# Patient Record
Sex: FEMALE | Race: WHITE | NOT HISPANIC OR LATINO | Employment: FULL TIME | URBAN - METROPOLITAN AREA
[De-identification: names, ages, dates, MRNs, and addresses within clinical notes are randomized per-mention and may not be internally consistent; named-entity substitution may affect disease eponyms.]

---

## 2017-05-05 ENCOUNTER — HOSPITAL ENCOUNTER (EMERGENCY)
Facility: HOSPITAL | Age: 52
Discharge: HOME | End: 2017-05-05
Attending: EMERGENCY MEDICINE | Admitting: EMERGENCY MEDICINE

## 2017-05-05 VITALS
WEIGHT: 127 LBS | OXYGEN SATURATION: 99 % | RESPIRATION RATE: 15 BRPM | SYSTOLIC BLOOD PRESSURE: 133 MMHG | HEIGHT: 67 IN | BODY MASS INDEX: 19.93 KG/M2 | HEART RATE: 63 BPM | DIASTOLIC BLOOD PRESSURE: 85 MMHG | TEMPERATURE: 98.5 F

## 2017-05-05 DIAGNOSIS — L29.8 PRURITIC ERYTHEMATOUS RASH: Primary | ICD-10-CM

## 2017-05-05 LAB
ALBUMIN SERPL-MCNC: 4 G/DL (ref 3.5–5.2)
ALBUMIN/GLOB SERPL: 1.1 G/DL
ALP SERPL-CCNC: 57 U/L (ref 39–117)
ALT SERPL W P-5'-P-CCNC: 15 U/L (ref 1–33)
ANION GAP SERPL CALCULATED.3IONS-SCNC: 15.1 MMOL/L
AST SERPL-CCNC: 22 U/L (ref 1–32)
BILIRUB SERPL-MCNC: 0.2 MG/DL (ref 0.1–1.2)
BUN BLD-MCNC: 44 MG/DL (ref 6–20)
BUN/CREAT SERPL: 13.2 (ref 7–25)
CALCIUM SPEC-SCNC: 9.4 MG/DL (ref 8.6–10.5)
CHLORIDE SERPL-SCNC: 103 MMOL/L (ref 98–107)
CO2 SERPL-SCNC: 17.9 MMOL/L (ref 22–29)
CREAT BLD-MCNC: 3.34 MG/DL (ref 0.57–1)
GFR SERPL CREATININE-BSD FRML MDRD: 15 ML/MIN/1.73
GLOBULIN UR ELPH-MCNC: 3.5 GM/DL
GLUCOSE BLD-MCNC: 94 MG/DL (ref 65–99)
HOLD SPECIMEN: NORMAL
POTASSIUM BLD-SCNC: 4.8 MMOL/L (ref 3.5–5.2)
PROT SERPL-MCNC: 7.5 G/DL (ref 6–8.5)
SODIUM BLD-SCNC: 136 MMOL/L (ref 136–145)
WHOLE BLOOD HOLD SPECIMEN: NORMAL
WHOLE BLOOD HOLD SPECIMEN: NORMAL

## 2017-05-05 PROCEDURE — 94640 AIRWAY INHALATION TREATMENT: CPT

## 2017-05-05 PROCEDURE — 25010000002 METHYLPREDNISOLONE PER 125 MG: Performed by: EMERGENCY MEDICINE

## 2017-05-05 PROCEDURE — 80053 COMPREHEN METABOLIC PANEL: CPT | Performed by: EMERGENCY MEDICINE

## 2017-05-05 PROCEDURE — 25010000002 DIPHENHYDRAMINE PER 50 MG: Performed by: EMERGENCY MEDICINE

## 2017-05-05 PROCEDURE — 99283 EMERGENCY DEPT VISIT LOW MDM: CPT

## 2017-05-05 PROCEDURE — 96374 THER/PROPH/DIAG INJ IV PUSH: CPT

## 2017-05-05 PROCEDURE — 96375 TX/PRO/DX INJ NEW DRUG ADDON: CPT

## 2017-05-05 RX ORDER — FAMOTIDINE 10 MG/ML
20 INJECTION, SOLUTION INTRAVENOUS ONCE
Status: COMPLETED | OUTPATIENT
Start: 2017-05-05 | End: 2017-05-05

## 2017-05-05 RX ORDER — DIPHENHYDRAMINE HYDROCHLORIDE 50 MG/ML
25 INJECTION INTRAMUSCULAR; INTRAVENOUS ONCE
Status: COMPLETED | OUTPATIENT
Start: 2017-05-05 | End: 2017-05-05

## 2017-05-05 RX ORDER — METHYLPREDNISOLONE SODIUM SUCCINATE 125 MG/2ML
125 INJECTION, POWDER, LYOPHILIZED, FOR SOLUTION INTRAMUSCULAR; INTRAVENOUS ONCE
Status: COMPLETED | OUTPATIENT
Start: 2017-05-05 | End: 2017-05-05

## 2017-05-05 RX ORDER — PREDNISONE 20 MG/1
20 TABLET ORAL 2 TIMES DAILY
Qty: 6 TABLET | Refills: 0 | Status: SHIPPED | OUTPATIENT
Start: 2017-05-05 | End: 2018-03-17

## 2017-05-05 RX ORDER — IPRATROPIUM BROMIDE AND ALBUTEROL SULFATE 2.5; .5 MG/3ML; MG/3ML
3 SOLUTION RESPIRATORY (INHALATION) ONCE
Status: COMPLETED | OUTPATIENT
Start: 2017-05-05 | End: 2017-05-05

## 2017-05-05 RX ORDER — DIPHENHYDRAMINE HCL 25 MG
25 TABLET ORAL EVERY 6 HOURS PRN
Qty: 15 TABLET | Refills: 0 | Status: SHIPPED | OUTPATIENT
Start: 2017-05-05 | End: 2018-04-11

## 2017-05-05 RX ORDER — SODIUM CHLORIDE 0.9 % (FLUSH) 0.9 %
10 SYRINGE (ML) INJECTION AS NEEDED
Status: DISCONTINUED | OUTPATIENT
Start: 2017-05-05 | End: 2017-05-05 | Stop reason: HOSPADM

## 2017-05-05 RX ORDER — FAMOTIDINE 20 MG/1
20 TABLET, FILM COATED ORAL 2 TIMES DAILY
Qty: 6 TABLET | Refills: 0 | Status: SHIPPED | OUTPATIENT
Start: 2017-05-05 | End: 2018-03-17

## 2017-05-05 RX ADMIN — FAMOTIDINE 20 MG: 10 INJECTION, SOLUTION INTRAVENOUS at 07:44

## 2017-05-05 RX ADMIN — IPRATROPIUM BROMIDE AND ALBUTEROL SULFATE 3 ML: .5; 3 SOLUTION RESPIRATORY (INHALATION) at 07:35

## 2017-05-05 RX ADMIN — DIPHENHYDRAMINE HYDROCHLORIDE 25 MG: 50 INJECTION, SOLUTION INTRAMUSCULAR; INTRAVENOUS at 07:46

## 2017-05-05 RX ADMIN — METHYLPREDNISOLONE SODIUM SUCCINATE 125 MG: 125 INJECTION, POWDER, FOR SOLUTION INTRAMUSCULAR; INTRAVENOUS at 07:42

## 2018-03-05 ENCOUNTER — LAB (OUTPATIENT)
Dept: LAB | Facility: HOSPITAL | Age: 53
End: 2018-03-05

## 2018-03-05 ENCOUNTER — TRANSCRIBE ORDERS (OUTPATIENT)
Dept: SLEEP MEDICINE | Facility: HOSPITAL | Age: 53
End: 2018-03-05

## 2018-03-05 DIAGNOSIS — R50.9 FEVER, UNSPECIFIED FEVER CAUSE: ICD-10-CM

## 2018-03-05 DIAGNOSIS — R05.9 COUGH: ICD-10-CM

## 2018-03-05 DIAGNOSIS — R50.9 FEVER, UNSPECIFIED FEVER CAUSE: Primary | ICD-10-CM

## 2018-03-05 LAB
FLUAV AG NPH QL: NEGATIVE
FLUBV AG NPH QL IA: NEGATIVE

## 2018-03-05 PROCEDURE — 87804 INFLUENZA ASSAY W/OPTIC: CPT

## 2018-03-17 ENCOUNTER — APPOINTMENT (OUTPATIENT)
Dept: CT IMAGING | Facility: HOSPITAL | Age: 53
End: 2018-03-17

## 2018-03-17 ENCOUNTER — HOSPITAL ENCOUNTER (INPATIENT)
Facility: HOSPITAL | Age: 53
LOS: 3 days | Discharge: HOME | End: 2018-03-20
Attending: FAMILY MEDICINE | Admitting: HOSPITALIST

## 2018-03-17 DIAGNOSIS — K57.92 ACUTE DIVERTICULITIS: Primary | ICD-10-CM

## 2018-03-17 DIAGNOSIS — R42 DIZZINESS: ICD-10-CM

## 2018-03-17 PROBLEM — N18.4 CKD (CHRONIC KIDNEY DISEASE) STAGE 4, GFR 15-29 ML/MIN: Status: ACTIVE | Noted: 2018-03-17

## 2018-03-17 PROBLEM — K57.32 SIGMOID DIVERTICULITIS: Status: ACTIVE | Noted: 2018-03-17

## 2018-03-17 PROBLEM — I10 HYPERTENSION: Status: ACTIVE | Noted: 2018-03-17

## 2018-03-17 PROBLEM — R63.6 UNDERWEIGHT: Status: ACTIVE | Noted: 2018-03-17

## 2018-03-17 LAB
ALBUMIN SERPL-MCNC: 3.4 G/DL (ref 3.5–5.2)
ALBUMIN/GLOB SERPL: 0.9 G/DL
ALP SERPL-CCNC: 75 U/L (ref 39–117)
ALT SERPL W P-5'-P-CCNC: 41 U/L (ref 1–33)
ANION GAP SERPL CALCULATED.3IONS-SCNC: 14.6 MMOL/L
AST SERPL-CCNC: 24 U/L (ref 1–32)
BACTERIA UR QL AUTO: NORMAL /HPF
BASOPHILS # BLD AUTO: 0.03 10*3/MM3 (ref 0–0.2)
BASOPHILS NFR BLD AUTO: 0.1 % (ref 0–1.5)
BILIRUB SERPL-MCNC: 0.6 MG/DL (ref 0.1–1.2)
BILIRUB UR QL STRIP: NEGATIVE
BUN BLD-MCNC: 42 MG/DL (ref 6–20)
BUN/CREAT SERPL: 17.2 (ref 7–25)
CALCIUM SPEC-SCNC: 9.8 MG/DL (ref 8.6–10.5)
CHLORIDE SERPL-SCNC: 99 MMOL/L (ref 98–107)
CLARITY UR: ABNORMAL
CO2 SERPL-SCNC: 23.4 MMOL/L (ref 22–29)
COLOR UR: YELLOW
CREAT BLD-MCNC: 2.44 MG/DL (ref 0.57–1)
D-LACTATE SERPL-SCNC: 1.4 MMOL/L (ref 0.5–2)
DEPRECATED RDW RBC AUTO: 43.4 FL (ref 37–54)
EOSINOPHIL # BLD AUTO: 0.19 10*3/MM3 (ref 0–0.7)
EOSINOPHIL NFR BLD AUTO: 0.7 % (ref 0.3–6.2)
ERYTHROCYTE [DISTWIDTH] IN BLOOD BY AUTOMATED COUNT: 13.5 % (ref 11.7–13)
GFR SERPL CREATININE-BSD FRML MDRD: 21 ML/MIN/1.73
GLOBULIN UR ELPH-MCNC: 3.7 GM/DL
GLUCOSE BLD-MCNC: 84 MG/DL (ref 65–99)
GLUCOSE UR STRIP-MCNC: NEGATIVE MG/DL
HCG SERPL QL: NEGATIVE
HCT VFR BLD AUTO: 40.7 % (ref 35.6–45.5)
HGB BLD-MCNC: 13.2 G/DL (ref 11.9–15.5)
HGB UR QL STRIP.AUTO: NEGATIVE
HOLD SPECIMEN: NORMAL
HOLD SPECIMEN: NORMAL
HYALINE CASTS UR QL AUTO: NORMAL /LPF
IMM GRANULOCYTES # BLD: 0.1 10*3/MM3 (ref 0–0.03)
IMM GRANULOCYTES NFR BLD: 0.4 % (ref 0–0.5)
KETONES UR QL STRIP: NEGATIVE
LEUKOCYTE ESTERASE UR QL STRIP.AUTO: NEGATIVE
LIPASE SERPL-CCNC: 144 U/L (ref 13–60)
LYMPHOCYTES # BLD AUTO: 1.86 10*3/MM3 (ref 0.9–4.8)
LYMPHOCYTES NFR BLD AUTO: 7 % (ref 19.6–45.3)
MCH RBC QN AUTO: 28.7 PG (ref 26.9–32)
MCHC RBC AUTO-ENTMCNC: 32.4 G/DL (ref 32.4–36.3)
MCV RBC AUTO: 88.5 FL (ref 80.5–98.2)
MONOCYTES # BLD AUTO: 2.48 10*3/MM3 (ref 0.2–1.2)
MONOCYTES NFR BLD AUTO: 9.3 % (ref 5–12)
NEUTROPHILS # BLD AUTO: 21.98 10*3/MM3 (ref 1.9–8.1)
NEUTROPHILS NFR BLD AUTO: 82.5 % (ref 42.7–76)
NITRITE UR QL STRIP: NEGATIVE
PH UR STRIP.AUTO: 6.5 [PH] (ref 5–8)
PLATELET # BLD AUTO: 312 10*3/MM3 (ref 140–500)
PMV BLD AUTO: 9.6 FL (ref 6–12)
POTASSIUM BLD-SCNC: 4.4 MMOL/L (ref 3.5–5.2)
PROT SERPL-MCNC: 7.1 G/DL (ref 6–8.5)
PROT UR QL STRIP: ABNORMAL
RBC # BLD AUTO: 4.6 10*6/MM3 (ref 3.9–5.2)
RBC # UR: NORMAL /HPF
REF LAB TEST METHOD: NORMAL
SODIUM BLD-SCNC: 137 MMOL/L (ref 136–145)
SP GR UR STRIP: 1.01 (ref 1–1.03)
SQUAMOUS #/AREA URNS HPF: NORMAL /HPF
UROBILINOGEN UR QL STRIP: ABNORMAL
WBC NRBC COR # BLD: 26.64 10*3/MM3 (ref 4.5–10.7)
WBC UR QL AUTO: NORMAL /HPF
WHOLE BLOOD HOLD SPECIMEN: NORMAL
WHOLE BLOOD HOLD SPECIMEN: NORMAL

## 2018-03-17 PROCEDURE — 25010000002 ONDANSETRON PER 1 MG: Performed by: HOSPITALIST

## 2018-03-17 PROCEDURE — 25010000002 MORPHINE PER 10 MG: Performed by: PHYSICIAN ASSISTANT

## 2018-03-17 PROCEDURE — 83605 ASSAY OF LACTIC ACID: CPT | Performed by: PHYSICIAN ASSISTANT

## 2018-03-17 PROCEDURE — 94640 AIRWAY INHALATION TREATMENT: CPT

## 2018-03-17 PROCEDURE — 99284 EMERGENCY DEPT VISIT MOD MDM: CPT

## 2018-03-17 PROCEDURE — 25010000002 PIPERACILLIN SOD-TAZOBACTAM PER 1 G: Performed by: PHYSICIAN ASSISTANT

## 2018-03-17 PROCEDURE — 84703 CHORIONIC GONADOTROPIN ASSAY: CPT | Performed by: FAMILY MEDICINE

## 2018-03-17 PROCEDURE — 81001 URINALYSIS AUTO W/SCOPE: CPT | Performed by: FAMILY MEDICINE

## 2018-03-17 PROCEDURE — 85025 COMPLETE CBC W/AUTO DIFF WBC: CPT | Performed by: FAMILY MEDICINE

## 2018-03-17 PROCEDURE — 25010000002 ONDANSETRON PER 1 MG: Performed by: PHYSICIAN ASSISTANT

## 2018-03-17 PROCEDURE — 80053 COMPREHEN METABOLIC PANEL: CPT | Performed by: FAMILY MEDICINE

## 2018-03-17 PROCEDURE — 25010000002 PIPERACILLIN SOD-TAZOBACTAM PER 1 G: Performed by: HOSPITALIST

## 2018-03-17 PROCEDURE — 25010000002 HYDROMORPHONE HCL PF 500 MG/50ML SOLUTION: Performed by: HOSPITALIST

## 2018-03-17 PROCEDURE — 74176 CT ABD & PELVIS W/O CONTRAST: CPT

## 2018-03-17 PROCEDURE — 83690 ASSAY OF LIPASE: CPT | Performed by: FAMILY MEDICINE

## 2018-03-17 PROCEDURE — 70450 CT HEAD/BRAIN W/O DYE: CPT

## 2018-03-17 RX ORDER — SODIUM CHLORIDE 9 MG/ML
100 INJECTION, SOLUTION INTRAVENOUS CONTINUOUS
Status: DISCONTINUED | OUTPATIENT
Start: 2018-03-17 | End: 2018-03-20

## 2018-03-17 RX ORDER — PARICALCITOL 1 UG/1
1 CAPSULE, LIQUID FILLED ORAL DAILY
Status: DISCONTINUED | OUTPATIENT
Start: 2018-03-17 | End: 2018-03-20 | Stop reason: HOSPADM

## 2018-03-17 RX ORDER — FLUTICASONE PROPIONATE 50 MCG
2 SPRAY, SUSPENSION (ML) NASAL DAILY
Status: DISCONTINUED | OUTPATIENT
Start: 2018-03-17 | End: 2018-03-20 | Stop reason: HOSPADM

## 2018-03-17 RX ORDER — BUDESONIDE AND FORMOTEROL FUMARATE DIHYDRATE 160; 4.5 UG/1; UG/1
2 AEROSOL RESPIRATORY (INHALATION)
Status: DISCONTINUED | OUTPATIENT
Start: 2018-03-17 | End: 2018-03-20 | Stop reason: HOSPADM

## 2018-03-17 RX ORDER — HYDROCODONE BITARTRATE AND ACETAMINOPHEN 5; 325 MG/1; MG/1
1 TABLET ORAL EVERY 4 HOURS PRN
Status: DISCONTINUED | OUTPATIENT
Start: 2018-03-17 | End: 2018-03-20 | Stop reason: HOSPADM

## 2018-03-17 RX ORDER — SODIUM CHLORIDE 0.9 % (FLUSH) 0.9 %
10 SYRINGE (ML) INJECTION AS NEEDED
Status: DISCONTINUED | OUTPATIENT
Start: 2018-03-17 | End: 2018-03-17

## 2018-03-17 RX ORDER — NALOXONE HCL 0.4 MG/ML
0.4 VIAL (ML) INJECTION
Status: DISCONTINUED | OUTPATIENT
Start: 2018-03-17 | End: 2018-03-20 | Stop reason: HOSPADM

## 2018-03-17 RX ORDER — SODIUM BICARBONATE 650 MG/1
650 TABLET ORAL 2 TIMES DAILY
Status: DISCONTINUED | OUTPATIENT
Start: 2018-03-17 | End: 2018-03-20 | Stop reason: HOSPADM

## 2018-03-17 RX ORDER — ACETAMINOPHEN 325 MG/1
650 TABLET ORAL EVERY 4 HOURS PRN
Status: DISCONTINUED | OUTPATIENT
Start: 2018-03-17 | End: 2018-03-20 | Stop reason: HOSPADM

## 2018-03-17 RX ORDER — ONDANSETRON 2 MG/ML
4 INJECTION INTRAMUSCULAR; INTRAVENOUS ONCE
Status: COMPLETED | OUTPATIENT
Start: 2018-03-17 | End: 2018-03-17

## 2018-03-17 RX ORDER — ONDANSETRON 2 MG/ML
4 INJECTION INTRAMUSCULAR; INTRAVENOUS EVERY 4 HOURS PRN
Status: DISCONTINUED | OUTPATIENT
Start: 2018-03-17 | End: 2018-03-20 | Stop reason: HOSPADM

## 2018-03-17 RX ORDER — HYDROMORPHONE HYDROCHLORIDE 1 MG/ML
0.5 INJECTION, SOLUTION INTRAMUSCULAR; INTRAVENOUS; SUBCUTANEOUS
Status: DISCONTINUED | OUTPATIENT
Start: 2018-03-17 | End: 2018-03-20 | Stop reason: HOSPADM

## 2018-03-17 RX ORDER — FEBUXOSTAT 40 MG/1
40 TABLET, FILM COATED ORAL DAILY
Status: DISCONTINUED | OUTPATIENT
Start: 2018-03-17 | End: 2018-03-20 | Stop reason: HOSPADM

## 2018-03-17 RX ORDER — LEVOTHYROXINE SODIUM 0.12 MG/1
125 TABLET ORAL
Status: DISCONTINUED | OUTPATIENT
Start: 2018-03-18 | End: 2018-03-20 | Stop reason: HOSPADM

## 2018-03-17 RX ADMIN — ONDANSETRON 4 MG: 2 INJECTION INTRAMUSCULAR; INTRAVENOUS at 12:49

## 2018-03-17 RX ADMIN — SODIUM CHLORIDE 1000 ML: 9 INJECTION, SOLUTION INTRAVENOUS at 12:43

## 2018-03-17 RX ADMIN — BUDESONIDE AND FORMOTEROL FUMARATE DIHYDRATE 2 PUFF: 160; 4.5 AEROSOL RESPIRATORY (INHALATION) at 20:57

## 2018-03-17 RX ADMIN — SODIUM CHLORIDE 100 ML/HR: 9 INJECTION, SOLUTION INTRAVENOUS at 17:00

## 2018-03-17 RX ADMIN — FEBUXOSTAT 40 MG: 40 TABLET ORAL at 18:47

## 2018-03-17 RX ADMIN — HYDROMORPHONE HYDROCHLORIDE 0.5 MG: 10 INJECTION INTRAMUSCULAR; INTRAVENOUS; SUBCUTANEOUS at 16:48

## 2018-03-17 RX ADMIN — SODIUM BICARBONATE 650 MG: 650 TABLET ORAL at 20:37

## 2018-03-17 RX ADMIN — TAZOBACTAM SODIUM AND PIPERACILLIN SODIUM 3.38 G: 375; 3 INJECTION, SOLUTION INTRAVENOUS at 14:07

## 2018-03-17 RX ADMIN — ONDANSETRON 4 MG: 2 INJECTION INTRAMUSCULAR; INTRAVENOUS at 20:37

## 2018-03-17 RX ADMIN — PARICALCITOL 1 MCG: 1 CAPSULE, LIQUID FILLED ORAL at 18:47

## 2018-03-17 RX ADMIN — FLUTICASONE PROPIONATE 2 SPRAY: 50 SPRAY, METERED NASAL at 18:47

## 2018-03-17 RX ADMIN — MORPHINE SULFATE 4 MG: 4 INJECTION, SOLUTION INTRAMUSCULAR; INTRAVENOUS at 12:49

## 2018-03-17 RX ADMIN — TAZOBACTAM SODIUM AND PIPERACILLIN SODIUM 3.38 G: 375; 3 INJECTION, SOLUTION INTRAVENOUS at 20:37

## 2018-03-17 NOTE — ED PROVIDER NOTES
"EMERGENCY DEPARTMENT ENCOUNTER    Room Number:  37/37  Date seen:  3/17/2018  Time seen: 12:20 PM  PCP: Pino Templeton MD    HPI:  Chief complaint: Lower abd pain  Context:Sun Rodriguez is a 52 y.o. female with a hx of asthma, hypertension, CKD, and diverticulitis who presents to the ED with c/o worsening lower abd pain onset about two days ago. She also complains of dizziness when laying down, rectal pain, and abnormal/ inconsistent urinary stream but denies any other symptoms at this time. She reports hx of diverticulitis which feels similar to her current symptoms. Pt reports recent URI like symptoms including ear pain and congestion which have been improving.        Onset: gradual  Location: lower abd  Radiation: none  Duration: about two days ago  Timing: constant  Character: \"pain\"  Aggravating Factors: none  Alleviating Factors: none  Severity: moderate        ALLERGIES  Review of patient's allergies indicates no known allergies.    PAST MEDICAL HISTORY  Active Ambulatory Problems     Diagnosis Date Noted   • No Active Ambulatory Problems     Resolved Ambulatory Problems     Diagnosis Date Noted   • No Resolved Ambulatory Problems     Past Medical History:   Diagnosis Date   • Asthma    • Bronchiectasis    • Chronic kidney disease    • Disease of thyroid gland    • Diverticulitis    • Hypercholesteremia    • Hypertension    • PONV (postoperative nausea and vomiting)    • Wegener's granulomatosis with renal involvement        PAST SURGICAL HISTORY  Past Surgical History:   Procedure Laterality Date   • COLONOSCOPY N/A 6/24/2016    Procedure: COLONOSCOPY INTO CECUM;  Surgeon: Bee Carreon MD;  Location: [facility] ENDOSCOPY;  Service:    • LUNG BIOPSY     • RENAL BIOPSY     • WISDOM TOOTH EXTRACTION         FAMILY HISTORY  Family History   Problem Relation Age of Onset   • Prostate cancer Father        SOCIAL HISTORY  Social History     Social History   • Marital status:      Spouse name: N/A   • " Number of children: N/A   • Years of education: N/A     Occupational History   • Not on file.     Social History Main Topics   • Smoking status: Never Smoker   • Smokeless tobacco: Never Used   • Alcohol use No   • Drug use: No   • Sexual activity: Yes     Partners: Male     Birth control/ protection: None     Other Topics Concern   • Not on file     Social History Narrative   • No narrative on file       REVIEW OF SYSTEMS  Review of Systems   Constitutional: Negative for fever.   HENT: Negative for sore throat.    Eyes: Negative.    Respiratory: Negative for cough and shortness of breath.    Cardiovascular: Negative for chest pain.   Gastrointestinal: Positive for abdominal pain (lower). Negative for diarrhea and vomiting.        Pt complains of rectal pain.   Genitourinary: Negative for dysuria.        Pt complains of abnormal/ inconsistent urinary stream.   Musculoskeletal: Negative for neck pain.   Skin: Negative for rash.   Allergic/Immunologic: Negative.    Neurological: Positive for dizziness. Negative for weakness, numbness and headaches.   Hematological: Negative.    Psychiatric/Behavioral: Negative.    All other systems reviewed and are negative.      PHYSICAL EXAM  ED Triage Vitals   Temp Heart Rate Resp BP SpO2   03/17/18 1130 03/17/18 1130 03/17/18 1130 03/17/18 1136 03/17/18 1130   99.8 °F (37.7 °C) 119 16 136/88 95 %      Temp src Heart Rate Source Patient Position BP Location FiO2 (%)   -- -- -- -- --            Physical Exam   Constitutional: She is oriented to person, place, and time and well-developed, well-nourished, and in no distress. No distress.   HENT:   Head: Normocephalic and atraumatic.   Right Ear: External ear normal.   Left Ear: External ear normal.   Nose: Nose normal.   Mouth/Throat: Mucous membranes are dry.   Eyes: Conjunctivae are normal.   Neck: Normal range of motion.   Cardiovascular: Normal rate and regular rhythm.    Pulmonary/Chest: Effort normal and breath sounds normal.    Abdominal: There is tenderness in the suprapubic area and left lower quadrant.   Normal bowel sounds  Soft  Nondistended  No rebound, guarding, or rigidity  No appreciable organomegaly  No CVA tenderness   Musculoskeletal: Normal range of motion.   Neurological: She is alert and oriented to person, place, and time.   GCS is 15  Cranial nerves II-XII are intact.  There is no dysarthria, aphasia or slurred speech.  Sensation is intact to light touch bilaterally and is symmetrical in the face, BUE and BLE.  Strength is 5/5 and symmetrical in the BUE and BLE.  Finger to nose, heel to shin, and rapid alternating movements are intact.  Gait is nml   Skin: Skin is warm and dry.   Psychiatric: Affect normal.   Nursing note and vitals reviewed.      LAB RESULTS  Recent Results (from the past 24 hour(s))   Comprehensive Metabolic Panel    Collection Time: 03/17/18 11:57 AM   Result Value Ref Range    Glucose 84 65 - 99 mg/dL    BUN 42 (H) 6 - 20 mg/dL    Creatinine 2.44 (H) 0.57 - 1.00 mg/dL    Sodium 137 136 - 145 mmol/L    Potassium 4.4 3.5 - 5.2 mmol/L    Chloride 99 98 - 107 mmol/L    CO2 23.4 22.0 - 29.0 mmol/L    Calcium 9.8 8.6 - 10.5 mg/dL    Total Protein 7.1 6.0 - 8.5 g/dL    Albumin 3.40 (L) 3.50 - 5.20 g/dL    ALT (SGPT) 41 (H) 1 - 33 U/L    AST (SGOT) 24 1 - 32 U/L    Alkaline Phosphatase 75 39 - 117 U/L    Total Bilirubin 0.6 0.1 - 1.2 mg/dL    eGFR Non African Amer 21 (L) >60 mL/min/1.73    Globulin 3.7 gm/dL    A/G Ratio 0.9 g/dL    BUN/Creatinine Ratio 17.2 7.0 - 25.0    Anion Gap 14.6 mmol/L   Lipase    Collection Time: 03/17/18 11:57 AM   Result Value Ref Range    Lipase 144 (H) 13 - 60 U/L   hCG, Serum, Qualitative    Collection Time: 03/17/18 11:57 AM   Result Value Ref Range    HCG Qualitative Negative Indeterminate, Negative   CBC Auto Differential    Collection Time: 03/17/18 11:58 AM   Result Value Ref Range    WBC 26.64 (H) 4.50 - 10.70 10*3/mm3    RBC 4.60 3.90 - 5.20 10*6/mm3    Hemoglobin 13.2  11.9 - 15.5 g/dL    Hematocrit 40.7 35.6 - 45.5 %    MCV 88.5 80.5 - 98.2 fL    MCH 28.7 26.9 - 32.0 pg    MCHC 32.4 32.4 - 36.3 g/dL    RDW 13.5 (H) 11.7 - 13.0 %    RDW-SD 43.4 37.0 - 54.0 fl    MPV 9.6 6.0 - 12.0 fL    Platelets 312 140 - 500 10*3/mm3    Neutrophil % 82.5 (H) 42.7 - 76.0 %    Lymphocyte % 7.0 (L) 19.6 - 45.3 %    Monocyte % 9.3 5.0 - 12.0 %    Eosinophil % 0.7 0.3 - 6.2 %    Basophil % 0.1 0.0 - 1.5 %    Immature Grans % 0.4 0.0 - 0.5 %    Neutrophils, Absolute 21.98 (H) 1.90 - 8.10 10*3/mm3    Lymphocytes, Absolute 1.86 0.90 - 4.80 10*3/mm3    Monocytes, Absolute 2.48 (H) 0.20 - 1.20 10*3/mm3    Eosinophils, Absolute 0.19 0.00 - 0.70 10*3/mm3    Basophils, Absolute 0.03 0.00 - 0.20 10*3/mm3    Immature Grans, Absolute 0.10 (H) 0.00 - 0.03 10*3/mm3   Urinalysis With / Culture If Indicated - Urine, Clean Catch    Collection Time: 03/17/18 12:05 PM   Result Value Ref Range    Color, UA Yellow Yellow, Straw    Appearance, UA Cloudy (A) Clear    pH, UA 6.5 5.0 - 8.0    Specific Gravity, UA 1.013 1.005 - 1.030    Glucose, UA Negative Negative    Ketones, UA Negative Negative    Bilirubin, UA Negative Negative    Blood, UA Negative Negative    Protein, UA 30 mg/dL (1+) (A) Negative    Leuk Esterase, UA Negative Negative    Nitrite, UA Negative Negative    Urobilinogen, UA 0.2 E.U./dL 0.2 - 1.0 E.U./dL   Urinalysis, Microscopic Only - Urine, Clean Catch    Collection Time: 03/17/18 12:05 PM   Result Value Ref Range    RBC, UA 0-2 None Seen, 0-2 /HPF    WBC, UA 0-2 None Seen, 0-2 /HPF    Bacteria, UA None Seen None Seen /HPF    Squamous Epithelial Cells, UA 0-2 None Seen, 0-2 /HPF    Hyaline Casts, UA 0-2 None Seen /LPF    Methodology Automated Microscopy        I ordered the above labs and reviewed the results    RADIOLOGY  CT Abdomen Pelvis Without Contrast    (Results Pending)   CT Head Without Contrast    (Results Pending)       I ordered the above noted radiological studies and reviewed the images  "on the PACS system.  Spoke with Dr. Clinton regarding CT/MRI scan results    MEDICATIONS GIVEN IN ER  Medications   sodium chloride 0.9 % flush 10 mL (not administered)   sodium chloride 0.9 % bolus 1,000 mL (1,000 mL Intravenous New Bag 3/17/18 1243)   morphine injection 4 mg (not administered)   ondansetron (ZOFRAN) injection 4 mg (not administered)         PROCEDURES  Procedures      PROGRESS AND CONSULTS    Progress Notes:    ED Course     1237 BP- 119/80 HR- 91 Temp- 99.8 °F (37.7 °C) O2 sat- 97%. Rechecked the patient who is in NAD and is resting comfortably. Informed pt of lab results which shows elevated WBC and Lipase. Informed pt of plan to order CT Abd/Pel and CT Head for further evaluation. Informed pt her dizziness symptoms sound most consistent with BPPV vs viral labrynthitis. She had a recent URI and still feels a lot of fullness in her ears with no other neuro deficits. Pt understands and agrees with treatment. All questions and concerns were addressed at this time.    1245 Reviewed pt's history and workup with Dr. Duran.  After a bedside evaluation; Dr. Duran agrees with the plan of care    1342 Discussed pt with Dr. Clinton (radiologist) who informs me of imaging results which shows acute sigmoid diverticulitis and negative CT Head.    1500: Discussed with Dr. Ibanez of [facility] who agrees to admit the patient for further evaluation and treatment.       Disposition vitals:  /80   Pulse 91   Temp 99.8 °F (37.7 °C)   Resp 16   Ht 170.2 cm (67\")   Wt 53.1 kg (117 lb)   SpO2 97%   BMI 18.32 kg/m²       DIAGNOSIS  Final diagnoses:   Acute diverticulitis   Dizziness       Documentation assistance provided by alena Lama for Jennifer Vang PA-C.  Information recorded by the alena was done at my direction and has been verified and validated by me.       Mauricio Lama  03/17/18 1342       WHITNEY Fox  03/17/18 1953    "

## 2018-03-17 NOTE — ED NOTES
"Patient notes intermittent dizziness when she is experiencing pain in abdomen.  Patient states, \"when I lay down flat, the room is spinning.\"  Patient denies dizziness in current position in bed.      Rell Barboza RN  03/17/18 1148    "

## 2018-03-17 NOTE — H&P
Name: Sun Rodriguez ADMIT: 3/17/2018   : 1965  PCP: Pino Templeton MD    MRN: [id number] LOS: 0 days   AGE/SEX: 52 y.o. female  ROOM: 37/37     Chief Complaint   Patient presents with   • Abdominal Pain       Subjective   Ms. Rodriguez is a 52 y.o. female with a history of CKD, HTN and prior diverticulitis that presents to [facility] complaining of abdominal pain.    Abdominal Pain   This is a new problem. Episode onset: started Thursday. The onset quality is gradual. The problem occurs intermittently. The problem has been gradually worsening. The pain is located in the LLQ, RLQ and suprapubic region. The pain is severe. The quality of the pain is sharp. The abdominal pain does not radiate. Associated symptoms include anorexia and nausea. Pertinent negatives include no constipation, diarrhea, fever or vomiting. Associated symptoms comments: dizziness. The pain is aggravated by certain positions, movement and eating. The pain is relieved by nothing. She has tried nothing for the symptoms. Prior diagnostic workup includes CT scan. Diverticular disease     She reports recovering from a URI over the past 3 weeks. Minimal cough is residual. Since that time she's had intermittent dizziness that is worse when she lays down. It is not worse with change of position or head movement. Feels as if room spins. There is mild associated nausea with this. No presyncope or syncope. Her respiratory illness was treated with antibiotics and steroids.      Past Medical History:   Diagnosis Date   • Asthma    • Bronchiectasis    • Chronic kidney disease    • Disease of thyroid gland    • Diverticulitis    • Hypercholesteremia    • Hypertension    • PONV (postoperative nausea and vomiting)    • Wegener's granulomatosis with renal involvement      Past Surgical History:   Procedure Laterality Date   • COLONOSCOPY N/A 2016    Procedure: COLONOSCOPY INTO CECUM;  Surgeon: Bee Carreon MD;  Location:   [facility] ENDOSCOPY;  Service:    • LUNG BIOPSY     • RENAL BIOPSY     • WISDOM TOOTH EXTRACTION       Family History   Problem Relation Age of Onset   • Prostate cancer Father      Social History   Substance Use Topics   • Smoking status: Never Smoker   • Smokeless tobacco: Never Used   • Alcohol use No       (Not in a hospital admission)  Allergies:  No Known Allergies    Review of Systems   Constitutional: Positive for unexpected weight change (15 lbs over course of last year (PCP suspected thyroid related)). Negative for fever.   HENT: Positive for congestion.    Eyes: Negative.    Respiratory: Positive for cough. Negative for wheezing.    Cardiovascular: Negative.  Negative for chest pain.   Gastrointestinal: Positive for abdominal pain, anorexia and nausea. Negative for constipation, diarrhea and vomiting.   Endocrine: Negative.    Genitourinary: Negative.    Musculoskeletal: Negative.    Skin: Negative.    Neurological: Positive for dizziness.   Hematological: Negative.    Psychiatric/Behavioral: Negative.         Objective    Vital Signs  Temp:  [99.8 °F (37.7 °C)] 99.8 °F (37.7 °C)  Heart Rate:  [] 83  Resp:  [16] 16  BP: (118-140)/(80-88) 140/82  SpO2:  [95 %-100 %] 100 %  on   ;   Device (Oxygen Therapy): room air  Body mass index is 18.32 kg/m².    Physical Exam   Constitutional: She is oriented to person, place, and time. No distress.   Frail. Poor muscle mass.   HENT:   Head: Normocephalic and atraumatic.   Eyes: Conjunctivae and EOM are normal. No scleral icterus. Right eye exhibits no nystagmus. Left eye exhibits no nystagmus.   Neck: Normal range of motion. Neck supple. No tracheal deviation present.   Cardiovascular: Regular rhythm.  Tachycardia present.    No murmur heard.  Pulmonary/Chest: Effort normal and breath sounds normal. No respiratory distress. She has no wheezes. She has no rales.   Abdominal: Soft. Bowel sounds are normal. She exhibits no distension and no mass. There is tenderness in  the right lower quadrant, suprapubic area and left lower quadrant. There is no rebound and no guarding.   Musculoskeletal: Normal range of motion. She exhibits no edema or deformity.   Neurological: She is alert and oriented to person, place, and time. No cranial nerve deficit.   Skin: Skin is warm and dry. No rash noted. No erythema.   Psychiatric: She has a normal mood and affect. Her behavior is normal. Judgment and thought content normal.   Nursing note and vitals reviewed.      Results Review:   I reviewed the patient's new clinical results.    Lab Results (last 24 hours)     Procedure Component Value Units Date/Time    Comprehensive Metabolic Panel [[id number]]  (Abnormal) Collected:  03/17/18 1157    Specimen:  Blood Updated:  03/17/18 1229     Glucose 84 mg/dL      BUN 42 (H) mg/dL      Creatinine 2.44 (H) mg/dL      Sodium 137 mmol/L      Potassium 4.4 mmol/L      Chloride 99 mmol/L      CO2 23.4 mmol/L      Calcium 9.8 mg/dL      Total Protein 7.1 g/dL      Albumin 3.40 (L) g/dL      ALT (SGPT) 41 (H) U/L      AST (SGOT) 24 U/L      Alkaline Phosphatase 75 U/L      Total Bilirubin 0.6 mg/dL      eGFR Non African Amer 21 (L) mL/min/1.73      Globulin 3.7 gm/dL      A/G Ratio 0.9 g/dL      BUN/Creatinine Ratio 17.2     Anion Gap 14.6 mmol/L     Lipase [[id number]]  (Abnormal) Collected:  03/17/18 1157    Specimen:  Blood Updated:  03/17/18 1229     Lipase 144 (H) U/L     hCG, Serum, Qualitative [[id number]]  (Normal) Collected:  03/17/18 1157    Specimen:  Blood Updated:  03/17/18 1224     HCG Qualitative Negative    CBC & Differential [[id number]] Collected:  03/17/18 1158    Specimen:  Blood Updated:  03/17/18 1211    Narrative:       The following orders were created for panel order CBC & Differential.  Procedure                               Abnormality         Status                     ---------                               -----------         ------                     CBC Auto Differential[[id number]]         Abnormal            Final result                 Please view results for these tests on the individual orders.    CBC Auto Differential [[id number]]  (Abnormal) Collected:  03/17/18 1158    Specimen:  Blood Updated:  03/17/18 1211     WBC 26.64 (H) 10*3/mm3      RBC 4.60 10*6/mm3      Hemoglobin 13.2 g/dL      Hematocrit 40.7 %      MCV 88.5 fL      MCH 28.7 pg      MCHC 32.4 g/dL      RDW 13.5 (H) %      RDW-SD 43.4 fl      MPV 9.6 fL      Platelets 312 10*3/mm3      Neutrophil % 82.5 (H) %      Lymphocyte % 7.0 (L) %      Monocyte % 9.3 %      Eosinophil % 0.7 %      Basophil % 0.1 %      Immature Grans % 0.4 %      Neutrophils, Absolute 21.98 (H) 10*3/mm3      Lymphocytes, Absolute 1.86 10*3/mm3      Monocytes, Absolute 2.48 (H) 10*3/mm3      Eosinophils, Absolute 0.19 10*3/mm3      Basophils, Absolute 0.03 10*3/mm3      Immature Grans, Absolute 0.10 (H) 10*3/mm3     Urinalysis With / Culture If Indicated - Urine, Clean Catch [[id number]]  (Abnormal) Collected:  03/17/18 1205    Specimen:  Urine from Urine, Clean Catch Updated:  03/17/18 1226     Color, UA Yellow     Appearance, UA Cloudy (A)     pH, UA 6.5     Specific Gravity, UA 1.013     Glucose, UA Negative     Ketones, UA Negative     Bilirubin, UA Negative     Blood, UA Negative     Protein, UA 30 mg/dL (1+) (A)     Leuk Esterase, UA Negative     Nitrite, UA Negative     Urobilinogen, UA 0.2 E.U./dL    Urinalysis, Microscopic Only - Urine, Clean Catch [[id number]] Collected:  03/17/18 1205    Specimen:  Urine from Urine, Clean Catch Updated:  03/17/18 1227     RBC, UA 0-2 /HPF      WBC, UA 0-2 /HPF      Bacteria, UA None Seen /HPF      Squamous Epithelial Cells, UA 0-2 /HPF      Hyaline Casts, UA 0-2 /LPF      Methodology Automated Microscopy    Lactic Acid, Plasma [[id number]]  (Normal) Collected:  03/17/18 1243    Specimen:  Blood Updated:  03/17/18 1309     Lactate 1.4 mmol/L           CT Abdomen Pelvis Without Contrast   Final Result   Moderate acute  sigmoid diverticulitis. There is no abscess   or free air.       This report was finalized on 3/17/2018 1:42 PM by Dr. Con Clinton MD.          CT Head Without Contrast   Final Result   Normal CT scan of the head without contrast.       This report was finalized on 3/17/2018 1:24 PM by Dr. Con Clinton MD.            Assessment/Plan      Active Hospital Problems (** Indicates Principal Problem)    Diagnosis Date Noted   • **Acute sigmoid diverticulitis [K57.32] 03/17/2018   • Hypertension [I10] 03/17/2018   • CKD (chronic kidney disease) stage 4, GFR 15-29 ml/min [N18.4] 03/17/2018   • Dizziness [R42] 03/17/2018   • Underweight BMI 18.3 [R63.6] 03/17/2018      Resolved Hospital Problems    Diagnosis Date Noted Date Resolved   No resolved problems to display.       Ms. Rodriguez is a 52 y.o. female with a h/o CKD4, HTN and prior diverticulitis who presents with abdominal pain. Her presentation is consistent with diverticulitis as evidenced by LLQ abdominal pain/tenderness, known diverticula, similarity to prior episode and imaging findings. Case has no complications.    · NPO except ice/sips for now. Will advance based on response to treatment.   · ZOSYN IV to cover GNRs and anaerobes  · General surgery to see, probably on Monday unless problems in the interim.  · NS @ 100 cc/hr  · Pain and nausea control with IV DILAUDID and ZOFRAN as needed.  · Monitor vital signs and pain  · Bowel regimen to prevent constipation including MiraLAX.    · Monitor renal function. Currently creatinine around baseline.  · Dizziness likely vestibular. Will monitor this with hydration.      I discussed the patients findings and my recommendations with patient.      Gabriel Ibanez MD  [facility]  03/17/18  3:00 PM

## 2018-03-17 NOTE — ED PROVIDER NOTES
Pt presents c/o worsening lower abd pain which began two days ago. Pt also complains of dizziness when laying down, rectal pain, and abnormal/ inconsistent urinary stream but denies any other symptoms at this time.    On exam, abd is in tender in BLQ without guarding or rebound, otherwise nml.     Notified pt of dx of sigmoid diverticulitis. Notified pt of plan to admit for further evaluation. Pt understands and agrees with the plan, all questions answered.    The GABRIEL and I have discussed this patient's history, physical exam, and treatment plan.  I have reviewed the documentation and personally had a face to face interaction with the patient. I affirm the documentation and agree with the treatment and plan.  The attached note describes my personal findings.    Documentation assistance provided by alena Freitas for Dr. Duran.  Information recorded by the scribe was done at my direction and has been verified and validated by me.       Fe Freitas  03/17/18 153       Saeed Duran MD  03/17/18 2070

## 2018-03-17 NOTE — PROGRESS NOTES
" Pharmacy to Dose - Zosyn   Day: 1  Per Dr. Ibanez  Indication: Intra-abdominal infection     Relevant clinical data and objective history reviewed:  52 y.o. female 170.2 cm (67\") 50.9 kg (112 lb 3.2 oz)    Antimicrobials:  Day 1 : Zosyn 3.375 g IV q8h        Renal:    Results from last 7 days  Lab Units 18  1157   CREATININE mg/dL 2.44*     Estimated Creatinine Clearance: 21.9 mL/min (by C-G formula based on SCr of 2.44 mg/dL (H)).  No intake or output data in the 24 hours ending 18 1802      Vitals:  Temp (24hrs), Av.5 °F (37.5 °C), Min:99.1 °F (37.3 °C), Max:99.8 °F (37.7 °C)    Lab Results   Component Value Date    WBC 26.64 (H) 2018     SpO2 Percentage    18 1409 18 1503 18 1623   SpO2: 100% 96% 95%     Vitals:    18 1333 18 1409 18 1503 18 1623   BP: 118/80 140/82 114/75 111/86   BP Location:  Right arm  Left arm   Patient Position:  Lying  Lying   Pulse: 85 83 78 78   Resp:  16  16   Temp:    99.1 °F (37.3 °C)   TempSrc:    Oral   SpO2: 97% 100% 96% 95%   Weight:    50.9 kg (112 lb 3.2 oz)   Height:    170.2 cm (67\")       Microbiology/Imaging:  3/5L: Flu swab: negative     Assessment/Plan:    Based on patient's renal function of 21.9 ml/min will dose with Zosyn 3.375g IV q8h. Can consider changing to q12h if renal function does not improve. Patient got a once dose at ~1400. We re-time maintenance dose for ~6hrs later. Monitor renal function, clinical status, and adjust accordingly.      Tito Brewster, PharmD  PGY-1 Pharmacy Resident     "

## 2018-03-17 NOTE — ED TRIAGE NOTES
Pt reports left lower abd. Pain started yesterday intermittently. Pt reports increased severity and consistency today. Hx diverticulosis

## 2018-03-18 PROBLEM — A41.9 SEPSIS (HCC): Status: ACTIVE | Noted: 2018-03-18

## 2018-03-18 LAB
ANION GAP SERPL CALCULATED.3IONS-SCNC: 17.2 MMOL/L
BUN BLD-MCNC: 38 MG/DL (ref 6–20)
BUN/CREAT SERPL: 13.5 (ref 7–25)
CALCIUM SPEC-SCNC: 9.3 MG/DL (ref 8.6–10.5)
CHLORIDE SERPL-SCNC: 99 MMOL/L (ref 98–107)
CO2 SERPL-SCNC: 20.8 MMOL/L (ref 22–29)
CREAT BLD-MCNC: 2.82 MG/DL (ref 0.57–1)
DEPRECATED RDW RBC AUTO: 43.8 FL (ref 37–54)
ERYTHROCYTE [DISTWIDTH] IN BLOOD BY AUTOMATED COUNT: 13.4 % (ref 11.7–13)
GFR SERPL CREATININE-BSD FRML MDRD: 18 ML/MIN/1.73
GLUCOSE BLD-MCNC: 83 MG/DL (ref 65–99)
HCT VFR BLD AUTO: 37.7 % (ref 35.6–45.5)
HGB BLD-MCNC: 12 G/DL (ref 11.9–15.5)
MCH RBC QN AUTO: 28.4 PG (ref 26.9–32)
MCHC RBC AUTO-ENTMCNC: 31.8 G/DL (ref 32.4–36.3)
MCV RBC AUTO: 89.1 FL (ref 80.5–98.2)
PLATELET # BLD AUTO: 264 10*3/MM3 (ref 140–500)
PMV BLD AUTO: 9.8 FL (ref 6–12)
POTASSIUM BLD-SCNC: 5 MMOL/L (ref 3.5–5.2)
RBC # BLD AUTO: 4.23 10*6/MM3 (ref 3.9–5.2)
SODIUM BLD-SCNC: 137 MMOL/L (ref 136–145)
WBC NRBC COR # BLD: 28.73 10*3/MM3 (ref 4.5–10.7)

## 2018-03-18 PROCEDURE — 25010000002 ONDANSETRON PER 1 MG: Performed by: HOSPITALIST

## 2018-03-18 PROCEDURE — 25010000002 PIPERACILLIN SOD-TAZOBACTAM PER 1 G: Performed by: HOSPITALIST

## 2018-03-18 PROCEDURE — 94799 UNLISTED PULMONARY SVC/PX: CPT

## 2018-03-18 PROCEDURE — 85027 COMPLETE CBC AUTOMATED: CPT | Performed by: HOSPITALIST

## 2018-03-18 PROCEDURE — 25010000002 HYDROMORPHONE HCL PF 500 MG/50ML SOLUTION: Performed by: HOSPITALIST

## 2018-03-18 PROCEDURE — 80048 BASIC METABOLIC PNL TOTAL CA: CPT | Performed by: HOSPITALIST

## 2018-03-18 RX ADMIN — BUDESONIDE AND FORMOTEROL FUMARATE DIHYDRATE 2 PUFF: 160; 4.5 AEROSOL RESPIRATORY (INHALATION) at 20:55

## 2018-03-18 RX ADMIN — ONDANSETRON 4 MG: 2 INJECTION INTRAMUSCULAR; INTRAVENOUS at 16:18

## 2018-03-18 RX ADMIN — TAZOBACTAM SODIUM AND PIPERACILLIN SODIUM 3.38 G: 375; 3 INJECTION, SOLUTION INTRAVENOUS at 23:56

## 2018-03-18 RX ADMIN — TAZOBACTAM SODIUM AND PIPERACILLIN SODIUM 3.38 G: 375; 3 INJECTION, SOLUTION INTRAVENOUS at 12:50

## 2018-03-18 RX ADMIN — TAZOBACTAM SODIUM AND PIPERACILLIN SODIUM 3.38 G: 375; 3 INJECTION, SOLUTION INTRAVENOUS at 04:13

## 2018-03-18 RX ADMIN — LEVOTHYROXINE SODIUM 125 MCG: 125 TABLET ORAL at 05:55

## 2018-03-18 RX ADMIN — BUDESONIDE AND FORMOTEROL FUMARATE DIHYDRATE 2 PUFF: 160; 4.5 AEROSOL RESPIRATORY (INHALATION) at 08:51

## 2018-03-18 RX ADMIN — HYDROMORPHONE HYDROCHLORIDE 0.5 MG: 10 INJECTION INTRAMUSCULAR; INTRAVENOUS; SUBCUTANEOUS at 20:24

## 2018-03-18 RX ADMIN — ACETAMINOPHEN 650 MG: 325 TABLET ORAL at 11:08

## 2018-03-18 RX ADMIN — SODIUM CHLORIDE 100 ML/HR: 9 INJECTION, SOLUTION INTRAVENOUS at 17:48

## 2018-03-18 RX ADMIN — PARICALCITOL 1 MCG: 1 CAPSULE, LIQUID FILLED ORAL at 09:01

## 2018-03-18 RX ADMIN — SODIUM BICARBONATE 650 MG: 650 TABLET ORAL at 20:24

## 2018-03-18 RX ADMIN — FEBUXOSTAT 40 MG: 40 TABLET ORAL at 09:01

## 2018-03-18 RX ADMIN — SODIUM BICARBONATE 650 MG: 650 TABLET ORAL at 09:01

## 2018-03-18 RX ADMIN — SODIUM CHLORIDE 100 ML/HR: 9 INJECTION, SOLUTION INTRAVENOUS at 01:58

## 2018-03-18 RX ADMIN — ONDANSETRON 4 MG: 2 INJECTION INTRAMUSCULAR; INTRAVENOUS at 20:16

## 2018-03-18 RX ADMIN — FLUTICASONE PROPIONATE 2 SPRAY: 50 SPRAY, METERED NASAL at 09:02

## 2018-03-18 NOTE — PROGRESS NOTES
Name: Sun Rodriguez ADMIT: 3/17/2018   : 1965  PCP: Pino Templeton MD    MRN: [id number] LOS: 1 days   AGE/SEX: 52 y.o. female  ROOM: [facility]/     Subjective   States her abdominal pain has improved. Does not require pain medication since last night. No nausea or vomiting. No new complaints.    Objective   Vital Signs  Temp:  [98 °F (36.7 °C)-99.8 °F (37.7 °C)] 98.6 °F (37 °C)  Heart Rate:  [] 112  Resp:  [16] 16  BP: (111-140)/(70-90) 118/70  Body mass index is 17.57 kg/m².    Physical Exam   Constitutional: She is oriented to person, place, and time. She is cooperative. No distress.   Frail. Poor muscle mass.    Neck: No JVD present. No tracheal deviation present. No thyromegaly present.   Cardiovascular: Regular rhythm.  Tachycardia present.    No murmur heard.  Pulmonary/Chest: Effort normal and breath sounds normal. No respiratory distress.   Abdominal: Soft. Normal appearance and bowel sounds are normal. She exhibits no distension. There is tenderness in the right lower quadrant, suprapubic area and left lower quadrant. There is no rebound and no guarding.   Neurological: She is alert and oriented to person, place, and time.   Skin: Skin is warm and dry. No rash noted.   Psychiatric: She has a normal mood and affect. Her behavior is normal.   Nursing note and vitals reviewed.      Results Review:       I reviewed the patient's new clinical results.    Results from last 7 days  Lab Units 18  0702 18  1158   WBC 10*3/mm3 28.73* 26.64*   HEMOGLOBIN g/dL 12.0 13.2   PLATELETS 10*3/mm3 264 312     Results from last 7 days  Lab Units 18  0702 18  1157   SODIUM mmol/L 137 137   POTASSIUM mmol/L 5.0 4.4   CHLORIDE mmol/L 99 99   CO2 mmol/L 20.8* 23.4   BUN mg/dL 38* 42*   CREATININE mg/dL 2.82* 2.44*   GLUCOSE mg/dL 83 84   Estimated Creatinine Clearance: 19 mL/min (by C-G formula based on SCr of 2.82 mg/dL (H)).  Results from last 7 days  Lab Units 18  0702  03/17/18  1157   CALCIUM mg/dL 9.3 9.8   ALBUMIN g/dL  --  3.40*       CT SCAN ABDOMEN AND PELVIS  3/17/2018  Moderate acute sigmoid diverticulitis. There is no abscess  or free air.      budesonide-formoterol 2 puff Inhalation BID - RT   febuxostat 40 mg Oral Daily   fluticasone 2 spray Each Nare Daily   levothyroxine 125 mcg Oral Q AM   paricalcitol 1 mcg Oral Daily   piperacillin-tazobactam 3.375 g Intravenous Q8H   polyethylene glycol 17 g Oral Daily   sodium bicarbonate 650 mg Oral BID       Pharmacy to Dose Zosyn     sodium chloride 100 mL/hr Last Rate: 100 mL/hr (03/18/18 0158)   NPO Diet NPO Except: Ice Chips, Sips With Meds      Assessment/Plan      Active Hospital Problems (** Indicates Principal Problem)    Diagnosis Date Noted   • **Acute sigmoid diverticulitis [K57.32] 03/17/2018   • Sepsis present on admission [A41.9] 03/18/2018   • Hypertension [I10] 03/17/2018   • CKD (chronic kidney disease) stage 4, GFR 15-29 ml/min [N18.4] 03/17/2018   • Dizziness [R42] 03/17/2018   • Underweight BMI 17.6 [R63.6] 03/17/2018      Resolved Hospital Problems    Diagnosis Date Noted Date Resolved   No resolved problems to display.       Ms. Rodriguez is a 52 y.o. female with a h/o CKD4 due to GPA, HTN and prior diverticulitis who presented with abdominal pain consistent with diverticulitis as evidenced by LLQ abdominal pain/tenderness, known diverticula, similarity to prior episode and imaging findings.     · Since her pain is improved will try her on clear liquids. Monitor WBC closely. Continue ZOSYN IV to cover GNRs and anaerobes.  · Consider general surgery to see on Monday if WBC has not improved. Previously has seen Dr. Bee Carreon.   · Continue NS @ 100 cc/hr  · Pain and nausea control with IV DILAUDID and ZOFRAN as needed.  · Monitor renal function. Currently creatinine around baseline. Consider consulting [facility] if creatinine rises any further.  · Dizziness likely vestibular. Will monitor this  with hydration. Improved.  · Nutrition consult pending. Suspect malnutrition.      Gabriel Ibanez MD  [facility]  03/18/18  9:41 AM

## 2018-03-18 NOTE — PLAN OF CARE
Problem: Patient Care Overview  Goal: Plan of Care Review  Outcome: Ongoing (interventions implemented as appropriate)   03/18/18 9502   Coping/Psychosocial   Plan of Care Reviewed With patient   Plan of Care Review   Progress no change   OTHER   Outcome Summary Patients vitals stable. Patient reports some nausea this shift and PRN medication given. Patient started on IV antibiotics and tolerating without difficulty. Patient asleep most of shift. Will continue to monitor.       Problem: Pain, Acute (Adult)  Goal: Identify Related Risk Factors and Signs and Symptoms  Outcome: Ongoing (interventions implemented as appropriate)    Goal: Acceptable Pain Control/Comfort Level  Outcome: Ongoing (interventions implemented as appropriate)

## 2018-03-18 NOTE — PROGRESS NOTES
Pharmacy consult to dose Zosyn    Sun Rodriguez is a 52 y.o. female 50.9 kg (112 lb 3.2 oz).  Pharmacy consulted to dose for intra abdominal infection per Dr Ibanez's request.    Anti-Infectives     Ordered     Dose/Rate Route Frequency Start Stop    03/17/18 1727  piperacillin-tazobactam (ZOSYN) 3.375 g in iso-osmotic dextrose 50 ml (premix)     Ordering Provider:  Gabriel Ibanez MD    3.375 g  over 4 Hours Intravenous Every 8 Hours 03/17/18 2000 03/24/18 1959 03/17/18 1627  Pharmacy to Dose Zosyn     Ordering Provider:  Gabriel Ibanez MD     Does not apply Continuous PRN 03/17/18 1627 03/24/18 1626    03/17/18 1345  piperacillin-tazobactam (ZOSYN) 3.375 g in iso-osmotic dextrose 50 ml (premix)     Ordering Provider:  WHITNEY Fox    3.375 g  over 0.5 Hours Intravenous Once 03/17/18 1347 03/17/18 1437           Estimated Creatinine Clearance: 19 mL/min (by C-G formula based on SCr of 2.82 mg/dL (H)).  Creatinine   Date Value Ref Range Status   03/18/2018 2.82 (H) 0.57 - 1.00 mg/dL Final   03/17/2018 2.44 (H) 0.57 - 1.00 mg/dL Final     WBC   Date Value Ref Range Status   03/18/2018 28.73 (H) 4.50 - 10.70 10*3/mm3 Final   03/17/2018 26.64 (H) 4.50 - 10.70 10*3/mm3 Final     Temp Readings from Last 2 Encounters:   03/18/18 98.6 °F (37 °C) (Oral)   05/05/17 98.5 °F (36.9 °C)       Microbiology/Imaging:  3/5L: Flu swab: negative      PLAN:  1.CrCl today= 19 Srcr= 2.82       Past srcr  2.49 mg% on 2/10 and 3.34 mg%  5/5/17    2.  Will renally adjust Zosyn to 3.375 gm IV Q 12 hr infused over 4 hours.  Will monitor and adjust as needed.      Ana Ochoa, PharmD,BCACP  03/18/18 12:57 PM

## 2018-03-19 LAB
ANION GAP SERPL CALCULATED.3IONS-SCNC: 11.4 MMOL/L
BUN BLD-MCNC: 32 MG/DL (ref 6–20)
BUN/CREAT SERPL: 10.7 (ref 7–25)
CALCIUM SPEC-SCNC: 9.3 MG/DL (ref 8.6–10.5)
CHLORIDE SERPL-SCNC: 104 MMOL/L (ref 98–107)
CHLORIDE UR-SCNC: 65 MMOL/L
CO2 SERPL-SCNC: 24.6 MMOL/L (ref 22–29)
CREAT BLD-MCNC: 3 MG/DL (ref 0.57–1)
CREAT UR-MCNC: 26 MG/DL
DEPRECATED RDW RBC AUTO: 44 FL (ref 37–54)
ERYTHROCYTE [DISTWIDTH] IN BLOOD BY AUTOMATED COUNT: 13.5 % (ref 11.7–13)
GFR SERPL CREATININE-BSD FRML MDRD: 16 ML/MIN/1.73
GLUCOSE BLD-MCNC: 101 MG/DL (ref 65–99)
HCT VFR BLD AUTO: 35.1 % (ref 35.6–45.5)
HGB BLD-MCNC: 10.9 G/DL (ref 11.9–15.5)
MCH RBC QN AUTO: 27.9 PG (ref 26.9–32)
MCHC RBC AUTO-ENTMCNC: 31.1 G/DL (ref 32.4–36.3)
MCV RBC AUTO: 89.8 FL (ref 80.5–98.2)
PLATELET # BLD AUTO: 242 10*3/MM3 (ref 140–500)
PMV BLD AUTO: 9.7 FL (ref 6–12)
POTASSIUM BLD-SCNC: 4.8 MMOL/L (ref 3.5–5.2)
PROT UR-MCNC: 11 MG/DL
PROT/CREAT UR: 423.1 MG/G CREA
RBC # BLD AUTO: 3.91 10*6/MM3 (ref 3.9–5.2)
SODIUM BLD-SCNC: 140 MMOL/L (ref 136–145)
SODIUM UR-SCNC: 85 MMOL/L
WBC NRBC COR # BLD: 16.02 10*3/MM3 (ref 4.5–10.7)

## 2018-03-19 PROCEDURE — 85027 COMPLETE CBC AUTOMATED: CPT | Performed by: HOSPITALIST

## 2018-03-19 PROCEDURE — 94799 UNLISTED PULMONARY SVC/PX: CPT

## 2018-03-19 PROCEDURE — 80048 BASIC METABOLIC PNL TOTAL CA: CPT | Performed by: HOSPITALIST

## 2018-03-19 PROCEDURE — 82436 ASSAY OF URINE CHLORIDE: CPT | Performed by: INTERNAL MEDICINE

## 2018-03-19 PROCEDURE — 84156 ASSAY OF PROTEIN URINE: CPT | Performed by: INTERNAL MEDICINE

## 2018-03-19 PROCEDURE — 25010000002 PIPERACILLIN SOD-TAZOBACTAM PER 1 G: Performed by: HOSPITALIST

## 2018-03-19 PROCEDURE — 82570 ASSAY OF URINE CREATININE: CPT | Performed by: INTERNAL MEDICINE

## 2018-03-19 PROCEDURE — 84300 ASSAY OF URINE SODIUM: CPT | Performed by: INTERNAL MEDICINE

## 2018-03-19 RX ADMIN — SODIUM CHLORIDE 100 ML/HR: 9 INJECTION, SOLUTION INTRAVENOUS at 05:34

## 2018-03-19 RX ADMIN — SODIUM BICARBONATE 650 MG: 650 TABLET ORAL at 08:30

## 2018-03-19 RX ADMIN — BUDESONIDE AND FORMOTEROL FUMARATE DIHYDRATE 2 PUFF: 160; 4.5 AEROSOL RESPIRATORY (INHALATION) at 20:30

## 2018-03-19 RX ADMIN — TAZOBACTAM SODIUM AND PIPERACILLIN SODIUM 3.38 G: 375; 3 INJECTION, SOLUTION INTRAVENOUS at 22:59

## 2018-03-19 RX ADMIN — FLUTICASONE PROPIONATE 2 SPRAY: 50 SPRAY, METERED NASAL at 08:30

## 2018-03-19 RX ADMIN — SODIUM BICARBONATE 650 MG: 650 TABLET ORAL at 20:18

## 2018-03-19 RX ADMIN — PARICALCITOL 1 MCG: 1 CAPSULE, LIQUID FILLED ORAL at 08:30

## 2018-03-19 RX ADMIN — TAZOBACTAM SODIUM AND PIPERACILLIN SODIUM 3.38 G: 375; 3 INJECTION, SOLUTION INTRAVENOUS at 11:40

## 2018-03-19 RX ADMIN — LEVOTHYROXINE SODIUM 125 MCG: 125 TABLET ORAL at 08:00

## 2018-03-19 RX ADMIN — FEBUXOSTAT 40 MG: 40 TABLET ORAL at 08:30

## 2018-03-19 RX ADMIN — BUDESONIDE AND FORMOTEROL FUMARATE DIHYDRATE 2 PUFF: 160; 4.5 AEROSOL RESPIRATORY (INHALATION) at 08:47

## 2018-03-19 RX ADMIN — SODIUM CHLORIDE 100 ML/HR: 9 INJECTION, SOLUTION INTRAVENOUS at 16:03

## 2018-03-19 RX ADMIN — ACETAMINOPHEN 650 MG: 325 TABLET ORAL at 11:21

## 2018-03-19 NOTE — CONSULTS
Referring Provider: Vamsi Richards MD  Reason for Consultation: Evaluation of patient with acute kidney injury on chronic kidney disease    Subjective     Chief complaint   Chief Complaint   Patient presents with   • Abdominal Pain       History of present illness:    This is a very pleasant 52-year-old  female was admitted on 3/17/2018 with abdominal pain and evidence of acute diverticulitis, on admission, her creatinine was 2.44, it has risen up to 3, hence nephrology consult was requested.  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  She has history of bronchiectasis, prior history of diverticulitis, hypertension, dyslipidemia.  Creatinine in 2016 was 2.48, and on 5/5/2017 was 3.34, on admission creatinine 2.44 and it's up to 3 today.  The patient's symptoms on admission was consistent with nausea and vomiting and diarrhea for the past 3 or 4 days prior to admission has improved significantly the last episode of diarrhea was yesterday she had no nausea today abdominal pain has improved significantly today.  She ate a sandwich for lunch and she tolerated that quite well.  No dysuria or gross hematuria.  No lower extremity edema.  Diabetic, she has hypothyroidism.      Past Medical History:   Diagnosis Date   • Asthma    • Bronchiectasis    • Chronic kidney disease    • Disease of thyroid gland    • Diverticulitis    • Hypercholesteremia    • Hypertension    • PONV (postoperative nausea and vomiting)    • Wegener's granulomatosis with renal involvement      Past Surgical History:   Procedure Laterality Date   • COLONOSCOPY N/A 6/24/2016    Procedure: COLONOSCOPY INTO CECUM;  Surgeon: Bee Carreon MD;  Location: [facility] ENDOSCOPY;  Service:    • LUNG  "BIOPSY     • RENAL BIOPSY     • WISDOM TOOTH EXTRACTION       Family History   Problem Relation Age of Onset   • Prostate cancer Father      Negative family history for kidney disease   Social History   Substance Use Topics   • Smoking status: Never Smoker   • Smokeless tobacco: Never Used   • Alcohol use No     Prescriptions Prior to Admission   Medication Sig Dispense Refill Last Dose   • CRESTOR 20 MG tablet TK 1 T PO DAILY  1 6/23/2016 at 0900   • febuxostat (ULORIC) 40 MG tablet Take  by mouth daily.   6/23/2016 at 0900   • fluticasone (FLONASE) 50 MCG/ACT nasal spray SPRAY 2 SPRAYS IN EACH NOSTRIL DAILY  1 6/23/2016 at 2100   • levothyroxine (SYNTHROID, LEVOTHROID) 125 MCG tablet Take  by mouth daily.   6/23/2016 at 0900   • paricalcitol (ZEMPLAR) 1 MCG capsule Take  by mouth daily.   6/23/2016 at 0900   • sodium bicarbonate 650 MG tablet Take  by mouth 2 (two) times a day.   6/23/2016 at 0900   • SYMBICORT 160-4.5 MCG/ACT inhaler INHALE 2 PUFFS PO BID  3 6/23/2016 at 0900   • diphenhydrAMINE (BENADRYL) 25 MG tablet Take 1 tablet by mouth Every 6 (Six) Hours As Needed for Itching. 15 tablet 0 Unknown at Unknown time     Allergies:  Review of patient's allergies indicates no known allergies.    Review of Systems  14 point review of system were all negative other than what is noted above in the history of present illness.      Objective     Vital Signs  Temp:  [97.6 °F (36.4 °C)-98.9 °F (37.2 °C)] 98.9 °F (37.2 °C)  Heart Rate:  [59-79] 79  Resp:  [16-18] 16  BP: (108-130)/(74-82) 108/76    Flowsheet Rows    Flowsheet Row First Filed Value   Admission Height 170.2 cm (67\") Documented at 03/17/2018 1130   Admission Weight 53.1 kg (117 lb) Documented at 03/17/2018 1130           I/O this shift:  In: 1050 [I.V.:1000; IV Piggyback:50]  Out: -   I/O last 3 completed shifts:  In: 2963 [I.V.:2863; IV Piggyback:100]  Out: -     Intake/Output Summary (Last 24 hours) at 03/19/18 7253  Last data filed at 03/19/18 1605   " Gross per 24 hour   Intake             3100 ml   Output                0 ml   Net             3100 ml       Physical Exam:  General Appearance: alert, oriented x 3, no acute distress,   Skin: warm and dry  HEENT: pupils round and reactive to light, oral mucosa normal,   Neck: supple, no JVD, trachea midline  Lungs: CTA, unlabored breathing effort  Heart: RRR, normal S1 and S2, no S3, no rub  Abdomen: soft, mild diffuse tenderness, with no rebound tenderness,  present bowel sounds to auscultation  : no palpable bladder,  Joints: No significant deformities noted, no crepitation over the knees or the ankles.  Lymphatics: No cervical or supraclavicular lymphadenopathy.  Extremities: no edema, cyanosis or clubbing  Neuro: normal speech and mental status  Psych: Normal affect    Results Review:  Results for orders placed or performed during the hospital encounter of 03/17/18   Comprehensive Metabolic Panel   Result Value Ref Range    Glucose 84 65 - 99 mg/dL    BUN 42 (H) 6 - 20 mg/dL    Creatinine 2.44 (H) 0.57 - 1.00 mg/dL    Sodium 137 136 - 145 mmol/L    Potassium 4.4 3.5 - 5.2 mmol/L    Chloride 99 98 - 107 mmol/L    CO2 23.4 22.0 - 29.0 mmol/L    Calcium 9.8 8.6 - 10.5 mg/dL    Total Protein 7.1 6.0 - 8.5 g/dL    Albumin 3.40 (L) 3.50 - 5.20 g/dL    ALT (SGPT) 41 (H) 1 - 33 U/L    AST (SGOT) 24 1 - 32 U/L    Alkaline Phosphatase 75 39 - 117 U/L    Total Bilirubin 0.6 0.1 - 1.2 mg/dL    eGFR Non African Amer 21 (L) >60 mL/min/1.73    Globulin 3.7 gm/dL    A/G Ratio 0.9 g/dL    BUN/Creatinine Ratio 17.2 7.0 - 25.0    Anion Gap 14.6 mmol/L   Lipase   Result Value Ref Range    Lipase 144 (H) 13 - 60 U/L   Urinalysis With / Culture If Indicated - Urine, Clean Catch   Result Value Ref Range    Color, UA Yellow Yellow, Straw    Appearance, UA Cloudy (A) Clear    pH, UA 6.5 5.0 - 8.0    Specific Gravity, UA 1.013 1.005 - 1.030    Glucose, UA Negative Negative    Ketones, UA Negative Negative    Bilirubin, UA Negative  Negative    Blood, UA Negative Negative    Protein, UA 30 mg/dL (1+) (A) Negative    Leuk Esterase, UA Negative Negative    Nitrite, UA Negative Negative    Urobilinogen, UA 0.2 E.U./dL 0.2 - 1.0 E.U./dL   hCG, Serum, Qualitative   Result Value Ref Range    HCG Qualitative Negative Indeterminate, Negative   CBC Auto Differential   Result Value Ref Range    WBC 26.64 (H) 4.50 - 10.70 10*3/mm3    RBC 4.60 3.90 - 5.20 10*6/mm3    Hemoglobin 13.2 11.9 - 15.5 g/dL    Hematocrit 40.7 35.6 - 45.5 %    MCV 88.5 80.5 - 98.2 fL    MCH 28.7 26.9 - 32.0 pg    MCHC 32.4 32.4 - 36.3 g/dL    RDW 13.5 (H) 11.7 - 13.0 %    RDW-SD 43.4 37.0 - 54.0 fl    MPV 9.6 6.0 - 12.0 fL    Platelets 312 140 - 500 10*3/mm3    Neutrophil % 82.5 (H) 42.7 - 76.0 %    Lymphocyte % 7.0 (L) 19.6 - 45.3 %    Monocyte % 9.3 5.0 - 12.0 %    Eosinophil % 0.7 0.3 - 6.2 %    Basophil % 0.1 0.0 - 1.5 %    Immature Grans % 0.4 0.0 - 0.5 %    Neutrophils, Absolute 21.98 (H) 1.90 - 8.10 10*3/mm3    Lymphocytes, Absolute 1.86 0.90 - 4.80 10*3/mm3    Monocytes, Absolute 2.48 (H) 0.20 - 1.20 10*3/mm3    Eosinophils, Absolute 0.19 0.00 - 0.70 10*3/mm3    Basophils, Absolute 0.03 0.00 - 0.20 10*3/mm3    Immature Grans, Absolute 0.10 (H) 0.00 - 0.03 10*3/mm3   Urinalysis, Microscopic Only - Urine, Clean Catch   Result Value Ref Range    RBC, UA 0-2 None Seen, 0-2 /HPF    WBC, UA 0-2 None Seen, 0-2 /HPF    Bacteria, UA None Seen None Seen /HPF    Squamous Epithelial Cells, UA 0-2 None Seen, 0-2 /HPF    Hyaline Casts, UA 0-2 None Seen /LPF    Methodology Automated Microscopy    Lactic Acid, Plasma   Result Value Ref Range    Lactate 1.4 0.5 - 2.0 mmol/L   Basic Metabolic Panel   Result Value Ref Range    Glucose 83 65 - 99 mg/dL    BUN 38 (H) 6 - 20 mg/dL    Creatinine 2.82 (H) 0.57 - 1.00 mg/dL    Sodium 137 136 - 145 mmol/L    Potassium 5.0 3.5 - 5.2 mmol/L    Chloride 99 98 - 107 mmol/L    CO2 20.8 (L) 22.0 - 29.0 mmol/L    Calcium 9.3 8.6 - 10.5 mg/dL    eGFR Non   Amer 18 (L) >60 mL/min/1.73    BUN/Creatinine Ratio 13.5 7.0 - 25.0    Anion Gap 17.2 mmol/L   CBC (No Diff)   Result Value Ref Range    WBC 28.73 (H) 4.50 - 10.70 10*3/mm3    RBC 4.23 3.90 - 5.20 10*6/mm3    Hemoglobin 12.0 11.9 - 15.5 g/dL    Hematocrit 37.7 35.6 - 45.5 %    MCV 89.1 80.5 - 98.2 fL    MCH 28.4 26.9 - 32.0 pg    MCHC 31.8 (L) 32.4 - 36.3 g/dL    RDW 13.4 (H) 11.7 - 13.0 %    RDW-SD 43.8 37.0 - 54.0 fl    MPV 9.8 6.0 - 12.0 fL    Platelets 264 140 - 500 10*3/mm3   Basic Metabolic Panel   Result Value Ref Range    Glucose 101 (H) 65 - 99 mg/dL    BUN 32 (H) 6 - 20 mg/dL    Creatinine 3.00 (H) 0.57 - 1.00 mg/dL    Sodium 140 136 - 145 mmol/L    Potassium 4.8 3.5 - 5.2 mmol/L    Chloride 104 98 - 107 mmol/L    CO2 24.6 22.0 - 29.0 mmol/L    Calcium 9.3 8.6 - 10.5 mg/dL    eGFR Non African Amer 16 (L) >60 mL/min/1.73    BUN/Creatinine Ratio 10.7 7.0 - 25.0    Anion Gap 11.4 mmol/L   CBC (No Diff)   Result Value Ref Range    WBC 16.02 (H) 4.50 - 10.70 10*3/mm3    RBC 3.91 3.90 - 5.20 10*6/mm3    Hemoglobin 10.9 (L) 11.9 - 15.5 g/dL    Hematocrit 35.1 (L) 35.6 - 45.5 %    MCV 89.8 80.5 - 98.2 fL    MCH 27.9 26.9 - 32.0 pg    MCHC 31.1 (L) 32.4 - 36.3 g/dL    RDW 13.5 (H) 11.7 - 13.0 %    RDW-SD 44.0 37.0 - 54.0 fl    MPV 9.7 6.0 - 12.0 fL    Platelets 242 140 - 500 10*3/mm3   Light Blue Top   Result Value Ref Range    Extra Tube hold for add-on    Green Top (Gel)   Result Value Ref Range    Extra Tube Hold for add-ons.    Lavender Top   Result Value Ref Range    Extra Tube hold for add-on    Gold Top - SST   Result Value Ref Range    Extra Tube Hold for add-ons.      Imaging Results (last 72 hours)     Procedure Component Value Units Date/Time    CT Abdomen Pelvis Without Contrast [[id number]] Collected:  03/17/18 1324     Updated:  03/17/18 1345    Narrative:       CT ABDOMEN PELVIS WO CONTRAST-     CLINICAL HISTORY: Pelvic pain. History of diverticulitis.     TECHNIQUE: Spiral CT images were  acquired through the abdomen and pelvis  with no oral or IV contrast and were reconstructed in 3 mm thick slices.     Radiation dose reduction techniques were utilized, including automated  exposure control and exposure modulation based on body size.     COMPARISON: CT scan of the abdomen and pelvis dated 2/9/2016.     FINDINGS: There is a tiny approximately 7 mm diameter cyst in the  anterior segment of the right lobe of the liver that has increased in  size and the interval. The liver is otherwise unremarkable. There are  several small ill-defined low-density masses scattered throughout the  spleen that appear unchanged. These are most likely hemangiomas. The  pancreas and kidneys and adrenal glands are unremarkable. The stomach  and small bowel appear normal. There is no bowel distention. There are  multiple diverticuli in the sigmoid colon. There is moderate  circumferential wall thickening involving an approximately 10 cm long  segment of the sigmoid colon in the posterior aspect of the pelvis.  Moderate diffuse pericolonic edema is also present. The findings are  consistent with acute diverticulitis. There is no abscess or free air.       Impression:       Moderate acute sigmoid diverticulitis. There is no abscess  or free air.     This report was finalized on 3/17/2018 1:42 PM by Dr. Con Clinton MD.       CT Head Without Contrast [[id number]] Collected:  03/17/18 1322     Updated:  03/17/18 1342    Narrative:       CT HEAD WO CONTRAST-     CLINICAL HISTORY: Dizziness.     TECHNIQUE: Transverse 3 mm thick images were acquired from the base of  the skull to the vertex without IV contrast.     Radiation dose reduction techniques were utilized, including automated  exposure control and exposure modulation based on body size.     COMPARISON: None     FINDINGS: The brain and ventricular system appear structurally normal.  There is no evidence of recent or old infarct or hemorrhage. There are  no masses. The  paranasal sinuses are partially imaged, and appear  grossly well aerated.       Impression:       Normal CT scan of the head without contrast.     This report was finalized on 3/17/2018 1:24 PM by Dr. Con Clinton MD.                 budesonide-formoterol 2 puff Inhalation BID - RT   febuxostat 40 mg Oral Daily   fluticasone 2 spray Each Nare Daily   levothyroxine 125 mcg Oral Q AM   paricalcitol 1 mcg Oral Daily   piperacillin-tazobactam 3.375 g Intravenous Q12H   polyethylene glycol 17 g Oral Daily   sodium bicarbonate 650 mg Oral BID       Pharmacy to Dose Zosyn     sodium chloride 100 mL/hr Last Rate: 100 mL/hr (03/19/18 1603)       Assessment/Plan   1.  Acute kidney injury on chronic kidney disease multifactorial in etiology associated with the diverticulitis with decreased oral intake with nausea and vomiting and diarrhea.    2.  Chronic kidney disease stage IV status associated with Wegener's granulomatosis with renal involvement has been in remission with stable renal function for few years.    3.  An anemia hemoglobin was 13 on admission and is down to 10.9 most likely she was hemoconcentrated because of volume depletion  4.  History of bronchiectasis  5.  Wegener's granulomatosis in remission  6.  Acute diverticulitis improving   7.  Hypertension, well controlled   8.  Metabolic acidosis treated        Plan :  1.  I agree with the present treatment   2.  Check random urine for sodium, chloride and protein to creatinine ratio.    3.  Surveillance labs       Thank you Dr. Richards for asking me to see this patient in consultation         I discussed the patients findings and my recommendations with the patient     Ryder Santoyo MD  03/19/18  5:34 PM

## 2018-03-19 NOTE — PROGRESS NOTES
Discharge Planning Assessment  [facility]     Patient Name: Sun Rodriguez  MRN: [id number]  Today's Date: 3/19/2018    Admit Date: 3/17/2018          Discharge Needs Assessment     Row Name 03/19/18 7848       Living Environment    Lives With spouse;child(natty), adult    Name(s) of Who Lives With Patient Simeon Rodriguez [phone]     Current Living Arrangements home/apartment/condo    Potentially Unsafe Housing Conditions unable to assess    Primary Care Provided by self;spouse/significant other    Provides Primary Care For child(natty)    Family Caregiver if Needed none    Quality of Family Relationships supportive    Able to Return to Prior Arrangements yes       Resource/Environmental Concerns    Resource/Environmental Concerns none    Transportation Concerns car, none       Transition Planning    Patient/Family Anticipates Transition to home with family    Patient/Family Anticipated Services at Transition none       Discharge Needs Assessment    Readmission Within the Last 30 Days no previous admission in last 30 days    Concerns to be Addressed no discharge needs identified;denies needs/concerns at this time    Equipment Currently Used at Home none    Anticipated Changes Related to Illness none    Equipment Needed After Discharge none            Discharge Plan     Row Name 03/19/18 0471       Plan    Plan Home; Follow for IV abx     Patient/Family in Agreement with Plan yes    Plan Comments CCP met with patient at bedside. CCP role explained and discharge planning discussed. Face sheet verified. Patient lives with her son (23 year old) and . Patient does not have POA/living will. Patient's emergency contact is Simeon Rodriguez ([phone]). Patient's PCP is Dr. Pino Templeton. Patient has two steps into the entrance and steps to the basement with handrails. Patient does not use medical equipment and is independent with her ADLS. Patient uses [facility] in [facility]; patient denies having trouble affording  her medications and does not have trouble remembering to take her medications. Patient has not used HH or been to SNF. Patient has transportation home. CCP will follow for IV abx and assist as needed. Joanna CAMACHO         Destination     No service coordination in this encounter.      Durable Medical Equipment     No service coordination in this encounter.      Dialysis/Infusion     No service coordination in this encounter.      Home Medical Care     No service coordination in this encounter.      Social Care     No service coordination in this encounter.                Demographic Summary     Row Name 03/19/18 1673       General Information    Admission Type inpatient    Arrived From home    Required Notices Provided Important Message from Medicare    Referral Source admission list    Reason for Consult discharge planning    Preferred Language English     Used During This Interaction no            Functional Status     Row Name 03/19/18 3526       Functional Status    Usual Activity Tolerance good    Current Activity Tolerance good       Functional Status, IADL    Medications independent    Meal Preparation independent    Housekeeping independent    Laundry independent    Shopping independent       Mental Status Summary    Recent Changes in Mental Status/Cognitive Functioning no changes            Psychosocial    No documentation.           Abuse/Neglect    No documentation.           Legal    No documentation.           Substance Abuse    No documentation.           Patient Forms    No documentation.         JANICE Romero

## 2018-03-19 NOTE — CONSULTS
Adult Nutrition  Assessment/PES    Patient Name:  Sun Rodriguez  YOB: 1965  MRN: [id number]  Admit Date:  3/17/2018    Assessment Date:  3/19/2018    Comments:  While patient has lost 15# (13%) x 13 months, her PCP thinks it is related to her thyroid. I did not identify any physical signs of malnutrition and there has been no change in her PO intake.          Adult Nutrition Assessment     Row Name 03/19/18 1155       Intake Assessment    Fluid Requirement Assessment exceeds needs    Tolerance other (see comments)   intermittent nausea       Fluid Intake Evaluation    IV Fluid (mL) 2400       PO Evaluation    Number of Days PO Intake Evaluated Insufficient Data   no intake recorded    Row Name 03/19/18 1154       Nutrition Prescription PO    Current PO Diet Clear Liquid    Fluid Consistency Thin    Row Name 03/19/18 1153       Calculation Measurements    Weight Used For Calculations 52.3 kg (115 lb 4.8 oz)       Estimated/Assessed Needs    Additional Documentation Calorie Requirements (Group);Fluid Requirements (Group);Protein Requirements (Group)       Calorie Requirements    Estimated Calorie Requirement (kcal/day) 1569   30 wilver/kg    Estimated Calorie Need Method kcal/kg       KCAL/KG    14 Kcal/Kg (kcal) 732.2    15 Kcal/Kg (kcal) 784.5    18 Kcal/Kg (kcal) 941.4    20 Kcal/Kg (kcal) 1046    25 Kcal/Kg (kcal) 1307.5    30 Kcal/Kg (kcal) 1569    35 Kcal/Kg (kcal) 1830.5    40 Kcal/Kg (kcal) 2092    45 Kcal/Kg (kcal) 2353.5    50 Kcal/Kg (kcal) 2615       Hansford-St. Jeor Equation    RMR (Hansford-St. Jeor Equation) 1170.63       Protein Requirements    Est Protein Requirement Amount (gms/kg) 1.2 gm protein    Estimated Protein Requirements (gms/day) 62.76       Fluid Requirements    Estimated Fluid Requirements (mL/day) 1569    Estimated Fluid Requirement Method RDA Method    RDA Method (mL) 1569    Kathy-Jamel Method (over 20 kg) 9239    Row Name 03/19/18 1152       Labs/Procedures/Meds    Lab  Results Reviewed reviewed       Physical Findings    Overall Physical Appearance underweight    Skin other (see comments)   intact, Issac score 20    Row Name 03/19/18 1151       Reason for Assessment    Reason For Assessment identified at risk by screening criteria    Diagnosis gastrointestinal disease    Identified At Risk by Screening Criteria MST SCORE 2+;unintentional loss of 10 lbs or more in the past 2 mos       Admit Weight    Admit Weight Method measured    Admit Weight 50.9 kg (112 lb 3.4 oz)       Usual Body Weight (UBW)    Weight Loss unintentional    Weight Loss Time Frame 15# (13%) x 13 months       Body Mass Index (BMI)    BMI Assessment BMI 17-18.4: protein-energy malnutrition grade I    Row Name 03/19/18 0524       Anthropometrics    Weight 52.3 kg (115 lb 3.2 oz)          Problem/Interventions:        Problem 1     Row Name 03/19/18 1156       Nutrition Diagnoses Problem 1    Problem 1 Nutrition Appropriate for Condition at this Time    Etiology (related to) Medical Diagnosis    Gastrointestinal Diverticulitis    Signs/Symptoms (evidenced by) Report/Observation    Reported/Observed By MD                    Intervention Goal     Row Name 03/19/18 1156       Intervention Goal    General Maintain nutrition    PO Tolerate PO;Advance diet;PO intake (%)    PO Intake % 75 %    Weight No significant weight loss            Nutrition Intervention     Row Name 03/19/18 1156       Nutrition Intervention    RD/Tech Action Supplement offered/refused;Follow Tx progress;Care plan reviewd              Education/Evaluation     Row Name 03/19/18 1156       Education    Education Will Instruct as appropriate       Monitor/Evaluation    Monitor Per protocol        Electronically signed by:  Kamala Desai RD  03/19/18 11:59 AM

## 2018-03-19 NOTE — PROGRESS NOTES
[facility]     LOS: 2 days     Name: Sun Rodriguez  Age: 52 y.o.  Sex: female  :  1965  MRN: [id number]         Primary Care Physician: Pino Templeton MD    Diet Clear Liquid; Thin    Subjective   She is feeling better today. Tolerating diet. Pain is nearly resolved. Just some soreness. Had a bowel movement ×1. She states she has been ambulating and denies lightheadedness.    Objective   Temp:  [97.6 °F (36.4 °C)-98.2 °F (36.8 °C)] 98.2 °F (36.8 °C)  Heart Rate:  [59-75] 73  Resp:  [16-18] 16  BP: (115-130)/(74-82) 121/82  SpO2:  [97 %-100 %] 97 %  on   ;   Device (Oxygen Therapy): room air  Body mass index is 18.04 kg/m².    Physical Exam   Constitutional: She is oriented to person, place, and time. No distress.   Cardiovascular: Normal rate and regular rhythm.    Pulmonary/Chest: Effort normal and breath sounds normal. No respiratory distress.   Abdominal: Soft. Bowel sounds are normal. She exhibits no distension. There is no tenderness. There is no rebound and no guarding.   Musculoskeletal: She exhibits no edema.   Neurological: She is alert and oriented to person, place, and time.   Skin: Skin is warm and dry.     Reviewed medications and new clinical results    budesonide-formoterol 2 puff Inhalation BID - RT   febuxostat 40 mg Oral Daily   fluticasone 2 spray Each Nare Daily   levothyroxine 125 mcg Oral Q AM   paricalcitol 1 mcg Oral Daily   piperacillin-tazobactam 3.375 g Intravenous Q12H   polyethylene glycol 17 g Oral Daily   sodium bicarbonate 650 mg Oral BID     Pharmacy to Dose Zosyn     sodium chloride 100 mL/hr Last Rate: 100 mL/hr (18 0534)     Results from last 7 days  Lab Units 18  0452 18  0702 18  1158   WBC 10*3/mm3 16.02* 28.73* 26.64*   HEMOGLOBIN g/dL 10.9* 12.0 13.2   PLATELETS 10*3/mm3 242 264 312     Results from last 7 days  Lab Units 18  0452 18  0702 18  1157   SODIUM mmol/L 140  137 137   POTASSIUM mmol/L 4.8 5.0 4.4   CHLORIDE mmol/L 104 99 99   CO2 mmol/L 24.6 20.8* 23.4   BUN mg/dL 32* 38* 42*   CREATININE mg/dL 3.00* 2.82* 2.44*   CALCIUM mg/dL 9.3 9.3 9.8   GLUCOSE mg/dL 101* 83 84     Lab Results   Component Value Date    ANIONGAP 11.4 03/19/2018     Estimated Creatinine Clearance: 18.3 mL/min (by C-G formula based on SCr of 3 mg/dL (H)).    Assessment/Plan   Active Hospital Problems (** Indicates Principal Problem)    Diagnosis Date Noted   • **Acute sigmoid diverticulitis [K57.32] 03/17/2018   • Sepsis present on admission [A41.9] 03/18/2018   • Hypertension [I10] 03/17/2018   • CKD (chronic kidney disease) stage 4, GFR 15-29 ml/min [N18.4] 03/17/2018   • Dizziness [R42] 03/17/2018   • Underweight BMI 17.6 [R63.6] 03/17/2018      Resolved Hospital Problems    Diagnosis Date Noted Date Resolved   No resolved problems to display.     · Acute sigmoid diverticulitis. She states she has had several episodes of diverticulitis, this is the second hospitalization for diverticulitis in two years. Will ask her surgeon to see to discuss possible surgical intervention in the future. WBC improved  · Advance diet as tolerated  · May shower  · CKD, mild increase in Cr from baseline. Ask her nephrologist to see.  · I discussed the patient's findings and my recommendations with patient.    Vamsi Richards MD   03/19/18  2:05 PM

## 2018-03-19 NOTE — PLAN OF CARE
Problem: Patient Care Overview  Goal: Plan of Care Review  Outcome: Ongoing (interventions implemented as appropriate)   03/19/18 0626   Coping/Psychosocial   Plan of Care Reviewed With patient   Plan of Care Review   Progress no change   OTHER   Outcome Summary vss. medicated for pain/nausea x1 overnight. resting quietly. no falls. continue IVF, IV antibiotics. ctm

## 2018-03-20 VITALS
RESPIRATION RATE: 16 BRPM | SYSTOLIC BLOOD PRESSURE: 121 MMHG | HEART RATE: 76 BPM | HEIGHT: 67 IN | BODY MASS INDEX: 5.36 KG/M2 | TEMPERATURE: 99.1 F | WEIGHT: 34.17 LBS | DIASTOLIC BLOOD PRESSURE: 89 MMHG | OXYGEN SATURATION: 98 %

## 2018-03-20 LAB
ALBUMIN SERPL-MCNC: 3.4 G/DL (ref 3.5–5.2)
ANION GAP SERPL CALCULATED.3IONS-SCNC: 13.3 MMOL/L
BASOPHILS # BLD AUTO: 0.02 10*3/MM3 (ref 0–0.2)
BASOPHILS NFR BLD AUTO: 0.2 % (ref 0–1.5)
BUN BLD-MCNC: 24 MG/DL (ref 6–20)
BUN/CREAT SERPL: 8.7 (ref 7–25)
CALCIUM SPEC-SCNC: 9.7 MG/DL (ref 8.6–10.5)
CHLORIDE SERPL-SCNC: 104 MMOL/L (ref 98–107)
CO2 SERPL-SCNC: 23.7 MMOL/L (ref 22–29)
CREAT BLD-MCNC: 2.76 MG/DL (ref 0.57–1)
DEPRECATED RDW RBC AUTO: 44.3 FL (ref 37–54)
EOSINOPHIL # BLD AUTO: 0.4 10*3/MM3 (ref 0–0.7)
EOSINOPHIL NFR BLD AUTO: 3.5 % (ref 0.3–6.2)
ERYTHROCYTE [DISTWIDTH] IN BLOOD BY AUTOMATED COUNT: 13.5 % (ref 11.7–13)
GFR SERPL CREATININE-BSD FRML MDRD: 18 ML/MIN/1.73
GLUCOSE BLD-MCNC: 96 MG/DL (ref 65–99)
HCT VFR BLD AUTO: 35.4 % (ref 35.6–45.5)
HGB BLD-MCNC: 11.1 G/DL (ref 11.9–15.5)
IMM GRANULOCYTES # BLD: 0.03 10*3/MM3 (ref 0–0.03)
IMM GRANULOCYTES NFR BLD: 0.3 % (ref 0–0.5)
LYMPHOCYTES # BLD AUTO: 1.47 10*3/MM3 (ref 0.9–4.8)
LYMPHOCYTES NFR BLD AUTO: 12.9 % (ref 19.6–45.3)
MAGNESIUM SERPL-MCNC: 2 MG/DL (ref 1.6–2.6)
MCH RBC QN AUTO: 28.2 PG (ref 26.9–32)
MCHC RBC AUTO-ENTMCNC: 31.4 G/DL (ref 32.4–36.3)
MCV RBC AUTO: 89.8 FL (ref 80.5–98.2)
MONOCYTES # BLD AUTO: 0.76 10*3/MM3 (ref 0.2–1.2)
MONOCYTES NFR BLD AUTO: 6.7 % (ref 5–12)
NEUTROPHILS # BLD AUTO: 8.7 10*3/MM3 (ref 1.9–8.1)
NEUTROPHILS NFR BLD AUTO: 76.4 % (ref 42.7–76)
PHOSPHATE SERPL-MCNC: 3.5 MG/DL (ref 2.5–4.5)
PLATELET # BLD AUTO: 235 10*3/MM3 (ref 140–500)
PMV BLD AUTO: 9.8 FL (ref 6–12)
POTASSIUM BLD-SCNC: 4 MMOL/L (ref 3.5–5.2)
RBC # BLD AUTO: 3.94 10*6/MM3 (ref 3.9–5.2)
SODIUM BLD-SCNC: 141 MMOL/L (ref 136–145)
URATE SERPL-MCNC: 1.6 MG/DL (ref 2.4–5.7)
WBC NRBC COR # BLD: 11.38 10*3/MM3 (ref 4.5–10.7)

## 2018-03-20 PROCEDURE — 85025 COMPLETE CBC W/AUTO DIFF WBC: CPT | Performed by: INTERNAL MEDICINE

## 2018-03-20 PROCEDURE — 80069 RENAL FUNCTION PANEL: CPT | Performed by: INTERNAL MEDICINE

## 2018-03-20 PROCEDURE — 83735 ASSAY OF MAGNESIUM: CPT | Performed by: INTERNAL MEDICINE

## 2018-03-20 PROCEDURE — 94799 UNLISTED PULMONARY SVC/PX: CPT

## 2018-03-20 PROCEDURE — 84550 ASSAY OF BLOOD/URIC ACID: CPT | Performed by: INTERNAL MEDICINE

## 2018-03-20 PROCEDURE — 99253 IP/OBS CNSLTJ NEW/EST LOW 45: CPT | Performed by: SURGERY

## 2018-03-20 PROCEDURE — 25010000002 PIPERACILLIN SOD-TAZOBACTAM PER 1 G: Performed by: HOSPITALIST

## 2018-03-20 RX ORDER — AMOXICILLIN AND CLAVULANATE POTASSIUM 500; 125 MG/1; MG/1
1 TABLET, FILM COATED ORAL DAILY
Qty: 4 TABLET | Refills: 0 | Status: SHIPPED | OUTPATIENT
Start: 2018-03-20 | End: 2018-03-20 | Stop reason: HOSPADM

## 2018-03-20 RX ORDER — LEVOFLOXACIN 500 MG/1
500 TABLET, FILM COATED ORAL EVERY OTHER DAY
Qty: 2 TABLET | Refills: 0 | Status: SHIPPED | OUTPATIENT
Start: 2018-03-20 | End: 2018-03-20

## 2018-03-20 RX ORDER — METRONIDAZOLE 500 MG/1
500 TABLET ORAL 3 TIMES DAILY
Qty: 12 TABLET | Refills: 0 | Status: SHIPPED | OUTPATIENT
Start: 2018-03-20 | End: 2018-03-24

## 2018-03-20 RX ORDER — LEVOFLOXACIN 500 MG/1
250 TABLET, FILM COATED ORAL EVERY OTHER DAY
Qty: 1 TABLET | Refills: 0 | Status: SHIPPED | OUTPATIENT
Start: 2018-03-20 | End: 2018-03-24

## 2018-03-20 RX ADMIN — FLUTICASONE PROPIONATE 2 SPRAY: 50 SPRAY, METERED NASAL at 09:21

## 2018-03-20 RX ADMIN — SODIUM BICARBONATE 650 MG: 650 TABLET ORAL at 09:21

## 2018-03-20 RX ADMIN — LEVOTHYROXINE SODIUM 125 MCG: 125 TABLET ORAL at 07:03

## 2018-03-20 RX ADMIN — BUDESONIDE AND FORMOTEROL FUMARATE DIHYDRATE 2 PUFF: 160; 4.5 AEROSOL RESPIRATORY (INHALATION) at 08:25

## 2018-03-20 RX ADMIN — PARICALCITOL 1 MCG: 1 CAPSULE, LIQUID FILLED ORAL at 09:21

## 2018-03-20 RX ADMIN — FEBUXOSTAT 40 MG: 40 TABLET ORAL at 09:21

## 2018-03-20 RX ADMIN — SODIUM CHLORIDE 100 ML/HR: 9 INJECTION, SOLUTION INTRAVENOUS at 09:21

## 2018-03-20 RX ADMIN — TAZOBACTAM SODIUM AND PIPERACILLIN SODIUM 3.38 G: 375; 3 INJECTION, SOLUTION INTRAVENOUS at 10:55

## 2018-03-20 NOTE — PROGRESS NOTES
"   LOS: 3 days    Patient Care Team:  Pino Templeton MD as PCP - General  Pino Templeton MD as PCP - Family Medicine    Chief Complaint:    Chief Complaint   Patient presents with   • Abdominal Pain     Follow up SOPHIE on CKD IV  Subjective     Interval History:   Feels a little better. Surgery consult noted. No blood in stool.      Review of Systems:   Bowels loose.  Eating. Still with abdominal pain, but better.     Objective     Vital Signs  Temp:  [97.4 °F (36.3 °C)-99.1 °F (37.3 °C)] 99.1 °F (37.3 °C)  Heart Rate:  [71-78] 76  Resp:  [15-16] 16  BP: (113-121)/(68-89) 121/89    Flowsheet Rows    Flowsheet Row First Filed Value   Admission Height 170.2 cm (67\") Documented at 03/17/2018 1130   Admission Weight 53.1 kg (117 lb) Documented at 03/17/2018 1130          I/O this shift:  In: 1240 [P.O.:240; I.V.:1000]  Out: -   I/O last 3 completed shifts:  In: 2100 [I.V.:2000; IV Piggyback:100]  Out: -     Intake/Output Summary (Last 24 hours) at 03/20/18 1652  Last data filed at 03/20/18 1250   Gross per 24 hour   Intake             1240 ml   Output                0 ml   Net             1240 ml       Physical Exam:  Thin WF.  Sitting on side of bed. Heart RRR no s3 or rub. Lungs clear to auscultation. Abd + bs.  Ext no edema.       Results Review:      Results from last 7 days  Lab Units 03/20/18  0529 03/19/18  0452 03/18/18  0702 03/17/18  1157   SODIUM mmol/L 141 140 137 137   POTASSIUM mmol/L 4.0 4.8 5.0 4.4   CHLORIDE mmol/L 104 104 99 99   CO2 mmol/L 23.7 24.6 20.8* 23.4   BUN mg/dL 24* 32* 38* 42*   CREATININE mg/dL 2.76* 3.00* 2.82* 2.44*   CALCIUM mg/dL 9.7 9.3 9.3 9.8   BILIRUBIN mg/dL  --   --   --  0.6   ALK PHOS U/L  --   --   --  75   ALT (SGPT) U/L  --   --   --  41*   AST (SGOT) U/L  --   --   --  24   GLUCOSE mg/dL 96 101* 83 84       Estimated Creatinine Clearance: 5.9 mL/min (by C-G formula based on SCr of 2.76 mg/dL (H)).      Results from last 7 days  Lab Units 03/20/18  0529   MAGNESIUM mg/dL " 2.0   PHOSPHORUS mg/dL 3.5         Results from last 7 days  Lab Units 03/20/18  0529   URIC ACID mg/dL 1.6*         Results from last 7 days  Lab Units 03/20/18  0529 03/19/18  0452 03/18/18  0702 03/17/18  1158   WBC 10*3/mm3 11.38* 16.02* 28.73* 26.64*   HEMOGLOBIN g/dL 11.1* 10.9* 12.0 13.2   PLATELETS 10*3/mm3 235 242 264 312               Imaging Results (last 24 hours)     ** No results found for the last 24 hours. **          budesonide-formoterol 2 puff Inhalation BID - RT   febuxostat 40 mg Oral Daily   fluticasone 2 spray Each Nare Daily   levothyroxine 125 mcg Oral Q AM   paricalcitol 1 mcg Oral Daily   piperacillin-tazobactam 3.375 g Intravenous Q12H   polyethylene glycol 17 g Oral Daily   sodium bicarbonate 650 mg Oral BID       Pharmacy to Dose Zosyn     sodium chloride 100 mL/hr Last Rate: 100 mL/hr (03/20/18 0921)       Medication Review:   Current Facility-Administered Medications   Medication Dose Route Frequency Provider Last Rate Last Dose   • acetaminophen (TYLENOL) tablet 650 mg  650 mg Oral Q4H PRN Gabriel Ibanez MD   650 mg at 03/19/18 1121   • budesonide-formoterol (SYMBICORT) 160-4.5 MCG/ACT inhaler 2 puff  2 puff Inhalation BID - RT Gabriel Ibanez MD   2 puff at 03/20/18 0825   • febuxostat (ULORIC) tablet 40 mg  40 mg Oral Daily Gabriel Ibanez MD   40 mg at 03/20/18 0921   • fluticasone (FLONASE) 50 MCG/ACT nasal spray 2 spray  2 spray Each Nare Daily Gabriel Ibanez MD   2 spray at 03/20/18 0921   • HYDROcodone-acetaminophen (NORCO) 5-325 MG per tablet 1 tablet  1 tablet Oral Q4H PRN Gabriel Ibanez MD       • HYDROmorphone (DILAUDID) injection 0.5 mg  0.5 mg Intravenous Q2H PRN Gabriel Ibanez MD   0.5 mg at 03/18/18 2024    And   • naloxone (NARCAN) injection 0.4 mg  0.4 mg Intravenous Q5 Min PRN Gabriel Ibaenz MD       • levothyroxine (SYNTHROID, LEVOTHROID) tablet 125 mcg  125 mcg Oral Q AM Gabriel Ibanez MD   125 mcg at 03/20/18 0703   • magnesium hydroxide (MILK OF MAGNESIA) suspension 3610  mg/10mL 10 mL  10 mL Oral Daily PRN Gabriel Ibanez MD       • ondansetron (ZOFRAN) injection 4 mg  4 mg Intravenous Q4H PRN Gabriel Ibanez MD   4 mg at 03/18/18 2016   • paricalcitol (ZEMPLAR) capsule 1 mcg  1 mcg Oral Daily Gabriel Ibanez MD   1 mcg at 03/20/18 0921   • Pharmacy to Dose Zosyn   Does not apply Continuous PRN Gabriel Ibanez MD       • piperacillin-tazobactam (ZOSYN) 3.375 g in iso-osmotic dextrose 50 ml (premix)  3.375 g Intravenous Q12H Gabriel Ibanez MD   3.375 g at 03/20/18 1055   • polyethylene glycol 3350 powder (packet)  17 g Oral Daily Gabriel bIanez MD       • sodium bicarbonate tablet 650 mg  650 mg Oral BID Gabriel Ibanez MD   650 mg at 03/20/18 0921   • sodium chloride 0.9 % infusion  100 mL/hr Intravenous Continuous Gabriel Ibanez  mL/hr at 03/20/18 0921 100 mL/hr at 03/20/18 0921       Assessment/Plan   1. SOPHIE on CKD IV.  History of Wegener's. Creatinine better.  Ok with renal for dc.  Has follow up with Dr. Corado May 30 at 9:45 am. BMP next week at the office.  2. Diverticulitis. Po antibiotic DW Dr. Richards. Cindy and flagyl. Plan for surgery in 4-6 weeks.   3. HTN controlled.       Principal Problem:    Acute sigmoid diverticulitis  Active Problems:    Hypertension    CKD (chronic kidney disease) stage 4, GFR 15-29 ml/min    Dizziness    Underweight BMI 17.6    Sepsis present on admission        Laura Francis MD  03/20/18  4:52 PM

## 2018-03-20 NOTE — PLAN OF CARE
Problem: Patient Care Overview  Goal: Plan of Care Review  Outcome: Ongoing (interventions implemented as appropriate)   03/20/18 144   Coping/Psychosocial   Plan of Care Reviewed With patient   Plan of Care Review   Progress improving   OTHER   Outcome Summary Discharge orders for home     Goal: Individualization and Mutuality  Outcome: Ongoing (interventions implemented as appropriate)      Problem: Fall Risk (Adult)  Goal: Identify Related Risk Factors and Signs and Symptoms  Outcome: Outcome(s) achieved Date Met: 03/20/18    Goal: Absence of Fall  Outcome: Ongoing (interventions implemented as appropriate)      Problem: Pain, Acute (Adult)  Goal: Acceptable Pain Control/Comfort Level  Outcome: Ongoing (interventions implemented as appropriate)

## 2018-03-20 NOTE — PROGRESS NOTES
Continued Stay Note  [facility]     Patient Name: Sun Rodriguez  MRN: [id number]  Today's Date: 3/20/2018    Admit Date: 3/17/2018          Discharge Plan     Row Name 03/20/18 1341       Plan    Plan Home    Plan Comments DC orders noted. Pt to dc on po Augmentin. No additional dc orders noted. Hafsa AVERY/CCP              Discharge Codes    No documentation.       Expected Discharge Date and Time     Expected Discharge Date Expected Discharge Time    Mar 20, 2018             Annia Mariano, RN

## 2018-03-20 NOTE — CONSULTS
Consult for evaluation of abdominal pain and reoccurring diverticulitis     CC: Abdominal pain, reoccurring diverticulitis      Patient is a 52 y.o. female referred by Dr. Richards for evaluation of LLQ abdominal pain and reoccurring diverticulitis.  Patient reports this episode started on 3/15/2018 and continued to progress in severity.  Patient describes the pain in the left lower quadrant as well as suprapubic area as severe constant stabbing pain.  Patient denied any fevers or chills, nausea or vomiting.  Patient reports the pain was so severe she felt dizzy.  The pain was aggravated by moving and eating.  Nothing made the pain better.  Patient's first hospitalization for diverticulitis was in February 2016.  June 2016, patient underwent a colonoscopy that confirmed diverticulosis.  Since that time patient has had multiple episodes of diverticulitis.  Patient has been treated with outpatient antibiotics.  Patient reports when she has had her milder episodes she changes herself nothing by mouth or just liquid for a couple of days and feels better.  Patient has lost approximately 15 pounds over the last year and a half.  This is her second hospitalization for diverticulitis.     Past Medical History:   Diagnosis Date   • Asthma    • Bronchiectasis    • Chronic kidney disease    • Disease of thyroid gland    • Diverticulitis    • Hypercholesteremia    • Hypertension    • PONV (postoperative nausea and vomiting)    • Wegener's granulomatosis with renal involvement      Past Surgical History:   Procedure Laterality Date   • COLONOSCOPY N/A 6/24/2016    Procedure: COLONOSCOPY INTO CECUM;  Surgeon: Bee Carreon MD;  Location: [facility] ENDOSCOPY;  Service:    • LUNG BIOPSY     • RENAL BIOPSY     • WISDOM TOOTH EXTRACTION       Family History   Problem Relation Age of Onset   • Prostate cancer Father      Social History   Substance Use Topics   • Smoking status: Never Smoker   • Smokeless tobacco: Never Used   •  Alcohol use No         Current Facility-Administered Medications:   •  acetaminophen (TYLENOL) tablet 650 mg, 650 mg, Oral, Q4H PRN, Gabriel Ibanez MD, 650 mg at 03/19/18 1121  •  budesonide-formoterol (SYMBICORT) 160-4.5 MCG/ACT inhaler 2 puff, 2 puff, Inhalation, BID - RT, Gabriel Ibanez MD, 2 puff at 03/20/18 0825  •  febuxostat (ULORIC) tablet 40 mg, 40 mg, Oral, Daily, Gabriel Ibanez MD, 40 mg at 03/20/18 0921  •  fluticasone (FLONASE) 50 MCG/ACT nasal spray 2 spray, 2 spray, Each Nare, Daily, Gabriel Ibanez MD, 2 spray at 03/20/18 0921  •  HYDROcodone-acetaminophen (NORCO) 5-325 MG per tablet 1 tablet, 1 tablet, Oral, Q4H PRN, Gabriel Ibanez MD  •  HYDROmorphone (DILAUDID) injection 0.5 mg, 0.5 mg, Intravenous, Q2H PRN, 0.5 mg at 03/18/18 2024 **AND** naloxone (NARCAN) injection 0.4 mg, 0.4 mg, Intravenous, Q5 Min PRN, Gabriel Ibanez MD  •  levothyroxine (SYNTHROID, LEVOTHROID) tablet 125 mcg, 125 mcg, Oral, Q AM, Gabriel Ibanez MD, 125 mcg at 03/20/18 0703  •  magnesium hydroxide (MILK OF MAGNESIA) suspension 2400 mg/10mL 10 mL, 10 mL, Oral, Daily PRN, Gabriel Ibanez MD  •  ondansetron (ZOFRAN) injection 4 mg, 4 mg, Intravenous, Q4H PRN, Gabriel Ibanez MD, 4 mg at 03/18/18 2016  •  paricalcitol (ZEMPLAR) capsule 1 mcg, 1 mcg, Oral, Daily, Gabriel Ibanez MD, 1 mcg at 03/20/18 0921  •  Pharmacy to Dose Zosyn, , Does not apply, Continuous PRN, Gabriel Ibanez MD  •  piperacillin-tazobactam (ZOSYN) 3.375 g in iso-osmotic dextrose 50 ml (premix), 3.375 g, Intravenous, Q12H, Gabriel Ibanez MD, 3.375 g at 03/20/18 1055  •  polyethylene glycol 3350 powder (packet), 17 g, Oral, Daily, Gabriel Ibanez MD  •  sodium bicarbonate tablet 650 mg, 650 mg, Oral, BID, Gabriel Ibanez MD, 650 mg at 03/20/18 0921  •  sodium chloride 0.9 % infusion, 100 mL/hr, Intravenous, Continuous, Gabriel Ibanez MD, Last Rate: 100 mL/hr at 03/20/18 0921, 100 mL/hr at 03/20/18 0921    Review of Systems  All other systems have been reviewed and are  negative.  Vitals:    03/20/18 0500 03/20/18 0748 03/20/18 0825 03/20/18 1433   BP:  113/68  121/89   BP Location:  Right arm  Right arm   Patient Position:  Lying  Sitting   Pulse:  77 71 76   Resp:  16 15 16   Temp:  97.4 °F (36.3 °C)  99.1 °F (37.3 °C)   TempSrc:  Oral  Oral   SpO2:  97% 97% 98%   Weight: 15.5 kg (34 lb 2.7 oz)      Height:           Physical Exam  General/physcological:   Alert and oriented x3 in no acute distress  HEENT: Normal cephalic, atraumatic, PERRLA, EOMI, sclera anicteric, moist mucous membranes, neck is supple, no JVD, no carotid bruits, no thyromegaly no adenopathy  Respiratory: CTA and percussion  CVA: RRR, normal S1-S2, no murmurs, no gallops or rubs  GI: Positive BS, soft, nondistended, Left lower quadrant tenderness, no rebound, no guarding, no hernias, no organomegaly and no masses  Musculoskeletal: Full range of motion, no clubbing, no cyanosis or edema  Neurovascular: Grossly intact    Patient does not use tobacco products currently and I have counseled the patient to not start using tobacco products in the future.    Results from last 7 days  Lab Units 03/20/18  0529 03/19/18  0452 03/18/18  0702   WBC 10*3/mm3 11.38* 16.02* 28.73*   HEMOGLOBIN g/dL 11.1* 10.9* 12.0   HEMATOCRIT % 35.4* 35.1* 37.7   PLATELETS 10*3/mm3 235 242 264       Results from last 7 days  Lab Units 03/20/18  0529 03/19/18  0452 03/18/18  0702   SODIUM mmol/L 141 140 137   POTASSIUM mmol/L 4.0 4.8 5.0   CHLORIDE mmol/L 104 104 99   CO2 mmol/L 23.7 24.6 20.8*   BUN mg/dL 24* 32* 38*   CREATININE mg/dL 2.76* 3.00* 2.82*   GLUCOSE mg/dL 96 101* 83   CALCIUM mg/dL 9.7 9.3 9.3     I have personally reviewed the abdominal CT scan that reveals acute diverticulitis  I have also personally reviewed the CT scan of the head which was normal    Assessment:  Reoccurring hospitalizations for reoccurring diverticulitis    Plan:  I have recommended that the patient complete her antibiotic course.  Once her antibiotics  have been completed, I will reorder a CT scan to evaluate the inflammatory reaction.  I have recommended that the patient undergo laparoscopic sigmoid colon resection.  I have discussed this procedure in detail with the patient.  I have discussed the risk of anesthesia, bleeding, infection, bladder injuries, ureter injuries, surrounding organ injuries, anastomotic leaks, anastomotic strictures and possibility of future recurrence of diverticulitis.  I have also discussed with the patient possibility of worsening kidney function with Carrasco's granulomatosis.  Patient understands these risks, benefits and alternatives and wishes to proceed.  I will see her in follow-up in my office in 2 weeks.    Bee Carreon MD  General, Minimally Invasive and Endoscopic Surgery  [facility]    [address], [address]  [place], [address]  P: [phone]  F: [phone]    Cc:  Pino Templeton MD

## 2018-03-20 NOTE — DISCHARGE SUMMARY
[facility]    Date of Discharge:  3/20/2018    PCP: Pino Templeton MD    Discharge Diagnosis:   Active Hospital Problems (** Indicates Principal Problem)    Diagnosis Date Noted   • **Acute sigmoid diverticulitis [K57.32] 03/17/2018   • Sepsis present on admission [A41.9] 03/18/2018   • Hypertension [I10] 03/17/2018   • CKD (chronic kidney disease) stage 4, GFR 15-29 ml/min [N18.4] 03/17/2018   • Dizziness [R42] 03/17/2018   • Underweight BMI 17.6 [R63.6] 03/17/2018      Resolved Hospital Problems    Diagnosis Date Noted Date Resolved   No resolved problems to display.      Consulting Physician(s)     Provider Relationship Specialty    Moisés Corado MD Consulting Physician Nephrology    Bee Carreon MD Consulting Physician General Surgery        Hospital Course  Please see history and physical for details. Patient is a 52 y.o. female for acute sigmoid diverticulitis. She has had several episodes of diverticulitis in the past and this is the second hospitalization for diverticulitis in about 2 years. She was started on antibiotics, clears with advancement in diet as tolerated. She had improvement in her symptoms in a couple days. Today she has no pain and she is tolerating by mouth. CT scan showed moderate acute sigmoid diverticulitis without abscess or free air. On admission WBC was 26,000. Today it is down to about 11,000. She has had bowel movements without blood. She has been on Zosyn here. She will be discharged on levaquin and flagyl dosing based on creatinine clearance. She has stomach upset with augmentin . Nephrology recommended levaquin dosed 250 mg QOD.    Because of her recurrent diverticulitis general surgery has been asked to see to consider definitive treatment/surgery in the future. They recommended repeat CT scan after she completes her antibiotics. Surgery recommends that she go laparoscopic sigmoid colon resection.  They like to see her in the office in 2 weeks.    She has a history of chronic kidney disease. Creatinine is about baseline today. It is 2.76. Nephrology saw her while here.    Condition on Discharge: Improved. Patient will like to go home today.     Temp:  [97.4 °F (36.3 °C)-99.1 °F (37.3 °C)] 99.1 °F (37.3 °C)  Heart Rate:  [71-78] 76  Resp:  [15-16] 16  BP: (113-121)/(68-89) 121/89  Body mass index is 5.35 kg/m².    Physical Exam   Constitutional: She is oriented to person, place, and time. No distress.   Cardiovascular: Normal rate and regular rhythm.    Pulmonary/Chest: Effort normal and breath sounds normal. No respiratory distress.   Abdominal: Soft. Bowel sounds are normal. There is no tenderness. There is no rebound and no guarding.   Neurological: She is alert and oriented to person, place, and time.   Skin: Skin is warm and dry.     Discharge Medications   Sun Rodriguez   Home Medication Instructions MICHAEL:[id number]    Printed on:03/20/18 4173   Medication Information                      CRESTOR 20 MG tablet  TK 1 T PO DAILY             diphenhydrAMINE (BENADRYL) 25 MG tablet  Take 1 tablet by mouth Every 6 (Six) Hours As Needed for Itching.             febuxostat (ULORIC) 40 MG tablet  Take  by mouth daily.             fluticasone (FLONASE) 50 MCG/ACT nasal spray  SPRAY 2 SPRAYS IN EACH NOSTRIL DAILY             levoFLOXacin (LEVAQUIN) 500 MG tablet  Take 0.5 tablets by mouth Every Other Day for 4 days.             levothyroxine (SYNTHROID, LEVOTHROID) 125 MCG tablet  Take  by mouth daily.             metroNIDAZOLE (FLAGYL) 500 MG tablet  Take 1 tablet by mouth 3 (Three) Times a Day for 4 days.             paricalcitol (ZEMPLAR) 1 MCG capsule  Take  by mouth daily.             sodium bicarbonate 650 MG tablet  Take  by mouth 2 (two) times a day.             SYMBICORT 160-4.5 MCG/ACT inhaler  INHALE 2 PUFFS PO BID                Diet Instructions     Diet: Regular       Discharge Diet:  Regular    As  tolerated         Activity Instructions     Activity as Tolerated            Additional Instructions for the Follow-ups that You Need to Schedule     Call MD for problems / concerns.    As directed        Follow-up Information     Pino Templeton MD Follow up in 1 week(s).    Specialty:  Family Medicine  Contact information:  [address] [facility] [id number] [phone]             Bee Carreon MD. Schedule an appointment as soon as possible for a visit in 2 week(s).    Specialty:  General Surgery  Contact information:  [address]  [facility] [address]  [facility] [id number] [phone]             Moisés Corado MD Follow up in 71 day(s).    Specialty:  Nephrology  Why:  has appointment with Dr. Corado May 30 at 9:45 am.   Contact information:  [address]  [facility] [address]  [facility] [id number] [phone]                  Vamsi Richards MD  03/20/18  4:55 PM    Discharge time spent greater than 30 minutes.

## 2018-03-20 NOTE — PLAN OF CARE
Problem: Patient Care Overview  Goal: Plan of Care Review  Outcome: Ongoing (interventions implemented as appropriate)   03/20/18 0448   Coping/Psychosocial   Plan of Care Reviewed With patient   Plan of Care Review   Progress improving   OTHER   Outcome Summary VSS. No new complaints. Possible d/c today. Will continue to monitor.      Goal: Discharge Needs Assessment  Outcome: Ongoing (interventions implemented as appropriate)    Goal: Interprofessional Rounds/Family Conf  Outcome: Ongoing (interventions implemented as appropriate)      Problem: Fall Risk (Adult)  Goal: Absence of Fall  Outcome: Ongoing (interventions implemented as appropriate)      Problem: Pain, Acute (Adult)  Goal: Acceptable Pain Control/Comfort Level  Outcome: Ongoing (interventions implemented as appropriate)

## 2018-03-22 NOTE — PROGRESS NOTES
Case Management Discharge Note    Final Note: Pt dc'd home    Destination     No service coordination in this encounter.      Durable Medical Equipment     No service coordination in this encounter.      Dialysis/Infusion     No service coordination in this encounter.      Home Medical Care     No service coordination in this encounter.      Social Care     No service coordination in this encounter.        Other: Other    Final Discharge Disposition Code: 01 - home or self-care

## 2018-04-11 ENCOUNTER — OFFICE VISIT (OUTPATIENT)
Dept: SURGERY | Facility: CLINIC | Age: 53
End: 2018-04-11

## 2018-04-11 VITALS
BODY MASS INDEX: 17.72 KG/M2 | HEIGHT: 67 IN | SYSTOLIC BLOOD PRESSURE: 104 MMHG | DIASTOLIC BLOOD PRESSURE: 60 MMHG | HEART RATE: 89 BPM | WEIGHT: 112.9 LBS | OXYGEN SATURATION: 97 %

## 2018-04-11 DIAGNOSIS — Z87.19 HISTORY OF COLONIC DIVERTICULITIS: Primary | ICD-10-CM

## 2018-04-11 PROCEDURE — 99213 OFFICE O/P EST LOW 20 MIN: CPT | Performed by: SURGERY

## 2018-04-11 RX ORDER — ALBUTEROL SULFATE 90 UG/1
2 AEROSOL, METERED RESPIRATORY (INHALATION) EVERY 4 HOURS PRN
COMMUNITY
Start: 2018-04-08

## 2018-04-11 RX ORDER — METRONIDAZOLE 500 MG/1
500 TABLET ORAL 3 TIMES DAILY
Qty: 42 TABLET | Refills: 0 | Status: SHIPPED | OUTPATIENT
Start: 2018-04-11 | End: 2018-04-25

## 2018-04-11 RX ORDER — CIPROFLOXACIN 500 MG/1
250 TABLET, FILM COATED ORAL 2 TIMES DAILY
Qty: 28 TABLET | Refills: 0 | Status: SHIPPED | OUTPATIENT
Start: 2018-04-11 | End: 2018-04-25

## 2018-04-11 NOTE — PROGRESS NOTES
CC: LLQ abdominal pain   n  Patient is a 52 y.o. female with a history of LLQ abdominal pain and reoccurring diverticulitis. Patient is here today to discuss and schedule surgery for her reoccurring diverticulitis. Patient reports her last episode started on 3/15/2018 and continued to progress in severity.  Patient describes the pain in the left lower quadrant as well as suprapubic area as severe constant stabbing pain.  Patient denied any fevers or chills, nausea or vomiting.  Patient reports the pain was so severe she felt dizzy.  The pain was aggravated by moving and eating.  Nothing made the pain better.  Patient's first hospitalization for diverticulitis was in February 2016.  June 2016, patient underwent a colonoscopy that confirmed diverticulosis.  Since that time patient has had multiple episodes of diverticulitis.  Patient has been treated with outpatient antibiotics.  Patient reports when she has had her milder episodes she changes herself nothing by mouth or just liquid for a couple of days and feels better.  Patient has lost approximately 15 pounds over the last year and a half.  This is her second hospitalization for diverticulitis.            Past Medical History:   Diagnosis Date   • Asthma    • Bronchiectasis    • Chronic kidney disease    • Disease of thyroid gland    • Diverticulitis    • Diverticulitis of colon    • Hypercholesteremia    • Hypertension    • PONV (postoperative nausea and vomiting)    • Wegener's granulomatosis with renal involvement      Past Surgical History:   Procedure Laterality Date   • COLONOSCOPY N/A 6/24/2016    Procedure: COLONOSCOPY INTO CECUM;  Surgeon: Bee Carreon MD;  Location: [facility] ENDOSCOPY;  Service:    • LUNG BIOPSY     • OOPHORECTOMY     • RENAL BIOPSY     • WISDOM TOOTH EXTRACTION       Family History   Problem Relation Age of Onset   • Prostate cancer Father      Social History   Substance Use Topics   • Smoking status: Never Smoker   • Smokeless  "tobacco: Never Used   • Alcohol use No         Current Outpatient Prescriptions:   •  CRESTOR 20 MG tablet, TK 1 T PO DAILY, Disp: , Rfl: 1  •  febuxostat (ULORIC) 40 MG tablet, Take  by mouth daily., Disp: , Rfl:   •  fluticasone (FLONASE) 50 MCG/ACT nasal spray, SPRAY 2 SPRAYS IN EACH NOSTRIL DAILY, Disp: , Rfl: 1  •  levothyroxine (SYNTHROID, LEVOTHROID) 125 MCG tablet, Take  by mouth daily., Disp: , Rfl:   •  paricalcitol (ZEMPLAR) 1 MCG capsule, Take  by mouth daily., Disp: , Rfl:   •  sodium bicarbonate 650 MG tablet, Take  by mouth 2 (two) times a day., Disp: , Rfl:   •  SYMBICORT 160-4.5 MCG/ACT inhaler, INHALE 2 PUFFS PO BID, Disp: , Rfl: 3  •  albuterol (PROVENTIL HFA;VENTOLIN HFA) 108 (90 Base) MCG/ACT inhaler, , Disp: , Rfl:   •  ciprofloxacin (CIPRO) 500 MG tablet, Take 0.5 tablets by mouth 2 (Two) Times a Day for 14 days., Disp: 28 tablet, Rfl: 0  •  metroNIDAZOLE (FLAGYL) 500 MG tablet, Take 1 tablet by mouth 3 (Three) Times a Day for 14 days., Disp: 42 tablet, Rfl: 0    Review of Systems  All other systems have been reviewed and are negative.  Vitals:    04/11/18 0904   BP: 104/60   Pulse: 89   SpO2: 97%   Weight: 51.2 kg (112 lb 14.4 oz)   Height: 170.2 cm (67\")       Physical Exam  General/physcological:   Alert and oriented x3 in no acute distress  HEENT: Normal cephalic, atraumatic, PERRLA, EOMI, sclera anicteric, moist mucous membranes, neck is supple, no JVD, no carotid bruits, no thyromegaly no adenopathy  Respiratory: CTA and percussion  CVA: RRR, normal S1-S2, no murmurs, no gallops or rubs  GI: Positive BS, soft, nondistended, nontender, no rebound, no guarding, no hernias, no organomegaly and no masses  Musculoskeletal: Full range of motion, no clubbing, no cyanosis or edema  Neurovascular: Grossly intact    Patient does not use tobacco products currently and I have counseled the patient to not start using tobacco products in the future.    Assessment:  Reoccurring Diverticulitis "     Plan:  I have recommended that the patient undergo a laparoscopic low anterior colon resection and possible colostomy.  I have discussed this procedure in detail with the patient.  I have discussed the risks, benefits and alternatives.  I have discussed the risk of anesthesia, bleeding, infection, PEs, pneumonia, ureter and bladder injuries, anastomotic strictures and leaks.  Patient understands these risks, benefits and alternatives and wishes to proceed.  I have her scheduled at her earliest convenience.    Bee Carreon MD  General, Minimally Invasive and Endoscopic Surgery  [facility]    [address] [place] [address], [address]  [place], [place], [address]  P: [phone]  F: [phone]    Cc:  Pino Templeton MD

## 2018-04-11 NOTE — PROGRESS NOTES
"Chief complaint:  Post-op  Follow up    History of Present Illness    This is Sun Rodriguez 52 y.o. status post *** and is doing very well.  Patient denies fever, chills, nausea or vomiting.  Patient's pain is well-controlled.      The following portions of the patient's history were reviewed and updated as appropriate: allergies, current medications, past family history, past medical history, past social history, past surgical history and problem list.    Vitals:    04/11/18 0904   BP: 104/60   Pulse: 89   SpO2: 97%   Weight: 51.2 kg (112 lb 14.4 oz)   Height: 170.2 cm (67\")       Physical Exam  Incision is well-healed without evidence of {No wound infection:31981}.    Patient does not use tobacco products currently and I have counseled the patient not to start using tobacco products in the future.    Assessment/plan:    This is Sun Rodriguez 52 y.o. status post *** and is doing very well.  I have instructed the patient not lift greater than 10 pounds for total of 6 weeks from the time of surgery. I have instructed the patient follow-up as needed.    Bee Carreon MD  General, Minimally Invasive and Endoscopic Surgery  [facility]    [address], [address]  [place], [place], [address]  P: [phone]  F: [phone]    Cc:  Pino Templeton MD  "

## 2018-04-24 ENCOUNTER — APPOINTMENT (OUTPATIENT)
Dept: PREADMISSION TESTING | Facility: HOSPITAL | Age: 53
End: 2018-04-24

## 2018-04-24 VITALS
OXYGEN SATURATION: 99 % | DIASTOLIC BLOOD PRESSURE: 76 MMHG | HEIGHT: 67 IN | RESPIRATION RATE: 18 BRPM | WEIGHT: 110.5 LBS | TEMPERATURE: 97.9 F | BODY MASS INDEX: 17.34 KG/M2 | SYSTOLIC BLOOD PRESSURE: 120 MMHG | HEART RATE: 54 BPM

## 2018-04-24 LAB
ANION GAP SERPL CALCULATED.3IONS-SCNC: 12 MMOL/L
BUN BLD-MCNC: 36 MG/DL (ref 6–20)
BUN/CREAT SERPL: 12.5 (ref 7–25)
CALCIUM SPEC-SCNC: 10.4 MG/DL (ref 8.6–10.5)
CHLORIDE SERPL-SCNC: 102 MMOL/L (ref 98–107)
CO2 SERPL-SCNC: 25 MMOL/L (ref 22–29)
CREAT BLD-MCNC: 2.88 MG/DL (ref 0.57–1)
DEPRECATED RDW RBC AUTO: 46 FL (ref 37–54)
ERYTHROCYTE [DISTWIDTH] IN BLOOD BY AUTOMATED COUNT: 14 % (ref 11.7–13)
GFR SERPL CREATININE-BSD FRML MDRD: 17 ML/MIN/1.73
GLUCOSE BLD-MCNC: 93 MG/DL (ref 65–99)
HCG SERPL QL: NEGATIVE
HCT VFR BLD AUTO: 40.3 % (ref 35.6–45.5)
HGB BLD-MCNC: 12.7 G/DL (ref 11.9–15.5)
MCH RBC QN AUTO: 27.9 PG (ref 26.9–32)
MCHC RBC AUTO-ENTMCNC: 31.5 G/DL (ref 32.4–36.3)
MCV RBC AUTO: 88.6 FL (ref 80.5–98.2)
PLATELET # BLD AUTO: 228 10*3/MM3 (ref 140–500)
PMV BLD AUTO: 10.4 FL (ref 6–12)
POTASSIUM BLD-SCNC: 5.4 MMOL/L (ref 3.5–5.2)
RBC # BLD AUTO: 4.55 10*6/MM3 (ref 3.9–5.2)
SODIUM BLD-SCNC: 139 MMOL/L (ref 136–145)
WBC NRBC COR # BLD: 7.5 10*3/MM3 (ref 4.5–10.7)

## 2018-04-24 PROCEDURE — 85027 COMPLETE CBC AUTOMATED: CPT | Performed by: SURGERY

## 2018-04-24 PROCEDURE — 84703 CHORIONIC GONADOTROPIN ASSAY: CPT | Performed by: SURGERY

## 2018-04-24 PROCEDURE — 80048 BASIC METABOLIC PNL TOTAL CA: CPT | Performed by: SURGERY

## 2018-04-24 PROCEDURE — 36415 COLL VENOUS BLD VENIPUNCTURE: CPT

## 2018-04-24 RX ORDER — ROSUVASTATIN CALCIUM 20 MG/1
20 TABLET, COATED ORAL DAILY
COMMUNITY

## 2018-04-24 RX ORDER — BUDESONIDE AND FORMOTEROL FUMARATE DIHYDRATE 160; 4.5 UG/1; UG/1
2 AEROSOL RESPIRATORY (INHALATION) 2 TIMES DAILY
COMMUNITY

## 2018-04-24 RX ORDER — FLUTICASONE PROPIONATE 50 MCG
2 SPRAY, SUSPENSION (ML) NASAL AS NEEDED
Status: ON HOLD | COMMUNITY
End: 2018-05-01 | Stop reason: SDUPTHER

## 2018-05-01 ENCOUNTER — HOSPITAL ENCOUNTER (INPATIENT)
Facility: HOSPITAL | Age: 53
LOS: 5 days | Discharge: HOME | End: 2018-05-06
Attending: SURGERY | Admitting: SURGERY

## 2018-05-01 ENCOUNTER — ANESTHESIA (OUTPATIENT)
Dept: PERIOP | Facility: HOSPITAL | Age: 53
End: 2018-05-01

## 2018-05-01 ENCOUNTER — ANESTHESIA EVENT (OUTPATIENT)
Dept: PERIOP | Facility: HOSPITAL | Age: 53
End: 2018-05-01

## 2018-05-01 ENCOUNTER — APPOINTMENT (OUTPATIENT)
Dept: GENERAL RADIOLOGY | Facility: HOSPITAL | Age: 53
End: 2018-05-01

## 2018-05-01 DIAGNOSIS — Z87.19 HISTORY OF COLONIC DIVERTICULITIS: ICD-10-CM

## 2018-05-01 LAB
ANION GAP SERPL CALCULATED.3IONS-SCNC: 13.8 MMOL/L
B-HCG UR QL: NEGATIVE
BUN BLD-MCNC: 32 MG/DL (ref 6–20)
BUN/CREAT SERPL: 12.3 (ref 7–25)
CALCIUM SPEC-SCNC: 10.9 MG/DL (ref 8.6–10.5)
CHLORIDE SERPL-SCNC: 96 MMOL/L (ref 98–107)
CO2 SERPL-SCNC: 24.2 MMOL/L (ref 22–29)
CREAT BLD-MCNC: 2.6 MG/DL (ref 0.57–1)
GFR SERPL CREATININE-BSD FRML MDRD: 19 ML/MIN/1.73
GLUCOSE BLD-MCNC: 85 MG/DL (ref 65–99)
INTERNAL NEGATIVE CONTROL: NEGATIVE
INTERNAL POSITIVE CONTROL: POSITIVE
Lab: NORMAL
POTASSIUM BLD-SCNC: 3.4 MMOL/L (ref 3.5–5.2)
SODIUM BLD-SCNC: 134 MMOL/L (ref 136–145)

## 2018-05-01 PROCEDURE — 25010000002 LIDOCAINE PER 10 MG: Performed by: NURSE ANESTHETIST, CERTIFIED REGISTERED

## 2018-05-01 PROCEDURE — 25010000002 ONDANSETRON PER 1 MG: Performed by: NURSE ANESTHETIST, CERTIFIED REGISTERED

## 2018-05-01 PROCEDURE — 80048 BASIC METABOLIC PNL TOTAL CA: CPT | Performed by: SURGERY

## 2018-05-01 PROCEDURE — 25010000002 PROPOFOL 10 MG/ML EMULSION: Performed by: NURSE ANESTHETIST, CERTIFIED REGISTERED

## 2018-05-01 PROCEDURE — 44207 L COLECTOMY/COLOPROCTOSTOMY: CPT | Performed by: SURGERY

## 2018-05-01 PROCEDURE — 25010000002 FENTANYL CITRATE (PF) 100 MCG/2ML SOLUTION: Performed by: NURSE ANESTHETIST, CERTIFIED REGISTERED

## 2018-05-01 PROCEDURE — 25010000002 DEXAMETHASONE PER 1 MG: Performed by: NURSE ANESTHETIST, CERTIFIED REGISTERED

## 2018-05-01 PROCEDURE — 0DBE4ZZ EXCISION OF LARGE INTESTINE, PERCUTANEOUS ENDOSCOPIC APPROACH: ICD-10-PCS | Performed by: SURGERY

## 2018-05-01 PROCEDURE — 25010000002 SUCCINYLCHOLINE PER 20 MG: Performed by: NURSE ANESTHETIST, CERTIFIED REGISTERED

## 2018-05-01 PROCEDURE — 25010000002 MIDAZOLAM PER 1 MG: Performed by: ANESTHESIOLOGY

## 2018-05-01 PROCEDURE — 25010000002 PHENYLEPHRINE PER 1 ML: Performed by: NURSE ANESTHETIST, CERTIFIED REGISTERED

## 2018-05-01 PROCEDURE — 25010000002 ERTAPENEM PER 500 MG: Performed by: SURGERY

## 2018-05-01 PROCEDURE — 88307 TISSUE EXAM BY PATHOLOGIST: CPT | Performed by: SURGERY

## 2018-05-01 PROCEDURE — 71045 X-RAY EXAM CHEST 1 VIEW: CPT

## 2018-05-01 PROCEDURE — 94640 AIRWAY INHALATION TREATMENT: CPT

## 2018-05-01 PROCEDURE — 25010000002 MAGNESIUM SULFATE PER 500 MG OF MAGNESIUM: Performed by: NURSE ANESTHETIST, CERTIFIED REGISTERED

## 2018-05-01 PROCEDURE — 94799 UNLISTED PULMONARY SVC/PX: CPT

## 2018-05-01 PROCEDURE — 44213 LAP MOBIL SPLENIC FL ADD-ON: CPT | Performed by: SURGERY

## 2018-05-01 PROCEDURE — 99024 POSTOP FOLLOW-UP VISIT: CPT | Performed by: SURGERY

## 2018-05-01 RX ORDER — MIDAZOLAM HYDROCHLORIDE 1 MG/ML
2 INJECTION INTRAMUSCULAR; INTRAVENOUS
Status: DISCONTINUED | OUTPATIENT
Start: 2018-05-01 | End: 2018-05-01 | Stop reason: HOSPADM

## 2018-05-01 RX ORDER — SODIUM BICARBONATE 650 MG/1
650 TABLET ORAL ONCE
Status: COMPLETED | OUTPATIENT
Start: 2018-05-01 | End: 2018-05-01

## 2018-05-01 RX ORDER — DIPHENHYDRAMINE HYDROCHLORIDE 50 MG/ML
25 INJECTION INTRAMUSCULAR; INTRAVENOUS EVERY 6 HOURS PRN
Status: DISCONTINUED | OUTPATIENT
Start: 2018-05-01 | End: 2018-05-05

## 2018-05-01 RX ORDER — LABETALOL HYDROCHLORIDE 5 MG/ML
5 INJECTION, SOLUTION INTRAVENOUS
Status: DISCONTINUED | OUTPATIENT
Start: 2018-05-01 | End: 2018-05-01 | Stop reason: HOSPADM

## 2018-05-01 RX ORDER — SODIUM CHLORIDE, SODIUM LACTATE, POTASSIUM CHLORIDE, CALCIUM CHLORIDE 600; 310; 30; 20 MG/100ML; MG/100ML; MG/100ML; MG/100ML
9 INJECTION, SOLUTION INTRAVENOUS CONTINUOUS
Status: DISCONTINUED | OUTPATIENT
Start: 2018-05-01 | End: 2018-05-06 | Stop reason: HOSPADM

## 2018-05-01 RX ORDER — ALVIMOPAN 12 MG/1
12 CAPSULE ORAL 2 TIMES DAILY
Status: DISCONTINUED | OUTPATIENT
Start: 2018-05-01 | End: 2018-05-05

## 2018-05-01 RX ORDER — OXYCODONE AND ACETAMINOPHEN 7.5; 325 MG/1; MG/1
1 TABLET ORAL ONCE AS NEEDED
Status: DISCONTINUED | OUTPATIENT
Start: 2018-05-01 | End: 2018-05-01 | Stop reason: HOSPADM

## 2018-05-01 RX ORDER — MAGNESIUM HYDROXIDE 1200 MG/15ML
LIQUID ORAL AS NEEDED
Status: DISCONTINUED | OUTPATIENT
Start: 2018-05-01 | End: 2018-05-01 | Stop reason: HOSPADM

## 2018-05-01 RX ORDER — ONDANSETRON 4 MG/1
4 TABLET, ORALLY DISINTEGRATING ORAL EVERY 6 HOURS PRN
Status: DISCONTINUED | OUTPATIENT
Start: 2018-05-01 | End: 2018-05-06 | Stop reason: HOSPADM

## 2018-05-01 RX ORDER — DEXTROSE, SODIUM CHLORIDE, AND POTASSIUM CHLORIDE 5; .45; .15 G/100ML; G/100ML; G/100ML
100 INJECTION INTRAVENOUS CONTINUOUS
Status: DISCONTINUED | OUTPATIENT
Start: 2018-05-01 | End: 2018-05-02

## 2018-05-01 RX ORDER — MEPERIDINE HYDROCHLORIDE 25 MG/ML
12.5 INJECTION INTRAMUSCULAR; INTRAVENOUS; SUBCUTANEOUS
Status: DISCONTINUED | OUTPATIENT
Start: 2018-05-01 | End: 2018-05-01 | Stop reason: HOSPADM

## 2018-05-01 RX ORDER — DEXAMETHASONE SODIUM PHOSPHATE 10 MG/ML
INJECTION INTRAMUSCULAR; INTRAVENOUS AS NEEDED
Status: DISCONTINUED | OUTPATIENT
Start: 2018-05-01 | End: 2018-05-01 | Stop reason: SURG

## 2018-05-01 RX ORDER — ROCURONIUM BROMIDE 10 MG/ML
INJECTION, SOLUTION INTRAVENOUS AS NEEDED
Status: DISCONTINUED | OUTPATIENT
Start: 2018-05-01 | End: 2018-05-01 | Stop reason: SURG

## 2018-05-01 RX ORDER — MIDAZOLAM HYDROCHLORIDE 1 MG/ML
1 INJECTION INTRAMUSCULAR; INTRAVENOUS
Status: DISCONTINUED | OUTPATIENT
Start: 2018-05-01 | End: 2018-05-01 | Stop reason: HOSPADM

## 2018-05-01 RX ORDER — FENTANYL CITRATE 50 UG/ML
50 INJECTION, SOLUTION INTRAMUSCULAR; INTRAVENOUS
Status: DISCONTINUED | OUTPATIENT
Start: 2018-05-01 | End: 2018-05-01 | Stop reason: HOSPADM

## 2018-05-01 RX ORDER — FENTANYL CITRATE 50 UG/ML
INJECTION, SOLUTION INTRAMUSCULAR; INTRAVENOUS AS NEEDED
Status: DISCONTINUED | OUTPATIENT
Start: 2018-05-01 | End: 2018-05-01 | Stop reason: SURG

## 2018-05-01 RX ORDER — FLUMAZENIL 0.1 MG/ML
0.2 INJECTION INTRAVENOUS AS NEEDED
Status: DISCONTINUED | OUTPATIENT
Start: 2018-05-01 | End: 2018-05-01 | Stop reason: HOSPADM

## 2018-05-01 RX ORDER — LEVOTHYROXINE SODIUM 0.12 MG/1
125 TABLET ORAL
Status: DISCONTINUED | OUTPATIENT
Start: 2018-05-02 | End: 2018-05-06 | Stop reason: HOSPADM

## 2018-05-01 RX ORDER — ONDANSETRON 2 MG/ML
4 INJECTION INTRAMUSCULAR; INTRAVENOUS EVERY 6 HOURS PRN
Status: DISCONTINUED | OUTPATIENT
Start: 2018-05-01 | End: 2018-05-06 | Stop reason: HOSPADM

## 2018-05-01 RX ORDER — NALOXONE HCL 0.4 MG/ML
0.1 VIAL (ML) INJECTION
Status: DISCONTINUED | OUTPATIENT
Start: 2018-05-01 | End: 2018-05-05

## 2018-05-01 RX ORDER — HYDROCODONE BITARTRATE AND ACETAMINOPHEN 7.5; 325 MG/1; MG/1
1 TABLET ORAL ONCE AS NEEDED
Status: DISCONTINUED | OUTPATIENT
Start: 2018-05-01 | End: 2018-05-01 | Stop reason: HOSPADM

## 2018-05-01 RX ORDER — SODIUM CHLORIDE 0.9 % (FLUSH) 0.9 %
1-10 SYRINGE (ML) INJECTION AS NEEDED
Status: DISCONTINUED | OUTPATIENT
Start: 2018-05-01 | End: 2018-05-01 | Stop reason: HOSPADM

## 2018-05-01 RX ORDER — HYDROMORPHONE HCL 110MG/55ML
0.5 PATIENT CONTROLLED ANALGESIA SYRINGE INTRAVENOUS
Status: DISCONTINUED | OUTPATIENT
Start: 2018-05-01 | End: 2018-05-01 | Stop reason: HOSPADM

## 2018-05-01 RX ORDER — HYDRALAZINE HYDROCHLORIDE 20 MG/ML
5 INJECTION INTRAMUSCULAR; INTRAVENOUS
Status: DISCONTINUED | OUTPATIENT
Start: 2018-05-01 | End: 2018-05-01 | Stop reason: HOSPADM

## 2018-05-01 RX ORDER — PROMETHAZINE HYDROCHLORIDE 25 MG/ML
12.5 INJECTION, SOLUTION INTRAMUSCULAR; INTRAVENOUS ONCE AS NEEDED
Status: DISCONTINUED | OUTPATIENT
Start: 2018-05-01 | End: 2018-05-01 | Stop reason: HOSPADM

## 2018-05-01 RX ORDER — PARICALCITOL 1 UG/1
1 CAPSULE, LIQUID FILLED ORAL DAILY
Status: DISCONTINUED | OUTPATIENT
Start: 2018-05-02 | End: 2018-05-06 | Stop reason: HOSPADM

## 2018-05-01 RX ORDER — EPHEDRINE SULFATE 50 MG/ML
5 INJECTION, SOLUTION INTRAVENOUS ONCE AS NEEDED
Status: DISCONTINUED | OUTPATIENT
Start: 2018-05-01 | End: 2018-05-01 | Stop reason: HOSPADM

## 2018-05-01 RX ORDER — SUCCINYLCHOLINE CHLORIDE 20 MG/ML
INJECTION INTRAMUSCULAR; INTRAVENOUS AS NEEDED
Status: DISCONTINUED | OUTPATIENT
Start: 2018-05-01 | End: 2018-05-01 | Stop reason: SURG

## 2018-05-01 RX ORDER — HYDROMORPHONE HCL IN 0.9% NACL 10 MG/50ML
PATIENT CONTROLLED ANALGESIA SYRINGE INTRAVENOUS CONTINUOUS
Status: DISCONTINUED | OUTPATIENT
Start: 2018-05-01 | End: 2018-05-05

## 2018-05-01 RX ORDER — SCOLOPAMINE TRANSDERMAL SYSTEM 1 MG/1
1 PATCH, EXTENDED RELEASE TRANSDERMAL ONCE
Status: DISCONTINUED | OUTPATIENT
Start: 2018-05-01 | End: 2018-05-02

## 2018-05-01 RX ORDER — ONDANSETRON 4 MG/1
4 TABLET, FILM COATED ORAL EVERY 6 HOURS PRN
Status: DISCONTINUED | OUTPATIENT
Start: 2018-05-01 | End: 2018-05-06 | Stop reason: HOSPADM

## 2018-05-01 RX ORDER — BUPIVACAINE HYDROCHLORIDE AND EPINEPHRINE 2.5; 5 MG/ML; UG/ML
INJECTION, SOLUTION INFILTRATION; PERINEURAL AS NEEDED
Status: DISCONTINUED | OUTPATIENT
Start: 2018-05-01 | End: 2018-05-01 | Stop reason: HOSPADM

## 2018-05-01 RX ORDER — FLUTICASONE PROPIONATE 50 MCG
2 SPRAY, SUSPENSION (ML) NASAL AS NEEDED
Status: DISCONTINUED | OUTPATIENT
Start: 2018-05-01 | End: 2018-05-06 | Stop reason: HOSPADM

## 2018-05-01 RX ORDER — PROPOFOL 10 MG/ML
VIAL (ML) INTRAVENOUS AS NEEDED
Status: DISCONTINUED | OUTPATIENT
Start: 2018-05-01 | End: 2018-05-01 | Stop reason: SURG

## 2018-05-01 RX ORDER — ALBUTEROL SULFATE 2.5 MG/3ML
2.5 SOLUTION RESPIRATORY (INHALATION) EVERY 4 HOURS PRN
Status: DISCONTINUED | OUTPATIENT
Start: 2018-05-01 | End: 2018-05-06 | Stop reason: HOSPADM

## 2018-05-01 RX ORDER — MAGNESIUM SULFATE HEPTAHYDRATE 500 MG/ML
INJECTION, SOLUTION INTRAMUSCULAR; INTRAVENOUS AS NEEDED
Status: DISCONTINUED | OUTPATIENT
Start: 2018-05-01 | End: 2018-05-01 | Stop reason: SURG

## 2018-05-01 RX ORDER — ONDANSETRON 2 MG/ML
INJECTION INTRAMUSCULAR; INTRAVENOUS AS NEEDED
Status: DISCONTINUED | OUTPATIENT
Start: 2018-05-01 | End: 2018-05-01 | Stop reason: SURG

## 2018-05-01 RX ORDER — BUDESONIDE AND FORMOTEROL FUMARATE DIHYDRATE 160; 4.5 UG/1; UG/1
2 AEROSOL RESPIRATORY (INHALATION) 2 TIMES DAILY
Status: DISCONTINUED | OUTPATIENT
Start: 2018-05-01 | End: 2018-05-06 | Stop reason: HOSPADM

## 2018-05-01 RX ORDER — NALOXONE HCL 0.4 MG/ML
0.2 VIAL (ML) INJECTION AS NEEDED
Status: DISCONTINUED | OUTPATIENT
Start: 2018-05-01 | End: 2018-05-01 | Stop reason: HOSPADM

## 2018-05-01 RX ORDER — KETAMINE HYDROCHLORIDE 10 MG/ML
INJECTION INTRAMUSCULAR; INTRAVENOUS AS NEEDED
Status: DISCONTINUED | OUTPATIENT
Start: 2018-05-01 | End: 2018-05-01 | Stop reason: SURG

## 2018-05-01 RX ORDER — PROMETHAZINE HYDROCHLORIDE 25 MG/1
25 SUPPOSITORY RECTAL ONCE AS NEEDED
Status: DISCONTINUED | OUTPATIENT
Start: 2018-05-01 | End: 2018-05-01 | Stop reason: HOSPADM

## 2018-05-01 RX ORDER — ONDANSETRON 2 MG/ML
4 INJECTION INTRAMUSCULAR; INTRAVENOUS ONCE AS NEEDED
Status: DISCONTINUED | OUTPATIENT
Start: 2018-05-01 | End: 2018-05-01 | Stop reason: HOSPADM

## 2018-05-01 RX ORDER — GLYCOPYRROLATE 0.2 MG/ML
INJECTION INTRAMUSCULAR; INTRAVENOUS AS NEEDED
Status: DISCONTINUED | OUTPATIENT
Start: 2018-05-01 | End: 2018-05-01 | Stop reason: SURG

## 2018-05-01 RX ORDER — LIDOCAINE HYDROCHLORIDE 20 MG/ML
INJECTION, SOLUTION INFILTRATION; PERINEURAL AS NEEDED
Status: DISCONTINUED | OUTPATIENT
Start: 2018-05-01 | End: 2018-05-01 | Stop reason: SURG

## 2018-05-01 RX ORDER — DIPHENHYDRAMINE HYDROCHLORIDE 50 MG/ML
12.5 INJECTION INTRAMUSCULAR; INTRAVENOUS
Status: DISCONTINUED | OUTPATIENT
Start: 2018-05-01 | End: 2018-05-01 | Stop reason: HOSPADM

## 2018-05-01 RX ORDER — FAMOTIDINE 10 MG/ML
20 INJECTION, SOLUTION INTRAVENOUS ONCE
Status: COMPLETED | OUTPATIENT
Start: 2018-05-01 | End: 2018-05-01

## 2018-05-01 RX ORDER — LIDOCAINE HYDROCHLORIDE 10 MG/ML
0.5 INJECTION, SOLUTION EPIDURAL; INFILTRATION; INTRACAUDAL; PERINEURAL ONCE AS NEEDED
Status: DISCONTINUED | OUTPATIENT
Start: 2018-05-01 | End: 2018-05-01 | Stop reason: HOSPADM

## 2018-05-01 RX ORDER — LIDOCAINE HYDROCHLORIDE ANHYDROUS AND DEXTROSE MONOHYDRATE 5; 400 G/100ML; MG/100ML
INJECTION, SOLUTION INTRAVENOUS CONTINUOUS PRN
Status: DISCONTINUED | OUTPATIENT
Start: 2018-05-01 | End: 2018-05-01 | Stop reason: SURG

## 2018-05-01 RX ORDER — ALBUTEROL SULFATE 2.5 MG/3ML
2.5 SOLUTION RESPIRATORY (INHALATION) ONCE AS NEEDED
Status: DISCONTINUED | OUTPATIENT
Start: 2018-05-01 | End: 2018-05-01 | Stop reason: HOSPADM

## 2018-05-01 RX ORDER — PROMETHAZINE HYDROCHLORIDE 25 MG/1
12.5 TABLET ORAL ONCE AS NEEDED
Status: DISCONTINUED | OUTPATIENT
Start: 2018-05-01 | End: 2018-05-01 | Stop reason: HOSPADM

## 2018-05-01 RX ORDER — ROSUVASTATIN CALCIUM 40 MG/1
40 TABLET, COATED ORAL DAILY
Status: DISCONTINUED | OUTPATIENT
Start: 2018-05-02 | End: 2018-05-02

## 2018-05-01 RX ORDER — PROMETHAZINE HYDROCHLORIDE 25 MG/1
25 TABLET ORAL ONCE AS NEEDED
Status: DISCONTINUED | OUTPATIENT
Start: 2018-05-01 | End: 2018-05-01 | Stop reason: HOSPADM

## 2018-05-01 RX ADMIN — ALVIMOPAN 12 MG: 12 CAPSULE ORAL at 20:14

## 2018-05-01 RX ADMIN — FAMOTIDINE 20 MG: 10 INJECTION, SOLUTION INTRAVENOUS at 07:02

## 2018-05-01 RX ADMIN — SODIUM BICARBONATE 650 MG: 650 TABLET ORAL at 16:11

## 2018-05-01 RX ADMIN — PHENYLEPHRINE HYDROCHLORIDE 150 MCG: 10 INJECTION INTRAVENOUS at 08:16

## 2018-05-01 RX ADMIN — FENTANYL CITRATE 50 MCG: 50 INJECTION INTRAMUSCULAR; INTRAVENOUS at 08:00

## 2018-05-01 RX ADMIN — SODIUM CHLORIDE, POTASSIUM CHLORIDE, SODIUM LACTATE AND CALCIUM CHLORIDE: 600; 310; 30; 20 INJECTION, SOLUTION INTRAVENOUS at 10:12

## 2018-05-01 RX ADMIN — FENTANYL CITRATE 50 MCG: 50 INJECTION INTRAMUSCULAR; INTRAVENOUS at 09:26

## 2018-05-01 RX ADMIN — SCOPALAMINE 1 PATCH: 1 PATCH, EXTENDED RELEASE TRANSDERMAL at 07:02

## 2018-05-01 RX ADMIN — FENTANYL CITRATE 50 MCG: 50 INJECTION INTRAMUSCULAR; INTRAVENOUS at 08:56

## 2018-05-01 RX ADMIN — SODIUM CHLORIDE 1 G: 900 INJECTION INTRAVENOUS at 08:10

## 2018-05-01 RX ADMIN — LIDOCAINE HYDROCHLORIDE 80 MG: 20 INJECTION, SOLUTION INFILTRATION; PERINEURAL at 08:02

## 2018-05-01 RX ADMIN — POTASSIUM CHLORIDE, DEXTROSE MONOHYDRATE AND SODIUM CHLORIDE 100 ML/HR: 150; 5; 450 INJECTION, SOLUTION INTRAVENOUS at 14:22

## 2018-05-01 RX ADMIN — DEXAMETHASONE SODIUM PHOSPHATE 6 MG: 10 INJECTION INTRAMUSCULAR; INTRAVENOUS at 09:03

## 2018-05-01 RX ADMIN — ROCURONIUM BROMIDE 10 MG: 10 INJECTION INTRAVENOUS at 09:05

## 2018-05-01 RX ADMIN — MIDAZOLAM HYDROCHLORIDE 1 MG: 2 INJECTION, SOLUTION INTRAMUSCULAR; INTRAVENOUS at 07:02

## 2018-05-01 RX ADMIN — ONDANSETRON 4 MG: 2 INJECTION INTRAMUSCULAR; INTRAVENOUS at 09:55

## 2018-05-01 RX ADMIN — ROCURONIUM BROMIDE 25 MG: 10 INJECTION INTRAVENOUS at 08:10

## 2018-05-01 RX ADMIN — ROCURONIUM BROMIDE 10 MG: 10 INJECTION INTRAVENOUS at 09:43

## 2018-05-01 RX ADMIN — SODIUM CHLORIDE, POTASSIUM CHLORIDE, SODIUM LACTATE AND CALCIUM CHLORIDE 9 ML/HR: 600; 310; 30; 20 INJECTION, SOLUTION INTRAVENOUS at 07:02

## 2018-05-01 RX ADMIN — Medication: at 11:50

## 2018-05-01 RX ADMIN — KETAMINE HYDROCHLORIDE 50 MG: 10 INJECTION, SOLUTION INTRAMUSCULAR; INTRAVENOUS at 08:09

## 2018-05-01 RX ADMIN — SUGAMMADEX 200 MG: 100 INJECTION, SOLUTION INTRAVENOUS at 10:36

## 2018-05-01 RX ADMIN — GLYCOPYRROLATE 0.2 MG: 0.2 INJECTION INTRAMUSCULAR; INTRAVENOUS at 08:00

## 2018-05-01 RX ADMIN — PROPOFOL 120 MG: 10 INJECTION, EMULSION INTRAVENOUS at 08:02

## 2018-05-01 RX ADMIN — SUCCINYLCHOLINE CHLORIDE 80 MG: 20 INJECTION, SOLUTION INTRAMUSCULAR; INTRAVENOUS; PARENTERAL at 08:02

## 2018-05-01 RX ADMIN — BUDESONIDE AND FORMOTEROL FUMARATE DIHYDRATE 2 PUFF: 160; 4.5 AEROSOL RESPIRATORY (INHALATION) at 20:07

## 2018-05-01 RX ADMIN — ALVIMOPAN 12 MG: 12 CAPSULE ORAL at 07:24

## 2018-05-01 RX ADMIN — ALVIMOPAN 12 MG: 12 CAPSULE ORAL at 12:43

## 2018-05-01 RX ADMIN — MAGNESIUM SULFATE HEPTAHYDRATE 2 G: 500 INJECTION, SOLUTION INTRAMUSCULAR; INTRAVENOUS at 08:15

## 2018-05-01 RX ADMIN — ROCURONIUM BROMIDE 5 MG: 10 INJECTION INTRAVENOUS at 08:02

## 2018-05-01 RX ADMIN — PHENYLEPHRINE HYDROCHLORIDE 100 MCG: 10 INJECTION INTRAVENOUS at 10:01

## 2018-05-01 RX ADMIN — LIDOCAINE HYDROCHLORIDE ANHYDROUS AND DEXTROSE MONOHYDRATE 2 MG/KG/HR: .4; 5 INJECTION, SOLUTION INTRAVENOUS at 08:30

## 2018-05-01 RX ADMIN — FENTANYL CITRATE 50 MCG: 50 INJECTION INTRAMUSCULAR; INTRAVENOUS at 08:49

## 2018-05-01 NOTE — ANESTHESIA PREPROCEDURE EVALUATION
Anesthesia Evaluation     Patient summary reviewed and Nursing notes reviewed   history of anesthetic complications: PONV  NPO Solid Status: > 8 hours  NPO Liquid Status: > 2 hours           Airway   Dental      Pulmonary    (+) asthma,   Cardiovascular     ECG reviewed    (+) hypertension, hyperlipidemia,       Neuro/Psych  (+) dizziness/light headedness,     GI/Hepatic/Renal/Endo    (+)   renal disease CRI, hypothyroidism,     ROS Comment: Wegners granulomatosis    Musculoskeletal     Abdominal    Substance History      OB/GYN          Other                        Anesthesia Plan    ASA 3     general     intravenous and inhalational induction

## 2018-05-01 NOTE — OP NOTE
Pre-op dx: reoccurring diverticulitis     Post-op dx: Pending Pathology    Procedure: Laparoscopic LAR with splenic flexure mobilization    Surgeon: Bee Carreon MD    Assistant: DIANE Bedoya    Anesthesia: GET    Estimated Blood Loss: 100ml    Specimens:   Order Name Source Comment Collection Info Order Time   BASIC METABOLIC PANEL   Collected By: Jessica Lang RN 5/1/2018  6:48 AM   TISSUE PATHOLOGY EXAM Large Intestine, Sigmoid Colon  Collected By: Iva Martin RN 5/1/2018  9:50 AM       Complications: none    Indications: This is a 52 y.o. female patient with reoccurring history of diverticulitis     Procedure:  After general endotracheal anesthesia, patient was prepped and draped in the usual sterile fashion.  An infraumbilical linear incision was performed with an 11 blade scalpel. A Veress needle was inserted and a saline drop test was performed which revealed the needle to be in appropriate position. The abdomen was insufflated 15 mmHg. The Veress needle was removed and an Optiview 12 mm trocar was placed under direct visualization. Upon entering the abdomen, limited visual exploration was performed. There was no gross visual metastasis. Patient was positioned in Trendelenburg position and tilted toward the right. Two 5 mm trochars were placed, one in the left upper quadrant and a second 5 mm trocar was placed in the right upper quadrant under direct visualization. A 12 mm trocar was placed in the right lower quadrant under direct visualization. The diseased portion was easily visualized in the sigmoid colon. The sigmoid and descending colon was mobilized along the line of Toldt bluntly and using an Enseal device. The ureter was identified and protected under direct visualization throughout the course of the dissection. The omentum was reflected cephalad and the lesser sac was entered using the Enseal. Complete mobilization of the splenic flexure was carried out using the Enseal device  and blunt dissection. Once the colon was fully mobilized to the midline, the mesentery was scored using the Enseal device. Inferior mesenteric pedicle was isolated skeletonized and divided with the vascular loaded stapler after identifying and avoiding the left ureter. The presacral plane was developed staying well on the mesentery to avoid damage to the nerves. The rectum was skeletonized just above the peritoneal reflection which was divided with the blue loaded endostapler.   The umbilical incision was then extended and the proximal end of the colon was delivered through the opening. The specimen was transected with the ALIREZA-75 stapler. The specimen contained the abnormality and was submitted to pathology. A pursestring was placed in the proximal colon and using cautery an opening was created. An EEA 29 anvil was secured with a pursestring in the proximal end. The proximal end of the colon was then replaced intra-abdominally. The fascia was closed with 0 Vicryl. A Betadine sponge was  placed in the subcutaneous tissue of the infraumbilical incision. The abdomen was reinsufflated. Copious irrigations was applied and meticulous hemostasis was maintained throughout the procedure.  An EEA 29 stapling device was placed rectally and an end-to-side anastomosis was created laparoscopically. The proximal and distal colonic rings were complete. Under saline solution the anastomosis was insufflated from below without evidence of any air leak. The anastomosis appeared healthy without evidence of vascular compromise. The saline irrigant was removed by suctioning. All needles and sponge counts were correct ×2. All trochars were removed under direct visualization without evidence of trocar site bleeding. All incisions were infiltrated with quarter percent Marcaine and epinephrine and closed with 4-0 Vicryl. Sterile dressings were place. Patient was transferred to the recovery room in stable condition.    Bee FRANCIS  MD Velma  General, Minimally Invasive and Endoscopic Surgery  [facility]    [address], [address]  [place], [place], [address]  P: [phone]  F: [phone]    Cc:  Pino Templeton MD

## 2018-05-01 NOTE — NURSING NOTE
Dr. christianson called report for chest  X ray findings.  Extensive sub q air present.  Wishes to speak with Dr. Carreon.  Will notify dr. Carreon.  Pt stable with o2 sat 100% on 2 liters nasal cannula

## 2018-05-01 NOTE — PROGRESS NOTES
Clinical Pharmacy Services: Medication History    Sun Rodriguez is a 52 y.o. female presenting to [facility] for History of colonic diverticulitis [Z87.19]  History of colonic diverticulitis [Z87.19]    She  has a past medical history of Asthma; Bronchiectasis; Chronic kidney disease; Diverticulitis; Diverticulosis; Hypercholesteremia; Hypothyroidism; Joint pain; PONV (postoperative nausea and vomiting); Postoperative urinary retention; Wegener's granulomatosis with renal involvement; and Weight loss.    Allergies as of 04/11/2018   • (No Known Allergies)       Medication information was obtained from: Patient  Pharmacy and Phone Number: [id number] [address] [facility] [address] [facility] [place] [address] - [phone] [facility] [phone] FX    Prior to Admission Medications     Prescriptions Last Dose Informant Patient Reported? Taking?    budesonide-formoterol (SYMBICORT) 160-4.5 MCG/ACT inhaler 5/1/2018 Self Yes Yes    Inhale 2 puffs 2 (Two) Times a Day.    febuxostat (ULORIC) 40 MG tablet 5/1/2018 Self Yes Yes    Take 40 mg by mouth Daily.    levothyroxine (SYNTHROID, LEVOTHROID) 125 MCG tablet 5/1/2018 Self Yes Yes    Take 125 mcg by mouth Daily.    paricalcitol (ZEMPLAR) 1 MCG capsule 5/1/2018 Self Yes Yes    Take 1 mcg by mouth Daily.    rosuvastatin (CRESTOR) 20 MG tablet 5/1/2018 Self Yes Yes    Take 20 mg by mouth Daily.    sodium bicarbonate 650 MG tablet 5/1/2018 Self Yes Yes    Take 650 mg by mouth 2 (Two) Times a Day.    albuterol (PROVENTIL HFA;VENTOLIN HFA) 108 (90 Base) MCG/ACT inhaler Unknown Self Yes No    Inhale 2 puffs Every 4 (Four) Hours As Needed for Wheezing.    fluticasone (FLONASE) 50 MCG/ACT nasal spray Unknown Self Yes No    SPRAY 2 SPRAYS IN EACH NOSTRIL DAILY            Medication notes: Based on the information obtained from patient, following changes have been made to patient's PTA medication list.     1. Pt reported taking Crestor  20 mg once daily at home. Pt's home medication list updated    This medication list is complete to the best of my knowledge as of 5/1/2018    Please call pharmacy for any questions.     Antonio ClarkD, BCPS  05/01/18 2:36 PM

## 2018-05-01 NOTE — H&P (VIEW-ONLY)
CC: LLQ abdominal pain   n  Patient is a 52 y.o. female with a history of LLQ abdominal pain and reoccurring diverticulitis. Patient is here today to discuss and schedule surgery for her reoccurring diverticulitis. Patient reports her last episode started on 3/15/2018 and continued to progress in severity.  Patient describes the pain in the left lower quadrant as well as suprapubic area as severe constant stabbing pain.  Patient denied any fevers or chills, nausea or vomiting.  Patient reports the pain was so severe she felt dizzy.  The pain was aggravated by moving and eating.  Nothing made the pain better.  Patient's first hospitalization for diverticulitis was in February 2016.  June 2016, patient underwent a colonoscopy that confirmed diverticulosis.  Since that time patient has had multiple episodes of diverticulitis.  Patient has been treated with outpatient antibiotics.  Patient reports when she has had her milder episodes she changes herself nothing by mouth or just liquid for a couple of days and feels better.  Patient has lost approximately 15 pounds over the last year and a half.  This is her second hospitalization for diverticulitis.            Past Medical History:   Diagnosis Date   • Asthma    • Bronchiectasis    • Chronic kidney disease    • Disease of thyroid gland    • Diverticulitis    • Diverticulitis of colon    • Hypercholesteremia    • Hypertension    • PONV (postoperative nausea and vomiting)    • Wegener's granulomatosis with renal involvement      Past Surgical History:   Procedure Laterality Date   • COLONOSCOPY N/A 6/24/2016    Procedure: COLONOSCOPY INTO CECUM;  Surgeon: Bee Carreon MD;  Location: [facility] ENDOSCOPY;  Service:    • LUNG BIOPSY     • OOPHORECTOMY     • RENAL BIOPSY     • WISDOM TOOTH EXTRACTION       Family History   Problem Relation Age of Onset   • Prostate cancer Father      Social History   Substance Use Topics   • Smoking status: Never Smoker   • Smokeless  "tobacco: Never Used   • Alcohol use No         Current Outpatient Prescriptions:   •  CRESTOR 20 MG tablet, TK 1 T PO DAILY, Disp: , Rfl: 1  •  febuxostat (ULORIC) 40 MG tablet, Take  by mouth daily., Disp: , Rfl:   •  fluticasone (FLONASE) 50 MCG/ACT nasal spray, SPRAY 2 SPRAYS IN EACH NOSTRIL DAILY, Disp: , Rfl: 1  •  levothyroxine (SYNTHROID, LEVOTHROID) 125 MCG tablet, Take  by mouth daily., Disp: , Rfl:   •  paricalcitol (ZEMPLAR) 1 MCG capsule, Take  by mouth daily., Disp: , Rfl:   •  sodium bicarbonate 650 MG tablet, Take  by mouth 2 (two) times a day., Disp: , Rfl:   •  SYMBICORT 160-4.5 MCG/ACT inhaler, INHALE 2 PUFFS PO BID, Disp: , Rfl: 3  •  albuterol (PROVENTIL HFA;VENTOLIN HFA) 108 (90 Base) MCG/ACT inhaler, , Disp: , Rfl:   •  ciprofloxacin (CIPRO) 500 MG tablet, Take 0.5 tablets by mouth 2 (Two) Times a Day for 14 days., Disp: 28 tablet, Rfl: 0  •  metroNIDAZOLE (FLAGYL) 500 MG tablet, Take 1 tablet by mouth 3 (Three) Times a Day for 14 days., Disp: 42 tablet, Rfl: 0    Review of Systems  All other systems have been reviewed and are negative.  Vitals:    04/11/18 0904   BP: 104/60   Pulse: 89   SpO2: 97%   Weight: 51.2 kg (112 lb 14.4 oz)   Height: 170.2 cm (67\")       Physical Exam  General/physcological:   Alert and oriented x3 in no acute distress  HEENT: Normal cephalic, atraumatic, PERRLA, EOMI, sclera anicteric, moist mucous membranes, neck is supple, no JVD, no carotid bruits, no thyromegaly no adenopathy  Respiratory: CTA and percussion  CVA: RRR, normal S1-S2, no murmurs, no gallops or rubs  GI: Positive BS, soft, nondistended, nontender, no rebound, no guarding, no hernias, no organomegaly and no masses  Musculoskeletal: Full range of motion, no clubbing, no cyanosis or edema  Neurovascular: Grossly intact    Patient does not use tobacco products currently and I have counseled the patient to not start using tobacco products in the future.    Assessment:  Reoccurring Diverticulitis "     Plan:  I have recommended that the patient undergo a laparoscopic low anterior colon resection and possible colostomy.  I have discussed this procedure in detail with the patient.  I have discussed the risks, benefits and alternatives.  I have discussed the risk of anesthesia, bleeding, infection, PEs, pneumonia, ureter and bladder injuries, anastomotic strictures and leaks.  Patient understands these risks, benefits and alternatives and wishes to proceed.  I have her scheduled at her earliest convenience.    Bee Carreon MD  General, Minimally Invasive and Endoscopic Surgery  [facility]    [address] [place] [address], [address]  [place], [place], [address]  P: [phone]  F: [phone]    Cc:  Pino Templeton MD

## 2018-05-01 NOTE — ANESTHESIA POSTPROCEDURE EVALUATION
Patient: Sun Rodriguez    Procedure Summary     Date:  05/01/18 Room / Location:  [facility] OR 09 / [facility] MAIN OR    Anesthesia Start:  0752 Anesthesia Stop:  1053    Procedure:  COLON RESECTION LAPAROSCOPIC SIGMOID with splenic flexure takedown (N/A Abdomen) Diagnosis:       History of colonic diverticulitis      (History of colonic diverticulitis [Z87.19])    Surgeon:  Bee Carreon MD Provider:  Woody Barba MD    Anesthesia Type:  general ASA Status:  3          Anesthesia Type: general  Last vitals  BP   105/62 (05/01/18 1102)   Temp   36.6 °C (97.9 °F) (05/01/18 0639)   Pulse   108 (05/01/18 1102)   Resp   18 (05/01/18 1102)     SpO2   100 % (05/01/18 1102)     Post Anesthesia Care and Evaluation    Patient location during evaluation: PACU  Patient participation: complete - patient participated  Level of consciousness: awake and alert  Pain management: adequate  Airway patency: patent  Anesthetic complications: No anesthetic complications    Cardiovascular status: acceptable  Respiratory status: acceptable  Hydration status: acceptable    Comments: --------------------            05/01/18               1102     --------------------   BP:       105/62     Pulse:     108       Resp:       18       Temp:                SpO2:      100%     --------------------

## 2018-05-01 NOTE — PROGRESS NOTES
Chief Complaint: post-op today     Subjective   Call to evaluate patient's subcutaneous emphysema, patient denies chest pain, no N&V, no SOA    Objective     Vital signs in last 24 hours:  Temp:  [94.8 °F (34.9 °C)-97.9 °F (36.6 °C)] 95.7 °F (35.4 °C)  Heart Rate:  [] 91  Resp:  [16-20] 16  BP: ()/(50-78) 98/64    Intake/Output last 3 shifts:  No intake/output data recorded.    Intake/Output this shift:  I/O this shift:  In: 1500 [I.V.:1500]  Out: 425 [Urine:300; Blood:125]    Physical Exam:  General: no acute distress, A&O x3  Skin: diffuse SQ crepitus on abd, chest, neck, periorbital  and upper arms  Respiratory: CTA, good inspiratory effort  CV: RRR  Abd: no BS, soft, ND, usual post-op tenderness, no rebound, no guarding, incisions dry     Assessment/Plan   S/P lap LAR with splenic flexure mobilization   SQ crepitus secondary to lap CO2, chest x-ray without evidence of PTX or free air.     Bee Carreon MD

## 2018-05-01 NOTE — ANESTHESIA PROCEDURE NOTES
Airway  Urgency: elective    Airway not difficult    General Information and Staff    Patient location during procedure: OR  Anesthesiologist: DORY GILLIS  CRNA: JUAN J HATFIELD    Indications and Patient Condition  Indications for airway management: airway protection    Preoxygenated: yes  MILS maintained throughout  Mask difficulty assessment: 1 - vent by mask    Final Airway Details  Final airway type: endotracheal airway      Successful airway: ETT  Cuffed: yes   Successful intubation technique: direct laryngoscopy  Endotracheal tube insertion site: oral  Blade: Vanesa  Blade size: #3  ETT size: 7.0 mm  Cormack-Lehane Classification: grade I - full view of glottis  Placement verified by: chest auscultation and capnometry   Inital cuff pressure (cm H2O): 22  Measured from: teeth  Number of attempts at approach: 1

## 2018-05-02 LAB
ANION GAP SERPL CALCULATED.3IONS-SCNC: 10.1 MMOL/L
BASOPHILS # BLD AUTO: 0.01 10*3/MM3 (ref 0–0.2)
BASOPHILS NFR BLD AUTO: 0.1 % (ref 0–1.5)
BUN BLD-MCNC: 33 MG/DL (ref 6–20)
BUN/CREAT SERPL: 11.7 (ref 7–25)
CALCIUM SPEC-SCNC: 9.7 MG/DL (ref 8.6–10.5)
CHLORIDE SERPL-SCNC: 100 MMOL/L (ref 98–107)
CO2 SERPL-SCNC: 23.9 MMOL/L (ref 22–29)
CREAT BLD-MCNC: 2.83 MG/DL (ref 0.57–1)
CYTO UR: NORMAL
DEPRECATED RDW RBC AUTO: 43.3 FL (ref 37–54)
EOSINOPHIL # BLD AUTO: 0.01 10*3/MM3 (ref 0–0.7)
EOSINOPHIL NFR BLD AUTO: 0.1 % (ref 0.3–6.2)
ERYTHROCYTE [DISTWIDTH] IN BLOOD BY AUTOMATED COUNT: 13.5 % (ref 11.7–13)
GFR SERPL CREATININE-BSD FRML MDRD: 18 ML/MIN/1.73
GLUCOSE BLD-MCNC: 135 MG/DL (ref 65–99)
HCT VFR BLD AUTO: 30.8 % (ref 35.6–45.5)
HGB BLD-MCNC: 9.8 G/DL (ref 11.9–15.5)
IMM GRANULOCYTES # BLD: 0.02 10*3/MM3 (ref 0–0.03)
IMM GRANULOCYTES NFR BLD: 0.2 % (ref 0–0.5)
LAB AP CASE REPORT: NORMAL
LYMPHOCYTES # BLD AUTO: 0.81 10*3/MM3 (ref 0.9–4.8)
LYMPHOCYTES NFR BLD AUTO: 6.5 % (ref 19.6–45.3)
Lab: NORMAL
MCH RBC QN AUTO: 27.9 PG (ref 26.9–32)
MCHC RBC AUTO-ENTMCNC: 31.8 G/DL (ref 32.4–36.3)
MCV RBC AUTO: 87.7 FL (ref 80.5–98.2)
MONOCYTES # BLD AUTO: 1.47 10*3/MM3 (ref 0.2–1.2)
MONOCYTES NFR BLD AUTO: 11.9 % (ref 5–12)
NEUTROPHILS # BLD AUTO: 10.07 10*3/MM3 (ref 1.9–8.1)
NEUTROPHILS NFR BLD AUTO: 81.2 % (ref 42.7–76)
PATH REPORT.FINAL DX SPEC: NORMAL
PATH REPORT.GROSS SPEC: NORMAL
PLATELET # BLD AUTO: 158 10*3/MM3 (ref 140–500)
PMV BLD AUTO: 10.6 FL (ref 6–12)
POTASSIUM BLD-SCNC: 4.2 MMOL/L (ref 3.5–5.2)
POTASSIUM BLD-SCNC: 5.9 MMOL/L (ref 3.5–5.2)
RBC # BLD AUTO: 3.51 10*6/MM3 (ref 3.9–5.2)
SODIUM BLD-SCNC: 134 MMOL/L (ref 136–145)
WBC NRBC COR # BLD: 12.39 10*3/MM3 (ref 4.5–10.7)

## 2018-05-02 PROCEDURE — 94799 UNLISTED PULMONARY SVC/PX: CPT

## 2018-05-02 PROCEDURE — 99024 POSTOP FOLLOW-UP VISIT: CPT | Performed by: SURGERY

## 2018-05-02 PROCEDURE — 25010000002 FUROSEMIDE PER 20 MG: Performed by: SURGERY

## 2018-05-02 PROCEDURE — 25010000002 ENOXAPARIN PER 10 MG: Performed by: SURGERY

## 2018-05-02 PROCEDURE — 25010000002 ONDANSETRON PER 1 MG: Performed by: SURGERY

## 2018-05-02 PROCEDURE — 84132 ASSAY OF SERUM POTASSIUM: CPT | Performed by: SURGERY

## 2018-05-02 PROCEDURE — 80048 BASIC METABOLIC PNL TOTAL CA: CPT | Performed by: SURGERY

## 2018-05-02 PROCEDURE — 85025 COMPLETE CBC W/AUTO DIFF WBC: CPT | Performed by: SURGERY

## 2018-05-02 RX ORDER — SODIUM CHLORIDE 9 MG/ML
100 INJECTION, SOLUTION INTRAVENOUS CONTINUOUS
Status: DISCONTINUED | OUTPATIENT
Start: 2018-05-02 | End: 2018-05-05

## 2018-05-02 RX ORDER — ROSUVASTATIN CALCIUM 10 MG/1
10 TABLET, COATED ORAL DAILY
Status: DISCONTINUED | OUTPATIENT
Start: 2018-05-03 | End: 2018-05-06 | Stop reason: HOSPADM

## 2018-05-02 RX ORDER — FUROSEMIDE 10 MG/ML
20 INJECTION INTRAMUSCULAR; INTRAVENOUS ONCE
Status: COMPLETED | OUTPATIENT
Start: 2018-05-02 | End: 2018-05-02

## 2018-05-02 RX ADMIN — ONDANSETRON 4 MG: 2 INJECTION INTRAMUSCULAR; INTRAVENOUS at 13:41

## 2018-05-02 RX ADMIN — ENOXAPARIN SODIUM 30 MG: 30 INJECTION SUBCUTANEOUS at 16:09

## 2018-05-02 RX ADMIN — POTASSIUM CHLORIDE, DEXTROSE MONOHYDRATE AND SODIUM CHLORIDE 100 ML/HR: 150; 5; 450 INJECTION, SOLUTION INTRAVENOUS at 00:34

## 2018-05-02 RX ADMIN — ALVIMOPAN 12 MG: 12 CAPSULE ORAL at 08:21

## 2018-05-02 RX ADMIN — SODIUM CHLORIDE 100 ML/HR: 9 INJECTION, SOLUTION INTRAVENOUS at 11:01

## 2018-05-02 RX ADMIN — FUROSEMIDE 20 MG: 10 INJECTION, SOLUTION INTRAMUSCULAR; INTRAVENOUS at 12:00

## 2018-05-02 RX ADMIN — ROSUVASTATIN CALCIUM 40 MG: 40 TABLET, FILM COATED ORAL at 08:21

## 2018-05-02 RX ADMIN — ALVIMOPAN 12 MG: 12 CAPSULE ORAL at 20:01

## 2018-05-02 RX ADMIN — SODIUM CHLORIDE 100 ML/HR: 9 INJECTION, SOLUTION INTRAVENOUS at 21:46

## 2018-05-02 RX ADMIN — LEVOTHYROXINE SODIUM 125 MCG: 125 TABLET ORAL at 06:29

## 2018-05-02 RX ADMIN — PARICALCITOL 1 MCG: 1 CAPSULE, LIQUID FILLED ORAL at 08:21

## 2018-05-02 RX ADMIN — BUDESONIDE AND FORMOTEROL FUMARATE DIHYDRATE 2 PUFF: 160; 4.5 AEROSOL RESPIRATORY (INHALATION) at 21:45

## 2018-05-02 RX ADMIN — BUDESONIDE AND FORMOTEROL FUMARATE DIHYDRATE 2 PUFF: 160; 4.5 AEROSOL RESPIRATORY (INHALATION) at 09:46

## 2018-05-02 NOTE — PLAN OF CARE
Problem: Patient Care Overview  Goal: Plan of Care Review  Outcome: Ongoing (interventions implemented as appropriate)   05/02/18 0240   Coping/Psychosocial   Plan of Care Reviewed With patient   Plan of Care Review   Progress improving   OTHER   Outcome Summary Pt with crepitus on upper chest, arms and face. Slight facial swelling on R side. Pain controlled with PCA pump. F/C with good output to be removed at 0600. Dressing clean, dry, and intact. Pt amb in [place]. No complaints of nausea miky ice chips well. VSS. IVF's infusing. Currently resting well.      Goal: Individualization and Mutuality  Outcome: Ongoing (interventions implemented as appropriate)    Goal: Discharge Needs Assessment  Outcome: Ongoing (interventions implemented as appropriate)    Goal: Interprofessional Rounds/Family Conf  Outcome: Ongoing (interventions implemented as appropriate)      Problem: Bowel Obstruction (Adult)  Goal: Signs and Symptoms of Listed Potential Problems Will be Absent, Minimized or Managed (Bowel Obstruction)  Outcome: Ongoing (interventions implemented as appropriate)

## 2018-05-02 NOTE — PLAN OF CARE
Problem: Patient Care Overview  Goal: Plan of Care Review  Outcome: Ongoing (interventions implemented as appropriate)   05/02/18 1415   Coping/Psychosocial   Plan of Care Reviewed With patient   OTHER   Outcome Summary vss. pain well controlled w/ PCA. ambulating halls w/ assist. tolerating clear liquids. c/o urinary retention and in and out cathed x1 today. will continue to monitor.      Goal: Individualization and Mutuality  Outcome: Ongoing (interventions implemented as appropriate)    Goal: Discharge Needs Assessment  Outcome: Ongoing (interventions implemented as appropriate)    Goal: Interprofessional Rounds/Family Conf  Outcome: Ongoing (interventions implemented as appropriate)      Problem: Bowel Obstruction (Adult)  Goal: Signs and Symptoms of Listed Potential Problems Will be Absent, Minimized or Managed (Bowel Obstruction)  Outcome: Ongoing (interventions implemented as appropriate)

## 2018-05-02 NOTE — CONSULTS
Adult Nutrition  Assessment/PES    Patient Name:  Sun Rodriguez  YOB: 1965  MRN: [id number]  Admit Date:  5/1/2018    Assessment Date:  5/2/2018    Comments:  Completed nutrition assessment triggered by nurse admission screen.          Adult Nutrition Assessment     Row Name 05/02/18 1112       Calculation Measurements    Weight Used For Calculations 49.4 kg (108 lb 14.5 oz)       KCAL/KG    14 Kcal/Kg (kcal) 691.6    15 Kcal/Kg (kcal) 741    18 Kcal/Kg (kcal) 889.2    20 Kcal/Kg (kcal) 988    25 Kcal/Kg (kcal) 1235    30 Kcal/Kg (kcal) 1482    35 Kcal/Kg (kcal) 1729    40 Kcal/Kg (kcal) 1976    45 Kcal/Kg (kcal) 2223    50 Kcal/Kg (kcal) 2470       [place]-St. Jeor Equation    RMR ([place]-St. Jeor Equation) 1141.63       Fluid Requirements    Kathy-Jamel Method (over 20 kg) 2488       Nutrition Prescription PO    Current PO Diet Clear Liquid       Nutrient/Fluid Evaluation    Number of Days Evaluated 1 day    Additional Documentation Fluid Intake Evaluation (Group)       Intake Assessment    Fluid Requirement Assessment meeting needs       Fluid Intake Evaluation    IV Fluid (mL) 2400       PO Evaluation    Number of Days PO Intake Evaluated Insufficient Data   no intake recorded    Row Name 05/02/18 1111       Physical Findings    Skin other (see comments)   surgical ics abd; Issac score 20       Calculation Measurements    Weight Used For Calculations 49.4 kg (108 lb 14.5 oz)       Estimated/Assessed Needs    Additional Documentation Calorie Requirements (Group);Fluid Requirements (Group);Protein Requirements (Group)       Calorie Requirements    Estimated Calorie Requirement (kcal/day) 1482    Estimated Calorie Need Method kcal/kg    Estimated Calorie Requirement Comment 30 wilver/kg       KCAL/KG    14 Kcal/Kg (kcal) 691.6    15 Kcal/Kg (kcal) 741    18 Kcal/Kg (kcal) 889.2    20 Kcal/Kg (kcal) 988    25 Kcal/Kg (kcal) 1235    30 Kcal/Kg (kcal) 1482    35 Kcal/Kg (kcal) 1729    40 Kcal/Kg  (kcal) 1976    45 Kcal/Kg (kcal) 2223    50 Kcal/Kg (kcal) 2470       [place]-St. Jeor Equation    RMR ([place]-St. Jeor Equation) 1141.63       Protein Requirements    Est Protein Requirement Amount (gms/kg) 1.2 gm protein    Estimated Protein Requirements (gms/day) 59.28       Fluid Requirements    Estimated Fluid Requirements (mL/day) 1482    Estimated Fluid Requirement Method RDA Method    RDA Method (mL) 1482    Kathy-Jamel Method (over 20 kg) 2488    Row Name 05/02/18 1108       Labs/Procedures/Meds    Lab Results Reviewed reviewed    Lab Results Comments BUN, crt. glu, K+, WBC high; H/H, Na+ low; XR chest; vitals and medications reviewed    Row Name 05/02/18 1107       Admit Weight    Admit Weight Method measured    Admit Weight 49.4 kg (108 lb 14.5 oz)       Ideal Body Weight (IBW)    % of Ideal Body Weight Assessment 70-79%: moderate deficit       Usual Body Weight (UBW)    Weight Loss Time Frame Stated weight May, 2017 127#. Current weight 108#. 27# weight loss (17.59%) x 1 year       Body Mass Index (BMI)    BMI Assessment BMI 17-18.4: protein-energy malnutrition grade I    Row Name 05/02/18 1104       Reason for Assessment    Reason For Assessment nurse/nurse practitioner consult    Diagnosis surgery/postoperative complications;gastrointestinal disease    Identified At Risk by Screening Criteria MST SCORE 2+          Problem/Interventions:        Problem 1     Row Name 05/02/18 1113       Nutrition Diagnoses Problem 1    Problem 1 Nutrition Appropriate for Condition at this Time    Etiology (related to) Medical Diagnosis    Gastrointestinal Diverticulitis    Signs/Symptoms (evidenced by) Report/Observation    Reported/Observed By MD                    Intervention Goal     Row Name 05/02/18 1113       Intervention Goal    General Maintain nutrition    PO Tolerate PO;Advance diet;PO intake (%)    PO Intake % 75 %    Weight No significant weight loss            Nutrition Intervention     Row Name  05/02/18 1114       Nutrition Intervention    RD/Tech Action Follow Tx progress;Care plan reviewd              Education/Evaluation     Row Name 05/02/18 1114       Education    Education Will Instruct as appropriate       Monitor/Evaluation    Monitor Per protocol        Electronically signed by:  Kamala Desai RD  05/02/18 11:15 AM

## 2018-05-02 NOTE — PROGRESS NOTES
Chief Complaint: POD # 1    Subjective   Pt reports ambulating in [place] yesterday, pain controlled, no N&V, no bms    Objective     Vital signs in last 24 hours:  Temp:  [94.8 °F (34.9 °C)-97.7 °F (36.5 °C)] 97.4 °F (36.3 °C)  Heart Rate:  [] 58  Resp:  [16-20] 16  BP: ()/(50-74) 98/64    Intake/Output last 3 shifts:  I/O last 3 completed shifts:  In: 2500 [I.V.:2500]  Out: 2325 [Urine:2200; Blood:125]    Intake/Output this shift:  No intake/output data recorded.    Physical Exam:  Skin: SQ crepitus small amount   Respiratory: CTA, good inspiratory effort  CV: RRR  Abd: + BS, soft, ND, usual post-op tenderness, no rebound, no guarding, incisions dry     Results from last 7 days  Lab Units 05/02/18  0407   WBC 10*3/mm3 12.39*   HEMOGLOBIN g/dL 9.8*   HEMATOCRIT % 30.8*   PLATELETS 10*3/mm3 158       Results from last 7 days  Lab Units 05/02/18  0407   SODIUM mmol/L 134*   POTASSIUM mmol/L 5.9*   CHLORIDE mmol/L 100   CO2 mmol/L 23.9   BUN mg/dL 33*   CREATININE mg/dL 2.83*   GLUCOSE mg/dL 135*   CALCIUM mg/dL 9.7     Assessment/Plan   S/P lap LAR with splenic flexure mobilization   Expected post-op ileus - bowel sounds - start clears   SQ crepitus resolving     Bee PENNY Carreon MD

## 2018-05-03 PROCEDURE — 25010000002 ENOXAPARIN PER 10 MG: Performed by: SURGERY

## 2018-05-03 PROCEDURE — 94799 UNLISTED PULMONARY SVC/PX: CPT

## 2018-05-03 PROCEDURE — 99024 POSTOP FOLLOW-UP VISIT: CPT | Performed by: SURGERY

## 2018-05-03 PROCEDURE — 25010000002 ONDANSETRON PER 1 MG: Performed by: SURGERY

## 2018-05-03 RX ORDER — ACETAMINOPHEN 325 MG/1
650 TABLET ORAL EVERY 4 HOURS PRN
Status: DISCONTINUED | OUTPATIENT
Start: 2018-05-03 | End: 2018-05-06 | Stop reason: HOSPADM

## 2018-05-03 RX ADMIN — BUDESONIDE AND FORMOTEROL FUMARATE DIHYDRATE 2 PUFF: 160; 4.5 AEROSOL RESPIRATORY (INHALATION) at 08:41

## 2018-05-03 RX ADMIN — ALVIMOPAN 12 MG: 12 CAPSULE ORAL at 09:02

## 2018-05-03 RX ADMIN — ACETAMINOPHEN 650 MG: 325 TABLET ORAL at 04:45

## 2018-05-03 RX ADMIN — BUDESONIDE AND FORMOTEROL FUMARATE DIHYDRATE 2 PUFF: 160; 4.5 AEROSOL RESPIRATORY (INHALATION) at 21:43

## 2018-05-03 RX ADMIN — ROSUVASTATIN CALCIUM 10 MG: 10 TABLET, FILM COATED ORAL at 09:02

## 2018-05-03 RX ADMIN — LEVOTHYROXINE SODIUM 125 MCG: 125 TABLET ORAL at 06:26

## 2018-05-03 RX ADMIN — ONDANSETRON 4 MG: 2 INJECTION INTRAMUSCULAR; INTRAVENOUS at 14:59

## 2018-05-03 RX ADMIN — PARICALCITOL 1 MCG: 1 CAPSULE, LIQUID FILLED ORAL at 09:02

## 2018-05-03 RX ADMIN — SODIUM CHLORIDE 100 ML/HR: 9 INJECTION, SOLUTION INTRAVENOUS at 16:09

## 2018-05-03 RX ADMIN — SODIUM CHLORIDE 100 ML/HR: 9 INJECTION, SOLUTION INTRAVENOUS at 06:26

## 2018-05-03 RX ADMIN — ALVIMOPAN 12 MG: 12 CAPSULE ORAL at 20:21

## 2018-05-03 RX ADMIN — ENOXAPARIN SODIUM 30 MG: 30 INJECTION SUBCUTANEOUS at 16:37

## 2018-05-03 NOTE — PROGRESS NOTES
Chief Complaint: POD # 2    Subjective   Pt feels a little nauseated, no vomiting, pain controlled, + flatus     Objective     Vital signs in last 24 hours:  Temp:  [97.8 °F (36.6 °C)-101.4 °F (38.6 °C)] 100.2 °F (37.9 °C)  Heart Rate:  [] 60  Resp:  [16-20] 16  BP: (110-129)/(60-78) 123/78    Intake/Output last 3 shifts:  I/O last 3 completed shifts:  In: 5435 [P.O.:790; I.V.:4645]  Out: 8030 [Urine:8030]    Intake/Output this shift:  I/O this shift:  In: 1327 [P.O.:320; I.V.:1007]  Out: 2000 [Urine:2000]    Physical Exam:  Respiratory: CTA, good inspiratory effort  CV: RRR  Abd: + BS, soft, slightly distended, usual post-op tenderness, no rebound, no guarding, incisions look good    Results from last 7 days  Lab Units 05/02/18  0407   WBC 10*3/mm3 12.39*   HEMOGLOBIN g/dL 9.8*   HEMATOCRIT % 30.8*   PLATELETS 10*3/mm3 158       Results from last 7 days  Lab Units 05/02/18  1447 05/02/18  0407   SODIUM mmol/L  --  134*   POTASSIUM mmol/L 4.2 5.9*   CHLORIDE mmol/L  --  100   CO2 mmol/L  --  23.9   BUN mg/dL  --  33*   CREATININE mg/dL  --  2.83*   GLUCOSE mg/dL  --  135*   CALCIUM mg/dL  --  9.7       Assessment/Plan   S/P lap LAR for reoccurring diverticulitis   Expected post-op ileus - await bowel function     Bee Carreon MD

## 2018-05-03 NOTE — PROGRESS NOTES
Discharge Planning Assessment   [facility]     Patient Name: Sun Rodriguez  MRN: [id number]  Today's Date: 5/3/2018    Admit Date: 5/1/2018          Discharge Needs Assessment     Row Name 05/03/18 0928       Living Environment    Lives With spouse;child(natty), adult    Current Living Arrangements home/apartment/condo    Primary Care Provided by self    Provides Primary Care For no one    Family Caregiver if Needed child(natty), adult;spouse    Quality of Family Relationships supportive    Able to Return to Prior Arrangements yes       Resource/Environmental Concerns    Transportation Concerns car, none       Transition Planning    Patient/Family Anticipates Transition to home with family    Patient/Family Anticipated Services at Transition none    Transportation Anticipated family or friend will provide       Discharge Needs Assessment    Readmission Within the Last 30 Days no previous admission in last 30 days    Concerns to be Addressed no discharge needs identified;denies needs/concerns at this time;adjustment to diagnosis/illness    Equipment Currently Used at Home nebulizer    Anticipated Changes Related to Illness none    Equipment Needed After Discharge none    Offered/Gave Vendor List yes            Discharge Plan     Row Name 05/03/18 0925       Plan    Plan Home w/o needs    Plan Comments Pt confirmed the address, PCP and pharmacy are correct. Pt said she can afford her Rx, uses a nebulizer PRN, drives and has transportation, lives with her  Simeon and an adult son, pt said her dtr Cyndie is between jobs and will be staying with her to assist when she returns home. Pt said she has never had home health. Pt said she does not have a HCS, POA or Living Will, I advised hospital [facility] can assist with HCS / Living will free of charge if wanted.  Pt said she plans home at discharge and does not anticipate any needs.                 Yudith Chiang RN                       Demographic Summary      Row Name 05/03/18 0928       General Information    Admission Type inpatient    Arrived From home    Referral Source admission list    Reason for Consult discharge planning            Functional Status     Row Name 05/03/18 0928       Functional Status, IADL    Medications independent    Meal Preparation independent    Housekeeping independent    Laundry independent    Shopping independent                Yudith Chiang RN

## 2018-05-03 NOTE — PLAN OF CARE
Problem: Patient Care Overview  Goal: Plan of Care Review  Outcome: Ongoing (interventions implemented as appropriate)   05/03/18 0338   Coping/Psychosocial   Plan of Care Reviewed With patient   Plan of Care Review   Progress improving   OTHER   Outcome Summary Pain well controlled with w/ PCA. Ambulating in halls with assist. Tolerating clear liq well. In and out cath x1 with relief and no other c/o of retention, still monitoring.  IS encouraged. IVF's infusing. Crepitus improving. Currently resting well.      Goal: Individualization and Mutuality  Outcome: Ongoing (interventions implemented as appropriate)    Goal: Discharge Needs Assessment  Outcome: Ongoing (interventions implemented as appropriate)    Goal: Interprofessional Rounds/Family Conf  Outcome: Ongoing (interventions implemented as appropriate)      Problem: Bowel Obstruction (Adult)  Goal: Signs and Symptoms of Listed Potential Problems Will be Absent, Minimized or Managed (Bowel Obstruction)  Outcome: Ongoing (interventions implemented as appropriate)

## 2018-05-03 NOTE — PLAN OF CARE
Problem: Patient Care Overview  Goal: Plan of Care Review  Outcome: Ongoing (interventions implemented as appropriate)   05/03/18 9241   Coping/Psychosocial   Plan of Care Reviewed With patient   OTHER   Outcome Summary pain well controlled with basal rate on PCA, voiding w/o difficulty, BM late this shift, ambulated in halls, uses IS w/ encouragement, less edema in face & neck no difficulty swallowing,, medicated for nause x 1 with relief     Goal: Individualization and Mutuality  Outcome: Ongoing (interventions implemented as appropriate)    Goal: Discharge Needs Assessment  Outcome: Ongoing (interventions implemented as appropriate)    Goal: Interprofessional Rounds/Family Conf  Outcome: Ongoing (interventions implemented as appropriate)      Problem: Bowel Obstruction (Adult)  Goal: Signs and Symptoms of Listed Potential Problems Will be Absent, Minimized or Managed (Bowel Obstruction)  Outcome: Ongoing (interventions implemented as appropriate)

## 2018-05-04 LAB
ANION GAP SERPL CALCULATED.3IONS-SCNC: 15 MMOL/L
BASOPHILS # BLD AUTO: 0.02 10*3/MM3 (ref 0–0.2)
BASOPHILS NFR BLD AUTO: 0.2 % (ref 0–1.5)
BUN BLD-MCNC: 23 MG/DL (ref 6–20)
BUN/CREAT SERPL: 10.4 (ref 7–25)
CALCIUM SPEC-SCNC: 9.5 MG/DL (ref 8.6–10.5)
CHLORIDE SERPL-SCNC: 103 MMOL/L (ref 98–107)
CO2 SERPL-SCNC: 22 MMOL/L (ref 22–29)
CREAT BLD-MCNC: 2.21 MG/DL (ref 0.57–1)
DEPRECATED RDW RBC AUTO: 45 FL (ref 37–54)
EOSINOPHIL # BLD AUTO: 0.29 10*3/MM3 (ref 0–0.7)
EOSINOPHIL NFR BLD AUTO: 2.4 % (ref 0.3–6.2)
ERYTHROCYTE [DISTWIDTH] IN BLOOD BY AUTOMATED COUNT: 13.8 % (ref 11.7–13)
GFR SERPL CREATININE-BSD FRML MDRD: 23 ML/MIN/1.73
GLUCOSE BLD-MCNC: 80 MG/DL (ref 65–99)
HCT VFR BLD AUTO: 28.9 % (ref 35.6–45.5)
HGB BLD-MCNC: 9 G/DL (ref 11.9–15.5)
IMM GRANULOCYTES # BLD: 0.02 10*3/MM3 (ref 0–0.03)
IMM GRANULOCYTES NFR BLD: 0.2 % (ref 0–0.5)
LYMPHOCYTES # BLD AUTO: 1.32 10*3/MM3 (ref 0.9–4.8)
LYMPHOCYTES NFR BLD AUTO: 11 % (ref 19.6–45.3)
MCH RBC QN AUTO: 27.9 PG (ref 26.9–32)
MCHC RBC AUTO-ENTMCNC: 31.1 G/DL (ref 32.4–36.3)
MCV RBC AUTO: 89.5 FL (ref 80.5–98.2)
MONOCYTES # BLD AUTO: 1.06 10*3/MM3 (ref 0.2–1.2)
MONOCYTES NFR BLD AUTO: 8.8 % (ref 5–12)
NEUTROPHILS # BLD AUTO: 9.27 10*3/MM3 (ref 1.9–8.1)
NEUTROPHILS NFR BLD AUTO: 77.4 % (ref 42.7–76)
PLATELET # BLD AUTO: 146 10*3/MM3 (ref 140–500)
PMV BLD AUTO: 10.3 FL (ref 6–12)
POTASSIUM BLD-SCNC: 4.2 MMOL/L (ref 3.5–5.2)
RBC # BLD AUTO: 3.23 10*6/MM3 (ref 3.9–5.2)
SODIUM BLD-SCNC: 140 MMOL/L (ref 136–145)
WBC NRBC COR # BLD: 11.98 10*3/MM3 (ref 4.5–10.7)

## 2018-05-04 PROCEDURE — 25010000002 ENOXAPARIN PER 10 MG: Performed by: SURGERY

## 2018-05-04 PROCEDURE — 94799 UNLISTED PULMONARY SVC/PX: CPT

## 2018-05-04 PROCEDURE — 25010000002 ONDANSETRON PER 1 MG: Performed by: SURGERY

## 2018-05-04 PROCEDURE — 85025 COMPLETE CBC W/AUTO DIFF WBC: CPT | Performed by: SURGERY

## 2018-05-04 PROCEDURE — 99024 POSTOP FOLLOW-UP VISIT: CPT | Performed by: SURGERY

## 2018-05-04 PROCEDURE — 80048 BASIC METABOLIC PNL TOTAL CA: CPT | Performed by: SURGERY

## 2018-05-04 RX ADMIN — PARICALCITOL 1 MCG: 1 CAPSULE, LIQUID FILLED ORAL at 08:06

## 2018-05-04 RX ADMIN — SODIUM CHLORIDE 100 ML/HR: 9 INJECTION, SOLUTION INTRAVENOUS at 10:22

## 2018-05-04 RX ADMIN — LEVOTHYROXINE SODIUM 125 MCG: 125 TABLET ORAL at 06:41

## 2018-05-04 RX ADMIN — SODIUM CHLORIDE 100 ML/HR: 9 INJECTION, SOLUTION INTRAVENOUS at 01:24

## 2018-05-04 RX ADMIN — BUDESONIDE AND FORMOTEROL FUMARATE DIHYDRATE 2 PUFF: 160; 4.5 AEROSOL RESPIRATORY (INHALATION) at 21:29

## 2018-05-04 RX ADMIN — ALVIMOPAN 12 MG: 12 CAPSULE ORAL at 21:02

## 2018-05-04 RX ADMIN — Medication: at 01:18

## 2018-05-04 RX ADMIN — ENOXAPARIN SODIUM 30 MG: 30 INJECTION SUBCUTANEOUS at 16:24

## 2018-05-04 RX ADMIN — ONDANSETRON 4 MG: 2 INJECTION INTRAMUSCULAR; INTRAVENOUS at 20:24

## 2018-05-04 RX ADMIN — ROSUVASTATIN CALCIUM 10 MG: 10 TABLET, FILM COATED ORAL at 08:06

## 2018-05-04 RX ADMIN — ACETAMINOPHEN 650 MG: 325 TABLET ORAL at 14:38

## 2018-05-04 RX ADMIN — ACETAMINOPHEN 650 MG: 325 TABLET ORAL at 04:17

## 2018-05-04 RX ADMIN — SODIUM CHLORIDE 100 ML/HR: 9 INJECTION, SOLUTION INTRAVENOUS at 20:19

## 2018-05-04 RX ADMIN — ALVIMOPAN 12 MG: 12 CAPSULE ORAL at 08:06

## 2018-05-04 RX ADMIN — ACETAMINOPHEN 650 MG: 325 TABLET ORAL at 22:39

## 2018-05-04 RX ADMIN — BUDESONIDE AND FORMOTEROL FUMARATE DIHYDRATE 2 PUFF: 160; 4.5 AEROSOL RESPIRATORY (INHALATION) at 10:01

## 2018-05-04 NOTE — PLAN OF CARE
Problem: Patient Care Overview  Goal: Plan of Care Review  Outcome: Ongoing (interventions implemented as appropriate)   05/04/18 1630   Coping/Psychosocial   Plan of Care Reviewed With patient   Plan of Care Review   Progress no change   OTHER   Outcome Summary Pt transferred to [facility] as med/surg overflow. Oriented to unit, discussed plan of care. Pain well controlled with dilaudid PCA and tylenol PRN. Denies nausea but appetite not great. No other needs/complaints. Will continue to monitor.        Problem: Bowel Obstruction (Adult)  Goal: Signs and Symptoms of Listed Potential Problems Will be Absent, Minimized or Managed (Bowel Obstruction)  Outcome: Ongoing (interventions implemented as appropriate)      Problem: Surgery Nonspecified (Adult)  Goal: Signs and Symptoms of Listed Potential Problems Will be Absent, Minimized or Managed (Surgery Nonspecified)  Outcome: Ongoing (interventions implemented as appropriate)

## 2018-05-04 NOTE — PLAN OF CARE
Problem: Patient Care Overview  Goal: Plan of Care Review  Outcome: Ongoing (interventions implemented as appropriate)   05/04/18 0436   Coping/Psychosocial   Plan of Care Reviewed With patient   Plan of Care Review   Progress no change   OTHER   Outcome Summary pain well controlled with PCA. Temp max of 100.4, tylenol given. lap sites with steristrips d/i. voiding okay      Goal: Individualization and Mutuality  Outcome: Ongoing (interventions implemented as appropriate)    Goal: Discharge Needs Assessment  Outcome: Ongoing (interventions implemented as appropriate)    Goal: Interprofessional Rounds/Family Conf  Outcome: Ongoing (interventions implemented as appropriate)      Problem: Bowel Obstruction (Adult)  Goal: Signs and Symptoms of Listed Potential Problems Will be Absent, Minimized or Managed (Bowel Obstruction)  Outcome: Ongoing (interventions implemented as appropriate)      Problem: Surgery Nonspecified (Adult)  Goal: Signs and Symptoms of Listed Potential Problems Will be Absent, Minimized or Managed (Surgery Nonspecified)  Outcome: Ongoing (interventions implemented as appropriate)

## 2018-05-05 ENCOUNTER — APPOINTMENT (OUTPATIENT)
Dept: GENERAL RADIOLOGY | Facility: HOSPITAL | Age: 53
End: 2018-05-05

## 2018-05-05 LAB
BACTERIA UR QL AUTO: NORMAL /HPF
BILIRUB UR QL STRIP: NEGATIVE
CLARITY UR: CLEAR
COLOR UR: YELLOW
GLUCOSE UR STRIP-MCNC: NEGATIVE MG/DL
HGB UR QL STRIP.AUTO: ABNORMAL
HYALINE CASTS UR QL AUTO: NORMAL /LPF
KETONES UR QL STRIP: NEGATIVE
LEUKOCYTE ESTERASE UR QL STRIP.AUTO: NEGATIVE
NITRITE UR QL STRIP: NEGATIVE
PH UR STRIP.AUTO: 6.5 [PH] (ref 5–8)
PROT UR QL STRIP: ABNORMAL
RBC # UR: NORMAL /HPF
REF LAB TEST METHOD: NORMAL
SP GR UR STRIP: 1.01 (ref 1–1.03)
SQUAMOUS #/AREA URNS HPF: NORMAL /HPF
UROBILINOGEN UR QL STRIP: ABNORMAL
WBC UR QL AUTO: NORMAL /HPF

## 2018-05-05 PROCEDURE — 94799 UNLISTED PULMONARY SVC/PX: CPT

## 2018-05-05 PROCEDURE — 25010000002 ENOXAPARIN PER 10 MG: Performed by: SURGERY

## 2018-05-05 PROCEDURE — 25010000002 ONDANSETRON PER 1 MG: Performed by: SURGERY

## 2018-05-05 PROCEDURE — 99024 POSTOP FOLLOW-UP VISIT: CPT | Performed by: SURGERY

## 2018-05-05 PROCEDURE — 81001 URINALYSIS AUTO W/SCOPE: CPT | Performed by: SURGERY

## 2018-05-05 PROCEDURE — 74022 RADEX COMPL AQT ABD SERIES: CPT

## 2018-05-05 RX ORDER — OXYCODONE HYDROCHLORIDE AND ACETAMINOPHEN 5; 325 MG/1; MG/1
2 TABLET ORAL EVERY 4 HOURS PRN
Status: DISCONTINUED | OUTPATIENT
Start: 2018-05-05 | End: 2018-05-06 | Stop reason: HOSPADM

## 2018-05-05 RX ADMIN — ACETAMINOPHEN 650 MG: 325 TABLET ORAL at 16:37

## 2018-05-05 RX ADMIN — ACETAMINOPHEN 650 MG: 325 TABLET ORAL at 12:42

## 2018-05-05 RX ADMIN — LEVOTHYROXINE SODIUM 125 MCG: 125 TABLET ORAL at 06:47

## 2018-05-05 RX ADMIN — PARICALCITOL 1 MCG: 1 CAPSULE, LIQUID FILLED ORAL at 09:24

## 2018-05-05 RX ADMIN — ONDANSETRON 4 MG: 2 INJECTION INTRAMUSCULAR; INTRAVENOUS at 20:35

## 2018-05-05 RX ADMIN — SODIUM CHLORIDE 100 ML/HR: 9 INJECTION, SOLUTION INTRAVENOUS at 06:12

## 2018-05-05 RX ADMIN — ENOXAPARIN SODIUM 30 MG: 30 INJECTION SUBCUTANEOUS at 15:41

## 2018-05-05 RX ADMIN — OXYCODONE HYDROCHLORIDE AND ACETAMINOPHEN 2 TABLET: 5; 325 TABLET ORAL at 20:35

## 2018-05-05 RX ADMIN — ROSUVASTATIN CALCIUM 10 MG: 10 TABLET, FILM COATED ORAL at 09:24

## 2018-05-05 RX ADMIN — BUDESONIDE AND FORMOTEROL FUMARATE DIHYDRATE 2 PUFF: 160; 4.5 AEROSOL RESPIRATORY (INHALATION) at 21:38

## 2018-05-05 RX ADMIN — BUDESONIDE AND FORMOTEROL FUMARATE DIHYDRATE 2 PUFF: 160; 4.5 AEROSOL RESPIRATORY (INHALATION) at 08:57

## 2018-05-05 NOTE — PLAN OF CARE
Problem: Patient Care Overview  Goal: Plan of Care Review  Outcome: Ongoing (interventions implemented as appropriate)   05/05/18 1949   Coping/Psychosocial   Plan of Care Reviewed With patient   Plan of Care Review   Progress improving   OTHER   Outcome Summary IVF and PCA d/c'ed. Pt having BMs. Diet increased to regular, pt tolerating. Pain controlled on Tylenol. Pt ambulating halls. Abd XR and UA completed d/t overnight fever. Will monitor.

## 2018-05-05 NOTE — PROGRESS NOTES
Chief Complaint: POD # 3    Subjective   Pt reports +flatus, no bm, tolerating full liquids  Objective     Vital signs in last 24 hours:  Temp:  [97.2 °F (36.2 °C)-100.4 °F (38 °C)] 99.8 °F (37.7 °C)  Heart Rate:  [] 78  Resp:  [16-20] 16  BP: (110-129)/(55-81) 121/71    Intake/Output last 3 shifts:  I/O last 3 completed shifts:  In: 3286 [P.O.:440; I.V.:2846]  Out: 5450 [Urine:5450]    Intake/Output this shift:  I/O this shift:  In: 1018 [I.V.:1018]  Out: -     Physical Exam:  Respiratory: CTA, good inspiratory effort  CV: RRR  Abd: + BS, soft, ND, usual post-op tenderness, no rebound, no guarding, incisions C/D/I    Assessment/Plan   S/P Lap LAR   Await bowel function       Bee Carreon MD

## 2018-05-05 NOTE — PLAN OF CARE
Problem: Patient Care Overview  Goal: Plan of Care Review  Outcome: Ongoing (interventions implemented as appropriate)   05/04/18 1630 05/04/18 2114 05/05/18 0749   Coping/Psychosocial   Plan of Care Reviewed With --  patient --    Plan of Care Review   Progress no change --  --    OTHER   Outcome Summary --  --  Patient had two episodes of loose bowel movements this morning. Patient is on IV fluids and Dilaudid PCA. Patient was medicated PRN Zofran x1. Spouse went home for the night. Patient was given Tylenol for fever. Patient ambulates in the [place]. Will continue to monitor vital signs, labs and comfort.     Goal: Individualization and Mutuality  Outcome: Ongoing (interventions implemented as appropriate)    Goal: Discharge Needs Assessment  Outcome: Ongoing (interventions implemented as appropriate)    Goal: Interprofessional Rounds/Family Conf  Outcome: Ongoing (interventions implemented as appropriate)      Problem: Bowel Obstruction (Adult)  Goal: Signs and Symptoms of Listed Potential Problems Will be Absent, Minimized or Managed (Bowel Obstruction)  Outcome: Ongoing (interventions implemented as appropriate)      Problem: Surgery Nonspecified (Adult)  Goal: Signs and Symptoms of Listed Potential Problems Will be Absent, Minimized or Managed (Surgery Nonspecified)  Outcome: Ongoing (interventions implemented as appropriate)

## 2018-05-05 NOTE — PROGRESS NOTES
Chief Complaint: POD # 4    Subjective   Pt reports having 3 bms during the night without blood, no N&V, pain improving, tolerating full liquids  Objective     Vital signs in last 24 hours:  Temp:  [99.4 °F (37.4 °C)-102.4 °F (39.1 °C)] 99.4 °F (37.4 °C)  Heart Rate:  [76-99] 84  Resp:  [16] 16  BP: (121-125)/(71) 125/71    Intake/Output last 3 shifts:  I/O last 3 completed shifts:  In: 3860.4 [P.O.:120; I.V.:3740.4]  Out: 4150 [Urine:4150]    Intake/Output this shift:  I/O this shift:  In: 481 [I.V.:481]  Out: -     Physical Exam:  Respiratory: CTA, good inspiratory effort  CV: RRR  Abd: + BS, soft, ND, usual post-op tenderness, no rebound, no guarding, incisions look good without erythema or drainage    Results from last 7 days  Lab Units 05/04/18  0635   WBC 10*3/mm3 11.98*   HEMOGLOBIN g/dL 9.0*   HEMATOCRIT % 28.9*   PLATELETS 10*3/mm3 146       Results from last 7 days  Lab Units 05/04/18  0635   SODIUM mmol/L 140   POTASSIUM mmol/L 4.2   CHLORIDE mmol/L 103   CO2 mmol/L 22.0   BUN mg/dL 23*   CREATININE mg/dL 2.21*   GLUCOSE mg/dL 80   CALCIUM mg/dL 9.5       Assessment/Plan   S/P lap LAR  Ileus resolving, adv diet and change to po pain meds  Febrile yesterday 1700, currently afebrile ? UA, CXR, cbc    Bee Carreon MD

## 2018-05-06 VITALS
WEIGHT: 108.91 LBS | RESPIRATION RATE: 16 BRPM | BODY MASS INDEX: 17.09 KG/M2 | OXYGEN SATURATION: 99 % | HEART RATE: 86 BPM | HEIGHT: 67 IN | SYSTOLIC BLOOD PRESSURE: 114 MMHG | DIASTOLIC BLOOD PRESSURE: 66 MMHG | TEMPERATURE: 97.7 F

## 2018-05-06 LAB
ANION GAP SERPL CALCULATED.3IONS-SCNC: 12.3 MMOL/L
BASOPHILS # BLD AUTO: 0.01 10*3/MM3 (ref 0–0.2)
BASOPHILS NFR BLD AUTO: 0.1 % (ref 0–1.5)
BUN BLD-MCNC: 19 MG/DL (ref 6–20)
BUN/CREAT SERPL: 8.3 (ref 7–25)
CALCIUM SPEC-SCNC: 9.9 MG/DL (ref 8.6–10.5)
CHLORIDE SERPL-SCNC: 102 MMOL/L (ref 98–107)
CO2 SERPL-SCNC: 23.7 MMOL/L (ref 22–29)
CREAT BLD-MCNC: 2.28 MG/DL (ref 0.57–1)
DEPRECATED RDW RBC AUTO: 43.6 FL (ref 37–54)
EOSINOPHIL # BLD AUTO: 0.46 10*3/MM3 (ref 0–0.7)
EOSINOPHIL NFR BLD AUTO: 4.9 % (ref 0.3–6.2)
ERYTHROCYTE [DISTWIDTH] IN BLOOD BY AUTOMATED COUNT: 13.4 % (ref 11.7–13)
GFR SERPL CREATININE-BSD FRML MDRD: 23 ML/MIN/1.73
GLUCOSE BLD-MCNC: 103 MG/DL (ref 65–99)
HCT VFR BLD AUTO: 29.2 % (ref 35.6–45.5)
HGB BLD-MCNC: 9.4 G/DL (ref 11.9–15.5)
IMM GRANULOCYTES # BLD: 0 10*3/MM3 (ref 0–0.03)
IMM GRANULOCYTES NFR BLD: 0 % (ref 0–0.5)
LYMPHOCYTES # BLD AUTO: 0.72 10*3/MM3 (ref 0.9–4.8)
LYMPHOCYTES NFR BLD AUTO: 7.7 % (ref 19.6–45.3)
MCH RBC QN AUTO: 28.1 PG (ref 26.9–32)
MCHC RBC AUTO-ENTMCNC: 32.2 G/DL (ref 32.4–36.3)
MCV RBC AUTO: 87.4 FL (ref 80.5–98.2)
MONOCYTES # BLD AUTO: 1.1 10*3/MM3 (ref 0.2–1.2)
MONOCYTES NFR BLD AUTO: 11.8 % (ref 5–12)
NEUTROPHILS # BLD AUTO: 7.06 10*3/MM3 (ref 1.9–8.1)
NEUTROPHILS NFR BLD AUTO: 75.5 % (ref 42.7–76)
PLATELET # BLD AUTO: 203 10*3/MM3 (ref 140–500)
PMV BLD AUTO: 9.8 FL (ref 6–12)
POTASSIUM BLD-SCNC: 3.9 MMOL/L (ref 3.5–5.2)
RBC # BLD AUTO: 3.34 10*6/MM3 (ref 3.9–5.2)
SODIUM BLD-SCNC: 138 MMOL/L (ref 136–145)
WBC NRBC COR # BLD: 9.35 10*3/MM3 (ref 4.5–10.7)

## 2018-05-06 PROCEDURE — 85025 COMPLETE CBC W/AUTO DIFF WBC: CPT | Performed by: SURGERY

## 2018-05-06 PROCEDURE — 80048 BASIC METABOLIC PNL TOTAL CA: CPT | Performed by: SURGERY

## 2018-05-06 PROCEDURE — 99024 POSTOP FOLLOW-UP VISIT: CPT | Performed by: SURGERY

## 2018-05-06 PROCEDURE — 94799 UNLISTED PULMONARY SVC/PX: CPT

## 2018-05-06 RX ORDER — OXYCODONE HYDROCHLORIDE AND ACETAMINOPHEN 5; 325 MG/1; MG/1
1 TABLET ORAL EVERY 4 HOURS PRN
Qty: 30 TABLET | Refills: 0 | Status: SHIPPED | OUTPATIENT
Start: 2018-05-06 | End: 2018-05-16

## 2018-05-06 RX ADMIN — ACETAMINOPHEN 650 MG: 325 TABLET ORAL at 09:18

## 2018-05-06 RX ADMIN — PARICALCITOL 1 MCG: 1 CAPSULE, LIQUID FILLED ORAL at 09:16

## 2018-05-06 RX ADMIN — BUDESONIDE AND FORMOTEROL FUMARATE DIHYDRATE 2 PUFF: 160; 4.5 AEROSOL RESPIRATORY (INHALATION) at 08:39

## 2018-05-06 RX ADMIN — ROSUVASTATIN CALCIUM 10 MG: 10 TABLET, FILM COATED ORAL at 09:16

## 2018-05-06 RX ADMIN — ACETAMINOPHEN 650 MG: 325 TABLET ORAL at 14:17

## 2018-05-06 RX ADMIN — LEVOTHYROXINE SODIUM 125 MCG: 125 TABLET ORAL at 09:16

## 2018-05-06 NOTE — DISCHARGE SUMMARY
Admitting date: 5/1/2018    Discharging date: 5/6/2018    Admitting diagnosis: Reoccurring diverticulitis    Discharging diagnoses: Same    Admitting/discharging physician: Dr. Carreon    Procedures: Laparoscopic LAR with splenic flexure mobilization    Hospital course:  This is a pleasant 52-year-old female patient who had reoccurring episodes of diverticulitis and presented on 5/1/2018 for a laparoscopic low anterior colon resection with splenic flexure mobilization.  Postoperatively the patient did well.  Patient had an expected postoperative ileus that resolved on postop day 3.  Patient's diet was slowly advanced to a regular diet on postop day 5.  Patient did have fever on postop day 3.  The workup was negative.  Patient was discharged on postop day 5 in stable condition.  Patient was ambulating in the [place], positive bowel movements, tolerating a regular diet and by mouth pain meds.  Patient was discharged to home to follow-up in 2 weeks in my office.  Patient was instructed on no lifting greater than 10 pounds, no driving on pain meds and may shower.  Patient was discharged on her home medications and Percocet.        Bee Carreon MD  General, Minimally Invasive and Endoscopic Surgery  [facility]    [address], [address]  [place], [place], [address]  P: [phone]  F: [phone]    Cc:  Pino Templeton MD

## 2018-05-06 NOTE — PROGRESS NOTES
Chief Complaint: POD # 5    Subjective   Pt tolerating reg diet, ambulating, tolerating po pain meds, +Bms    Objective     Vital signs in last 24 hours:  Temp:  [97.7 °F (36.5 °C)-100.7 °F (38.2 °C)] 97.7 °F (36.5 °C)  Heart Rate:  [77-86] 86  Resp:  [16] 16  BP: (113-126)/(66-77) 114/66    Intake/Output last 3 shifts:  I/O last 3 completed shifts:  In: 2382.4 [I.V.:2382.4]  Out: 700 [Urine:700]    Intake/Output this shift:  I/O this shift:  In: 560 [P.O.:560]  Out: -     Physical Exam:  Respiratory: CTA, good inspiratory effort  CV: RRR  Abd: + BS, soft, ND, usual post-op tenderness, no rebound, no guarding, incisions look good   CXR - okay, UA okay    Results from last 7 days  Lab Units 05/06/18  0621   WBC 10*3/mm3 9.35   HEMOGLOBIN g/dL 9.4*   HEMATOCRIT % 29.2*   PLATELETS 10*3/mm3 203       Results from last 7 days  Lab Units 05/06/18  0621   SODIUM mmol/L 138   POTASSIUM mmol/L 3.9   CHLORIDE mmol/L 102   CO2 mmol/L 23.7   BUN mg/dL 19   CREATININE mg/dL 2.28*   GLUCOSE mg/dL 103*   CALCIUM mg/dL 9.9     Assessment/Plan   S/P Lap LAR  Neg w/u for fever, afebrile last 24 hrs  D/c home   F/u in 2 weeks    Bee Carreon MD

## 2018-05-06 NOTE — PLAN OF CARE
Problem: Patient Care Overview  Goal: Plan of Care Review  Outcome: Ongoing (interventions implemented as appropriate)  Pt received only one dose of pain meds this shift premedicated with zofran secondary to intolerance of narcotics. She has been up ad andrew in [facility] and is using her incentive spirometer independently. Temp this am was 100.7, pt denies discomfort, her wbc count is normal and the rest of her vitals are stable. Encouraged continued activity and pulmonary hygiene as well as increasing fluids. Will continue to monitor and report any changes as needed.  Goal: Individualization and Mutuality  Outcome: Ongoing (interventions implemented as appropriate)    Goal: Interprofessional Rounds/Family Conf  Outcome: Ongoing (interventions implemented as appropriate)      Problem: Bowel Obstruction (Adult)  Goal: Signs and Symptoms of Listed Potential Problems Will be Absent, Minimized or Managed (Bowel Obstruction)  Outcome: Ongoing (interventions implemented as appropriate)      Problem: Pain, Acute (Adult)  Goal: Identify Related Risk Factors and Signs and Symptoms  Outcome: Ongoing (interventions implemented as appropriate)    Goal: Acceptable Pain Control/Comfort Level  Outcome: Ongoing (interventions implemented as appropriate)

## 2018-05-23 ENCOUNTER — OFFICE VISIT (OUTPATIENT)
Dept: SURGERY | Facility: CLINIC | Age: 53
End: 2018-05-23

## 2018-05-23 VITALS
BODY MASS INDEX: 15.93 KG/M2 | OXYGEN SATURATION: 99 % | SYSTOLIC BLOOD PRESSURE: 113 MMHG | HEART RATE: 74 BPM | WEIGHT: 101.7 LBS | DIASTOLIC BLOOD PRESSURE: 78 MMHG

## 2018-05-23 DIAGNOSIS — R10.10 PAIN OF UPPER ABDOMEN: Primary | ICD-10-CM

## 2018-05-23 PROCEDURE — 99024 POSTOP FOLLOW-UP VISIT: CPT | Performed by: SURGERY

## 2018-05-23 NOTE — PROGRESS NOTES
Chief complaint:  Post-op  Follow up    History of Present Illness    This is Sun Rodriguez 52 y.o. status post laparoscopic LAR and is doing very well.  Patient denies fever, chills, nausea or vomiting.  Patient's pain is well-controlled.      The following portions of the patient's history were reviewed and updated as appropriate: allergies, current medications, past family history, past medical history, past social history, past surgical history and problem list.    Vitals:    05/23/18 1003   BP: 113/78   BP Location: Left arm   Patient Position: Sitting   Cuff Size: Adult   Pulse: 74   SpO2: 99%   Weight: 46.1 kg (101 lb 11.2 oz)       Physical Exam  Incisions are well-healed without evidence of infection, herniation or seroma.    Patient does not use tobacco products currently and I have counseled the patient not to start using tobacco products in the future.    Assessment/plan:    This is Sun Rodriguez 52 y.o. status post laparoscopic LAR and is doing very well.  I have instructed the patient not lift greater than 10 pounds for total of 6 weeks from the time of surgery. I have instructed the patient follow-up as needed.    Bee Carreon MD  General, Minimally Invasive and Endoscopic Surgery  [facility]    [address], [address]  [place], [place], [address]  P: [phone]  F: [phone]    Cc:  Pino Templeton MD

## 2018-05-29 ENCOUNTER — HOSPITAL ENCOUNTER (OUTPATIENT)
Dept: NUCLEAR MEDICINE | Facility: HOSPITAL | Age: 53
Discharge: HOME | End: 2018-05-29
Attending: SURGERY

## 2018-05-29 ENCOUNTER — HOSPITAL ENCOUNTER (OUTPATIENT)
Dept: ULTRASOUND IMAGING | Facility: HOSPITAL | Age: 53
Discharge: HOME | End: 2018-05-29
Attending: SURGERY | Admitting: SURGERY

## 2018-05-29 ENCOUNTER — HOSPITAL ENCOUNTER (OUTPATIENT)
Dept: ULTRASOUND IMAGING | Facility: HOSPITAL | Age: 53
End: 2018-05-29
Attending: SURGERY

## 2018-05-29 DIAGNOSIS — R10.10 PAIN OF UPPER ABDOMEN: ICD-10-CM

## 2018-05-29 PROCEDURE — 0 TECHNETIUM TC 99M MEBROFENIN KIT: Performed by: SURGERY

## 2018-05-29 PROCEDURE — 25010000002 SINCALIDE PER 5 MCG: Performed by: SURGERY

## 2018-05-29 PROCEDURE — 78227 HEPATOBIL SYST IMAGE W/DRUG: CPT

## 2018-05-29 PROCEDURE — A9537 TC99M MEBROFENIN: HCPCS | Performed by: SURGERY

## 2018-05-29 PROCEDURE — 76705 ECHO EXAM OF ABDOMEN: CPT

## 2018-05-29 RX ORDER — KIT FOR THE PREPARATION OF TECHNETIUM TC 99M MEBROFENIN 45 MG/10ML
1 INJECTION, POWDER, LYOPHILIZED, FOR SOLUTION INTRAVENOUS
Status: COMPLETED | OUTPATIENT
Start: 2018-05-29 | End: 2018-05-29

## 2018-05-29 RX ADMIN — MEBROFENIN 1 DOSE: 45 INJECTION, POWDER, LYOPHILIZED, FOR SOLUTION INTRAVENOUS at 11:04

## 2018-05-29 RX ADMIN — SINCALIDE 0.9 MCG: 5 INJECTION, POWDER, LYOPHILIZED, FOR SOLUTION INTRAVENOUS at 12:30

## 2018-06-04 PROCEDURE — 88304 TISSUE EXAM BY PATHOLOGIST: CPT | Performed by: SURGERY

## 2018-06-05 ENCOUNTER — LAB REQUISITION (OUTPATIENT)
Dept: LAB | Facility: HOSPITAL | Age: 53
End: 2018-06-05

## 2018-06-05 DIAGNOSIS — Z00.00 ENCOUNTER FOR GENERAL ADULT MEDICAL EXAMINATION WITHOUT ABNORMAL FINDINGS: ICD-10-CM

## 2018-06-06 LAB
CYTO UR: NORMAL
LAB AP CASE REPORT: NORMAL
LAB AP CLINICAL INFORMATION: NORMAL
Lab: NORMAL
PATH REPORT.FINAL DX SPEC: NORMAL
PATH REPORT.GROSS SPEC: NORMAL

## 2018-06-14 ENCOUNTER — TRANSCRIBE ORDERS (OUTPATIENT)
Dept: ADMINISTRATIVE | Facility: HOSPITAL | Age: 53
End: 2018-06-14

## 2018-06-14 DIAGNOSIS — R63.4 WEIGHT LOSS: ICD-10-CM

## 2018-06-14 DIAGNOSIS — R10.9 ACUTE ABDOMINAL PAIN: Primary | ICD-10-CM

## 2018-06-15 ENCOUNTER — HOSPITAL ENCOUNTER (OUTPATIENT)
Dept: CT IMAGING | Facility: HOSPITAL | Age: 53
Discharge: HOME | End: 2018-06-15
Attending: FAMILY MEDICINE | Admitting: FAMILY MEDICINE

## 2018-06-15 ENCOUNTER — LAB (OUTPATIENT)
Dept: LAB | Facility: HOSPITAL | Age: 53
End: 2018-06-15
Attending: FAMILY MEDICINE

## 2018-06-15 DIAGNOSIS — R10.9 ACUTE ABDOMINAL PAIN: ICD-10-CM

## 2018-06-15 DIAGNOSIS — R63.4 WEIGHT LOSS: ICD-10-CM

## 2018-06-15 LAB
AMYLASE SERPL-CCNC: 141 U/L (ref 28–100)
LIPASE SERPL-CCNC: 96 U/L (ref 13–60)

## 2018-06-15 PROCEDURE — 0 DIATRIZOATE MEGLUMINE & SODIUM PER 1 ML: Performed by: FAMILY MEDICINE

## 2018-06-15 PROCEDURE — 83690 ASSAY OF LIPASE: CPT

## 2018-06-15 PROCEDURE — 74176 CT ABD & PELVIS W/O CONTRAST: CPT

## 2018-06-15 PROCEDURE — 36415 COLL VENOUS BLD VENIPUNCTURE: CPT

## 2018-06-15 PROCEDURE — 82565 ASSAY OF CREATININE: CPT

## 2018-06-15 PROCEDURE — 82150 ASSAY OF AMYLASE: CPT | Performed by: FAMILY MEDICINE

## 2018-06-15 RX ADMIN — DIATRIZOATE MEGLUMINE AND DIATRIZOATE SODIUM 30 ML: 660; 100 LIQUID ORAL; RECTAL at 08:00

## 2018-06-16 LAB — CREAT BLDA-MCNC: 2.9 MG/DL (ref 0.6–1.3)

## 2018-06-20 ENCOUNTER — OFFICE VISIT (OUTPATIENT)
Dept: SURGERY | Facility: CLINIC | Age: 53
End: 2018-06-20

## 2018-06-20 VITALS
HEART RATE: 65 BPM | DIASTOLIC BLOOD PRESSURE: 74 MMHG | SYSTOLIC BLOOD PRESSURE: 115 MMHG | WEIGHT: 101.1 LBS | OXYGEN SATURATION: 98 % | BODY MASS INDEX: 15.83 KG/M2

## 2018-06-20 DIAGNOSIS — Z09 FOLLOW UP: Primary | ICD-10-CM

## 2018-06-20 PROCEDURE — 99024 POSTOP FOLLOW-UP VISIT: CPT | Performed by: SURGERY

## 2018-06-20 RX ORDER — METRONIDAZOLE 500 MG/1
TABLET ORAL
COMMUNITY
Start: 2018-06-15 | End: 2018-06-25 | Stop reason: HOSPADM

## 2018-06-20 NOTE — PROGRESS NOTES
Chief complaint:  Post-op  Follow up    History of Present Illness    This is Sun Rodriguez 52 y.o. status post upper scopic cholecystectomy with diarrhea.  Patient was placed on Bactrim and ciprofloxacin.  Patient reports that her diarrhea has resolved and now she is unable to go to the bathroom.   Patient denies fever, chills, nausea or vomiting.  Patient's pain is well-controlled.      The following portions of the patient's history were reviewed and updated as appropriate: allergies, current medications, past family history, past medical history, past social history, past surgical history and problem list.    Vitals:    06/20/18 1401   BP: 115/74   BP Location: Left arm   Patient Position: Sitting   Cuff Size: Adult   Pulse: 65   SpO2: 98%   Weight: 45.9 kg (101 lb 1.6 oz)       Physical Exam  Incision is well-healed without evidence of infection, herniation or seroma.    Patient does not use tobacco products currently and I have counseled the patient not to start using tobacco products in the future.    Assessment/plan:    This is Sun Rodriguez 52 y.o. status post laparoscopic cholecystectomy.  Patient is also had a laparoscopic LAR for diverticulitis.  I am concerned that she may have a stricture.  I have given the patient my cell phone number and advised.  If she is worse or starts having increasing problems then we would need to admit her to the hospital and do further evaluation for underlying issue.  I have instructed the patient not lift greater than 10 pounds for total of 6 weeks from the time of surgery. I have instructed the patient follow-up as needed.    Bee Carreon MD  General, Minimally Invasive and Endoscopic Surgery  [facility]    [address], [address]  [place], [place], [address]  P: [phone]  F: [phone]    Cc:  Pino Templeton MD

## 2018-06-21 ENCOUNTER — HOSPITAL ENCOUNTER (INPATIENT)
Facility: HOSPITAL | Age: 53
LOS: 4 days | Discharge: HOME | End: 2018-06-25
Attending: SURGERY | Admitting: SURGERY

## 2018-06-21 ENCOUNTER — ANESTHESIA (OUTPATIENT)
Dept: PERIOP | Facility: HOSPITAL | Age: 53
End: 2018-06-21

## 2018-06-21 ENCOUNTER — ANESTHESIA EVENT (OUTPATIENT)
Dept: PERIOP | Facility: HOSPITAL | Age: 53
End: 2018-06-21

## 2018-06-21 ENCOUNTER — APPOINTMENT (OUTPATIENT)
Dept: CT IMAGING | Facility: HOSPITAL | Age: 53
End: 2018-06-21

## 2018-06-21 PROBLEM — K52.9 COLITIS, ACUTE: Status: ACTIVE | Noted: 2018-06-21

## 2018-06-21 LAB
ALBUMIN SERPL-MCNC: 3.8 G/DL (ref 3.5–5.2)
ALBUMIN/GLOB SERPL: 1.1 G/DL
ALP SERPL-CCNC: 81 U/L (ref 39–117)
ALT SERPL W P-5'-P-CCNC: 11 U/L (ref 1–33)
AMYLASE SERPL-CCNC: 182 U/L (ref 28–100)
ANION GAP SERPL CALCULATED.3IONS-SCNC: 15.1 MMOL/L
ANION GAP SERPL CALCULATED.3IONS-SCNC: 15.1 MMOL/L
AST SERPL-CCNC: 20 U/L (ref 1–32)
BASOPHILS # BLD AUTO: 0.03 10*3/MM3 (ref 0–0.2)
BASOPHILS NFR BLD AUTO: 0.2 % (ref 0–1.5)
BILIRUB SERPL-MCNC: 0.3 MG/DL (ref 0.1–1.2)
BUN BLD-MCNC: 22 MG/DL (ref 6–20)
BUN BLD-MCNC: 22 MG/DL (ref 6–20)
BUN/CREAT SERPL: 9.2 (ref 7–25)
BUN/CREAT SERPL: 9.2 (ref 7–25)
CALCIUM SPEC-SCNC: 10.2 MG/DL (ref 8.6–10.5)
CALCIUM SPEC-SCNC: 10.2 MG/DL (ref 8.6–10.5)
CHLORIDE SERPL-SCNC: 103 MMOL/L (ref 98–107)
CHLORIDE SERPL-SCNC: 103 MMOL/L (ref 98–107)
CO2 SERPL-SCNC: 20.9 MMOL/L (ref 22–29)
CO2 SERPL-SCNC: 20.9 MMOL/L (ref 22–29)
CREAT BLD-MCNC: 2.38 MG/DL (ref 0.57–1)
CREAT BLD-MCNC: 2.38 MG/DL (ref 0.57–1)
D-LACTATE SERPL-SCNC: 1.2 MMOL/L (ref 0.5–2)
DEPRECATED RDW RBC AUTO: 48.8 FL (ref 37–54)
EOSINOPHIL # BLD AUTO: 0.08 10*3/MM3 (ref 0–0.7)
EOSINOPHIL NFR BLD AUTO: 0.6 % (ref 0.3–6.2)
ERYTHROCYTE [DISTWIDTH] IN BLOOD BY AUTOMATED COUNT: 14.8 % (ref 11.7–13)
GFR SERPL CREATININE-BSD FRML MDRD: 21 ML/MIN/1.73
GFR SERPL CREATININE-BSD FRML MDRD: 21 ML/MIN/1.73
GLOBULIN UR ELPH-MCNC: 3.5 GM/DL
GLUCOSE BLD-MCNC: 101 MG/DL (ref 65–99)
GLUCOSE BLD-MCNC: 101 MG/DL (ref 65–99)
HCT VFR BLD AUTO: 40.8 % (ref 35.6–45.5)
HGB BLD-MCNC: 12.5 G/DL (ref 11.9–15.5)
IMM GRANULOCYTES # BLD: 0.03 10*3/MM3 (ref 0–0.03)
IMM GRANULOCYTES NFR BLD: 0.2 % (ref 0–0.5)
LIPASE SERPL-CCNC: 125 U/L (ref 13–60)
LYMPHOCYTES # BLD AUTO: 1.24 10*3/MM3 (ref 0.9–4.8)
LYMPHOCYTES NFR BLD AUTO: 10.1 % (ref 19.6–45.3)
MCH RBC QN AUTO: 27.7 PG (ref 26.9–32)
MCHC RBC AUTO-ENTMCNC: 30.6 G/DL (ref 32.4–36.3)
MCV RBC AUTO: 90.5 FL (ref 80.5–98.2)
MONOCYTES # BLD AUTO: 0.61 10*3/MM3 (ref 0.2–1.2)
MONOCYTES NFR BLD AUTO: 4.9 % (ref 5–12)
NEUTROPHILS # BLD AUTO: 10.34 10*3/MM3 (ref 1.9–8.1)
NEUTROPHILS NFR BLD AUTO: 84 % (ref 42.7–76)
PLATELET # BLD AUTO: 383 10*3/MM3 (ref 140–500)
PMV BLD AUTO: 9.3 FL (ref 6–12)
POTASSIUM BLD-SCNC: 4 MMOL/L (ref 3.5–5.2)
POTASSIUM BLD-SCNC: 4 MMOL/L (ref 3.5–5.2)
PROT SERPL-MCNC: 7.3 G/DL (ref 6–8.5)
RBC # BLD AUTO: 4.51 10*6/MM3 (ref 3.9–5.2)
SODIUM BLD-SCNC: 139 MMOL/L (ref 136–145)
SODIUM BLD-SCNC: 139 MMOL/L (ref 136–145)
WBC NRBC COR # BLD: 12.33 10*3/MM3 (ref 4.5–10.7)

## 2018-06-21 PROCEDURE — 85025 COMPLETE CBC W/AUTO DIFF WBC: CPT | Performed by: SURGERY

## 2018-06-21 PROCEDURE — 25010000002 HYDROMORPHONE PER 4 MG: Performed by: ANESTHESIOLOGY

## 2018-06-21 PROCEDURE — 25010000002 MIDAZOLAM PER 1 MG: Performed by: ANESTHESIOLOGY

## 2018-06-21 PROCEDURE — 25010000002 FENTANYL CITRATE (PF) 100 MCG/2ML SOLUTION: Performed by: ANESTHESIOLOGY

## 2018-06-21 PROCEDURE — 25010000002 ONDANSETRON PER 1 MG: Performed by: SURGERY

## 2018-06-21 PROCEDURE — 25010000002 PHENYLEPHRINE PER 1 ML: Performed by: ANESTHESIOLOGY

## 2018-06-21 PROCEDURE — 25010000002 PROPOFOL 1000 MG/ML EMULSION: Performed by: ANESTHESIOLOGY

## 2018-06-21 PROCEDURE — 80048 BASIC METABOLIC PNL TOTAL CA: CPT | Performed by: SURGERY

## 2018-06-21 PROCEDURE — 25010000002 HYDROMORPHONE PER 4 MG: Performed by: SURGERY

## 2018-06-21 PROCEDURE — 25010000002 PROPOFOL 10 MG/ML EMULSION: Performed by: ANESTHESIOLOGY

## 2018-06-21 PROCEDURE — 80053 COMPREHEN METABOLIC PANEL: CPT | Performed by: SURGERY

## 2018-06-21 PROCEDURE — 74176 CT ABD & PELVIS W/O CONTRAST: CPT

## 2018-06-21 PROCEDURE — 83690 ASSAY OF LIPASE: CPT | Performed by: SURGERY

## 2018-06-21 PROCEDURE — 45340 SIG W/TNDSC BALLOON DILATION: CPT | Performed by: SURGERY

## 2018-06-21 PROCEDURE — 25010000002 SUCCINYLCHOLINE PER 20 MG: Performed by: ANESTHESIOLOGY

## 2018-06-21 PROCEDURE — 82150 ASSAY OF AMYLASE: CPT | Performed by: SURGERY

## 2018-06-21 PROCEDURE — 83605 ASSAY OF LACTIC ACID: CPT | Performed by: SURGERY

## 2018-06-21 PROCEDURE — 99223 1ST HOSP IP/OBS HIGH 75: CPT | Performed by: SURGERY

## 2018-06-21 PROCEDURE — 0D7N8ZZ DILATION OF SIGMOID COLON, VIA NATURAL OR ARTIFICIAL OPENING ENDOSCOPIC: ICD-10-PCS | Performed by: SURGERY

## 2018-06-21 PROCEDURE — 25010000002 ONDANSETRON PER 1 MG: Performed by: ANESTHESIOLOGY

## 2018-06-21 RX ORDER — NALOXONE HCL 0.4 MG/ML
0.4 VIAL (ML) INJECTION
Status: DISCONTINUED | OUTPATIENT
Start: 2018-06-21 | End: 2018-06-25 | Stop reason: HOSPADM

## 2018-06-21 RX ORDER — NALOXONE HCL 0.4 MG/ML
0.2 VIAL (ML) INJECTION AS NEEDED
Status: DISCONTINUED | OUTPATIENT
Start: 2018-06-21 | End: 2018-06-21 | Stop reason: HOSPADM

## 2018-06-21 RX ORDER — SODIUM CHLORIDE, SODIUM LACTATE, POTASSIUM CHLORIDE, CALCIUM CHLORIDE 600; 310; 30; 20 MG/100ML; MG/100ML; MG/100ML; MG/100ML
9 INJECTION, SOLUTION INTRAVENOUS CONTINUOUS
Status: DISCONTINUED | OUTPATIENT
Start: 2018-06-21 | End: 2018-06-23

## 2018-06-21 RX ORDER — FAMOTIDINE 10 MG/ML
20 INJECTION, SOLUTION INTRAVENOUS ONCE
Status: COMPLETED | OUTPATIENT
Start: 2018-06-21 | End: 2018-06-21

## 2018-06-21 RX ORDER — MIDAZOLAM HYDROCHLORIDE 1 MG/ML
1 INJECTION INTRAMUSCULAR; INTRAVENOUS
Status: DISCONTINUED | OUTPATIENT
Start: 2018-06-21 | End: 2018-06-21 | Stop reason: HOSPADM

## 2018-06-21 RX ORDER — FLUMAZENIL 0.1 MG/ML
0.2 INJECTION INTRAVENOUS AS NEEDED
Status: DISCONTINUED | OUTPATIENT
Start: 2018-06-21 | End: 2018-06-21 | Stop reason: HOSPADM

## 2018-06-21 RX ORDER — PROMETHAZINE HYDROCHLORIDE 25 MG/ML
12.5 INJECTION, SOLUTION INTRAMUSCULAR; INTRAVENOUS ONCE AS NEEDED
Status: DISCONTINUED | OUTPATIENT
Start: 2018-06-21 | End: 2018-06-21 | Stop reason: HOSPADM

## 2018-06-21 RX ORDER — ALBUTEROL SULFATE 2.5 MG/3ML
2.5 SOLUTION RESPIRATORY (INHALATION) EVERY 6 HOURS PRN
Status: DISCONTINUED | OUTPATIENT
Start: 2018-06-21 | End: 2018-06-25 | Stop reason: HOSPADM

## 2018-06-21 RX ORDER — ONDANSETRON 4 MG/1
4 TABLET, ORALLY DISINTEGRATING ORAL EVERY 8 HOURS PRN
Status: DISCONTINUED | OUTPATIENT
Start: 2018-06-21 | End: 2018-06-25 | Stop reason: HOSPADM

## 2018-06-21 RX ORDER — FENTANYL CITRATE 50 UG/ML
50 INJECTION, SOLUTION INTRAMUSCULAR; INTRAVENOUS
Status: DISCONTINUED | OUTPATIENT
Start: 2018-06-21 | End: 2018-06-21 | Stop reason: HOSPADM

## 2018-06-21 RX ORDER — FLUTICASONE PROPIONATE 50 MCG
2 SPRAY, SUSPENSION (ML) NASAL DAILY
Status: DISCONTINUED | OUTPATIENT
Start: 2018-06-21 | End: 2018-06-25 | Stop reason: HOSPADM

## 2018-06-21 RX ORDER — ONDANSETRON 4 MG/1
4 TABLET, ORALLY DISINTEGRATING ORAL EVERY 8 HOURS PRN
COMMUNITY
End: 2018-07-19

## 2018-06-21 RX ORDER — PROMETHAZINE HYDROCHLORIDE 25 MG/1
12.5 TABLET ORAL ONCE AS NEEDED
Status: DISCONTINUED | OUTPATIENT
Start: 2018-06-21 | End: 2018-06-21 | Stop reason: HOSPADM

## 2018-06-21 RX ORDER — SUCCINYLCHOLINE CHLORIDE 20 MG/ML
INJECTION INTRAMUSCULAR; INTRAVENOUS AS NEEDED
Status: DISCONTINUED | OUTPATIENT
Start: 2018-06-21 | End: 2018-06-21 | Stop reason: SURG

## 2018-06-21 RX ORDER — SCOLOPAMINE TRANSDERMAL SYSTEM 1 MG/1
1 PATCH, EXTENDED RELEASE TRANSDERMAL ONCE
Status: DISCONTINUED | OUTPATIENT
Start: 2018-06-21 | End: 2018-06-21 | Stop reason: HOSPADM

## 2018-06-21 RX ORDER — ONDANSETRON 2 MG/ML
4 INJECTION INTRAMUSCULAR; INTRAVENOUS EVERY 6 HOURS PRN
Status: DISCONTINUED | OUTPATIENT
Start: 2018-06-21 | End: 2018-06-25 | Stop reason: HOSPADM

## 2018-06-21 RX ORDER — BUDESONIDE AND FORMOTEROL FUMARATE DIHYDRATE 160; 4.5 UG/1; UG/1
2 AEROSOL RESPIRATORY (INHALATION)
Status: DISCONTINUED | OUTPATIENT
Start: 2018-06-21 | End: 2018-06-25 | Stop reason: HOSPADM

## 2018-06-21 RX ORDER — HYDROMORPHONE HYDROCHLORIDE 1 MG/ML
0.5 INJECTION, SOLUTION INTRAMUSCULAR; INTRAVENOUS; SUBCUTANEOUS
Status: DISCONTINUED | OUTPATIENT
Start: 2018-06-21 | End: 2018-06-21 | Stop reason: HOSPADM

## 2018-06-21 RX ORDER — MIDAZOLAM HYDROCHLORIDE 1 MG/ML
2 INJECTION INTRAMUSCULAR; INTRAVENOUS
Status: DISCONTINUED | OUTPATIENT
Start: 2018-06-21 | End: 2018-06-21 | Stop reason: HOSPADM

## 2018-06-21 RX ORDER — LABETALOL HYDROCHLORIDE 5 MG/ML
5 INJECTION, SOLUTION INTRAVENOUS
Status: DISCONTINUED | OUTPATIENT
Start: 2018-06-21 | End: 2018-06-21 | Stop reason: HOSPADM

## 2018-06-21 RX ORDER — SODIUM CHLORIDE 0.9 % (FLUSH) 0.9 %
1-10 SYRINGE (ML) INJECTION AS NEEDED
Status: DISCONTINUED | OUTPATIENT
Start: 2018-06-21 | End: 2018-06-25 | Stop reason: HOSPADM

## 2018-06-21 RX ORDER — FEBUXOSTAT 40 MG/1
40 TABLET, FILM COATED ORAL DAILY
Status: DISCONTINUED | OUTPATIENT
Start: 2018-06-21 | End: 2018-06-22

## 2018-06-21 RX ORDER — OXYCODONE HYDROCHLORIDE AND ACETAMINOPHEN 5; 325 MG/1; MG/1
1 TABLET ORAL EVERY 4 HOURS PRN
Status: DISCONTINUED | OUTPATIENT
Start: 2018-06-21 | End: 2018-06-25 | Stop reason: HOSPADM

## 2018-06-21 RX ORDER — FENTANYL CITRATE 50 UG/ML
25 INJECTION, SOLUTION INTRAMUSCULAR; INTRAVENOUS
Status: DISCONTINUED | OUTPATIENT
Start: 2018-06-21 | End: 2018-06-21 | Stop reason: HOSPADM

## 2018-06-21 RX ORDER — FENTANYL CITRATE 50 UG/ML
INJECTION, SOLUTION INTRAMUSCULAR; INTRAVENOUS
Status: COMPLETED
Start: 2018-06-21 | End: 2018-06-21

## 2018-06-21 RX ORDER — PROMETHAZINE HYDROCHLORIDE 25 MG/1
25 SUPPOSITORY RECTAL ONCE AS NEEDED
Status: DISCONTINUED | OUTPATIENT
Start: 2018-06-21 | End: 2018-06-21 | Stop reason: HOSPADM

## 2018-06-21 RX ORDER — DIPHENHYDRAMINE HYDROCHLORIDE 50 MG/ML
12.5 INJECTION INTRAMUSCULAR; INTRAVENOUS
Status: DISCONTINUED | OUTPATIENT
Start: 2018-06-21 | End: 2018-06-21 | Stop reason: HOSPADM

## 2018-06-21 RX ORDER — EPHEDRINE SULFATE 50 MG/ML
INJECTION, SOLUTION INTRAVENOUS AS NEEDED
Status: DISCONTINUED | OUTPATIENT
Start: 2018-06-21 | End: 2018-06-21 | Stop reason: SURG

## 2018-06-21 RX ORDER — SODIUM BICARBONATE 650 MG/1
650 TABLET ORAL ONCE
Status: DISCONTINUED | OUTPATIENT
Start: 2018-06-21 | End: 2018-06-22

## 2018-06-21 RX ORDER — PROPOFOL 10 MG/ML
VIAL (ML) INTRAVENOUS AS NEEDED
Status: DISCONTINUED | OUTPATIENT
Start: 2018-06-21 | End: 2018-06-21 | Stop reason: SURG

## 2018-06-21 RX ORDER — ROSUVASTATIN CALCIUM 20 MG/1
20 TABLET, COATED ORAL DAILY
Status: DISCONTINUED | OUTPATIENT
Start: 2018-06-21 | End: 2018-06-21

## 2018-06-21 RX ORDER — EPHEDRINE SULFATE 50 MG/ML
5 INJECTION, SOLUTION INTRAVENOUS ONCE AS NEEDED
Status: DISCONTINUED | OUTPATIENT
Start: 2018-06-21 | End: 2018-06-21 | Stop reason: HOSPADM

## 2018-06-21 RX ORDER — SODIUM CHLORIDE 9 MG/ML
150 INJECTION, SOLUTION INTRAVENOUS CONTINUOUS
Status: DISCONTINUED | OUTPATIENT
Start: 2018-06-21 | End: 2018-06-22

## 2018-06-21 RX ORDER — ONDANSETRON 2 MG/ML
4 INJECTION INTRAMUSCULAR; INTRAVENOUS ONCE AS NEEDED
Status: COMPLETED | OUTPATIENT
Start: 2018-06-21 | End: 2018-06-21

## 2018-06-21 RX ORDER — DEXTROSE MONOHYDRATE 100 MG/ML
75 INJECTION, SOLUTION INTRAVENOUS CONTINUOUS
Status: DISCONTINUED | OUTPATIENT
Start: 2018-06-22 | End: 2018-06-22

## 2018-06-21 RX ORDER — PROMETHAZINE HYDROCHLORIDE 25 MG/1
25 TABLET ORAL ONCE AS NEEDED
Status: DISCONTINUED | OUTPATIENT
Start: 2018-06-21 | End: 2018-06-21 | Stop reason: HOSPADM

## 2018-06-21 RX ORDER — LIDOCAINE HYDROCHLORIDE 20 MG/ML
INJECTION, SOLUTION INFILTRATION; PERINEURAL AS NEEDED
Status: DISCONTINUED | OUTPATIENT
Start: 2018-06-21 | End: 2018-06-21 | Stop reason: SURG

## 2018-06-21 RX ORDER — HYDROMORPHONE HYDROCHLORIDE 1 MG/ML
0.5 INJECTION, SOLUTION INTRAMUSCULAR; INTRAVENOUS; SUBCUTANEOUS
Status: DISCONTINUED | OUTPATIENT
Start: 2018-06-21 | End: 2018-06-25 | Stop reason: HOSPADM

## 2018-06-21 RX ORDER — SODIUM CHLORIDE 0.9 % (FLUSH) 0.9 %
1-10 SYRINGE (ML) INJECTION AS NEEDED
Status: DISCONTINUED | OUTPATIENT
Start: 2018-06-21 | End: 2018-06-21 | Stop reason: HOSPADM

## 2018-06-21 RX ORDER — LIDOCAINE HYDROCHLORIDE 10 MG/ML
0.5 INJECTION, SOLUTION EPIDURAL; INFILTRATION; INTRACAUDAL; PERINEURAL ONCE AS NEEDED
Status: DISCONTINUED | OUTPATIENT
Start: 2018-06-21 | End: 2018-06-21 | Stop reason: HOSPADM

## 2018-06-21 RX ORDER — ACETAMINOPHEN 325 MG/1
650 TABLET ORAL EVERY 4 HOURS PRN
Status: DISCONTINUED | OUTPATIENT
Start: 2018-06-21 | End: 2018-06-25 | Stop reason: HOSPADM

## 2018-06-21 RX ORDER — LEVOTHYROXINE SODIUM 0.12 MG/1
125 TABLET ORAL EVERY MORNING
Status: DISCONTINUED | OUTPATIENT
Start: 2018-06-22 | End: 2018-06-21

## 2018-06-21 RX ADMIN — ONDANSETRON 4 MG: 2 INJECTION INTRAMUSCULAR; INTRAVENOUS at 21:20

## 2018-06-21 RX ADMIN — FENTANYL CITRATE 50 MCG: 50 INJECTION INTRAMUSCULAR; INTRAVENOUS at 19:52

## 2018-06-21 RX ADMIN — SODIUM CHLORIDE, POTASSIUM CHLORIDE, SODIUM LACTATE AND CALCIUM CHLORIDE 9 ML/HR: 600; 310; 30; 20 INJECTION, SOLUTION INTRAVENOUS at 19:51

## 2018-06-21 RX ADMIN — ONDANSETRON 4 MG: 2 INJECTION INTRAMUSCULAR; INTRAVENOUS at 19:03

## 2018-06-21 RX ADMIN — MIDAZOLAM 1 MG: 1 INJECTION INTRAMUSCULAR; INTRAVENOUS at 19:53

## 2018-06-21 RX ADMIN — HYDROMORPHONE HYDROCHLORIDE 0.5 MG: 10 INJECTION, SOLUTION INTRAMUSCULAR; INTRAVENOUS; SUBCUTANEOUS at 23:18

## 2018-06-21 RX ADMIN — PHENYLEPHRINE HYDROCHLORIDE 200 MCG: 10 INJECTION INTRAVENOUS at 20:41

## 2018-06-21 RX ADMIN — HYDROMORPHONE HYDROCHLORIDE 0.5 MG: 1 INJECTION, SOLUTION INTRAMUSCULAR; INTRAVENOUS; SUBCUTANEOUS at 21:42

## 2018-06-21 RX ADMIN — FENTANYL CITRATE 100 MCG: 50 INJECTION INTRAMUSCULAR; INTRAVENOUS at 20:14

## 2018-06-21 RX ADMIN — PHENYLEPHRINE HYDROCHLORIDE 100 MCG: 10 INJECTION INTRAVENOUS at 20:31

## 2018-06-21 RX ADMIN — HYDROMORPHONE HYDROCHLORIDE 0.5 MG: 1 INJECTION, SOLUTION INTRAMUSCULAR; INTRAVENOUS; SUBCUTANEOUS at 22:03

## 2018-06-21 RX ADMIN — FAMOTIDINE 20 MG: 10 INJECTION, SOLUTION INTRAVENOUS at 19:53

## 2018-06-21 RX ADMIN — LIDOCAINE HYDROCHLORIDE 80 MG: 20 INJECTION, SOLUTION INFILTRATION; PERINEURAL at 20:14

## 2018-06-21 RX ADMIN — FENTANYL CITRATE 25 MCG: 50 INJECTION, SOLUTION INTRAMUSCULAR; INTRAVENOUS at 22:08

## 2018-06-21 RX ADMIN — FLUTICASONE PROPIONATE 2 SPRAY: 50 SPRAY, METERED NASAL at 16:52

## 2018-06-21 RX ADMIN — ONDANSETRON 4 MG: 2 INJECTION INTRAMUSCULAR; INTRAVENOUS at 23:27

## 2018-06-21 RX ADMIN — FENTANYL CITRATE 25 MCG: 50 INJECTION, SOLUTION INTRAMUSCULAR; INTRAVENOUS at 21:39

## 2018-06-21 RX ADMIN — METRONIDAZOLE 500 MG: 500 INJECTION, SOLUTION INTRAVENOUS at 18:57

## 2018-06-21 RX ADMIN — FENTANYL CITRATE 25 MCG: 50 INJECTION, SOLUTION INTRAMUSCULAR; INTRAVENOUS at 21:55

## 2018-06-21 RX ADMIN — HYDROMORPHONE HYDROCHLORIDE 0.5 MG: 10 INJECTION, SOLUTION INTRAMUSCULAR; INTRAVENOUS; SUBCUTANEOUS at 15:47

## 2018-06-21 RX ADMIN — PHENYLEPHRINE HYDROCHLORIDE 100 MCG: 10 INJECTION INTRAVENOUS at 20:57

## 2018-06-21 RX ADMIN — SODIUM CHLORIDE 150 ML/HR: 9 INJECTION, SOLUTION INTRAVENOUS at 16:53

## 2018-06-21 RX ADMIN — PHENYLEPHRINE HYDROCHLORIDE 100 MCG: 10 INJECTION INTRAVENOUS at 20:23

## 2018-06-21 RX ADMIN — EPHEDRINE SULFATE 10 MG: 50 INJECTION INTRAMUSCULAR; INTRAVENOUS; SUBCUTANEOUS at 20:56

## 2018-06-21 RX ADMIN — ONDANSETRON 4 MG: 2 INJECTION INTRAMUSCULAR; INTRAVENOUS at 15:34

## 2018-06-21 RX ADMIN — PHENYLEPHRINE HYDROCHLORIDE 100 MCG: 10 INJECTION INTRAVENOUS at 20:29

## 2018-06-21 RX ADMIN — HYDROMORPHONE HYDROCHLORIDE 0.5 MG: 10 INJECTION, SOLUTION INTRAMUSCULAR; INTRAVENOUS; SUBCUTANEOUS at 17:50

## 2018-06-21 RX ADMIN — PROPOFOL 120 MG: 10 INJECTION, EMULSION INTRAVENOUS at 20:14

## 2018-06-21 RX ADMIN — PHENYLEPHRINE HYDROCHLORIDE 100 MCG: 10 INJECTION INTRAVENOUS at 20:50

## 2018-06-21 RX ADMIN — SODIUM CHLORIDE 1000 ML: 9 INJECTION, SOLUTION INTRAVENOUS at 15:34

## 2018-06-21 RX ADMIN — SUCCINYLCHOLINE CHLORIDE 70 MG: 20 INJECTION, SOLUTION INTRAMUSCULAR; INTRAVENOUS; PARENTERAL at 20:14

## 2018-06-21 RX ADMIN — PHENYLEPHRINE HYDROCHLORIDE 100 MCG: 10 INJECTION INTRAVENOUS at 20:35

## 2018-06-21 RX ADMIN — PROPOFOL 75 MCG/KG/MIN: 10 INJECTION, EMULSION INTRAVENOUS at 20:20

## 2018-06-21 NOTE — NURSING NOTE
Regarding PICC consult.  Chart reviewed.  Spoke with primary nurse.  States patient is going to the OR tonight and will hold on request for PICC line -- patient has PIV at this time.  Will need approval from patients nephrologist for PICC related to severe CKD prior to attempting placement.

## 2018-06-21 NOTE — PLAN OF CARE
Problem: Patient Care Overview  Goal: Plan of Care Review  Outcome: Ongoing (interventions implemented as appropriate)   06/21/18 1640   Coping/Psychosocial   Plan of Care Reviewed With patient   Plan of Care Review   Progress no change   OTHER   Outcome Summary Pt admitted to floor- direct admit; colon resection done 5/1; pt having N/V and abdominal pain; abdominal sounds present- however firm in RLQ; zofran and IV pain meds given; IV fluid bolus done with cont. IV fluids; CT of the abdomen done; Pt is standby assist; clear liquid diet; stool sample needed, however pt stated she has not had a BM since Monday.     Goal: Individualization and Mutuality  Outcome: Ongoing (interventions implemented as appropriate)    Goal: Discharge Needs Assessment  Outcome: Ongoing (interventions implemented as appropriate)    Goal: Interprofessional Rounds/Family Conf  Outcome: Ongoing (interventions implemented as appropriate)      Problem: Skin Injury Risk (Adult)  Goal: Identify Related Risk Factors and Signs and Symptoms  Outcome: Ongoing (interventions implemented as appropriate)    Goal: Skin Health and Integrity  Outcome: Ongoing (interventions implemented as appropriate)      Problem: Nausea/Vomiting (Adult)  Goal: Identify Related Risk Factors and Signs and Symptoms  Outcome: Ongoing (interventions implemented as appropriate)    Goal: Symptom Relief  Outcome: Ongoing (interventions implemented as appropriate)    Goal: Adequate Hydration  Outcome: Ongoing (interventions implemented as appropriate)      Problem: Pain, Acute (Adult)  Goal: Identify Related Risk Factors and Signs and Symptoms  Outcome: Ongoing (interventions implemented as appropriate)    Goal: Acceptable Pain Control/Comfort Level  Outcome: Ongoing (interventions implemented as appropriate)

## 2018-06-21 NOTE — H&P
CC: Diarrhea and abdominal pain      Patient is a 52 y.o. female who underwent a colon resection on 5/1/2018 for reoccurring diverticulitis.  Patient then started having some nausea and vomiting the right upper quadrant abdominal pain and underwent a workup was found to have biliary dyskinesia.  Patient had a laparoscopic cholecystectomy on 6/8/2018.  On 6/15/2018 patient was having diarrhea and underwent a CT scan which revealed colitis and the patient was placed on ciprofloxacin and Flagyl.  Patient was seen in my office on 6/20/2018 and reported that the diarrhea had improved and she was starting to feel better but felt bloated.  Patient had not had a bowel movement for 24 hours now.  Patient denied any nausea or vomiting.  Patient denies fevers or chills.  Patient has very decreased appetite.  Today patient started feeling worse with increasing nausea.  Patient also reported she started having increasing bloating and pain.  Patient was directly admitted to the hospital.    Past Medical History:   Diagnosis Date   • Asthma    • Bronchiectasis    • Chronic kidney disease    • Colitis    • Diverticulitis    • Diverticulosis    • Hypercholesteremia    • Hypothyroidism    • Joint pain    • PONV (postoperative nausea and vomiting)    • Postoperative urinary retention    • Wegener's granulomatosis with renal involvement    • Weight loss      Past Surgical History:   Procedure Laterality Date   • COLON RESECTION N/A 5/1/2018    Procedure: COLON RESECTION LAPAROSCOPIC SIGMOID with splenic flexure takedown;  Surgeon: Bee Carreon MD;  Location: [facility] OR;  Service: General   • COLONOSCOPY N/A 6/24/2016    Procedure: COLONOSCOPY INTO CECUM;  Surgeon: Bee Carreon MD;  Location: [facility] ENDOSCOPY;  Service:    • LUNG BIOPSY Right 2014   • OOPHORECTOMY Right    • RENAL BIOPSY  2007   • SALPINGECTOMY Right    • WISDOM TOOTH EXTRACTION       Family History   Problem Relation Age of Onset   • Prostate cancer  Father    • Malig Hyperthermia Neg Hx      Social History   Substance Use Topics   • Smoking status: Never Smoker   • Smokeless tobacco: Never Used   • Alcohol use No         Current Facility-Administered Medications:   •  acetaminophen (TYLENOL) tablet 650 mg, 650 mg, Oral, Q4H PRN, Bee Carreon MD  •  albuterol (PROVENTIL) nebulizer solution 0.083% 2.5 mg/3mL, 2.5 mg, Nebulization, Q6H PRN, Bee Carreon MD  •  budesonide-formoterol (SYMBICORT) 160-4.5 MCG/ACT inhaler 2 puff, 2 puff, Inhalation, BID - RT, Bee Carreon MD  •  [START ON 6/22/2018] dextrose 10 % infusion, 75 mL/hr, Intravenous, Continuous, Bee Carreon MD  •  enoxaparin (LOVENOX) syringe 30 mg, 30 mg, Subcutaneous, Q24H, Bee Carreon MD, Stopped at 06/21/18 1653  •  febuxostat (ULORIC) tablet 40 mg, 40 mg, Oral, Daily, Bee Carreon MD, Stopped at 06/21/18 1549  •  fluticasone (FLONASE) 50 MCG/ACT nasal spray 2 spray, 2 spray, Each Nare, Daily, Bee Carreon MD, 2 spray at 06/21/18 1652  •  HYDROmorphone (DILAUDID) injection 0.5 mg, 0.5 mg, Intravenous, Q2H PRN, 0.5 mg at 06/21/18 1750 **AND** naloxone (NARCAN) injection 0.4 mg, 0.4 mg, Intravenous, Q5 Min PRN, Bee Carreon MD  •  metroNIDAZOLE (FLAGYL) IVPB 500 mg, 500 mg, Intravenous, Q8H, Bee Carreon MD, 500 mg at 06/21/18 1857  •  ondansetron (ZOFRAN) injection 4 mg, 4 mg, Intravenous, Q6H PRN, Bee Carreon MD, 4 mg at 06/21/18 1903  •  ondansetron ODT (ZOFRAN-ODT) disintegrating tablet 4 mg, 4 mg, Oral, Q8H PRN, Bee Carreon MD  •  oxyCODONE-acetaminophen (PERCOCET) 5-325 MG per tablet 1 tablet, 1 tablet, Oral, Q4H PRN, Bee Carreon MD  •  Pharmacy to Dose TPN, , Does not apply, Continuous PRN, Bee Carreon MD  •  sodium bicarbonate tablet 650 mg, 650 mg, Oral, Once, Bee Carreon MD  •  sodium chloride 0.9 % flush 1-10 mL, 1-10 mL, Intravenous, PRN, Bee Carreon MD  •  sodium chloride 0.9 % infusion, 150  "mL/hr, Intravenous, Continuous, Bee Carreon MD, Last Rate: 150 mL/hr at 06/21/18 1653, 150 mL/hr at 06/21/18 1653    Review of Systems   General: Patient reports good health  Eyes: No eye problems  Ears, nose, mouth and throat: No rhinitis, no hearing problems, no chronic cough  Cardiovascular/heart: Denies palpitations, syncope or chest pain  Respiratory/lung: Denies shortness of breath, hemoptysis, or dyspnea on exertion   Genital/urinary: No frequency, hematuria or dysuria  Hematological/lymphatic: Denies anemia or other problems  Musculoskeletal: No joint pain, no defects  Skin: No psoriasis or other skin issues  Neurological: No seizures or other neurological problems  Psychiatric: None  Endocrine: Negative  Gastro-intestinal: See history of present illness  All other systems have been reviewed and are negative.    Vitals:    06/21/18 1454 06/21/18 1501   BP:  140/95   BP Location:  Right arm   Patient Position:  Lying   Pulse:  69   Resp: 18 18   Temp: 97.3 °F (36.3 °C) 97.3 °F (36.3 °C)   TempSrc: Oral Oral   SpO2:  96%   Weight:  46 kg (101 lb 6.6 oz)   Height:  170 cm (66.93\")       Physical Exam  General/physcological:   Alert and oriented x3 in no acute distress  HEENT: Normal cephalic, atraumatic, PERRLA, EOMI, sclera anicteric, moist mucous membranes, neck is supple, no JVD, no carotid bruits, no thyromegaly no adenopathy  Respiratory: CTA and percussion  CVA: RRR, normal S1-S2, no murmurs, no gallops or rubs  GI: Positive BS, soft, Increased distention from yesterday, no rebound, no guarding, generalized tenderness with deep palpation   Musculoskeletal:  no clubbing, no cyanosis or edema  Neurovascular: Grossly intact    Assessment:  Abdominal pain  Diarrhea  Colitis    Plan:  This is a pleasant 52-year-old female patient in whom is presenting with colitis and had been on outpatient antibiotics.  Patient started having increasing bloating after the diarrhea had resolved.  Patient was admitted " today as a direct admission.  I will obtain a stat CT scan of the abdomen and pelvis, CBC, CMP, lactic acid, amylase and lipase.  I will start patient on IV fluids, nothing by mouth, TPN, and supportive care.    Bee Carreon MD  General, Minimally Invasive and Endoscopic Surgery  [facility]    [address], [address]  [place], [place], [address]  P: [phone]  F: [phone]    Cc:  iPno Templeton MD

## 2018-06-21 NOTE — NURSING NOTE
Pt placed on contact spore precautions- however per pt- she has not had a BM since Monday 6/18/18.  Pt is not currently having any BMs now.  Pt does have abdominal pain and severe nausea and is unable to tolerate any PO meds.  IV anti-nausea and pain meds given.  RLQ of abdomen is also very firm.

## 2018-06-21 NOTE — ANESTHESIA PREPROCEDURE EVALUATION
Anesthesia Evaluation     Patient summary reviewed and Nursing notes reviewed   no history of anesthetic complications:  NPO Solid Status: > 8 hours             Airway   Mallampati: II  TM distance: >3 FB  Neck ROM: full  no difficulty expected  Dental - normal exam     Pulmonary - normal exam   (+) asthma,   (-) COPD, not a smoker, lung cancer  Cardiovascular - normal exam  Exercise tolerance: good (4-7 METS)    Rhythm: regular  Rate: normal    (+) hypertension, hyperlipidemia,   (-) past MI, CAD, dysrhythmias, angina, CHF, cardiac stents      Neuro/Psych  (+) dizziness/light headedness,     (-) seizures, TIA, CVA  GI/Hepatic/Renal/Endo    (+)   renal disease CRI, hypothyroidism,   (-) hepatitis, liver disease, diabetes    Musculoskeletal (-) negative ROS    Abdominal  - normal exam   Substance History - negative use     OB/GYN          Other - negative ROS                     Anesthesia Plan    ASA 3 - emergent     general     intravenous induction   Anesthetic plan and risks discussed with patient.    Plan discussed with attending.

## 2018-06-22 LAB
ALBUMIN SERPL-MCNC: 3.5 G/DL (ref 3.5–5.2)
ALBUMIN/GLOB SERPL: 1.3 G/DL
ALP SERPL-CCNC: 66 U/L (ref 39–117)
ALT SERPL W P-5'-P-CCNC: 8 U/L (ref 1–33)
ANION GAP SERPL CALCULATED.3IONS-SCNC: 14.2 MMOL/L
AST SERPL-CCNC: 19 U/L (ref 1–32)
BASOPHILS # BLD AUTO: 0.05 10*3/MM3 (ref 0–0.2)
BASOPHILS NFR BLD AUTO: 0.5 % (ref 0–1.5)
BILIRUB SERPL-MCNC: 0.2 MG/DL (ref 0.1–1.2)
BUN BLD-MCNC: 22 MG/DL (ref 6–20)
BUN/CREAT SERPL: 9.8 (ref 7–25)
C DIFF TOX GENS STL QL NAA+PROBE: POSITIVE
CALCIUM SPEC-SCNC: 9.2 MG/DL (ref 8.6–10.5)
CHLORIDE SERPL-SCNC: 103 MMOL/L (ref 98–107)
CO2 SERPL-SCNC: 19.8 MMOL/L (ref 22–29)
CREAT BLD-MCNC: 2.25 MG/DL (ref 0.57–1)
DEPRECATED RDW RBC AUTO: 49.5 FL (ref 37–54)
EOSINOPHIL # BLD AUTO: 0.22 10*3/MM3 (ref 0–0.7)
EOSINOPHIL NFR BLD AUTO: 2.1 % (ref 0.3–6.2)
ERYTHROCYTE [DISTWIDTH] IN BLOOD BY AUTOMATED COUNT: 15.2 % (ref 11.7–13)
GFR SERPL CREATININE-BSD FRML MDRD: 23 ML/MIN/1.73
GLOBULIN UR ELPH-MCNC: 2.6 GM/DL
GLUCOSE BLD-MCNC: 110 MG/DL (ref 65–99)
HCT VFR BLD AUTO: 35.4 % (ref 35.6–45.5)
HGB BLD-MCNC: 11 G/DL (ref 11.9–15.5)
IMM GRANULOCYTES # BLD: 0.02 10*3/MM3 (ref 0–0.03)
IMM GRANULOCYTES NFR BLD: 0.2 % (ref 0–0.5)
LYMPHOCYTES # BLD AUTO: 1.76 10*3/MM3 (ref 0.9–4.8)
LYMPHOCYTES NFR BLD AUTO: 17 % (ref 19.6–45.3)
MAGNESIUM SERPL-MCNC: 1.8 MG/DL (ref 1.6–2.6)
MCH RBC QN AUTO: 27.8 PG (ref 26.9–32)
MCHC RBC AUTO-ENTMCNC: 31.1 G/DL (ref 32.4–36.3)
MCV RBC AUTO: 89.4 FL (ref 80.5–98.2)
MONOCYTES # BLD AUTO: 0.74 10*3/MM3 (ref 0.2–1.2)
MONOCYTES NFR BLD AUTO: 7.2 % (ref 5–12)
NEUTROPHILS # BLD AUTO: 7.56 10*3/MM3 (ref 1.9–8.1)
NEUTROPHILS NFR BLD AUTO: 73.2 % (ref 42.7–76)
PHOSPHATE SERPL-MCNC: 4.6 MG/DL (ref 2.5–4.5)
PLATELET # BLD AUTO: 366 10*3/MM3 (ref 140–500)
PMV BLD AUTO: 8.6 FL (ref 6–12)
POTASSIUM BLD-SCNC: 3.9 MMOL/L (ref 3.5–5.2)
PROT SERPL-MCNC: 6.1 G/DL (ref 6–8.5)
RBC # BLD AUTO: 3.96 10*6/MM3 (ref 3.9–5.2)
SODIUM BLD-SCNC: 137 MMOL/L (ref 136–145)
TRIGL SERPL-MCNC: 155 MG/DL (ref 0–150)
WBC NRBC COR # BLD: 10.33 10*3/MM3 (ref 4.5–10.7)

## 2018-06-22 PROCEDURE — 94799 UNLISTED PULMONARY SVC/PX: CPT

## 2018-06-22 PROCEDURE — 84478 ASSAY OF TRIGLYCERIDES: CPT | Performed by: SURGERY

## 2018-06-22 PROCEDURE — 25010000002 ONDANSETRON PER 1 MG: Performed by: SURGERY

## 2018-06-22 PROCEDURE — 85025 COMPLETE CBC W/AUTO DIFF WBC: CPT | Performed by: SURGERY

## 2018-06-22 PROCEDURE — 87493 C DIFF AMPLIFIED PROBE: CPT | Performed by: SURGERY

## 2018-06-22 PROCEDURE — 99231 SBSQ HOSP IP/OBS SF/LOW 25: CPT | Performed by: SURGERY

## 2018-06-22 PROCEDURE — 84100 ASSAY OF PHOSPHORUS: CPT | Performed by: SURGERY

## 2018-06-22 PROCEDURE — 80053 COMPREHEN METABOLIC PANEL: CPT | Performed by: SURGERY

## 2018-06-22 PROCEDURE — 25010000002 HYDROMORPHONE PER 4 MG: Performed by: SURGERY

## 2018-06-22 PROCEDURE — 94640 AIRWAY INHALATION TREATMENT: CPT

## 2018-06-22 PROCEDURE — 83735 ASSAY OF MAGNESIUM: CPT | Performed by: SURGERY

## 2018-06-22 RX ORDER — DEXTROSE AND SODIUM CHLORIDE 5; .45 G/100ML; G/100ML
50 INJECTION, SOLUTION INTRAVENOUS CONTINUOUS
Status: DISCONTINUED | OUTPATIENT
Start: 2018-06-22 | End: 2018-06-25 | Stop reason: HOSPADM

## 2018-06-22 RX ADMIN — OXYCODONE HYDROCHLORIDE AND ACETAMINOPHEN 1 TABLET: 5; 325 TABLET ORAL at 06:12

## 2018-06-22 RX ADMIN — BUDESONIDE AND FORMOTEROL FUMARATE DIHYDRATE 2 PUFF: 160; 4.5 AEROSOL RESPIRATORY (INHALATION) at 19:48

## 2018-06-22 RX ADMIN — DEXTROSE MONOHYDRATE 75 ML/HR: 100 INJECTION, SOLUTION INTRAVENOUS at 00:48

## 2018-06-22 RX ADMIN — FEBUXOSTAT 40 MG: 40 TABLET ORAL at 08:22

## 2018-06-22 RX ADMIN — METRONIDAZOLE 500 MG: 500 INJECTION, SOLUTION INTRAVENOUS at 18:35

## 2018-06-22 RX ADMIN — FLUTICASONE PROPIONATE 2 SPRAY: 50 SPRAY, METERED NASAL at 08:22

## 2018-06-22 RX ADMIN — ONDANSETRON 4 MG: 2 INJECTION INTRAMUSCULAR; INTRAVENOUS at 08:22

## 2018-06-22 RX ADMIN — HYDROMORPHONE HYDROCHLORIDE 0.5 MG: 10 INJECTION, SOLUTION INTRAMUSCULAR; INTRAVENOUS; SUBCUTANEOUS at 04:36

## 2018-06-22 RX ADMIN — ONDANSETRON 4 MG: 2 INJECTION INTRAMUSCULAR; INTRAVENOUS at 04:36

## 2018-06-22 RX ADMIN — BUDESONIDE AND FORMOTEROL FUMARATE DIHYDRATE 2 PUFF: 160; 4.5 AEROSOL RESPIRATORY (INHALATION) at 08:31

## 2018-06-22 RX ADMIN — ONDANSETRON 4 MG: 2 INJECTION INTRAMUSCULAR; INTRAVENOUS at 22:22

## 2018-06-22 RX ADMIN — DEXTROSE AND SODIUM CHLORIDE 75 ML/HR: 5; 450 INJECTION, SOLUTION INTRAVENOUS at 10:16

## 2018-06-22 RX ADMIN — HYDROMORPHONE HYDROCHLORIDE 0.5 MG: 10 INJECTION, SOLUTION INTRAMUSCULAR; INTRAVENOUS; SUBCUTANEOUS at 15:33

## 2018-06-22 RX ADMIN — METRONIDAZOLE 500 MG: 500 INJECTION, SOLUTION INTRAVENOUS at 10:17

## 2018-06-22 RX ADMIN — HYDROMORPHONE HYDROCHLORIDE 0.5 MG: 10 INJECTION, SOLUTION INTRAMUSCULAR; INTRAVENOUS; SUBCUTANEOUS at 08:22

## 2018-06-22 RX ADMIN — ONDANSETRON 4 MG: 2 INJECTION INTRAMUSCULAR; INTRAVENOUS at 15:33

## 2018-06-22 RX ADMIN — DEXTROSE AND SODIUM CHLORIDE 125 ML/HR: 5; 450 INJECTION, SOLUTION INTRAVENOUS at 22:22

## 2018-06-22 RX ADMIN — METRONIDAZOLE 500 MG: 500 INJECTION, SOLUTION INTRAVENOUS at 03:14

## 2018-06-22 NOTE — CONSULTS
Chart reviewed -- per notes Nephrology physician does not recommend placement.  Patient does not want TPN.  Will sign off at this time please re consult if situation changes and nephrology is in agreement

## 2018-06-22 NOTE — PLAN OF CARE
Problem: Patient Care Overview  Goal: Plan of Care Review  Outcome: Ongoing (interventions implemented as appropriate)   06/22/18 6080   Coping/Psychosocial   Plan of Care Reviewed With patient   Plan of Care Review   Progress improving   OTHER   Outcome Summary VSS, PT A&Ox4. Pt given one dose of dilaudid and one dose of zofran since procedure. Pt denies further discomfort at this time. Nephrology consulted regarding PICC placement for TPN. Pt NPO only sips with meds and chips. Dr. Carreon will have to go back in and dilate cecum further. no date set. Dextrose 10% @75 via RFA IV. Abd more disteneded on right side. MD aware.       Problem: Skin Injury Risk (Adult)  Goal: Identify Related Risk Factors and Signs and Symptoms  Outcome: Ongoing (interventions implemented as appropriate)    Goal: Skin Health and Integrity  Outcome: Ongoing (interventions implemented as appropriate)      Problem: Nausea/Vomiting (Adult)  Goal: Identify Related Risk Factors and Signs and Symptoms  Outcome: Ongoing (interventions implemented as appropriate)    Goal: Symptom Relief  Outcome: Ongoing (interventions implemented as appropriate)    Goal: Adequate Hydration  Outcome: Ongoing (interventions implemented as appropriate)      Problem: Pain, Acute (Adult)  Goal: Identify Related Risk Factors and Signs and Symptoms  Outcome: Ongoing (interventions implemented as appropriate)    Goal: Acceptable Pain Control/Comfort Level  Outcome: Ongoing (interventions implemented as appropriate)

## 2018-06-22 NOTE — ANESTHESIA PROCEDURE NOTES
Airway  Urgency: elective    Airway not difficult    General Information and Staff    Patient location during procedure: OR  Anesthesiologist: CHERYL BEAULIEU    Indications and Patient Condition  Indications for airway management: airway protection    Preoxygenated: yes  Mask difficulty assessment: 0 - not attempted    Final Airway Details  Final airway type: endotracheal airway      Successful airway: ETT  Cuffed: yes   Successful intubation technique: direct laryngoscopy and RSI  Facilitating devices/methods: intubating stylet and cricoid pressure  Endotracheal tube insertion site: oral  Blade: Vanesa  ETT size: 7.0 mm  Cormack-Lehane Classification: grade I - full view of glottis  Placement verified by: chest auscultation and capnometry   Inital cuff pressure (cm H2O): 25  Cuff volume (mL): 4  Measured from: teeth  Number of attempts at approach: 1

## 2018-06-22 NOTE — PLAN OF CARE
Problem: Patient Care Overview  Goal: Plan of Care Review  Outcome: Ongoing (interventions implemented as appropriate)   06/22/18 1703   Coping/Psychosocial   Plan of Care Reviewed With patient   Plan of Care Review   Progress improving   OTHER   Outcome Summary Pt stated her pain and nausea are much more controlled today; VSS; stool sample collected- came back pos for cdiff- in contact precautions; IV fluids; pt NPO for another dilation procedure late today; Nephrology saw pt- does not want PICC placed- note in chart- message relayed to Dr. Carreon; Nutrition saw pt; Pt refuses TPN- wants to speak with physician regarding order; IV fluids changed to D51/2NS; pain and distention still at RLQ.     Goal: Individualization and Mutuality  Outcome: Ongoing (interventions implemented as appropriate)    Goal: Discharge Needs Assessment  Outcome: Ongoing (interventions implemented as appropriate)    Goal: Interprofessional Rounds/Family Conf  Outcome: Ongoing (interventions implemented as appropriate)      Problem: Skin Injury Risk (Adult)  Goal: Identify Related Risk Factors and Signs and Symptoms  Outcome: Ongoing (interventions implemented as appropriate)    Goal: Skin Health and Integrity  Outcome: Ongoing (interventions implemented as appropriate)      Problem: Nausea/Vomiting (Adult)  Goal: Identify Related Risk Factors and Signs and Symptoms  Outcome: Ongoing (interventions implemented as appropriate)    Goal: Symptom Relief  Outcome: Ongoing (interventions implemented as appropriate)    Goal: Adequate Hydration  Outcome: Ongoing (interventions implemented as appropriate)      Problem: Pain, Acute (Adult)  Goal: Identify Related Risk Factors and Signs and Symptoms  Outcome: Ongoing (interventions implemented as appropriate)    Goal: Acceptable Pain Control/Comfort Level  Outcome: Ongoing (interventions implemented as appropriate)

## 2018-06-22 NOTE — ANESTHESIA POSTPROCEDURE EVALUATION
"Patient: Sun Rodriguez    Procedure Summary     Date:  06/21/18 Room / Location:  [facility] OR  / [facility] MAIN OR    Anesthesia Start:  2008 Anesthesia Stop:  2135    Procedure:  COLONOSCOPY WITH DECOMPRESSION (N/A Rectum) Diagnosis:      Surgeon:  Bee Carreon MD Provider:  Areli Conte MD    Anesthesia Type:  MAC ASA Status:  3 - Emergent          Anesthesia Type: MAC  Last vitals  BP   108/72 (06/21/18 2128)   Temp   36.9 °C (98.4 °F) (06/21/18 2128)   Pulse   92 (06/21/18 2128)   Resp   14 (06/21/18 2128)     SpO2   100 % (06/21/18 2128)     Post Anesthesia Care and Evaluation    Patient location during evaluation: bedside  Patient participation: complete - patient participated  Level of consciousness: awake and alert  Pain management: adequate  Airway patency: patent  Anesthetic complications: No anesthetic complications    Cardiovascular status: acceptable  Respiratory status: acceptable  Hydration status: acceptable    Comments: /72 (BP Location: Left arm, Patient Position: Lying)   Pulse 92   Temp 36.9 °C (98.4 °F) (Oral)   Resp 14   Ht 170 cm (66.93\")   Wt 46 kg (101 lb 6.6 oz)   LMP 10/31/2017 (Exact Date)   SpO2 100%   BMI 15.92 kg/m²       "

## 2018-06-22 NOTE — PROGRESS NOTES
Malnutrition Severity Assessment    Patient Name:  Sun Rodriguez  YOB: 1965  MRN: [id number]  Admit Date:  6/21/2018    Patient meets criteria for : Moderate malnutrition    Comments:  Pt exhibits hollowing/scooping of temporal region, some protrusion of clavicle bone, depression on thigh is prominent. Significant weight loss noted. Periods of very poor po and malabsorption due to GI issues since early May. See assessment for details.    Malnutrition Type: Acute Illness/Injury Malnutrition     Malnutrition Type (last 8 hours)      Malnutrition Severity Assessment     Row Name 06/22/18 1128       Malnutrition Severity Assessment    Malnutrition Type Acute Illness/Injury Malnutrition    Row Name 06/22/18 1128       Physical Signs of Malnutrition (Acute)    Muscle Wasting Moderate    Fat Loss Moderate    Row Name 06/22/18 1128       Weight Status (Acute)    BMI Severe (<16)    %UBW Mod (75-85%)   130lb is UBW    Weight Loss Severe (>7.5% / 3 mo)    Row Name 06/22/18 1128       Criteria Met (Must meet criteria for severity in at least 2 of these categories: M Wasting, Fat Loss, Fluid, Secondary Signs, Wt. Status, Intake)    Patient meets criteria for  Moderate malnutrition          Electronically signed by:  Diann Jamil RD  06/22/18 11:30 AM

## 2018-06-22 NOTE — CONSULTS
Referring Provider: Dr Boo  Reason for Consultation: CKD IV    Subjective     Chief complaint No chief complaint on file.      History of present illness:  51 yo WF with h/o CKD stage IV due to Wegener's granulomatosis (Dx 2007), dyslipidemia, gout and recurrent diverticulitis, s/p LAR 5/1/18.  Had biliary dyskinesia and underwent lap-kumar 6/8/18.  One week ago developed diarrhea and CT showed colitis and started cipro & flagyl.  At appt with Dr Carreon two days ago felt bloated and no BM x24h.  No n/c.  Underwent sigmoidoscopy yest with with balloon dilation of stricture.  She's NPO and may need TPN, PICC proposed.  Cr stable 2.4 yest, 2.2 today (was 2.2 on 5/6/18, 2.9 on 6/15/18).  Sees Dr Corado.  No dysuria.  No n/v.  No fever/chills.  No dyspnea or swelling.    Past Medical History:   Diagnosis Date   • Asthma    • Bronchiectasis    • Chronic kidney disease    • Colitis    • Diverticulitis    • Diverticulosis    • Hypercholesteremia    • Hypothyroidism    • Joint pain    • PONV (postoperative nausea and vomiting)    • Postoperative urinary retention    • Wegener's granulomatosis with renal involvement    • Weight loss      Past Surgical History:   Procedure Laterality Date   • COLON RESECTION N/A 5/1/2018    Procedure: COLON RESECTION LAPAROSCOPIC SIGMOID with splenic flexure takedown;  Surgeon: Bee Carreon MD;  Location: [facility] OR;  Service: General   • COLONOSCOPY N/A 6/24/2016    Procedure: COLONOSCOPY INTO CECUM;  Surgeon: Bee Carreon MD;  Location: [facility] ENDOSCOPY;  Service:    • LUNG BIOPSY Right 2014   • OOPHORECTOMY Right    • RENAL BIOPSY  2007   • SALPINGECTOMY Right    • WISDOM TOOTH EXTRACTION       Family History   Problem Relation Age of Onset   • Prostate cancer Father    • Malig Hyperthermia Neg Hx        Social History   Substance Use Topics   • Smoking status: Never Smoker   • Smokeless tobacco: Never Used   • Alcohol use No     Prescriptions Prior to Admission  "  Medication Sig Dispense Refill Last Dose   • albuterol (PROVENTIL HFA;VENTOLIN HFA) 108 (90 Base) MCG/ACT inhaler Inhale 2 puffs Every 4 (Four) Hours As Needed for Wheezing.   Past Week at Unknown time   • budesonide-formoterol (SYMBICORT) 160-4.5 MCG/ACT inhaler Inhale 2 puffs 2 (Two) Times a Day.   Past Week at Unknown time   • febuxostat (ULORIC) 40 MG tablet Take 40 mg by mouth Daily.   6/20/2018 at Unknown time   • fluticasone (FLONASE) 50 MCG/ACT nasal spray SPRAY 2 SPRAYS IN EACH NOSTRIL DAILY  1 6/20/2018 at Unknown time   • levothyroxine (SYNTHROID, LEVOTHROID) 125 MCG tablet Take 125 mcg by mouth Daily.   6/20/2018 at Unknown time   • metroNIDAZOLE (FLAGYL) 500 MG tablet    6/20/2018 at Unknown time   • ondansetron ODT (ZOFRAN-ODT) 4 MG disintegrating tablet Take 4 mg by mouth Every 8 (Eight) Hours As Needed for Nausea or Vomiting.   6/21/2018 at Unknown time   • rosuvastatin (CRESTOR) 20 MG tablet Take 20 mg by mouth Daily.   6/20/2018 at Unknown time   • sodium bicarbonate 650 MG tablet Take 650 mg by mouth 2 (Two) Times a Day.   6/20/2018 at Unknown time     Allergies:  Patient has no known allergies.  Review of Systems  12 pt ROS NEG Except as per HPI  Objective     Vital Signs  Temp:  [97.3 °F (36.3 °C)-98.4 °F (36.9 °C)] 97.9 °F (36.6 °C)  Heart Rate:  [69-92] 77  Resp:  [14-20] 16  BP: (108-140)/(72-95) 128/81    Flowsheet Rows      First Filed Value   Admission Height  170 cm (66.93\") Documented at 06/21/2018 1501   Admission Weight  46 kg (101 lb 6.6 oz) Documented at 06/21/2018 1501           I/O this shift:  In: 850 [I.V.:750; IV Piggyback:100]  Out: 200 [Emesis/NG output:200]  No intake/output data recorded.    Intake/Output Summary (Last 24 hours) at 06/22/18 0654  Last data filed at 06/22/18 0315   Gross per 24 hour   Intake              850 ml   Output              200 ml   Net              650 ml       Physical Exam:  gen pleasant thin middle age WF in no distress  Neck supple no " JVD  HEENT OP clear, MMM  Lungs CTA bilat no rales  CV RRR no M/G  abd soft mild TTP, non dist, hypoactive BS  vasc no pedal/ankle edema, 2+ radial pulses  Derm normal turgor, no rash  MS no joint warmth or erythema  Neuro oriented x3, speech normal       Results Review:  Results for orders placed or performed during the hospital encounter of 06/21/18   Basic Metabolic Panel   Result Value Ref Range    Glucose 101 (H) 65 - 99 mg/dL    BUN 22 (H) 6 - 20 mg/dL    Creatinine 2.38 (H) 0.57 - 1.00 mg/dL    Sodium 139 136 - 145 mmol/L    Potassium 4.0 3.5 - 5.2 mmol/L    Chloride 103 98 - 107 mmol/L    CO2 20.9 (L) 22.0 - 29.0 mmol/L    Calcium 10.2 8.6 - 10.5 mg/dL    eGFR Non African Amer 21 (L) >60 mL/min/1.73    BUN/Creatinine Ratio 9.2 7.0 - 25.0    Anion Gap 15.1 mmol/L   CBC Auto Differential   Result Value Ref Range    WBC 12.33 (H) 4.50 - 10.70 10*3/mm3    RBC 4.51 3.90 - 5.20 10*6/mm3    Hemoglobin 12.5 11.9 - 15.5 g/dL    Hematocrit 40.8 35.6 - 45.5 %    MCV 90.5 80.5 - 98.2 fL    MCH 27.7 26.9 - 32.0 pg    MCHC 30.6 (L) 32.4 - 36.3 g/dL    RDW 14.8 (H) 11.7 - 13.0 %    RDW-SD 48.8 37.0 - 54.0 fl    MPV 9.3 6.0 - 12.0 fL    Platelets 383 140 - 500 10*3/mm3    Neutrophil % 84.0 (H) 42.7 - 76.0 %    Lymphocyte % 10.1 (L) 19.6 - 45.3 %    Monocyte % 4.9 (L) 5.0 - 12.0 %    Eosinophil % 0.6 0.3 - 6.2 %    Basophil % 0.2 0.0 - 1.5 %    Immature Grans % 0.2 0.0 - 0.5 %    Neutrophils, Absolute 10.34 (H) 1.90 - 8.10 10*3/mm3    Lymphocytes, Absolute 1.24 0.90 - 4.80 10*3/mm3    Monocytes, Absolute 0.61 0.20 - 1.20 10*3/mm3    Eosinophils, Absolute 0.08 0.00 - 0.70 10*3/mm3    Basophils, Absolute 0.03 0.00 - 0.20 10*3/mm3    Immature Grans, Absolute 0.03 0.00 - 0.03 10*3/mm3   Amylase   Result Value Ref Range    Amylase 182 (H) 28 - 100 U/L   Lipase   Result Value Ref Range    Lipase 125 (H) 13 - 60 U/L   Lactic Acid, Plasma   Result Value Ref Range    Lactate 1.2 0.5 - 2.0 mmol/L   Comprehensive Metabolic Panel    Result Value Ref Range    Glucose 101 (H) 65 - 99 mg/dL    BUN 22 (H) 6 - 20 mg/dL    Creatinine 2.38 (H) 0.57 - 1.00 mg/dL    Sodium 139 136 - 145 mmol/L    Potassium 4.0 3.5 - 5.2 mmol/L    Chloride 103 98 - 107 mmol/L    CO2 20.9 (L) 22.0 - 29.0 mmol/L    Calcium 10.2 8.6 - 10.5 mg/dL    Total Protein 7.3 6.0 - 8.5 g/dL    Albumin 3.80 3.50 - 5.20 g/dL    ALT (SGPT) 11 1 - 33 U/L    AST (SGOT) 20 1 - 32 U/L    Alkaline Phosphatase 81 39 - 117 U/L    Total Bilirubin 0.3 0.1 - 1.2 mg/dL    eGFR Non African Amer 21 (L) >60 mL/min/1.73    Globulin 3.5 gm/dL    A/G Ratio 1.1 g/dL    BUN/Creatinine Ratio 9.2 7.0 - 25.0    Anion Gap 15.1 mmol/L   CBC Auto Differential   Result Value Ref Range    WBC 10.33 4.50 - 10.70 10*3/mm3    RBC 3.96 3.90 - 5.20 10*6/mm3    Hemoglobin 11.0 (L) 11.9 - 15.5 g/dL    Hematocrit 35.4 (L) 35.6 - 45.5 %    MCV 89.4 80.5 - 98.2 fL    MCH 27.8 26.9 - 32.0 pg    MCHC 31.1 (L) 32.4 - 36.3 g/dL    RDW 15.2 (H) 11.7 - 13.0 %    RDW-SD 49.5 37.0 - 54.0 fl    MPV 8.6 6.0 - 12.0 fL    Platelets 366 140 - 500 10*3/mm3    Neutrophil % 73.2 42.7 - 76.0 %    Lymphocyte % 17.0 (L) 19.6 - 45.3 %    Monocyte % 7.2 5.0 - 12.0 %    Eosinophil % 2.1 0.3 - 6.2 %    Basophil % 0.5 0.0 - 1.5 %    Immature Grans % 0.2 0.0 - 0.5 %    Neutrophils, Absolute 7.56 1.90 - 8.10 10*3/mm3    Lymphocytes, Absolute 1.76 0.90 - 4.80 10*3/mm3    Monocytes, Absolute 0.74 0.20 - 1.20 10*3/mm3    Eosinophils, Absolute 0.22 0.00 - 0.70 10*3/mm3    Basophils, Absolute 0.05 0.00 - 0.20 10*3/mm3    Immature Grans, Absolute 0.02 0.00 - 0.03 10*3/mm3     Imaging Results (last 72 hours)     Procedure Component Value Units Date/Time    CT Abdomen Pelvis Without Contrast [[id number]] Collected:  06/21/18 1639     Updated:  06/21/18 1639    Narrative:       CT ABDOMEN AND PELVIS WITHOUT CONTRAST     HISTORY: Abdominal pain and vomiting. Recent colonic resection. The  patient was unable to tolerate oral contrast.     TECHNIQUE: Axial CT  images of the abdomen and pelvis were obtained  without administration of intravenous contrast. The patient was not able  to tolerate oral contrast. Coronal and sagittal reformats were obtained.     COMPARISON: 06/15/2018.     FINDINGS: There is severe dilatation of the colon extending up to the  level of the anastomosis within the pelvis. The cecum measures 10.1 cm  in maximum dimension. Almost all of the dilated colon contains  air-contrast levels. This contrast refers to the oral contrast  administered on the study from 06/05/2018. There is also circumferential  low-density wall thickening involving the distal left and proximal  sigmoid colon. This may be secondary to some mild chronic ischemia. The  rectum distal to the anastomosis contains a small amount of air and  contrast. There is significant stranding and small amounts of fluid seen  within the left paracolic gutter     Increased in the interim from prior CT. Majority of the small bowel  loops demonstrate normal caliber.     There is small abdominal ascites present. Noncontrast attenuation of the  liver is normal. There is a low-density right hepatic lobe lesion,  unchanged. Numerous low-density splenic lesions are present and  unchanged. The pancreas is normal. The gallbladder is surgically absent.  Bilateral adrenal gland and kidneys are unchanged. L5-S1 degenerative  disc disease with vacuum disc phenomena is present.       Impression:       There is evidence of large bowel obstruction at the level of  the anastomosis within the sigmoid. This is likely incomplete secondary  to a small amount of passage of previously administered oral contrast  into the rectum. There is however wall thickening within the distal left  and sigmoid colon that may represent chronic ischemic change within the  wall. The cecum is dilated to 10.1 cm, this puts it at high risk for  perforation. There is fat stranding and fluid within the left paracolic  gutter, small abdominal  ascites present.     These findings were discussed with Dr. Bee Carreon by telephone.     Radiation dose reduction techniques were utilized, including automated  exposure control and exposure modulation based on body size.                    budesonide-formoterol 2 puff Inhalation BID - RT   enoxaparin 30 mg Subcutaneous Q24H   febuxostat 40 mg Oral Daily   fluticasone 2 spray Each Nare Daily   metroNIDAZOLE 500 mg Intravenous Q8H   sodium bicarbonate 650 mg Oral Once       dextrose 75 mL/hr Last Rate: 75 mL/hr (06/22/18 0048)   lactated ringers 9 mL/hr Last Rate: 9 mL/hr (06/21/18 1951)   Pharmacy to Dose TPN     sodium chloride 150 mL/hr Last Rate: 150 mL/hr (06/21/18 1653)       Assessment/Plan   CKD stage IV - due to Wegener's (s/p renal Bx 2007) . Cr stable, 2.2 today, baseline as of 5/5/18, was elevated to 2.9 on 6/15 likely from vol depletion with diarrhea at the time.  eGFR ~ 25 ml/min, so we do need to avoid periph PICC line to preserve veins for future dialysis access.  K+ stable.      Metabolic acidosis - AG 14, chronic, due to advanced CKD +/- recent diarrhea, takes NaHCO3 1300 BID @ home, HCO3 20 today  Colonic stricture s/p baloon dilation 6/21/18  Biliary dyskinesia s/p lap-kumar 6/8/18  Recurrent diverticulitis s/p LAR 5/1/18  Hypothyroidism  Dyslipidemia   Anemia of CKD - Hb 11  Nutrition - no bowel fcn currently, NPO and to start TPN    Plan  - change D10W to D5 1/2 NS 75cc/hr  - monitor HCo3, if drops further can add bicarb to IVF  - prefer no periph PICC line, so may need IJ [place] (if plan TPN outside hospital), vs TLC, if only anticipated in-pt use.  RN to notify Dr Carreon, I left my call-back #.    - avoid IV contrast  - will renally dose medications   - d/c uloric & sodium bicarb as my impression is NPO status    Thank you Bridger Carreon     Active Problems:    Colitis, acute        I discussed the patients findings and my recommendations with patient    Pino Mauro,  MD  06/22/18  6:54 AM

## 2018-06-22 NOTE — PROGRESS NOTES
Adult Nutrition  Assessment/PES    Patient Name:  Sun Rodriguez  YOB: 1965  MRN: [id number]  Admit Date:  6/21/2018    Assessment Date:  6/22/2018    Comments:  Nutrition assessment complete. Pt with periods of poor po since early May due to GI issues. States she ate very well Sun- Tuesday this week.  Does not want TPN.  On zofran.  Hopefully can tolerate po and pt willing to drink nutrition supplements. Will follow along.             Reason for Assessment     Row Name 06/22/18 1107          Reason for Assessment    Reason For Assessment identified at risk by screening criteria     Diagnosis gastrointestinal disease;renal disease   stricture in colon, Wegener's granulomatosis with renal involvement- CKD     Identified At Risk by Screening Criteria MST SCORE 2+;BMI             Nutrition/Diet History     Row Name 06/22/18 1109          Nutrition/Diet History    Factors Affecting Nutritional Intake abdominal distension;nausea             Anthropometrics     Row Name 06/22/18 1109          Admit Weight    Admit Weight 46 kg (101 lb 6.6 oz)        Usual Body Weight (UBW)    Usual Body Weight 53.1 kg (117 lb)   3/2018     Weight Loss unintentional     Weight Loss Time Frame 3 months        Body Mass Index (BMI)    BMI Assessment BMI less than 16: protein-energy malnutrition grade III             Labs/Tests/Procedures/Meds     Row Name 06/22/18 1111          Labs/Procedures/Meds    Lab Results Reviewed reviewed, pertinent     Lab Results Comments glu, BUN, Cr        Diagnostic Tests/Procedures    Diagnostic Test/Procedure Reviewed reviewed, pertinent     Diagnostic Test/Procedures Comments flex sigmoidoscopy with dilation        Medications    Pertinent Medications Reviewed reviewed, pertinent     Pertinent Medications Comments flagyl, IVF, zofran             Physical Findings     Row Name 06/22/18 1114          Physical Findings    Overall Physical Appearance underweight     Skin other (see comments)    incision             Estimated/Assessed Needs     Row Name 06/22/18 1115          Estimated/Assessed Needs    Additional Documentation --   1610 kcals (35/kg), 55g pro (1.2/kg), 1600cc fluid             Nutrition Prescription Ordered     Row Name 06/22/18 1115          Nutrition Prescription PO    Current PO Diet NPO       Problem/Interventions:        Problem 1     Row Name 06/22/18 1116          Nutrition Diagnoses Problem 1    Problem 1 Altered GI Function     Etiology (related to) Medical Diagnosis     Gastrointestinal Colitis               Intervention Goal     Row Name 06/22/18 1124          Intervention Goal    General Maintain nutrition;Reduce/improve symptoms;Meet nutritional needs for age/condition;Improved nutrition related lab(s);Disease management/therapy     PO Initiate feeding;Tolerate PO     Weight No significant weight loss             Nutrition Intervention     Row Name 06/22/18 1124          Nutrition Intervention    RD/Tech Action Follow Tx progress;Care plan reviewd;Await begin PO               Education/Evaluation     Row Name 06/22/18 1124          Education    Education Will Instruct as appropriate        Monitor/Evaluation    Monitor Per protocol;Symptoms;I&O;Pertinent labs;Weight;Skin status         Electronically signed by:  Diann Jamil RD  06/22/18 11:25 AM

## 2018-06-22 NOTE — PROGRESS NOTES
Discharge Planning Assessment  [facility]     Patient Name: Sun Rodriguez  MRN: [id number]  Today's Date: 6/22/2018    Admit Date: 6/21/2018          Discharge Needs Assessment     Row Name 06/22/18 0957       Living Environment    Lives With spouse    Name(s) of Who Lives With Patient --   Spouse- Simeon Rodriguez [phone]    Current Living Arrangements home/apartment/condo    Primary Care Provided by self    Provides Primary Care For no one    Family Caregiver if Needed spouse    Able to Return to Prior Arrangements yes       Resource/Environmental Concerns    Resource/Environmental Concerns none    Transportation Concerns car, none       Transition Planning    Patient/Family Anticipates Transition to home    Patient/Family Anticipated Services at Transition none    Transportation Anticipated family or friend will provide       Discharge Needs Assessment    Readmission Within the Last 30 Days no previous admission in last 30 days    Concerns to be Addressed no discharge needs identified    Equipment Currently Used at Home nebulizer    Anticipated Changes Related to Illness none    Equipment Needed After Discharge none            Discharge Plan     Row Name 06/22/18 0959       Plan    Plan Plan is home    Patient/Family in Agreement with Plan yes    Plan Comments CCP role explained and face sheet verified; states she has information on living will; current pharmacy is [facility]; emergency contact is spouse Simeon Rodriguez [phone], he will perovide transportation at d/c; DME includes a nebulizer; no previous HH or rehab. Plan is home no needs, CCP to follow. GENA Dennis        Destination     No service coordination in this encounter.      Durable Medical Equipment     No service coordination in this encounter.      Dialysis/Infusion     No service coordination in this encounter.      Home Medical Care     No service coordination in this encounter.      Social Care     No service coordination in  this encounter.                Demographic Summary     Row Name 06/22/18 0955       General Information    Admission Type inpatient    Arrived From home    Reason for Consult discharge planning            Functional Status     Row Name 06/22/18 0956       Functional Status    Usual Activity Tolerance good    Current Activity Tolerance good       Functional Status, IADL    Medications independent    Meal Preparation independent    Housekeeping independent    Laundry independent    Shopping independent            Psychosocial    No documentation.           Abuse/Neglect    No documentation.           Legal     Row Name 06/22/18 0956       Financial/Legal    Finance Comments --   Patient reports she has information on living will            Substance Abuse    No documentation.           Patient Forms    No documentation.         Ayesha Blue RN

## 2018-06-22 NOTE — OP NOTE
Operative Note:    Pre-op dx: Colonic distention with colitis    Post-op dx: Inflammation and stricture    Procedure: Sigmoidoscopy with balloon dilation up to 8 mm    Surgeon: Bee Carreon MD    Anesthesia: MAC    EBL: Minimal    Specimen:  * No orders in the log *    Complications: none    Procedure:    Colonoscopy:  A digital rectal examination was performed which revealed no rectal masses.  Scope was introduced into the rectum and advanced into the sigmoid.  Patient had some inflammation and a stricture at about 15-20 cm from the anal verge.  The scope could not be passed through the stricture.  A guidewire was placed across the stricture and a balloon was insufflated to 8 mm.  There was minimal amount of bleeding.  No further dilation was carried out due to severe stricture.  Air and liquid stool was evacuated with the colonoscope.  The patient was transferred to recovery room in stable condition.      Assessment/Plan:  Patient had a stricture which was dilated to 8 mm        Bee Carreon MD  General, Minimally Invasive and Endoscopic Surgery  [facility]    [address], [address]  [place], [place], [address]  P: [phone]  F: [phone]    Cc:  Pino Templeton MD

## 2018-06-23 ENCOUNTER — ANESTHESIA (OUTPATIENT)
Dept: GASTROENTEROLOGY | Facility: HOSPITAL | Age: 53
End: 2018-06-23

## 2018-06-23 ENCOUNTER — ANESTHESIA EVENT (OUTPATIENT)
Dept: GASTROENTEROLOGY | Facility: HOSPITAL | Age: 53
End: 2018-06-23

## 2018-06-23 LAB
ALBUMIN SERPL-MCNC: 2.9 G/DL (ref 3.5–5.2)
ANION GAP SERPL CALCULATED.3IONS-SCNC: 11.1 MMOL/L
BASOPHILS # BLD AUTO: 0.04 10*3/MM3 (ref 0–0.2)
BASOPHILS NFR BLD AUTO: 0.4 % (ref 0–1.5)
BUN BLD-MCNC: 16 MG/DL (ref 6–20)
BUN/CREAT SERPL: 6.7 (ref 7–25)
CALCIUM SPEC-SCNC: 8.9 MG/DL (ref 8.6–10.5)
CHLORIDE SERPL-SCNC: 107 MMOL/L (ref 98–107)
CO2 SERPL-SCNC: 21.9 MMOL/L (ref 22–29)
CREAT BLD-MCNC: 2.39 MG/DL (ref 0.57–1)
DEPRECATED RDW RBC AUTO: 50 FL (ref 37–54)
EOSINOPHIL # BLD AUTO: 0.66 10*3/MM3 (ref 0–0.7)
EOSINOPHIL NFR BLD AUTO: 7.3 % (ref 0.3–6.2)
ERYTHROCYTE [DISTWIDTH] IN BLOOD BY AUTOMATED COUNT: 15.2 % (ref 11.7–13)
GFR SERPL CREATININE-BSD FRML MDRD: 21 ML/MIN/1.73
GLUCOSE BLD-MCNC: 95 MG/DL (ref 65–99)
HCT VFR BLD AUTO: 33.5 % (ref 35.6–45.5)
HGB BLD-MCNC: 10.4 G/DL (ref 11.9–15.5)
IMM GRANULOCYTES # BLD: 0.01 10*3/MM3 (ref 0–0.03)
IMM GRANULOCYTES NFR BLD: 0.1 % (ref 0–0.5)
LYMPHOCYTES # BLD AUTO: 1.73 10*3/MM3 (ref 0.9–4.8)
LYMPHOCYTES NFR BLD AUTO: 19.3 % (ref 19.6–45.3)
MAGNESIUM SERPL-MCNC: 2 MG/DL (ref 1.6–2.6)
MCH RBC QN AUTO: 27.8 PG (ref 26.9–32)
MCHC RBC AUTO-ENTMCNC: 31 G/DL (ref 32.4–36.3)
MCV RBC AUTO: 89.6 FL (ref 80.5–98.2)
MONOCYTES # BLD AUTO: 0.71 10*3/MM3 (ref 0.2–1.2)
MONOCYTES NFR BLD AUTO: 7.9 % (ref 5–12)
NEUTROPHILS # BLD AUTO: 5.84 10*3/MM3 (ref 1.9–8.1)
NEUTROPHILS NFR BLD AUTO: 65.1 % (ref 42.7–76)
PHOSPHATE SERPL-MCNC: 2.9 MG/DL (ref 2.5–4.5)
PLATELET # BLD AUTO: 371 10*3/MM3 (ref 140–500)
PMV BLD AUTO: 8.8 FL (ref 6–12)
POTASSIUM BLD-SCNC: 3.8 MMOL/L (ref 3.5–5.2)
RBC # BLD AUTO: 3.74 10*6/MM3 (ref 3.9–5.2)
SODIUM BLD-SCNC: 140 MMOL/L (ref 136–145)
WBC NRBC COR # BLD: 8.98 10*3/MM3 (ref 4.5–10.7)

## 2018-06-23 PROCEDURE — 99231 SBSQ HOSP IP/OBS SF/LOW 25: CPT | Performed by: SURGERY

## 2018-06-23 PROCEDURE — 83735 ASSAY OF MAGNESIUM: CPT | Performed by: SURGERY

## 2018-06-23 PROCEDURE — 85025 COMPLETE CBC W/AUTO DIFF WBC: CPT | Performed by: SURGERY

## 2018-06-23 PROCEDURE — 94799 UNLISTED PULMONARY SVC/PX: CPT

## 2018-06-23 PROCEDURE — C1726 CATH, BAL DIL, NON-VASCULAR: HCPCS | Performed by: SURGERY

## 2018-06-23 PROCEDURE — 25010000002 PROPOFOL 10 MG/ML EMULSION: Performed by: ANESTHESIOLOGY

## 2018-06-23 PROCEDURE — 80069 RENAL FUNCTION PANEL: CPT | Performed by: INTERNAL MEDICINE

## 2018-06-23 PROCEDURE — 0D7N8ZZ DILATION OF SIGMOID COLON, VIA NATURAL OR ARTIFICIAL OPENING ENDOSCOPIC: ICD-10-PCS | Performed by: SURGERY

## 2018-06-23 PROCEDURE — 43249 ESOPH EGD DILATION <30 MM: CPT | Performed by: SURGERY

## 2018-06-23 RX ORDER — LIDOCAINE HYDROCHLORIDE 20 MG/ML
INJECTION, SOLUTION INFILTRATION; PERINEURAL AS NEEDED
Status: DISCONTINUED | OUTPATIENT
Start: 2018-06-23 | End: 2018-06-23 | Stop reason: SURG

## 2018-06-23 RX ORDER — SODIUM CHLORIDE, SODIUM LACTATE, POTASSIUM CHLORIDE, CALCIUM CHLORIDE 600; 310; 30; 20 MG/100ML; MG/100ML; MG/100ML; MG/100ML
30 INJECTION, SOLUTION INTRAVENOUS CONTINUOUS
Status: DISCONTINUED | OUTPATIENT
Start: 2018-06-23 | End: 2018-06-24

## 2018-06-23 RX ORDER — PROPOFOL 10 MG/ML
VIAL (ML) INTRAVENOUS AS NEEDED
Status: DISCONTINUED | OUTPATIENT
Start: 2018-06-23 | End: 2018-06-23 | Stop reason: SURG

## 2018-06-23 RX ADMIN — METRONIDAZOLE 500 MG: 500 INJECTION, SOLUTION INTRAVENOUS at 03:49

## 2018-06-23 RX ADMIN — DEXTROSE AND SODIUM CHLORIDE 150 ML/HR: 5; 450 INJECTION, SOLUTION INTRAVENOUS at 19:24

## 2018-06-23 RX ADMIN — LIDOCAINE HYDROCHLORIDE 40 MG: 20 INJECTION, SOLUTION INFILTRATION; PERINEURAL at 11:51

## 2018-06-23 RX ADMIN — DEXTROSE AND SODIUM CHLORIDE 125 ML/HR: 5; 450 INJECTION, SOLUTION INTRAVENOUS at 06:47

## 2018-06-23 RX ADMIN — SODIUM CHLORIDE, POTASSIUM CHLORIDE, SODIUM LACTATE AND CALCIUM CHLORIDE 30 ML/HR: 600; 310; 30; 20 INJECTION, SOLUTION INTRAVENOUS at 11:40

## 2018-06-23 RX ADMIN — BUDESONIDE AND FORMOTEROL FUMARATE DIHYDRATE 2 PUFF: 160; 4.5 AEROSOL RESPIRATORY (INHALATION) at 20:04

## 2018-06-23 RX ADMIN — FLUTICASONE PROPIONATE 2 SPRAY: 50 SPRAY, METERED NASAL at 09:00

## 2018-06-23 RX ADMIN — VANCOMYCIN 125 MG: KIT at 22:27

## 2018-06-23 RX ADMIN — PROPOFOL 100 MG: 10 INJECTION, EMULSION INTRAVENOUS at 11:51

## 2018-06-23 RX ADMIN — SODIUM CHLORIDE, POTASSIUM CHLORIDE, SODIUM LACTATE AND CALCIUM CHLORIDE: 600; 310; 30; 20 INJECTION, SOLUTION INTRAVENOUS at 11:51

## 2018-06-23 RX ADMIN — BUDESONIDE AND FORMOTEROL FUMARATE DIHYDRATE 2 PUFF: 160; 4.5 AEROSOL RESPIRATORY (INHALATION) at 08:27

## 2018-06-23 RX ADMIN — PROPOFOL 100 MG: 10 INJECTION, EMULSION INTRAVENOUS at 12:00

## 2018-06-23 NOTE — PROGRESS NOTES
"   LOS: 2 days    Patient Care Team:  Pino Templeton MD as PCP - General  Pino Templeton MD as PCP - Family Medicine  Liliya Giraldo MD as Consulting Physician (Rheumatology)  Moisés Corado MD as Consulting Physician (Nephrology)  Grey Dia MD as Consulting Physician (Pulmonary Disease)    Chief Complaint:  No chief complaint on file.    Follow up CKD IV  Subjective     Interval History:     Ongoing diarrhea.  C. Dif positive.  LLQ pain better.  Plan for colonic dilation today.  Urinating. 30 pound weight loss last 6 months. Not hungry.      Review of Systems:   As above    Objective     Vital Signs  Temp:  [97.7 °F (36.5 °C)-98.6 °F (37 °C)] 98.6 °F (37 °C)  Heart Rate:  [62-88] 62  Resp:  [16-18] 16  BP: (122-135)/(77-92) 125/85    Flowsheet Rows      First Filed Value   Admission Height  170 cm (66.93\") Documented at 06/21/2018 1501   Admission Weight  46 kg (101 lb 6.6 oz) Documented at 06/21/2018 1501          No intake/output data recorded.  I/O last 3 completed shifts:  In: 1750 [I.V.:1650; IV Piggyback:100]  Out: 600 [Urine:400; Emesis/NG output:200]    Intake/Output Summary (Last 24 hours) at 06/23/18 0828  Last data filed at 06/22/18 2222   Gross per 24 hour   Intake              900 ml   Output              400 ml   Net              500 ml       Physical Exam:  Chronically ill appearing WF.  Awake, alert. Oral mucosa dry.  Heart RRR no s3 or rub. Lungs clear to auscultation   Abd scaphoid.  +bs, soft, tender LLQ.  No guarding or rebound.    Ext no edema. Skin no rash.       Results Review:      Results from last 7 days  Lab Units 06/23/18  0644 06/22/18  0636 06/21/18  1539   SODIUM mmol/L 140 137 139  139   POTASSIUM mmol/L 3.8 3.9 4.0  4.0   CHLORIDE mmol/L 107 103 103  103   CO2 mmol/L 21.9* 19.8* 20.9*  20.9*   BUN mg/dL 16 22* 22*  22*   CREATININE mg/dL 2.39* 2.25* 2.38*  2.38*   CALCIUM mg/dL 8.9 9.2 10.2  10.2   BILIRUBIN mg/dL  --  0.2 0.3   ALK PHOS U/L  --  66 " 81   ALT (SGPT) U/L  --  8 11   AST (SGOT) U/L  --  19 20   GLUCOSE mg/dL 95 110* 101*  101*       Estimated Creatinine Clearance: 20.2 mL/min (A) (by C-G formula based on SCr of 2.39 mg/dL (H)).      Results from last 7 days  Lab Units 06/23/18  0644 06/22/18  0636   MAGNESIUM mg/dL 2.0 1.8   PHOSPHORUS mg/dL 2.9 4.6*               Results from last 7 days  Lab Units 06/23/18  0644 06/22/18  0635 06/21/18  1539   WBC 10*3/mm3 8.98 10.33 12.33*   HEMOGLOBIN g/dL 10.4* 11.0* 12.5   PLATELETS 10*3/mm3 371 366 383               Imaging Results (last 24 hours)     Procedure Component Value Units Date/Time    CT Abdomen Pelvis Without Contrast [[id number]] Collected:  06/21/18 1639     Updated:  06/22/18 1013    Narrative:       CT ABDOMEN AND PELVIS WITHOUT CONTRAST     HISTORY: Abdominal pain and vomiting. Recent colonic resection. The  patient was unable to tolerate oral contrast.     TECHNIQUE: Axial CT images of the abdomen and pelvis were obtained  without administration of intravenous contrast. The patient was not able  to tolerate oral contrast. Coronal and sagittal reformats were obtained.     COMPARISON: 06/15/2018.     FINDINGS: There is severe dilatation of the colon extending up to the  level of the anastomosis within the pelvis. The cecum measures 10.1 cm  in maximum dimension. Almost all of the dilated colon contains  air-contrast levels. This contrast refers to the oral contrast  administered on the study from 06/05/2018. There is also circumferential  low-density wall thickening involving the distal left and proximal  sigmoid colon. This may be secondary to some mild chronic ischemia. The  rectum distal to the anastomosis contains a small amount of air and  contrast. There is significant stranding and small amounts of fluid seen  within the left paracolic gutter     Increased in the interim from prior CT. Majority of the small bowel  loops demonstrate normal caliber.     There is small abdominal ascites  present. Noncontrast attenuation of the  liver is normal. There is a low-density right hepatic lobe lesion,  unchanged. Numerous low-density splenic lesions are present and  unchanged. The pancreas is normal. The gallbladder is surgically absent.  Bilateral adrenal gland and kidneys are unchanged. L5-S1 degenerative  disc disease with vacuum disc phenomena is present.       Impression:       There is evidence of large bowel obstruction at the level of  the anastomosis within the sigmoid suggestive of a stricture. This is  likely incomplete secondary to a small amount of passage of previously  administered oral contrast into the rectum. Circumferential wall  thickening within the distal left and sigmoid colon that may represent  chronic ischemic change within the wall. The cecum is dilated to 10.1  cm, this puts it at high risk for perforation. There is fat stranding  and fluid within the left paracolic gutter, small abdominal ascites  present.     These findings were discussed with Dr. Boo by telephone.     Radiation dose reduction techniques were utilized, including automated  exposure control and exposure modulation based on body size.     This report was finalized on 6/22/2018 10:10 AM by Dr. Romie Farris M.D.             budesonide-formoterol 2 puff Inhalation BID - RT   fluticasone 2 spray Each Nare Daily   metroNIDAZOLE 500 mg Intravenous Q8H       dextrose 5 % and sodium chloride 0.45 % 150 mL/hr Last Rate: 125 mL/hr (06/23/18 0647)       Medication Review:   Current Facility-Administered Medications   Medication Dose Route Frequency Provider Last Rate Last Dose   • acetaminophen (TYLENOL) tablet 650 mg  650 mg Oral Q4H PRN Bee Carreon MD       • albuterol (PROVENTIL) nebulizer solution 0.083% 2.5 mg/3mL  2.5 mg Nebulization Q6H PRN Bee Carreon MD       • budesonide-formoterol (SYMBICORT) 160-4.5 MCG/ACT inhaler 2 puff  2 puff Inhalation BID - RT Bee Carreon MD   2 puff at  06/23/18 0827   • dextrose 5 % and sodium chloride 0.45 % infusion  150 mL/hr Intravenous Continuous Laura Francis  mL/hr at 06/23/18 0647 125 mL/hr at 06/23/18 0647   • fluticasone (FLONASE) 50 MCG/ACT nasal spray 2 spray  2 spray Each Nare Daily Bee Carreon MD   2 spray at 06/22/18 0822   • HYDROmorphone (DILAUDID) injection 0.5 mg  0.5 mg Intravenous Q2H PRN Bee Carreon MD   0.5 mg at 06/22/18 1533    And   • naloxone (NARCAN) injection 0.4 mg  0.4 mg Intravenous Q5 Min PRN Bee Carreon MD       • metroNIDAZOLE (FLAGYL) IVPB 500 mg  500 mg Intravenous Q8H Bee Carreon MD   500 mg at 06/23/18 0349   • ondansetron (ZOFRAN) injection 4 mg  4 mg Intravenous Q6H PRN Bee Carreon MD   4 mg at 06/22/18 2222   • ondansetron ODT (ZOFRAN-ODT) disintegrating tablet 4 mg  4 mg Oral Q8H PRN Bee Carreon MD       • oxyCODONE-acetaminophen (PERCOCET) 5-325 MG per tablet 1 tablet  1 tablet Oral Q4H PRN Bee Carreon MD   1 tablet at 06/22/18 0612   • sodium chloride 0.9 % flush 1-10 mL  1-10 mL Intravenous PRN Bee Carreon MD           Assessment/Plan   1. 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  Creatinine baseline 2.2.  Trying to keep up with volume losses due to diarrhea.  K and bicarb stable.  Increase rate of IVF to 150 cc per hour  2. C. Dif colitis.  Iv Flagyl.   3. Colonic stricture sp dilation 6/21. Repeat today per Dr. Carreon.   4. Biliary dyskinesia sp lap kumar 6/8/18  5. Recurrent diverticulitis sp lap resection 5/1/18  6. Hypothyroid on replacement  7. Anemia  8. Protein calorie Malnutrition.  Patient does not want TPN.        Laura Francis MD  06/23/18  8:28 AM

## 2018-06-23 NOTE — NURSING NOTE
Pt voiced to me that she was under the impression that Dr. Carreon was going to dc IV Flagyl and start vanco. Called answering service and received call back from Dr. Salcedo and discussed situation. Orders from Dr. Salcedo to follow to dc IV Flagyl and start PO Vanco.

## 2018-06-23 NOTE — OP NOTE
Operative Note:    Pre-op dx: Colonic stricture, C. Difficile     Post-op dx: colonic stricture at 15 cm and C. Difficile    Procedure: sigmoidoscopy with staged balloon dilation     Surgeon: Bee Carreon MD    Anesthesia: MAC    EBL: minium    Specimen:  * No orders in the log *    Complications: none    Procedure:  A digital rectal examination was performed which revealed no rectal masses.  Scope was introduced into the rectum and advanced into the sigmoid to approximately 14 cm where the previous colonic stricture had been dilated at 8 mm.  The stricture was redilated up to 8 mm with a balloon dilator with ease.  Further dilation was carried out with the balloon dilator up to 12 mm with good results and minimal bleeding.  No further dilation was carried out.  Patient will need a repeat staged dilation until reaching 20 mm balloon.  The colon was desufflated and the procedure was terminated.   Patient was transferred to recovery room in stable condition.          Bee Carreon MD  General, Minimally Invasive and Endoscopic Surgery  [facility]    [address], [address]  [place], [place], [address]  P: [phone]  F: [phone]    Cc:  Pino Templeton MD

## 2018-06-23 NOTE — ANESTHESIA POSTPROCEDURE EVALUATION
Patient: Sun Rodriguez    Procedure Summary     Date:  06/23/18 Room / Location:  [facility] ENDOSCOPY 1 /  VAN ENDOSCOPY    Anesthesia Start:  1151 Anesthesia Stop:  1234    Procedure:  FLEXIBLE SIGMOIDOSCOPY WITH 12MM BALLOON DILATATION (N/A ) Diagnosis:      Surgeon:  Bee Carreon MD Provider:  Lico Merida MD    Anesthesia Type:  MAC ASA Status:  3          Anesthesia Type: MAC  Last vitals  BP   107/80 (06/23/18 1230)   Temp   37 °C (98.6 °F) (06/23/18 1147)   Pulse   81 (06/23/18 1230)   Resp   16 (06/23/18 1230)     SpO2   99 % (06/23/18 1230)     Post Anesthesia Care and Evaluation    Patient location during evaluation: PACU  Patient participation: complete - patient participated  Level of consciousness: awake and alert  Pain management: adequate  Airway patency: patent  Anesthetic complications: No anesthetic complications    Cardiovascular status: acceptable  Respiratory status: acceptable  Hydration status: acceptable    Comments: --------------------            06/23/18               1230     --------------------   BP:       107/80     Pulse:      81       Resp:       16       Temp:                SpO2:      99%      --------------------

## 2018-06-23 NOTE — PLAN OF CARE
Problem: Patient Care Overview  Goal: Plan of Care Review  Outcome: Ongoing (interventions implemented as appropriate)   06/23/18 1941   Coping/Psychosocial   Plan of Care Reviewed With patient   Plan of Care Review   Progress no change   OTHER   Outcome Summary no pain or nausea today; ivf running at 150mL/hr; pt had procedure today with dilation successful...but will need further dilations (probalby early next week); iv antibiotics dc'd and switched to oral solution; pt placed on full liquid diet, but continues to have diarrhea; c-diff isolation maintained     Goal: Individualization and Mutuality  Outcome: Ongoing (interventions implemented as appropriate)    Goal: Discharge Needs Assessment  Outcome: Ongoing (interventions implemented as appropriate)    Goal: Interprofessional Rounds/Family Conf  Outcome: Ongoing (interventions implemented as appropriate)      Problem: Skin Injury Risk (Adult)  Goal: Identify Related Risk Factors and Signs and Symptoms  Outcome: Ongoing (interventions implemented as appropriate)    Goal: Skin Health and Integrity  Outcome: Ongoing (interventions implemented as appropriate)      Problem: Nausea/Vomiting (Adult)  Goal: Identify Related Risk Factors and Signs and Symptoms  Outcome: Ongoing (interventions implemented as appropriate)    Goal: Symptom Relief  Outcome: Ongoing (interventions implemented as appropriate)    Goal: Adequate Hydration  Outcome: Ongoing (interventions implemented as appropriate)      Problem: Pain, Acute (Adult)  Goal: Identify Related Risk Factors and Signs and Symptoms  Outcome: Ongoing (interventions implemented as appropriate)    Goal: Acceptable Pain Control/Comfort Level  Outcome: Ongoing (interventions implemented as appropriate)

## 2018-06-23 NOTE — PROGRESS NOTES
Daily Progress Note    Chief Complaint: diarrhea    Subjective   Patient started having liquid diarrhea again today at about 2:00 and has had about 12 bowel movements.  Patient reports that her abdominal distention and pain has improved however she continues to have discomfort in the left lower quadrant.  Patient denies any nausea or vomiting today.  Patient is concerned about the PICC line and TPN as she may need future dialysis and would wish to postpone this if possible.    Objective     Vital signs in last 24 hours:  Temp:  [97.3 °F (36.3 °C)-98.4 °F (36.9 °C)] 97.3 °F (36.3 °C)  Heart Rate:  [62-92] 88  Resp:  [14-18] 18  BP: (108-136)/(72-92) 135/92    Intake/Output last 3 shifts:  I/O last 3 completed shifts:  In: 850 [I.V.:750; IV Piggyback:100]  Out: 600 [Urine:400; Emesis/NG output:200]    Physical Exam:  Respiratory: CTA, normal inspiratory effort  CV: RRR, no JVD  Abd: Positive BS, soft, positive distention however decreased from yesterday, left lower quadrant tenderness to deep palpation  Ext:  No cyanosis, no edema    Results from last 7 days  Lab Units 06/22/18  0635   WBC 10*3/mm3 10.33   HEMOGLOBIN g/dL 11.0*   HEMATOCRIT % 35.4*   PLATELETS 10*3/mm3 366       Results from last 7 days  Lab Units 06/22/18  0636   SODIUM mmol/L 137   POTASSIUM mmol/L 3.9   CHLORIDE mmol/L 103   CO2 mmol/L 19.8*   BUN mg/dL 22*   CREATININE mg/dL 2.25*   CALCIUM mg/dL 9.2   BILIRUBIN mg/dL 0.2   ALK PHOS U/L 66   ALT (SGPT) U/L 8   AST (SGOT) U/L 19   GLUCOSE mg/dL 110*     Assessment/Plan   -Diarrhea-await stool cultures, continue Flagyl and ciprofloxacin   -Abdominal pain-improving  -Dehydration-improving but with increased diarrhea I am concerned with the IV fluid rate of only 75 cc an hour and will increase this to 125 cc until we can get the diarrhea under control.  - Colonic stricture at about 15 cm dilated up to 8 mm on 6/21/2018 -patient is scheduled for a re-dilation in a.m. and evaluation of  stricture    Bee Carreon MD  General, Minimally Invasive and Endoscopic Surgery  [facility]

## 2018-06-23 NOTE — ANESTHESIA PREPROCEDURE EVALUATION
Anesthesia Evaluation     Patient summary reviewed and Nursing notes reviewed   history of anesthetic complications: PONV               Airway   Mallampati: II  Dental      Pulmonary    (+) asthma,   Cardiovascular     (+) hypertension, hyperlipidemia,       Neuro/Psych  (+) dizziness/light headedness,     GI/Hepatic/Renal/Endo    (+)   renal disease CRI, hypothyroidism,     Musculoskeletal (-) negative ROS    Abdominal    Substance History - negative use     OB/GYN negative ob/gyn ROS         Other                        Anesthesia Plan    ASA 3     MAC     intravenous induction   Anesthetic plan and risks discussed with patient.

## 2018-06-23 NOTE — PLAN OF CARE
Problem: Patient Care Overview  Goal: Plan of Care Review  Outcome: Ongoing (interventions implemented as appropriate)   06/23/18 1821   Coping/Psychosocial   Plan of Care Reviewed With patient   Plan of Care Review   Progress no change   OTHER   Outcome Summary Pt continues to c/o nausea intermittenly. VSS. Continues to have frequent liquid BM's. Dr Carreon rescheuled sigmoidoscopy with dilation for 1100 today. VSS. IV fluids increased to 125ml/hr. Will continue to monitor.     Goal: Individualization and Mutuality  Outcome: Ongoing (interventions implemented as appropriate)    Goal: Discharge Needs Assessment  Outcome: Ongoing (interventions implemented as appropriate)      Problem: Skin Injury Risk (Adult)  Goal: Identify Related Risk Factors and Signs and Symptoms  Outcome: Ongoing (interventions implemented as appropriate)    Goal: Skin Health and Integrity  Outcome: Ongoing (interventions implemented as appropriate)      Problem: Nausea/Vomiting (Adult)  Goal: Identify Related Risk Factors and Signs and Symptoms  Outcome: Ongoing (interventions implemented as appropriate)    Goal: Symptom Relief  Outcome: Ongoing (interventions implemented as appropriate)    Goal: Adequate Hydration  Outcome: Ongoing (interventions implemented as appropriate)      Problem: Pain, Acute (Adult)  Goal: Identify Related Risk Factors and Signs and Symptoms  Outcome: Ongoing (interventions implemented as appropriate)    Goal: Acceptable Pain Control/Comfort Level  Outcome: Ongoing (interventions implemented as appropriate)

## 2018-06-23 NOTE — PROGRESS NOTES
Daily Progress Note    Chief Complaint: diarrhea and LLQ abdominal pain    Subjective   Pt reports continued diarrhea and flatus     Objective     Vital signs in last 24 hours:  Temp:  [97.7 °F (36.5 °C)-98.6 °F (37 °C)] 98.6 °F (37 °C)  Heart Rate:  [62-88] 67  Resp:  [16-18] 16  BP: (122-135)/(77-92) 125/85    Intake/Output last 3 shifts:  I/O last 3 completed shifts:  In: 1750 [I.V.:1650; IV Piggyback:100]  Out: 600 [Urine:400; Emesis/NG output:200]    Physical Exam:  Respiratory: CTA, normal inspiratory effort  CV: RRR, no JVD  Abd: Positive BS, soft, decreased distention, decreased tenderness, no rebound, no guarding  Ext:  No cyanosis, no edema    Results from last 7 days  Lab Units 06/23/18  0644   WBC 10*3/mm3 8.98   HEMOGLOBIN g/dL 10.4*   HEMATOCRIT % 33.5*   PLATELETS 10*3/mm3 371       Results from last 7 days  Lab Units 06/23/18  0644   SODIUM mmol/L 140   POTASSIUM mmol/L 3.8   CHLORIDE mmol/L 107   CO2 mmol/L 21.9*   BUN mg/dL 16   CREATININE mg/dL 2.39*   GLUCOSE mg/dL 95   CALCIUM mg/dL 8.9       Assessment/Plan   Diarrhea - C.diff toxin + x 2   D/C cipro and continue flagyl   Colonic stricture - sigmoid with repeated dilation     Bee Carreon MD  General, Minimally Invasive and Endoscopic Surgery  [facility]

## 2018-06-24 LAB
ALBUMIN SERPL-MCNC: 2.7 G/DL (ref 3.5–5.2)
ALBUMIN/GLOB SERPL: 1 G/DL
ALP SERPL-CCNC: 54 U/L (ref 39–117)
ALT SERPL W P-5'-P-CCNC: 8 U/L (ref 1–33)
ANION GAP SERPL CALCULATED.3IONS-SCNC: 9.6 MMOL/L
AST SERPL-CCNC: 17 U/L (ref 1–32)
BASOPHILS # BLD AUTO: 0.05 10*3/MM3 (ref 0–0.2)
BASOPHILS NFR BLD AUTO: 0.7 % (ref 0–1.5)
BILIRUB SERPL-MCNC: <0.2 MG/DL (ref 0.1–1.2)
BUN BLD-MCNC: 11 MG/DL (ref 6–20)
BUN/CREAT SERPL: 5.4 (ref 7–25)
CALCIUM SPEC-SCNC: 9.2 MG/DL (ref 8.6–10.5)
CHLORIDE SERPL-SCNC: 110 MMOL/L (ref 98–107)
CO2 SERPL-SCNC: 23.4 MMOL/L (ref 22–29)
CREAT BLD-MCNC: 2.05 MG/DL (ref 0.57–1)
DEPRECATED RDW RBC AUTO: 49.4 FL (ref 37–54)
EOSINOPHIL # BLD AUTO: 0.73 10*3/MM3 (ref 0–0.7)
EOSINOPHIL NFR BLD AUTO: 10.1 % (ref 0.3–6.2)
ERYTHROCYTE [DISTWIDTH] IN BLOOD BY AUTOMATED COUNT: 15.2 % (ref 11.7–13)
GFR SERPL CREATININE-BSD FRML MDRD: 26 ML/MIN/1.73
GLOBULIN UR ELPH-MCNC: 2.7 GM/DL
GLUCOSE BLD-MCNC: 101 MG/DL (ref 65–99)
HCT VFR BLD AUTO: 31.7 % (ref 35.6–45.5)
HGB BLD-MCNC: 9.9 G/DL (ref 11.9–15.5)
IMM GRANULOCYTES # BLD: 0.02 10*3/MM3 (ref 0–0.03)
IMM GRANULOCYTES NFR BLD: 0.3 % (ref 0–0.5)
LYMPHOCYTES # BLD AUTO: 2.52 10*3/MM3 (ref 0.9–4.8)
LYMPHOCYTES NFR BLD AUTO: 35 % (ref 19.6–45.3)
MCH RBC QN AUTO: 27.8 PG (ref 26.9–32)
MCHC RBC AUTO-ENTMCNC: 31.2 G/DL (ref 32.4–36.3)
MCV RBC AUTO: 89 FL (ref 80.5–98.2)
MONOCYTES # BLD AUTO: 0.67 10*3/MM3 (ref 0.2–1.2)
MONOCYTES NFR BLD AUTO: 9.3 % (ref 5–12)
NEUTROPHILS # BLD AUTO: 3.24 10*3/MM3 (ref 1.9–8.1)
NEUTROPHILS NFR BLD AUTO: 44.9 % (ref 42.7–76)
PHOSPHATE SERPL-MCNC: 2.9 MG/DL (ref 2.5–4.5)
PLATELET # BLD AUTO: 355 10*3/MM3 (ref 140–500)
PMV BLD AUTO: 8.9 FL (ref 6–12)
POTASSIUM BLD-SCNC: 4.3 MMOL/L (ref 3.5–5.2)
PROT SERPL-MCNC: 5.4 G/DL (ref 6–8.5)
RBC # BLD AUTO: 3.56 10*6/MM3 (ref 3.9–5.2)
SODIUM BLD-SCNC: 143 MMOL/L (ref 136–145)
WBC NRBC COR # BLD: 7.21 10*3/MM3 (ref 4.5–10.7)

## 2018-06-24 PROCEDURE — 80053 COMPREHEN METABOLIC PANEL: CPT | Performed by: INTERNAL MEDICINE

## 2018-06-24 PROCEDURE — 99232 SBSQ HOSP IP/OBS MODERATE 35: CPT | Performed by: SURGERY

## 2018-06-24 PROCEDURE — 94799 UNLISTED PULMONARY SVC/PX: CPT

## 2018-06-24 PROCEDURE — 85025 COMPLETE CBC W/AUTO DIFF WBC: CPT | Performed by: SURGERY

## 2018-06-24 PROCEDURE — 84100 ASSAY OF PHOSPHORUS: CPT | Performed by: INTERNAL MEDICINE

## 2018-06-24 RX ADMIN — DEXTROSE AND SODIUM CHLORIDE 50 ML/HR: 5; 450 INJECTION, SOLUTION INTRAVENOUS at 15:17

## 2018-06-24 RX ADMIN — DEXTROSE AND SODIUM CHLORIDE 150 ML/HR: 5; 450 INJECTION, SOLUTION INTRAVENOUS at 00:50

## 2018-06-24 RX ADMIN — VANCOMYCIN 125 MG: KIT at 06:07

## 2018-06-24 RX ADMIN — BUDESONIDE AND FORMOTEROL FUMARATE DIHYDRATE 2 PUFF: 160; 4.5 AEROSOL RESPIRATORY (INHALATION) at 08:21

## 2018-06-24 RX ADMIN — VANCOMYCIN 125 MG: KIT at 18:41

## 2018-06-24 RX ADMIN — BUDESONIDE AND FORMOTEROL FUMARATE DIHYDRATE 2 PUFF: 160; 4.5 AEROSOL RESPIRATORY (INHALATION) at 17:50

## 2018-06-24 RX ADMIN — VANCOMYCIN 125 MG: KIT at 12:15

## 2018-06-24 RX ADMIN — VANCOMYCIN 125 MG: KIT at 02:29

## 2018-06-24 RX ADMIN — DEXTROSE AND SODIUM CHLORIDE 150 ML/HR: 5; 450 INJECTION, SOLUTION INTRAVENOUS at 07:31

## 2018-06-24 NOTE — PLAN OF CARE
Problem: Patient Care Overview  Goal: Plan of Care Review  Outcome: Ongoing (interventions implemented as appropriate)   06/24/18 0438   Coping/Psychosocial   Plan of Care Reviewed With patient   Plan of Care Review   Progress improving   OTHER   Outcome Summary No report of N/V this shift. Diarrhea continues. Pt reports eating 2 yogurts. VSS. C-Diff isolation maintained. Will continue to monitor.     Goal: Individualization and Mutuality  Outcome: Ongoing (interventions implemented as appropriate)    Goal: Discharge Needs Assessment  Outcome: Ongoing (interventions implemented as appropriate)      Problem: Skin Injury Risk (Adult)  Goal: Identify Related Risk Factors and Signs and Symptoms  Outcome: Ongoing (interventions implemented as appropriate)    Goal: Skin Health and Integrity  Outcome: Ongoing (interventions implemented as appropriate)      Problem: Nausea/Vomiting (Adult)  Goal: Identify Related Risk Factors and Signs and Symptoms  Outcome: Ongoing (interventions implemented as appropriate)    Goal: Symptom Relief  Outcome: Ongoing (interventions implemented as appropriate)    Goal: Adequate Hydration  Outcome: Ongoing (interventions implemented as appropriate)      Problem: Pain, Acute (Adult)  Goal: Identify Related Risk Factors and Signs and Symptoms  Outcome: Ongoing (interventions implemented as appropriate)    Goal: Acceptable Pain Control/Comfort Level  Outcome: Ongoing (interventions implemented as appropriate)

## 2018-06-24 NOTE — PROGRESS NOTES
"   LOS: 3 days    Patient Care Team:  Pino Templeton MD as PCP - General  Pino Templeton MD as PCP - Family Medicine  Liliya Giraldo MD as Consulting Physician (Rheumatology)  Moisés Corado MD as Consulting Physician (Nephrology)  Grey Dia MD as Consulting Physician (Pulmonary Disease)    Chief Complaint:  No chief complaint on file.    Follow up CKD IV  Subjective     Interval History:     SP sigmoid colon dilation yesterday. LLQ pain better.   Just some pressure now.  No real change in diarrhea.  Eating a little.  Yogurt, oatmeal.     Review of Systems:   As above    Objective     Vital Signs  Temp:  [97.7 °F (36.5 °C)-98.6 °F (37 °C)] 97.8 °F (36.6 °C)  Heart Rate:  [55-86] 86  Resp:  [16-18] 18  BP: (107-135)/(73-89) 129/89    Flowsheet Rows      First Filed Value   Admission Height  170 cm (66.93\") Documented at 06/21/2018 1501   Admission Weight  46 kg (101 lb 6.6 oz) Documented at 06/21/2018 1501          No intake/output data recorded.  I/O last 3 completed shifts:  In: 1370 [P.O.:120; I.V.:1250]  Out: -     Intake/Output Summary (Last 24 hours) at 06/24/18 0816  Last data filed at 06/23/18 1225   Gross per 24 hour   Intake              470 ml   Output                0 ml   Net              470 ml       Physical Exam:  Chronically ill appearing WF.  Awake, alert. Oral mucosa dry.  Heart RRR no s3 or rub. Lungs clear to auscultation   Abd scaphoid.  +bs, soft, tender LLQ.  No guarding or rebound.    Ext no edema. Skin no rash.       Results Review:      Results from last 7 days  Lab Units 06/24/18  0431 06/23/18  0644 06/22/18  0636 06/21/18  1539   SODIUM mmol/L 143 140 137 139  139   POTASSIUM mmol/L 4.3 3.8 3.9 4.0  4.0   CHLORIDE mmol/L 110* 107 103 103  103   CO2 mmol/L 23.4 21.9* 19.8* 20.9*  20.9*   BUN mg/dL 11 16 22* 22*  22*   CREATININE mg/dL 2.05* 2.39* 2.25* 2.38*  2.38*   CALCIUM mg/dL 9.2 8.9 9.2 10.2  10.2   BILIRUBIN mg/dL <0.2  --  0.2 0.3   ALK PHOS U/L 54 "  --  66 81   ALT (SGPT) U/L 8  --  8 11   AST (SGOT) U/L 17  --  19 20   GLUCOSE mg/dL 101* 95 110* 101*  101*       Estimated Creatinine Clearance: 23.9 mL/min (A) (by C-G formula based on SCr of 2.05 mg/dL (H)).      Results from last 7 days  Lab Units 06/24/18  0431 06/23/18  0644 06/22/18  0636   MAGNESIUM mg/dL  --  2.0 1.8   PHOSPHORUS mg/dL 2.9 2.9 4.6*               Results from last 7 days  Lab Units 06/24/18  0431 06/23/18  0644 06/22/18  0635 06/21/18  1539   WBC 10*3/mm3 7.21 8.98 10.33 12.33*   HEMOGLOBIN g/dL 9.9* 10.4* 11.0* 12.5   PLATELETS 10*3/mm3 355 371 366 383               Imaging Results (last 24 hours)     ** No results found for the last 24 hours. **          budesonide-formoterol 2 puff Inhalation BID - RT   fluticasone 2 spray Each Nare Daily   vancomycin 125 mg Oral Q6H       dextrose 5 % and sodium chloride 0.45 % 150 mL/hr Last Rate: 150 mL/hr (06/24/18 0731)       Medication Review:   Current Facility-Administered Medications   Medication Dose Route Frequency Provider Last Rate Last Dose   • acetaminophen (TYLENOL) tablet 650 mg  650 mg Oral Q4H PRN Bee Carreon MD       • albuterol (PROVENTIL) nebulizer solution 0.083% 2.5 mg/3mL  2.5 mg Nebulization Q6H PRN Bee Carreon MD       • budesonide-formoterol (SYMBICORT) 160-4.5 MCG/ACT inhaler 2 puff  2 puff Inhalation BID - RT Bee Carreon MD   2 puff at 06/23/18 2004   • dextrose 5 % and sodium chloride 0.45 % infusion  150 mL/hr Intravenous Continuous Laura Francis  mL/hr at 06/24/18 0731 150 mL/hr at 06/24/18 0731   • fluticasone (FLONASE) 50 MCG/ACT nasal spray 2 spray  2 spray Each Nare Daily Bee Carreon MD   2 spray at 06/23/18 0900   • HYDROmorphone (DILAUDID) injection 0.5 mg  0.5 mg Intravenous Q2H PRN Bee Carreon MD   0.5 mg at 06/22/18 1533    And   • naloxone (NARCAN) injection 0.4 mg  0.4 mg Intravenous Q5 Min PRN Bee Carreon MD       • ondansetron (ZOFRAN) injection 4 mg  4  mg Intravenous Q6H PRN Bee Carreon MD   4 mg at 06/22/18 2222   • ondansetron ODT (ZOFRAN-ODT) disintegrating tablet 4 mg  4 mg Oral Q8H PRN Bee Carreon MD       • oxyCODONE-acetaminophen (PERCOCET) 5-325 MG per tablet 1 tablet  1 tablet Oral Q4H PRN Bee Carreon MD   1 tablet at 06/22/18 0612   • sodium chloride 0.9 % flush 1-10 mL  1-10 mL Intravenous PRN Bee Carreon MD       • vancomycin oral solution 125 mg  125 mg Oral Q6H Cuong Ramesh Jr., MD   125 mg at 06/24/18 0607       Assessment/Plan   1. 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  Creatinine baseline 2.2.  Creatinine better today with increase in IVF.  Trying to keep up with volume losses due to diarrhea.  K stable.  Bicarb better.   2. C. Dif colitis.  Iv Flagyl.   3. Colonic stricture sp dilation 6/21, 6/23.    4. Biliary dyskinesia sp lap kumar 6/8/18  5. Recurrent diverticulitis sp lap resection 5/1/18  6. Hypothyroid on replacement  7. Anemia  8. Protein calorie Malnutrition.  Patient does not want TPN.        Laura Francis MD  06/24/18  8:16 AM

## 2018-06-24 NOTE — PLAN OF CARE
Problem: Patient Care Overview  Goal: Plan of Care Review   06/24/18 6056   Coping/Psychosocial   Plan of Care Reviewed With patient   Plan of Care Review   Progress improving   OTHER   Outcome Summary no reports of n/v or pain this shift. patient was tolerating full liquids and MD advanced to reg diet for dinner. VSS, PO vanc for cdiff       Problem: Skin Injury Risk (Adult)  Goal: Identify Related Risk Factors and Signs and Symptoms  Outcome: Outcome(s) achieved Date Met: 06/24/18    Goal: Skin Health and Integrity  Outcome: Ongoing (interventions implemented as appropriate)      Problem: Nausea/Vomiting (Adult)  Goal: Identify Related Risk Factors and Signs and Symptoms  Outcome: Outcome(s) achieved Date Met: 06/24/18    Goal: Symptom Relief  Outcome: Ongoing (interventions implemented as appropriate)    Goal: Adequate Hydration  Outcome: Ongoing (interventions implemented as appropriate)      Problem: Pain, Acute (Adult)  Goal: Identify Related Risk Factors and Signs and Symptoms  Outcome: Outcome(s) achieved Date Met: 06/24/18    Goal: Acceptable Pain Control/Comfort Level  Outcome: Ongoing (interventions implemented as appropriate)

## 2018-06-24 NOTE — PROGRESS NOTES
Daily Progress Note    Chief Complaint: diarrhea and LLQ abdominal pain    Subjective   Still having diarrhea with very loose stools, no other changes. On PO Vancomycin for CDiff.    Objective     Vital signs in last 24 hours:  Temp:  [97.7 °F (36.5 °C)-97.9 °F (36.6 °C)] 97.8 °F (36.6 °C)  Heart Rate:  [55-86] 81  Resp:  [16-18] 18  BP: (114-135)/(73-89) 129/89    Intake/Output last 3 shifts:  I/O last 3 completed shifts:  In: 1370 [P.O.:120; I.V.:1250]  Out: -     Physical Exam:  Respiratory: CTA, normal inspiratory effort  CV: RRR, no JVD  Abd: Positive BS, soft, decreased distention, decreased tenderness, no rebound, no guarding  Ext:  No cyanosis, no edema    Results from last 7 days  Lab Units 06/24/18  0431   WBC 10*3/mm3 7.21   HEMOGLOBIN g/dL 9.9*   HEMATOCRIT % 31.7*   PLATELETS 10*3/mm3 355       Results from last 7 days  Lab Units 06/24/18  0431   SODIUM mmol/L 143   POTASSIUM mmol/L 4.3   CHLORIDE mmol/L 110*   CO2 mmol/L 23.4   BUN mg/dL 11   CREATININE mg/dL 2.05*   GLUCOSE mg/dL 101*   CALCIUM mg/dL 9.2       Assessment/Plan   CDiff colitis with associated diarrhea  Colonic stricture s/p endoscopic dilation    - Continue PO Vancomycin for CDiff treatment    Bee Carreon MD  General, Minimally Invasive and Endoscopic Surgery  [facility]

## 2018-06-25 VITALS
BODY MASS INDEX: 16.65 KG/M2 | OXYGEN SATURATION: 97 % | RESPIRATION RATE: 15 BRPM | HEART RATE: 60 BPM | HEIGHT: 67 IN | SYSTOLIC BLOOD PRESSURE: 123 MMHG | WEIGHT: 106.1 LBS | DIASTOLIC BLOOD PRESSURE: 71 MMHG | TEMPERATURE: 98.7 F

## 2018-06-25 LAB
ALBUMIN SERPL-MCNC: 3 G/DL (ref 3.5–5.2)
ANION GAP SERPL CALCULATED.3IONS-SCNC: 10.8 MMOL/L
BASOPHILS # BLD AUTO: 0.05 10*3/MM3 (ref 0–0.2)
BASOPHILS NFR BLD AUTO: 0.7 % (ref 0–1.5)
BUN BLD-MCNC: 12 MG/DL (ref 6–20)
BUN/CREAT SERPL: 6.3 (ref 7–25)
CALCIUM SPEC-SCNC: 9.2 MG/DL (ref 8.6–10.5)
CHLORIDE SERPL-SCNC: 107 MMOL/L (ref 98–107)
CO2 SERPL-SCNC: 25.2 MMOL/L (ref 22–29)
CREAT BLD-MCNC: 1.9 MG/DL (ref 0.57–1)
DEPRECATED RDW RBC AUTO: 49.9 FL (ref 37–54)
EOSINOPHIL # BLD AUTO: 0.87 10*3/MM3 (ref 0–0.7)
EOSINOPHIL NFR BLD AUTO: 12.2 % (ref 0.3–6.2)
ERYTHROCYTE [DISTWIDTH] IN BLOOD BY AUTOMATED COUNT: 15.5 % (ref 11.7–13)
GFR SERPL CREATININE-BSD FRML MDRD: 28 ML/MIN/1.73
GLUCOSE BLD-MCNC: 90 MG/DL (ref 65–99)
HCT VFR BLD AUTO: 32.3 % (ref 35.6–45.5)
HGB BLD-MCNC: 10 G/DL (ref 11.9–15.5)
IMM GRANULOCYTES # BLD: 0.01 10*3/MM3 (ref 0–0.03)
IMM GRANULOCYTES NFR BLD: 0.1 % (ref 0–0.5)
LYMPHOCYTES # BLD AUTO: 2.74 10*3/MM3 (ref 0.9–4.8)
LYMPHOCYTES NFR BLD AUTO: 38.5 % (ref 19.6–45.3)
MCH RBC QN AUTO: 27.5 PG (ref 26.9–32)
MCHC RBC AUTO-ENTMCNC: 31 G/DL (ref 32.4–36.3)
MCV RBC AUTO: 89 FL (ref 80.5–98.2)
MONOCYTES # BLD AUTO: 0.52 10*3/MM3 (ref 0.2–1.2)
MONOCYTES NFR BLD AUTO: 7.3 % (ref 5–12)
NEUTROPHILS # BLD AUTO: 2.94 10*3/MM3 (ref 1.9–8.1)
NEUTROPHILS NFR BLD AUTO: 41.3 % (ref 42.7–76)
PHOSPHATE SERPL-MCNC: 2.4 MG/DL (ref 2.5–4.5)
PLATELET # BLD AUTO: 388 10*3/MM3 (ref 140–500)
PMV BLD AUTO: 8.9 FL (ref 6–12)
POTASSIUM BLD-SCNC: 4.1 MMOL/L (ref 3.5–5.2)
RBC # BLD AUTO: 3.63 10*6/MM3 (ref 3.9–5.2)
SODIUM BLD-SCNC: 143 MMOL/L (ref 136–145)
WBC NRBC COR # BLD: 7.12 10*3/MM3 (ref 4.5–10.7)

## 2018-06-25 PROCEDURE — 94799 UNLISTED PULMONARY SVC/PX: CPT

## 2018-06-25 PROCEDURE — 80069 RENAL FUNCTION PANEL: CPT | Performed by: INTERNAL MEDICINE

## 2018-06-25 PROCEDURE — 99238 HOSP IP/OBS DSCHRG MGMT 30/<: CPT | Performed by: SURGERY

## 2018-06-25 PROCEDURE — 85025 COMPLETE CBC W/AUTO DIFF WBC: CPT | Performed by: SURGERY

## 2018-06-25 RX ADMIN — BUDESONIDE AND FORMOTEROL FUMARATE DIHYDRATE 2 PUFF: 160; 4.5 AEROSOL RESPIRATORY (INHALATION) at 07:22

## 2018-06-25 RX ADMIN — VANCOMYCIN 125 MG: KIT at 06:20

## 2018-06-25 RX ADMIN — VANCOMYCIN 125 MG: KIT at 00:06

## 2018-06-25 RX ADMIN — VANCOMYCIN 125 MG: KIT at 12:59

## 2018-06-25 RX ADMIN — DEXTROSE AND SODIUM CHLORIDE 50 ML/HR: 5; 450 INJECTION, SOLUTION INTRAVENOUS at 08:18

## 2018-06-25 NOTE — DISCHARGE SUMMARY
Admitting date: 6/21/2018    Discharging date: 6/25/2018    Admitting diagnosis: Abdominal pain left lower quadrant and diarrhea    Discharging diagnoses: Clostridium difficile, diarrhea, colonic stricture    Admitting/discharging physician: Dr. Carreon    Procedures: Endoscopy with dilation ×2    Hospital course:  This is a pleasant 52-year-old female patient to presented with increasing left lower quadrant abdominal pain and diarrhea.  Patient was being treated as an outpatient on Flagyl and ciprofloxacin with very little improvement.  Patient's abdominal pain increased and patient was admitted to the hospital on 6/21/2018 as a direct admission for further evaluation and treatment.  Patient underwent endoscopy which revealed a colonic stricture was dilated up to 8 mm.  Patient's C. difficile came back positive after being treated for 7 days on Flagyl and ciprofloxacin.  Patient's antibiotics was changed to by mouth vancomycin.  Patient underwent a second endoscopy on 6/23/2018 and was able to be dilated up to 12 mm.  Patient was tolerating a regular diet, diarrhea had improved, patient's pain had resolved, and patient was no longer having nausea.  Patient was discharged to home on her admitting medications and vancomycin.  Patient will follow-up for repeat endoscopy and possible further dilation on July 6, 2018.        Bee Carreon MD  General, Minimally Invasive and Endoscopic Surgery  [facility]    [address], [address]  [place], [place], [address]  P: [phone]  F: [phone]    Cc:  Pino Templeton MD

## 2018-06-25 NOTE — PLAN OF CARE
Problem: Patient Care Overview  Goal: Plan of Care Review   06/25/18 1620   Coping/Psychosocial   Plan of Care Reviewed With patient   Plan of Care Review   Progress improving   OTHER   Outcome Summary Patient anxious to be DC. VSS. tolerating regular diet. DC on PO vanc

## 2018-06-25 NOTE — PROGRESS NOTES
Case Management Discharge Note    Final Note: DC home via private auto. Jennifer Flor RN    Destination     No service coordination in this encounter.      Durable Medical Equipment     No service coordination in this encounter.      Dialysis/Infusion     No service coordination in this encounter.      Home Medical Care     No service coordination in this encounter.      Social Care     No service coordination in this encounter.        Other: Other (private auto)    Final Discharge Disposition Code: 01 - home or self-care

## 2018-06-25 NOTE — PLAN OF CARE
Problem: Patient Care Overview  Goal: Plan of Care Review  Outcome: Ongoing (interventions implemented as appropriate)   06/25/18 0353   Coping/Psychosocial   Plan of Care Reviewed With patient   Plan of Care Review   Progress improving   OTHER   Outcome Summary Patient had no c/o pain this shift. Ambulating independently. IVF continued. PO vanc continued. Some diarrhea reported. Strict I & O's. Daily weight on standing scale. Tolerating diet. VSS. Will continue to monitor.     Goal: Individualization and Mutuality  Outcome: Ongoing (interventions implemented as appropriate)    Goal: Discharge Needs Assessment  Outcome: Ongoing (interventions implemented as appropriate)      Problem: Skin Injury Risk (Adult)  Goal: Skin Health and Integrity  Outcome: Ongoing (interventions implemented as appropriate)      Problem: Nausea/Vomiting (Adult)  Goal: Symptom Relief  Outcome: Ongoing (interventions implemented as appropriate)    Goal: Adequate Hydration  Outcome: Ongoing (interventions implemented as appropriate)      Problem: Pain, Acute (Adult)  Goal: Acceptable Pain Control/Comfort Level  Outcome: Ongoing (interventions implemented as appropriate)

## 2018-06-25 NOTE — PROGRESS NOTES
Continued Stay Note  [facility]     Patient Name: Sun Rodriguez  MRN: [id number]  Today's Date: 6/25/2018    Admit Date: 6/21/2018          Discharge Plan     Row Name 06/25/18 1202       Plan    Plan home- will get po Vancomycin through outpatient pharmacy    Patient/Family in Agreement with Plan yes    Plan Comments Spoke with patient in room.  She confirms that plan is to return home.  Will likely need po Vancomycin @ dc.  Discussed with patient and she is agreeable to getting it through the [facility] outpatient pharmacy when she is discharged.  Sticky note left for MD to update.  Will continue to follow. Jennifer Flor RN              Discharge Codes    No documentation.           Jennifer Flor RN

## 2018-06-25 NOTE — PLAN OF CARE
Problem: Patient Care Overview  Goal: Plan of Care Review   06/25/18 1501   Coping/Psychosocial   Plan of Care Reviewed With patient   Plan of Care Review   Progress improving   OTHER   Outcome Summary no c/o pain or nausea. tolerating regular diet. VSS. continue to monitor       Problem: Skin Injury Risk (Adult)  Goal: Skin Health and Integrity  Outcome: Ongoing (interventions implemented as appropriate)      Problem: Nausea/Vomiting (Adult)  Goal: Symptom Relief  Outcome: Ongoing (interventions implemented as appropriate)    Goal: Adequate Hydration  Outcome: Ongoing (interventions implemented as appropriate)      Problem: Pain, Acute (Adult)  Goal: Acceptable Pain Control/Comfort Level  Outcome: Ongoing (interventions implemented as appropriate)

## 2018-06-25 NOTE — PROGRESS NOTES
Daily Progress Note    Chief Complaint: diarrhea and LLQ abdominal pain    Subjective     Pt reports pain improved, tolerating regular diet, loose stool but not explosive or multiple    Objective     Vital signs in last 24 hours:  Temp:  [98.3 °F (36.8 °C)-98.7 °F (37.1 °C)] 98.7 °F (37.1 °C)  Heart Rate:  [60-80] 60  Resp:  [15-16] 15  BP: (110-123)/(70-73) 123/71    Intake/Output last 3 shifts:  I/O last 3 completed shifts:  In: 3052.2 [P.O.:1450; I.V.:1602.2]  Out: 3325 [Urine:3325]    Physical Exam:  Respiratory: CTA, normal inspiratory effort  CV: RRR, no JVD  Abd: Positive BS, soft, ND, NT, no rebound, no guarding  Ext:  No cyanosis, no edema    Results from last 7 days  Lab Units 06/25/18  0528   WBC 10*3/mm3 7.12   HEMOGLOBIN g/dL 10.0*   HEMATOCRIT % 32.3*   PLATELETS 10*3/mm3 388       Results from last 7 days  Lab Units 06/25/18  0528   SODIUM mmol/L 143   POTASSIUM mmol/L 4.1   CHLORIDE mmol/L 107   CO2 mmol/L 25.2   BUN mg/dL 12   CREATININE mg/dL 1.90*   GLUCOSE mg/dL 90   CALCIUM mg/dL 9.2     Assessment/Plan   CDiff colitis with associated diarrhea  Colonic stricture s/p endoscopic dilation  - D/c home   - Continue PO Vancomycin for CDiff treatment  F/u in 2 weeks for a repeat endoscopic dilation    Bee Carreon MD  General, Minimally Invasive and Endoscopic Surgery  [facility]

## 2018-06-29 RX ORDER — METRONIDAZOLE 500 MG/1
500 TABLET ORAL 3 TIMES DAILY
Qty: 42 TABLET | Refills: 0 | Status: ON HOLD | OUTPATIENT
Start: 2018-06-29 | End: 2018-07-01

## 2018-07-01 ENCOUNTER — HOSPITAL ENCOUNTER (EMERGENCY)
Facility: HOSPITAL | Age: 53
Discharge: STILL ADMITTED OR ADMINISTRATIVE | End: 2018-07-01

## 2018-07-01 ENCOUNTER — HOSPITAL ENCOUNTER (INPATIENT)
Facility: HOSPITAL | Age: 53
LOS: 12 days | Discharge: HOME HEALTH | End: 2018-07-13
Attending: SURGERY | Admitting: SURGERY

## 2018-07-01 ENCOUNTER — ANESTHESIA EVENT (OUTPATIENT)
Dept: PERIOP | Facility: HOSPITAL | Age: 53
End: 2018-07-01

## 2018-07-01 ENCOUNTER — ANESTHESIA (OUTPATIENT)
Dept: GASTROENTEROLOGY | Facility: HOSPITAL | Age: 53
End: 2018-07-01

## 2018-07-01 ENCOUNTER — APPOINTMENT (OUTPATIENT)
Dept: GENERAL RADIOLOGY | Facility: HOSPITAL | Age: 53
End: 2018-07-01

## 2018-07-01 ENCOUNTER — ANESTHESIA (OUTPATIENT)
Dept: PERIOP | Facility: HOSPITAL | Age: 53
End: 2018-07-01

## 2018-07-01 ENCOUNTER — ANESTHESIA EVENT (OUTPATIENT)
Dept: GASTROENTEROLOGY | Facility: HOSPITAL | Age: 53
End: 2018-07-01

## 2018-07-01 VITALS — DIASTOLIC BLOOD PRESSURE: 97 MMHG | SYSTOLIC BLOOD PRESSURE: 137 MMHG

## 2018-07-01 DIAGNOSIS — R10.0 ACUTE ABDOMEN: ICD-10-CM

## 2018-07-01 DIAGNOSIS — M62.81 MUSCLE WEAKNESS (GENERALIZED): Primary | ICD-10-CM

## 2018-07-01 DIAGNOSIS — K56.699 COLONIC STRICTURE (HCC): ICD-10-CM

## 2018-07-01 LAB
ANION GAP SERPL CALCULATED.3IONS-SCNC: 12.8 MMOL/L
BASOPHILS # BLD AUTO: 0.03 10*3/MM3 (ref 0–0.2)
BASOPHILS NFR BLD AUTO: 0.3 % (ref 0–1.5)
BUN BLD-MCNC: 21 MG/DL (ref 6–20)
BUN/CREAT SERPL: 9.6 (ref 7–25)
CALCIUM SPEC-SCNC: 9.1 MG/DL (ref 8.6–10.5)
CHLORIDE SERPL-SCNC: 103 MMOL/L (ref 98–107)
CK SERPL-CCNC: 40 U/L (ref 20–180)
CO2 SERPL-SCNC: 22.2 MMOL/L (ref 22–29)
CREAT BLD-MCNC: 2.18 MG/DL (ref 0.57–1)
DEPRECATED RDW RBC AUTO: 51.8 FL (ref 37–54)
EOSINOPHIL # BLD AUTO: 0.28 10*3/MM3 (ref 0–0.7)
EOSINOPHIL NFR BLD AUTO: 2.8 % (ref 0.3–6.2)
ERYTHROCYTE [DISTWIDTH] IN BLOOD BY AUTOMATED COUNT: 15.8 % (ref 11.7–13)
GFR SERPL CREATININE-BSD FRML MDRD: 24 ML/MIN/1.73
GLUCOSE BLD-MCNC: 90 MG/DL (ref 65–99)
HCT VFR BLD AUTO: 32.5 % (ref 35.6–45.5)
HGB BLD-MCNC: 9.9 G/DL (ref 11.9–15.5)
IMM GRANULOCYTES # BLD: 0.01 10*3/MM3 (ref 0–0.03)
IMM GRANULOCYTES NFR BLD: 0.1 % (ref 0–0.5)
LYMPHOCYTES # BLD AUTO: 1.54 10*3/MM3 (ref 0.9–4.8)
LYMPHOCYTES NFR BLD AUTO: 15.2 % (ref 19.6–45.3)
MAGNESIUM SERPL-MCNC: 2 MG/DL (ref 1.6–2.6)
MCH RBC QN AUTO: 27.4 PG (ref 26.9–32)
MCHC RBC AUTO-ENTMCNC: 30.5 G/DL (ref 32.4–36.3)
MCV RBC AUTO: 90 FL (ref 80.5–98.2)
MONOCYTES # BLD AUTO: 0.94 10*3/MM3 (ref 0.2–1.2)
MONOCYTES NFR BLD AUTO: 9.3 % (ref 5–12)
NEUTROPHILS # BLD AUTO: 7.35 10*3/MM3 (ref 1.9–8.1)
NEUTROPHILS NFR BLD AUTO: 72.4 % (ref 42.7–76)
NRBC BLD MANUAL-RTO: 0 /100 WBC (ref 0–0)
PLATELET # BLD AUTO: 346 10*3/MM3 (ref 140–500)
PMV BLD AUTO: 9.1 FL (ref 6–12)
POTASSIUM BLD-SCNC: 4.8 MMOL/L (ref 3.5–5.2)
RBC # BLD AUTO: 3.61 10*6/MM3 (ref 3.9–5.2)
SODIUM BLD-SCNC: 138 MMOL/L (ref 136–145)
TROPONIN T SERPL-MCNC: <0.01 NG/ML (ref 0–0.03)
WBC NRBC COR # BLD: 10.14 10*3/MM3 (ref 4.5–10.7)

## 2018-07-01 PROCEDURE — 0D7N8ZZ DILATION OF SIGMOID COLON, VIA NATURAL OR ARTIFICIAL OPENING ENDOSCOPIC: ICD-10-PCS | Performed by: SURGERY

## 2018-07-01 PROCEDURE — 25010000002 PROPOFOL 10 MG/ML EMULSION: Performed by: ANESTHESIOLOGY

## 2018-07-01 PROCEDURE — 85025 COMPLETE CBC W/AUTO DIFF WBC: CPT | Performed by: SURGERY

## 2018-07-01 PROCEDURE — 87070 CULTURE OTHR SPECIMN AEROBIC: CPT | Performed by: SURGERY

## 2018-07-01 PROCEDURE — 94799 UNLISTED PULMONARY SVC/PX: CPT

## 2018-07-01 PROCEDURE — 44141 PARTIAL REMOVAL OF COLON: CPT | Performed by: SURGERY

## 2018-07-01 PROCEDURE — G0378 HOSPITAL OBSERVATION PER HR: HCPCS

## 2018-07-01 PROCEDURE — 83735 ASSAY OF MAGNESIUM: CPT | Performed by: SURGERY

## 2018-07-01 PROCEDURE — 84484 ASSAY OF TROPONIN QUANT: CPT | Performed by: SURGERY

## 2018-07-01 PROCEDURE — 82550 ASSAY OF CK (CPK): CPT | Performed by: SURGERY

## 2018-07-01 PROCEDURE — 93005 ELECTROCARDIOGRAM TRACING: CPT | Performed by: SURGERY

## 2018-07-01 PROCEDURE — 25010000002 DEXAMETHASONE PER 1 MG: Performed by: ANESTHESIOLOGY

## 2018-07-01 PROCEDURE — C1726 CATH, BAL DIL, NON-VASCULAR: HCPCS | Performed by: SURGERY

## 2018-07-01 PROCEDURE — 87075 CULTR BACTERIA EXCEPT BLOOD: CPT | Performed by: SURGERY

## 2018-07-01 PROCEDURE — 45340 SIG W/TNDSC BALLOON DILATION: CPT | Performed by: SURGERY

## 2018-07-01 PROCEDURE — 74019 RADEX ABDOMEN 2 VIEWS: CPT

## 2018-07-01 PROCEDURE — 25010000002 HYDROMORPHONE PER 4 MG: Performed by: SURGERY

## 2018-07-01 PROCEDURE — 93010 ELECTROCARDIOGRAM REPORT: CPT | Performed by: INTERNAL MEDICINE

## 2018-07-01 PROCEDURE — 25010000002 ONDANSETRON PER 1 MG: Performed by: SURGERY

## 2018-07-01 PROCEDURE — 0D1B0Z4 BYPASS ILEUM TO CUTANEOUS, OPEN APPROACH: ICD-10-PCS | Performed by: SURGERY

## 2018-07-01 PROCEDURE — 99223 1ST HOSP IP/OBS HIGH 75: CPT | Performed by: SURGERY

## 2018-07-01 PROCEDURE — 25010000002 ERTAPENEM PER 500 MG: Performed by: ANESTHESIOLOGY

## 2018-07-01 PROCEDURE — 25010000002 ONDANSETRON PER 1 MG: Performed by: ANESTHESIOLOGY

## 2018-07-01 PROCEDURE — 94640 AIRWAY INHALATION TREATMENT: CPT

## 2018-07-01 PROCEDURE — 25010000002 MIDAZOLAM PER 1 MG

## 2018-07-01 PROCEDURE — 80048 BASIC METABOLIC PNL TOTAL CA: CPT | Performed by: SURGERY

## 2018-07-01 PROCEDURE — 87205 SMEAR GRAM STAIN: CPT | Performed by: SURGERY

## 2018-07-01 PROCEDURE — 87186 SC STD MICRODIL/AGAR DIL: CPT | Performed by: SURGERY

## 2018-07-01 PROCEDURE — 44141 PARTIAL REMOVAL OF COLON: CPT | Performed by: PHYSICIAN ASSISTANT

## 2018-07-01 PROCEDURE — 0DTN0ZZ RESECTION OF SIGMOID COLON, OPEN APPROACH: ICD-10-PCS | Performed by: SURGERY

## 2018-07-01 RX ORDER — NALOXONE HCL 0.4 MG/ML
0.1 VIAL (ML) INJECTION
Status: DISCONTINUED | OUTPATIENT
Start: 2018-07-01 | End: 2018-07-13 | Stop reason: HOSPADM

## 2018-07-01 RX ORDER — MIDAZOLAM HYDROCHLORIDE 1 MG/ML
2 INJECTION INTRAMUSCULAR; INTRAVENOUS
Status: DISCONTINUED | OUTPATIENT
Start: 2018-07-01 | End: 2018-07-02 | Stop reason: HOSPADM

## 2018-07-01 RX ORDER — PROPOFOL 10 MG/ML
VIAL (ML) INTRAVENOUS AS NEEDED
Status: DISCONTINUED | OUTPATIENT
Start: 2018-07-01 | End: 2018-07-02 | Stop reason: SURG

## 2018-07-01 RX ORDER — SODIUM CHLORIDE 0.9 % (FLUSH) 0.9 %
1-10 SYRINGE (ML) INJECTION AS NEEDED
Status: DISCONTINUED | OUTPATIENT
Start: 2018-07-01 | End: 2018-07-13 | Stop reason: HOSPADM

## 2018-07-01 RX ORDER — SCOLOPAMINE TRANSDERMAL SYSTEM 1 MG/1
PATCH, EXTENDED RELEASE TRANSDERMAL
Status: COMPLETED
Start: 2018-07-01 | End: 2018-07-02

## 2018-07-01 RX ORDER — SODIUM BICARBONATE 650 MG/1
650 TABLET ORAL DAILY
Status: DISCONTINUED | OUTPATIENT
Start: 2018-07-01 | End: 2018-07-06

## 2018-07-01 RX ORDER — SODIUM CHLORIDE, SODIUM LACTATE, POTASSIUM CHLORIDE, CALCIUM CHLORIDE 600; 310; 30; 20 MG/100ML; MG/100ML; MG/100ML; MG/100ML
INJECTION, SOLUTION INTRAVENOUS CONTINUOUS PRN
Status: DISCONTINUED | OUTPATIENT
Start: 2018-07-01 | End: 2018-07-01 | Stop reason: SURG

## 2018-07-01 RX ORDER — ONDANSETRON 4 MG/1
4 TABLET, ORALLY DISINTEGRATING ORAL EVERY 6 HOURS PRN
Status: DISCONTINUED | OUTPATIENT
Start: 2018-07-01 | End: 2018-07-11 | Stop reason: SDUPTHER

## 2018-07-01 RX ORDER — MIDAZOLAM HYDROCHLORIDE 1 MG/ML
1 INJECTION INTRAMUSCULAR; INTRAVENOUS
Status: DISCONTINUED | OUTPATIENT
Start: 2018-07-01 | End: 2018-07-02 | Stop reason: HOSPADM

## 2018-07-01 RX ORDER — ROCURONIUM BROMIDE 10 MG/ML
INJECTION, SOLUTION INTRAVENOUS AS NEEDED
Status: DISCONTINUED | OUTPATIENT
Start: 2018-07-01 | End: 2018-07-02 | Stop reason: SURG

## 2018-07-01 RX ORDER — ACETAMINOPHEN 160 MG/5ML
650 SOLUTION ORAL EVERY 4 HOURS PRN
Status: DISCONTINUED | OUTPATIENT
Start: 2018-07-01 | End: 2018-07-13 | Stop reason: HOSPADM

## 2018-07-01 RX ORDER — ROSUVASTATIN CALCIUM 20 MG/1
20 TABLET, COATED ORAL DAILY
Status: DISCONTINUED | OUTPATIENT
Start: 2018-07-01 | End: 2018-07-02

## 2018-07-01 RX ORDER — FEBUXOSTAT 40 MG/1
40 TABLET, FILM COATED ORAL DAILY
Status: DISCONTINUED | OUTPATIENT
Start: 2018-07-01 | End: 2018-07-11

## 2018-07-01 RX ORDER — SODIUM CHLORIDE 0.9 % (FLUSH) 0.9 %
3 SYRINGE (ML) INJECTION AS NEEDED
Status: DISCONTINUED | OUTPATIENT
Start: 2018-07-01 | End: 2018-07-01

## 2018-07-01 RX ORDER — FLUTICASONE PROPIONATE 50 MCG
2 SPRAY, SUSPENSION (ML) NASAL 2 TIMES DAILY
Status: DISCONTINUED | OUTPATIENT
Start: 2018-07-01 | End: 2018-07-05

## 2018-07-01 RX ORDER — PROMETHAZINE HYDROCHLORIDE 25 MG/1
25 TABLET ORAL EVERY 8 HOURS PRN
Status: DISCONTINUED | OUTPATIENT
Start: 2018-07-01 | End: 2018-07-13 | Stop reason: HOSPADM

## 2018-07-01 RX ORDER — ALBUTEROL SULFATE 90 UG/1
2 AEROSOL, METERED RESPIRATORY (INHALATION) EVERY 4 HOURS PRN
Status: DISCONTINUED | OUTPATIENT
Start: 2018-07-01 | End: 2018-07-11 | Stop reason: CLARIF

## 2018-07-01 RX ORDER — DEXAMETHASONE SODIUM PHOSPHATE 10 MG/ML
INJECTION INTRAMUSCULAR; INTRAVENOUS AS NEEDED
Status: DISCONTINUED | OUTPATIENT
Start: 2018-07-01 | End: 2018-07-02 | Stop reason: SURG

## 2018-07-01 RX ORDER — LIDOCAINE HYDROCHLORIDE 20 MG/ML
INJECTION, SOLUTION INFILTRATION; PERINEURAL AS NEEDED
Status: DISCONTINUED | OUTPATIENT
Start: 2018-07-01 | End: 2018-07-02 | Stop reason: SURG

## 2018-07-01 RX ORDER — ONDANSETRON 2 MG/ML
INJECTION INTRAMUSCULAR; INTRAVENOUS AS NEEDED
Status: DISCONTINUED | OUTPATIENT
Start: 2018-07-01 | End: 2018-07-02 | Stop reason: SURG

## 2018-07-01 RX ORDER — SODIUM CHLORIDE 9 MG/ML
50 INJECTION, SOLUTION INTRAVENOUS CONTINUOUS
Status: DISCONTINUED | OUTPATIENT
Start: 2018-07-01 | End: 2018-07-02

## 2018-07-01 RX ORDER — FAMOTIDINE 10 MG/ML
INJECTION, SOLUTION INTRAVENOUS
Status: COMPLETED
Start: 2018-07-01 | End: 2018-07-01

## 2018-07-01 RX ORDER — FAMOTIDINE 10 MG/ML
20 INJECTION, SOLUTION INTRAVENOUS ONCE
Status: COMPLETED | OUTPATIENT
Start: 2018-07-01 | End: 2018-07-01

## 2018-07-01 RX ORDER — ONDANSETRON 2 MG/ML
4 INJECTION INTRAMUSCULAR; INTRAVENOUS EVERY 6 HOURS PRN
Status: DISCONTINUED | OUTPATIENT
Start: 2018-07-01 | End: 2018-07-11 | Stop reason: SDUPTHER

## 2018-07-01 RX ORDER — HYDROMORPHONE HCL 110MG/55ML
PATIENT CONTROLLED ANALGESIA SYRINGE INTRAVENOUS AS NEEDED
Status: DISCONTINUED | OUTPATIENT
Start: 2018-07-01 | End: 2018-07-02 | Stop reason: SURG

## 2018-07-01 RX ORDER — SCOLOPAMINE TRANSDERMAL SYSTEM 1 MG/1
1 PATCH, EXTENDED RELEASE TRANSDERMAL ONCE
Status: DISCONTINUED | OUTPATIENT
Start: 2018-07-01 | End: 2018-07-02

## 2018-07-01 RX ORDER — SODIUM CHLORIDE 0.9 % (FLUSH) 0.9 %
1-10 SYRINGE (ML) INJECTION AS NEEDED
Status: DISCONTINUED | OUTPATIENT
Start: 2018-07-01 | End: 2018-07-02 | Stop reason: HOSPADM

## 2018-07-01 RX ORDER — FENTANYL CITRATE 50 UG/ML
50 INJECTION, SOLUTION INTRAMUSCULAR; INTRAVENOUS
Status: DISCONTINUED | OUTPATIENT
Start: 2018-07-01 | End: 2018-07-02 | Stop reason: HOSPADM

## 2018-07-01 RX ORDER — SODIUM CHLORIDE, SODIUM LACTATE, POTASSIUM CHLORIDE, CALCIUM CHLORIDE 600; 310; 30; 20 MG/100ML; MG/100ML; MG/100ML; MG/100ML
9 INJECTION, SOLUTION INTRAVENOUS CONTINUOUS
Status: DISCONTINUED | OUTPATIENT
Start: 2018-07-01 | End: 2018-07-02

## 2018-07-01 RX ORDER — LIDOCAINE HYDROCHLORIDE 10 MG/ML
0.5 INJECTION, SOLUTION EPIDURAL; INFILTRATION; INTRACAUDAL; PERINEURAL ONCE AS NEEDED
Status: DISCONTINUED | OUTPATIENT
Start: 2018-07-01 | End: 2018-07-02 | Stop reason: HOSPADM

## 2018-07-01 RX ORDER — MAGNESIUM HYDROXIDE 1200 MG/15ML
LIQUID ORAL AS NEEDED
Status: DISCONTINUED | OUTPATIENT
Start: 2018-07-01 | End: 2018-07-02 | Stop reason: HOSPADM

## 2018-07-01 RX ORDER — PROPOFOL 10 MG/ML
VIAL (ML) INTRAVENOUS AS NEEDED
Status: DISCONTINUED | OUTPATIENT
Start: 2018-07-01 | End: 2018-07-01 | Stop reason: SURG

## 2018-07-01 RX ORDER — ACETAMINOPHEN 325 MG/1
650 TABLET ORAL EVERY 4 HOURS PRN
Status: DISCONTINUED | OUTPATIENT
Start: 2018-07-01 | End: 2018-07-03

## 2018-07-01 RX ORDER — ACETAMINOPHEN 650 MG/1
650 SUPPOSITORY RECTAL EVERY 4 HOURS PRN
Status: DISCONTINUED | OUTPATIENT
Start: 2018-07-01 | End: 2018-07-13 | Stop reason: HOSPADM

## 2018-07-01 RX ORDER — BUDESONIDE AND FORMOTEROL FUMARATE DIHYDRATE 160; 4.5 UG/1; UG/1
2 AEROSOL RESPIRATORY (INHALATION) 2 TIMES DAILY
Status: DISCONTINUED | OUTPATIENT
Start: 2018-07-01 | End: 2018-07-13 | Stop reason: HOSPADM

## 2018-07-01 RX ORDER — MIDAZOLAM HYDROCHLORIDE 1 MG/ML
INJECTION INTRAMUSCULAR; INTRAVENOUS
Status: COMPLETED
Start: 2018-07-01 | End: 2018-07-01

## 2018-07-01 RX ORDER — DEXTROSE, SODIUM CHLORIDE, AND POTASSIUM CHLORIDE 5; .45; .15 G/100ML; G/100ML; G/100ML
100 INJECTION INTRAVENOUS CONTINUOUS
Status: DISCONTINUED | OUTPATIENT
Start: 2018-07-01 | End: 2018-07-02

## 2018-07-01 RX ORDER — LEVOTHYROXINE SODIUM 0.12 MG/1
125 TABLET ORAL DAILY
Status: DISCONTINUED | OUTPATIENT
Start: 2018-07-01 | End: 2018-07-13 | Stop reason: HOSPADM

## 2018-07-01 RX ORDER — ONDANSETRON 4 MG/1
4 TABLET, FILM COATED ORAL EVERY 6 HOURS PRN
Status: DISCONTINUED | OUTPATIENT
Start: 2018-07-01 | End: 2018-07-11 | Stop reason: SDUPTHER

## 2018-07-01 RX ORDER — ONDANSETRON 4 MG/1
4 TABLET, ORALLY DISINTEGRATING ORAL EVERY 8 HOURS PRN
Status: DISCONTINUED | OUTPATIENT
Start: 2018-07-01 | End: 2018-07-13 | Stop reason: HOSPADM

## 2018-07-01 RX ORDER — FENTANYL CITRATE 50 UG/ML
INJECTION, SOLUTION INTRAMUSCULAR; INTRAVENOUS
Status: COMPLETED
Start: 2018-07-01 | End: 2018-07-02

## 2018-07-01 RX ORDER — PROMETHAZINE HYDROCHLORIDE 25 MG/1
25 TABLET ORAL EVERY 8 HOURS PRN
COMMUNITY
Start: 2018-06-04 | End: 2018-07-19

## 2018-07-01 RX ADMIN — ONDANSETRON 4 MG: 2 INJECTION INTRAMUSCULAR; INTRAVENOUS at 23:16

## 2018-07-01 RX ADMIN — LIDOCAINE HYDROCHLORIDE 60 MG: 20 INJECTION, SOLUTION INFILTRATION; PERINEURAL at 23:16

## 2018-07-01 RX ADMIN — ROCURONIUM BROMIDE 40 MG: 10 INJECTION INTRAVENOUS at 23:16

## 2018-07-01 RX ADMIN — HYDROMORPHONE HYDROCHLORIDE 0.5 MG: 1 INJECTION, SOLUTION INTRAMUSCULAR; INTRAVENOUS; SUBCUTANEOUS at 19:35

## 2018-07-01 RX ADMIN — SODIUM CHLORIDE 1 G: 900 INJECTION INTRAVENOUS at 23:45

## 2018-07-01 RX ADMIN — ONDANSETRON 4 MG: 2 INJECTION INTRAMUSCULAR; INTRAVENOUS at 17:53

## 2018-07-01 RX ADMIN — MIDAZOLAM 2 MG: 1 INJECTION INTRAMUSCULAR; INTRAVENOUS at 22:57

## 2018-07-01 RX ADMIN — FEBUXOSTAT 40 MG: 40 TABLET ORAL at 16:19

## 2018-07-01 RX ADMIN — HYDROMORPHONE HYDROCHLORIDE 0.5 MG: 1 INJECTION, SOLUTION INTRAMUSCULAR; INTRAVENOUS; SUBCUTANEOUS at 16:47

## 2018-07-01 RX ADMIN — MIDAZOLAM HYDROCHLORIDE 2 MG: 1 INJECTION INTRAMUSCULAR; INTRAVENOUS at 22:57

## 2018-07-01 RX ADMIN — HYDROMORPHONE HYDROCHLORIDE 0.5 MG: 1 INJECTION, SOLUTION INTRAMUSCULAR; INTRAVENOUS; SUBCUTANEOUS at 12:15

## 2018-07-01 RX ADMIN — VANCOMYCIN 125 MG: KIT at 18:22

## 2018-07-01 RX ADMIN — FAMOTIDINE 20 MG: 10 INJECTION, SOLUTION INTRAVENOUS at 22:55

## 2018-07-01 RX ADMIN — SODIUM CHLORIDE, POTASSIUM CHLORIDE, SODIUM LACTATE AND CALCIUM CHLORIDE: 600; 310; 30; 20 INJECTION, SOLUTION INTRAVENOUS at 12:50

## 2018-07-01 RX ADMIN — DEXAMETHASONE SODIUM PHOSPHATE 8 MG: 10 INJECTION INTRAMUSCULAR; INTRAVENOUS at 23:16

## 2018-07-01 RX ADMIN — SODIUM BICARBONATE 650 MG: 650 TABLET ORAL at 16:19

## 2018-07-01 RX ADMIN — SCOPALAMINE 1 PATCH: 1 PATCH, EXTENDED RELEASE TRANSDERMAL at 22:45

## 2018-07-01 RX ADMIN — BUDESONIDE AND FORMOTEROL FUMARATE DIHYDRATE 2 PUFF: 160; 4.5 AEROSOL RESPIRATORY (INHALATION) at 21:16

## 2018-07-01 RX ADMIN — POTASSIUM CHLORIDE, DEXTROSE MONOHYDRATE AND SODIUM CHLORIDE 100 ML/HR: 150; 5; 450 INJECTION, SOLUTION INTRAVENOUS at 12:16

## 2018-07-01 RX ADMIN — PROPOFOL 150 MG: 10 INJECTION, EMULSION INTRAVENOUS at 23:16

## 2018-07-01 RX ADMIN — HYDROMORPHONE HYDROCHLORIDE 0.5 MG: 1 INJECTION, SOLUTION INTRAMUSCULAR; INTRAVENOUS; SUBCUTANEOUS at 14:30

## 2018-07-01 RX ADMIN — PHENYLEPHRINE HYDROCHLORIDE 100 MCG: 10 INJECTION INTRAVENOUS at 23:30

## 2018-07-01 RX ADMIN — SODIUM CHLORIDE, POTASSIUM CHLORIDE, SODIUM LACTATE AND CALCIUM CHLORIDE: 600; 310; 30; 20 INJECTION, SOLUTION INTRAVENOUS at 23:16

## 2018-07-01 RX ADMIN — SODIUM CHLORIDE, POTASSIUM CHLORIDE, SODIUM LACTATE AND CALCIUM CHLORIDE 9 ML/HR: 600; 310; 30; 20 INJECTION, SOLUTION INTRAVENOUS at 22:30

## 2018-07-01 RX ADMIN — LEVOTHYROXINE SODIUM 125 MCG: 125 TABLET ORAL at 16:19

## 2018-07-01 RX ADMIN — PROPOFOL 400 MG: 10 INJECTION, EMULSION INTRAVENOUS at 12:50

## 2018-07-01 RX ADMIN — HYDROMORPHONE HYDROCHLORIDE 0.5 MG: 2 INJECTION INTRAMUSCULAR; INTRAVENOUS; SUBCUTANEOUS at 23:16

## 2018-07-01 RX ADMIN — FAMOTIDINE 20 MG: 10 INJECTION INTRAVENOUS at 22:55

## 2018-07-01 RX ADMIN — PHENYLEPHRINE HYDROCHLORIDE 100 MCG: 10 INJECTION INTRAVENOUS at 23:25

## 2018-07-01 RX ADMIN — ROSUVASTATIN CALCIUM 20 MG: 20 TABLET, FILM COATED ORAL at 16:19

## 2018-07-01 RX ADMIN — SCOLOPAMINE TRANSDERMAL SYSTEM 1 PATCH: 1 PATCH, EXTENDED RELEASE TRANSDERMAL at 22:45

## 2018-07-01 RX ADMIN — SODIUM CHLORIDE 50 ML/HR: 9 INJECTION, SOLUTION INTRAVENOUS at 12:44

## 2018-07-01 NOTE — H&P
CC: abdominal pain, obstipation      Patient is a 53 y.o. female who is well-known to me.  Patient has been treated for C. difficile toxin diarrhea on Flagyl and vancomycin.  Patient is presenting with reoccurring stenosis and obstipation.  Patient denies vomiting but positive for nausea.  Patient reports this started last night.  Patient denies fever or chills.  Patient has a known colonic stricture that had been dilated previously up to 12 mm.    Past Medical History:   Diagnosis Date   • Asthma    • Bronchiectasis    • Chronic kidney disease    • Colitis    • Diverticulitis    • Diverticulosis    • Hypercholesteremia    • Hypothyroidism    • Joint pain    • PONV (postoperative nausea and vomiting)    • Postoperative urinary retention    • Wegener's granulomatosis with renal involvement    • Weight loss      Past Surgical History:   Procedure Laterality Date   • COLON RESECTION N/A 5/1/2018    Procedure: COLON RESECTION LAPAROSCOPIC SIGMOID with splenic flexure takedown;  Surgeon: Bee Carreon MD;  Location: [facility] OR;  Service: General   • COLONOSCOPY N/A 6/24/2016    Procedure: COLONOSCOPY INTO CECUM;  Surgeon: Bee Carreon MD;  Location: [facility] ENDOSCOPY;  Service:    • COLONOSCOPY N/A 6/21/2018    Procedure: COLONOSCOPY WITH DECOMPRESSION;  Surgeon: Bee Carreon MD;  Location: [facility] OR;  Service: Gastroenterology   • LUNG BIOPSY Right 2014   • OOPHORECTOMY Right    • RENAL BIOPSY  2007   • SALPINGECTOMY Right    • SIGMOIDOSCOPY N/A 6/23/2018    Procedure: FLEXIBLE SIGMOIDOSCOPY WITH 12MM BALLOON DILATATION;  Surgeon: Bee Carreon MD;  Location: [facility] ENDOSCOPY;  Service: General   • WISDOM TOOTH EXTRACTION       Family History   Problem Relation Age of Onset   • Prostate cancer Father    • Malig Hyperthermia Neg Hx      Social History   Substance Use Topics   • Smoking status: Never Smoker   • Smokeless tobacco: Never Used   • Alcohol use No         Current  "Facility-Administered Medications:   •  dextrose 5 % and sodium chloride 0.45 % with KCl 20 mEq/L infusion, 100 mL/hr, Intravenous, Continuous, Bee Carreon MD, Last Rate: 100 mL/hr at 07/01/18 1216, 100 mL/hr at 07/01/18 1216  •  HYDROmorphone (DILAUDID) injection 0.5 mg, 0.5 mg, Intravenous, Q2H PRN, Bee Carreon MD, 0.5 mg at 07/01/18 1215  •  sodium chloride 0.9 % flush 1-10 mL, 1-10 mL, Intravenous, PRN, Bee Carreon MD  •  sodium chloride 0.9 % flush 3 mL, 3 mL, Intravenous, PRN, Bee Carreon MD  •  sodium chloride 0.9 % infusion, 50 mL/hr, Intravenous, Continuous, Bee Carreon MD, Last Rate: 50 mL/hr at 07/01/18 1244, 50 mL/hr at 07/01/18 1244    Review of Systems   General: Patient reports good health  Eyes: No eye problems  Ears, nose, mouth and throat: No rhinitis, no hearing problems, no chronic cough  Cardiovascular/heart: Denies palpitations, syncope or chest pain  Respiratory/lung: Denies shortness of breath, hemoptysis, or dyspnea on exertion   Genital/urinary: No frequency, hematuria or dysuria  Hematological/lymphatic: Denies anemia or other problems  Musculoskeletal: No joint pain, no defects  Skin: No psoriasis or other skin issues  Neurological: No seizures or other neurological problems  Psychiatric: None  Endocrine: Negative  Gastro-intestinal: See history of present illness  All other systems have been reviewed and are negative.  Vitals:    07/01/18 1128 07/01/18 1138 07/01/18 1241   BP:  130/87 127/78   BP Location:  Left arm Right arm   Patient Position:  Lying Lying   Pulse:  84 66   Resp:  18 14   Temp: 98 °F (36.7 °C)  98.4 °F (36.9 °C)   TempSrc: Oral  Oral   SpO2:  97%    Weight:  47.7 kg (105 lb 1.6 oz)    Height:  170 cm (66.93\")        Physical Exam  General/physcological:   Alert and oriented x3 in no acute distress  HEENT: Normal cephalic, atraumatic, PERRLA, EOMI, sclera anicteric, moist mucous membranes, neck is supple, no JVD, no carotid bruits, no " thyromegaly no adenopathy  Respiratory: CTA and percussion  CVA: RRR, normal S1-S2, no murmurs, no gallops or rubs  GI: Positive BS, soft, nondistended, nontender, no rebound, no guarding, no hernias, no organomegaly and no masses  Musculoskeletal: Full range of motion, no clubbing, no cyanosis or edema  Neurovascular: Grossly intact    Patient does not use tobacco products currently and I have counseled the patient to not start using tobacco products in the future.    Assessment:  Abdominal pain  C. difficile colitis  Colonic stricture    Plan:  This pleasant 53-year-old female patient with colonic stricture status post balloon dilation with reoccurring symptoms.  I have recommended that the patient be admitted to the hospital for IV fluids and supportive care.  I have also recommended that the patient undergo repeat balloon dilation of the stricture.  We will proceed today.    Bee Carreon MD  General, Minimally Invasive and Endoscopic Surgery  [facility] [place] operated [facility]    [address], [address]  [place], [place], [address]  P: [phone]  F: [phone]    Cc:  Pino Templeton MD

## 2018-07-01 NOTE — ANESTHESIA POSTPROCEDURE EVALUATION
"Patient: Sun Rodriguez    Procedure Summary     Date:  07/01/18 Room / Location:   [facility] END[facility] 1 /  [facility]    Anesthesia Start:  1252 Anesthesia Stop:  1317    Procedure:  FLEXIBLE SIGMOIDOSCOPY WITH DILATATION with colonic balloons 8 TO 15MM (N/A ) Diagnosis:      Surgeon:  Bee Carreon MD Provider:  Marlon Tenorio MD    Anesthesia Type:  MAC ASA Status:  3          Anesthesia Type: MAC  Last vitals  BP   127/78 (07/01/18 1241)   Temp   36.9 °C (98.4 °F) (07/01/18 1241)   Pulse   66 (07/01/18 1241)   Resp   14 (07/01/18 1241)     SpO2   97 % (07/01/18 1138)     Post Anesthesia Care and Evaluation    Patient location during evaluation: bedside  Patient participation: complete - patient participated  Level of consciousness: awake and alert  Pain management: adequate  Airway patency: patent  Anesthetic complications: No anesthetic complications    Cardiovascular status: acceptable  Respiratory status: acceptable  Hydration status: acceptable    Comments: /78 (BP Location: Right arm, Patient Position: Lying)   Pulse 66   Temp 36.9 °C (98.4 °F) (Oral)   Resp 14   Ht 170 cm (66.93\")   Wt 47.7 kg (105 lb 1.6 oz)   LMP 10/31/2017 (Exact Date)   SpO2 97%   BMI 16.50 kg/m²       "

## 2018-07-01 NOTE — PLAN OF CARE
Problem: Patient Care Overview  Goal: Plan of Care Review  Outcome: Ongoing (interventions implemented as appropriate)   07/01/18 9557   Coping/Psychosocial   Plan of Care Reviewed With patient   Plan of Care Review   Progress no change   OTHER   Outcome Summary Pt direct admitted to floor; readmit from 5 days ago; c/o ab pain; had flex sig balloon decompression; IV fluids with IV pain meds; clear liquid diet; tx for cdiff- in contact; VSS.     Goal: Individualization and Mutuality  Outcome: Ongoing (interventions implemented as appropriate)    Goal: Discharge Needs Assessment  Outcome: Ongoing (interventions implemented as appropriate)    Goal: Interprofessional Rounds/Family Conf  Outcome: Ongoing (interventions implemented as appropriate)      Problem: Pain, Acute (Adult)  Goal: Identify Related Risk Factors and Signs and Symptoms  Outcome: Ongoing (interventions implemented as appropriate)    Goal: Acceptable Pain Control/Comfort Level  Outcome: Ongoing (interventions implemented as appropriate)

## 2018-07-01 NOTE — OP NOTE
Operative Note:    Pre-op dx: Colonic stricture    Post-op dx: Same    Procedure: Sigmoidoscopy with balloon dilation up to 15 mm    Surgeon: Bee Carreon MD    Anesthesia: MAC    EBL: Minimal    Specimen:  * No orders in the log *    Complications: none    Procedure:    Colonoscopy:  A digital rectal examination was performed which revealed no rectal masses.  Scope was introduced into the rectum and advanced into the sigmoid where the colonic stricture was visualized.  Sequential balloon dilation was carried out from 8mm up to 15 mm with good results and minimal bleeding.  The colon was desufflated and the procedure was terminated.   Patient was transferred to recovery room in stable condition.        Bee Carreon MD  General, Minimally Invasive and Endoscopic Surgery  [facility]    [address], [address]  [place], [place], [address]  P: [phone]  F: [phone]    Cc:  Pino Templeton MD

## 2018-07-02 PROBLEM — R10.0 ACUTE ABDOMEN: Status: ACTIVE | Noted: 2018-07-02

## 2018-07-02 LAB
ALBUMIN SERPL-MCNC: 3.5 G/DL (ref 3.5–5.2)
ANION GAP SERPL CALCULATED.3IONS-SCNC: 13.1 MMOL/L
ANION GAP SERPL CALCULATED.3IONS-SCNC: 14.4 MMOL/L
BASOPHILS # BLD AUTO: 0.01 10*3/MM3 (ref 0–0.2)
BASOPHILS NFR BLD AUTO: 0.1 % (ref 0–1.5)
BUN BLD-MCNC: 23 MG/DL (ref 6–20)
BUN BLD-MCNC: 25 MG/DL (ref 6–20)
BUN/CREAT SERPL: 8.6 (ref 7–25)
BUN/CREAT SERPL: 9.2 (ref 7–25)
CALCIUM SPEC-SCNC: 8.5 MG/DL (ref 8.6–10.5)
CALCIUM SPEC-SCNC: 8.7 MG/DL (ref 8.6–10.5)
CHLORIDE SERPL-SCNC: 100 MMOL/L (ref 98–107)
CHLORIDE SERPL-SCNC: 101 MMOL/L (ref 98–107)
CHLORIDE UR-SCNC: 52 MMOL/L
CO2 SERPL-SCNC: 20.9 MMOL/L (ref 22–29)
CO2 SERPL-SCNC: 21.6 MMOL/L (ref 22–29)
CREAT BLD-MCNC: 2.66 MG/DL (ref 0.57–1)
CREAT BLD-MCNC: 2.73 MG/DL (ref 0.57–1)
CREAT UR-MCNC: 88.8 MG/DL
DEPRECATED RDW RBC AUTO: 52 FL (ref 37–54)
EOSINOPHIL # BLD AUTO: 0 10*3/MM3 (ref 0–0.7)
EOSINOPHIL NFR BLD AUTO: 0 % (ref 0.3–6.2)
ERYTHROCYTE [DISTWIDTH] IN BLOOD BY AUTOMATED COUNT: 15.7 % (ref 11.7–13)
GFR SERPL CREATININE-BSD FRML MDRD: 18 ML/MIN/1.73
GFR SERPL CREATININE-BSD FRML MDRD: 19 ML/MIN/1.73
GLUCOSE BLD-MCNC: 193 MG/DL (ref 65–99)
GLUCOSE BLD-MCNC: 196 MG/DL (ref 65–99)
HCT VFR BLD AUTO: 32.4 % (ref 35.6–45.5)
HGB BLD-MCNC: 10 G/DL (ref 11.9–15.5)
IMM GRANULOCYTES # BLD: 0.03 10*3/MM3 (ref 0–0.03)
IMM GRANULOCYTES NFR BLD: 0.2 % (ref 0–0.5)
LYMPHOCYTES # BLD AUTO: 0.63 10*3/MM3 (ref 0.9–4.8)
LYMPHOCYTES NFR BLD AUTO: 4.3 % (ref 19.6–45.3)
MCH RBC QN AUTO: 28 PG (ref 26.9–32)
MCHC RBC AUTO-ENTMCNC: 30.9 G/DL (ref 32.4–36.3)
MCV RBC AUTO: 90.8 FL (ref 80.5–98.2)
MONOCYTES # BLD AUTO: 0.31 10*3/MM3 (ref 0.2–1.2)
MONOCYTES NFR BLD AUTO: 2.1 % (ref 5–12)
NEUTROPHILS # BLD AUTO: 13.61 10*3/MM3 (ref 1.9–8.1)
NEUTROPHILS NFR BLD AUTO: 93.5 % (ref 42.7–76)
PHOSPHATE SERPL-MCNC: 5.2 MG/DL (ref 2.5–4.5)
PLATELET # BLD AUTO: 346 10*3/MM3 (ref 140–500)
PMV BLD AUTO: 9.2 FL (ref 6–12)
POTASSIUM BLD-SCNC: 5.3 MMOL/L (ref 3.5–5.2)
POTASSIUM BLD-SCNC: 6 MMOL/L (ref 3.5–5.2)
PROT UR-MCNC: 26 MG/DL
PROT/CREAT UR: 292.8 MG/G CREA (ref 0–200)
RBC # BLD AUTO: 3.57 10*6/MM3 (ref 3.9–5.2)
SODIUM BLD-SCNC: 134 MMOL/L (ref 136–145)
SODIUM BLD-SCNC: 137 MMOL/L (ref 136–145)
SODIUM UR-SCNC: 40 MMOL/L
WBC NRBC COR # BLD: 14.56 10*3/MM3 (ref 4.5–10.7)

## 2018-07-02 PROCEDURE — 82570 ASSAY OF URINE CREATININE: CPT | Performed by: INTERNAL MEDICINE

## 2018-07-02 PROCEDURE — 25010000002 HYDROMORPHONE PER 4 MG: Performed by: ANESTHESIOLOGY

## 2018-07-02 PROCEDURE — 63710000001 INSULIN REGULAR HUMAN PER 5 UNITS: Performed by: INTERNAL MEDICINE

## 2018-07-02 PROCEDURE — 94644 CONT INHLJ TX 1ST HOUR: CPT

## 2018-07-02 PROCEDURE — 93005 ELECTROCARDIOGRAM TRACING: CPT | Performed by: INTERNAL MEDICINE

## 2018-07-02 PROCEDURE — 82436 ASSAY OF URINE CHLORIDE: CPT | Performed by: INTERNAL MEDICINE

## 2018-07-02 PROCEDURE — 99024 POSTOP FOLLOW-UP VISIT: CPT | Performed by: SURGERY

## 2018-07-02 PROCEDURE — 80069 RENAL FUNCTION PANEL: CPT | Performed by: INTERNAL MEDICINE

## 2018-07-02 PROCEDURE — 94640 AIRWAY INHALATION TREATMENT: CPT

## 2018-07-02 PROCEDURE — 25010000002 PHENYLEPHRINE PER 1 ML: Performed by: ANESTHESIOLOGY

## 2018-07-02 PROCEDURE — 80048 BASIC METABOLIC PNL TOTAL CA: CPT | Performed by: SURGERY

## 2018-07-02 PROCEDURE — 88307 TISSUE EXAM BY PATHOLOGIST: CPT | Performed by: SURGERY

## 2018-07-02 PROCEDURE — 25010000002 FENTANYL CITRATE (PF) 100 MCG/2ML SOLUTION

## 2018-07-02 PROCEDURE — 94799 UNLISTED PULMONARY SVC/PX: CPT

## 2018-07-02 PROCEDURE — 84300 ASSAY OF URINE SODIUM: CPT | Performed by: INTERNAL MEDICINE

## 2018-07-02 PROCEDURE — 25010000002 FENTANYL CITRATE (PF) 100 MCG/2ML SOLUTION: Performed by: ANESTHESIOLOGY

## 2018-07-02 PROCEDURE — 93010 ELECTROCARDIOGRAM REPORT: CPT | Performed by: INTERNAL MEDICINE

## 2018-07-02 PROCEDURE — 84156 ASSAY OF PROTEIN URINE: CPT | Performed by: INTERNAL MEDICINE

## 2018-07-02 PROCEDURE — 85025 COMPLETE CBC W/AUTO DIFF WBC: CPT | Performed by: SURGERY

## 2018-07-02 RX ORDER — ONDANSETRON 2 MG/ML
4 INJECTION INTRAMUSCULAR; INTRAVENOUS ONCE AS NEEDED
Status: DISCONTINUED | OUTPATIENT
Start: 2018-07-02 | End: 2018-07-02 | Stop reason: HOSPADM

## 2018-07-02 RX ORDER — FLUMAZENIL 0.1 MG/ML
0.2 INJECTION INTRAVENOUS AS NEEDED
Status: DISCONTINUED | OUTPATIENT
Start: 2018-07-02 | End: 2018-07-02 | Stop reason: HOSPADM

## 2018-07-02 RX ORDER — EPHEDRINE SULFATE 50 MG/ML
5 INJECTION, SOLUTION INTRAVENOUS ONCE AS NEEDED
Status: DISCONTINUED | OUTPATIENT
Start: 2018-07-02 | End: 2018-07-02 | Stop reason: HOSPADM

## 2018-07-02 RX ORDER — DIPHENHYDRAMINE HYDROCHLORIDE 50 MG/ML
12.5 INJECTION INTRAMUSCULAR; INTRAVENOUS
Status: DISCONTINUED | OUTPATIENT
Start: 2018-07-02 | End: 2018-07-02 | Stop reason: HOSPADM

## 2018-07-02 RX ORDER — FENTANYL CITRATE 50 UG/ML
INJECTION, SOLUTION INTRAMUSCULAR; INTRAVENOUS AS NEEDED
Status: DISCONTINUED | OUTPATIENT
Start: 2018-07-02 | End: 2018-07-02 | Stop reason: SURG

## 2018-07-02 RX ORDER — LABETALOL HYDROCHLORIDE 5 MG/ML
5 INJECTION, SOLUTION INTRAVENOUS
Status: DISCONTINUED | OUTPATIENT
Start: 2018-07-02 | End: 2018-07-02 | Stop reason: HOSPADM

## 2018-07-02 RX ORDER — NALOXONE HCL 0.4 MG/ML
0.1 VIAL (ML) INJECTION
Status: DISCONTINUED | OUTPATIENT
Start: 2018-07-02 | End: 2018-07-07

## 2018-07-02 RX ORDER — HYDROCODONE BITARTRATE AND ACETAMINOPHEN 7.5; 325 MG/1; MG/1
1 TABLET ORAL ONCE AS NEEDED
Status: DISCONTINUED | OUTPATIENT
Start: 2018-07-02 | End: 2018-07-02 | Stop reason: HOSPADM

## 2018-07-02 RX ORDER — FENTANYL CITRATE 50 UG/ML
INJECTION, SOLUTION INTRAMUSCULAR; INTRAVENOUS
Status: COMPLETED
Start: 2018-07-02 | End: 2018-07-02

## 2018-07-02 RX ORDER — SODIUM CHLORIDE 9 MG/ML
75 INJECTION, SOLUTION INTRAVENOUS CONTINUOUS
Status: DISCONTINUED | OUTPATIENT
Start: 2018-07-02 | End: 2018-07-07

## 2018-07-02 RX ORDER — HYDROMORPHONE HCL IN 0.9% NACL 10 MG/50ML
PATIENT CONTROLLED ANALGESIA SYRINGE INTRAVENOUS CONTINUOUS
Status: DISCONTINUED | OUTPATIENT
Start: 2018-07-02 | End: 2018-07-07

## 2018-07-02 RX ORDER — OXYCODONE AND ACETAMINOPHEN 7.5; 325 MG/1; MG/1
1 TABLET ORAL ONCE AS NEEDED
Status: DISCONTINUED | OUTPATIENT
Start: 2018-07-02 | End: 2018-07-02 | Stop reason: HOSPADM

## 2018-07-02 RX ORDER — DEXTROSE, SODIUM CHLORIDE, AND POTASSIUM CHLORIDE 5; .45; .15 G/100ML; G/100ML; G/100ML
125 INJECTION INTRAVENOUS CONTINUOUS
Status: DISCONTINUED | OUTPATIENT
Start: 2018-07-02 | End: 2018-07-02

## 2018-07-02 RX ORDER — FENTANYL CITRATE 50 UG/ML
50 INJECTION, SOLUTION INTRAMUSCULAR; INTRAVENOUS
Status: DISCONTINUED | OUTPATIENT
Start: 2018-07-02 | End: 2018-07-02 | Stop reason: HOSPADM

## 2018-07-02 RX ORDER — PROMETHAZINE HYDROCHLORIDE 25 MG/1
12.5 TABLET ORAL ONCE AS NEEDED
Status: DISCONTINUED | OUTPATIENT
Start: 2018-07-02 | End: 2018-07-02 | Stop reason: HOSPADM

## 2018-07-02 RX ORDER — ONDANSETRON 2 MG/ML
4 INJECTION INTRAMUSCULAR; INTRAVENOUS EVERY 6 HOURS PRN
Status: DISCONTINUED | OUTPATIENT
Start: 2018-07-02 | End: 2018-07-13 | Stop reason: HOSPADM

## 2018-07-02 RX ORDER — PROMETHAZINE HYDROCHLORIDE 25 MG/ML
12.5 INJECTION, SOLUTION INTRAMUSCULAR; INTRAVENOUS ONCE AS NEEDED
Status: DISCONTINUED | OUTPATIENT
Start: 2018-07-02 | End: 2018-07-02 | Stop reason: HOSPADM

## 2018-07-02 RX ORDER — HYDRALAZINE HYDROCHLORIDE 20 MG/ML
5 INJECTION INTRAMUSCULAR; INTRAVENOUS
Status: DISCONTINUED | OUTPATIENT
Start: 2018-07-02 | End: 2018-07-02 | Stop reason: HOSPADM

## 2018-07-02 RX ORDER — ONDANSETRON 4 MG/1
4 TABLET, ORALLY DISINTEGRATING ORAL EVERY 6 HOURS PRN
Status: DISCONTINUED | OUTPATIENT
Start: 2018-07-02 | End: 2018-07-13 | Stop reason: HOSPADM

## 2018-07-02 RX ORDER — DEXTROSE MONOHYDRATE 25 G/50ML
50 INJECTION, SOLUTION INTRAVENOUS ONCE
Status: COMPLETED | OUTPATIENT
Start: 2018-07-02 | End: 2018-07-02

## 2018-07-02 RX ORDER — PROMETHAZINE HYDROCHLORIDE 25 MG/1
25 SUPPOSITORY RECTAL ONCE AS NEEDED
Status: DISCONTINUED | OUTPATIENT
Start: 2018-07-02 | End: 2018-07-02 | Stop reason: HOSPADM

## 2018-07-02 RX ORDER — ONDANSETRON 4 MG/1
4 TABLET, FILM COATED ORAL EVERY 6 HOURS PRN
Status: DISCONTINUED | OUTPATIENT
Start: 2018-07-02 | End: 2018-07-13 | Stop reason: HOSPADM

## 2018-07-02 RX ORDER — ROSUVASTATIN CALCIUM 10 MG/1
10 TABLET, COATED ORAL DAILY
Status: DISCONTINUED | OUTPATIENT
Start: 2018-07-03 | End: 2018-07-11

## 2018-07-02 RX ORDER — DEXTROSE MONOHYDRATE 25 G/50ML
50 INJECTION, SOLUTION INTRAVENOUS ONCE
Status: DISCONTINUED | OUTPATIENT
Start: 2018-07-02 | End: 2018-07-02

## 2018-07-02 RX ORDER — PROMETHAZINE HYDROCHLORIDE 25 MG/1
25 TABLET ORAL ONCE AS NEEDED
Status: DISCONTINUED | OUTPATIENT
Start: 2018-07-02 | End: 2018-07-02 | Stop reason: HOSPADM

## 2018-07-02 RX ORDER — NALOXONE HCL 0.4 MG/ML
0.2 VIAL (ML) INJECTION AS NEEDED
Status: DISCONTINUED | OUTPATIENT
Start: 2018-07-02 | End: 2018-07-02 | Stop reason: HOSPADM

## 2018-07-02 RX ADMIN — SODIUM CHLORIDE 125 ML/HR: 9 INJECTION, SOLUTION INTRAVENOUS at 18:35

## 2018-07-02 RX ADMIN — DEXTROSE MONOHYDRATE 50 ML: 25 INJECTION, SOLUTION INTRAVENOUS at 12:32

## 2018-07-02 RX ADMIN — BUDESONIDE AND FORMOTEROL FUMARATE DIHYDRATE 2 PUFF: 160; 4.5 AEROSOL RESPIRATORY (INHALATION) at 19:45

## 2018-07-02 RX ADMIN — HYDROMORPHONE HYDROCHLORIDE 0.5 MG: 1 INJECTION, SOLUTION INTRAMUSCULAR; INTRAVENOUS; SUBCUTANEOUS at 03:19

## 2018-07-02 RX ADMIN — SODIUM CHLORIDE, POTASSIUM CHLORIDE, SODIUM LACTATE AND CALCIUM CHLORIDE: 600; 310; 30; 20 INJECTION, SOLUTION INTRAVENOUS at 00:30

## 2018-07-02 RX ADMIN — FENTANYL CITRATE 50 MCG: 50 INJECTION INTRAMUSCULAR; INTRAVENOUS at 02:29

## 2018-07-02 RX ADMIN — ALBUTEROL SULFATE 10 MG: 2.5 SOLUTION RESPIRATORY (INHALATION) at 10:33

## 2018-07-02 RX ADMIN — SODIUM BICARBONATE 650 MG: 650 TABLET ORAL at 09:21

## 2018-07-02 RX ADMIN — VANCOMYCIN 125 MG: KIT at 06:33

## 2018-07-02 RX ADMIN — PHENYLEPHRINE HYDROCHLORIDE 100 MCG: 10 INJECTION INTRAVENOUS at 01:05

## 2018-07-02 RX ADMIN — SUGAMMADEX 95 MG: 100 INJECTION, SOLUTION INTRAVENOUS at 01:53

## 2018-07-02 RX ADMIN — PHENYLEPHRINE HYDROCHLORIDE 100 MCG: 10 INJECTION INTRAVENOUS at 01:21

## 2018-07-02 RX ADMIN — HYDROMORPHONE HYDROCHLORIDE 0.5 MG: 2 INJECTION INTRAMUSCULAR; INTRAVENOUS; SUBCUTANEOUS at 00:15

## 2018-07-02 RX ADMIN — POTASSIUM CHLORIDE, DEXTROSE MONOHYDRATE AND SODIUM CHLORIDE 125 ML/HR: 150; 5; 450 INJECTION, SOLUTION INTRAVENOUS at 03:12

## 2018-07-02 RX ADMIN — FENTANYL CITRATE 50 MCG: 50 INJECTION, SOLUTION INTRAMUSCULAR; INTRAVENOUS at 02:35

## 2018-07-02 RX ADMIN — FEBUXOSTAT 40 MG: 40 TABLET ORAL at 09:21

## 2018-07-02 RX ADMIN — HYDROMORPHONE HYDROCHLORIDE: 10 INJECTION INTRAMUSCULAR; INTRAVENOUS; SUBCUTANEOUS at 03:32

## 2018-07-02 RX ADMIN — VANCOMYCIN 125 MG: KIT at 18:35

## 2018-07-02 RX ADMIN — SODIUM CHLORIDE 125 ML/HR: 9 INJECTION, SOLUTION INTRAVENOUS at 09:35

## 2018-07-02 RX ADMIN — VANCOMYCIN 125 MG: KIT at 12:32

## 2018-07-02 RX ADMIN — HUMAN INSULIN 10 UNITS: 100 INJECTION, SOLUTION SUBCUTANEOUS at 12:32

## 2018-07-02 RX ADMIN — BUDESONIDE AND FORMOTEROL FUMARATE DIHYDRATE 2 PUFF: 160; 4.5 AEROSOL RESPIRATORY (INHALATION) at 07:51

## 2018-07-02 RX ADMIN — LEVOTHYROXINE SODIUM 125 MCG: 125 TABLET ORAL at 09:21

## 2018-07-02 RX ADMIN — ROSUVASTATIN CALCIUM 20 MG: 20 TABLET, FILM COATED ORAL at 09:21

## 2018-07-02 RX ADMIN — FENTANYL CITRATE 50 MCG: 50 INJECTION INTRAMUSCULAR; INTRAVENOUS at 01:20

## 2018-07-02 RX ADMIN — FENTANYL CITRATE 50 MCG: 50 INJECTION, SOLUTION INTRAMUSCULAR; INTRAVENOUS at 03:05

## 2018-07-02 NOTE — ANESTHESIA PROCEDURE NOTES
Airway  Urgency: elective    Airway not difficult    General Information and Staff    Patient location during procedure: OR  Anesthesiologist: LYLY LAW    Indications and Patient Condition  Indications for airway management: airway protection    Preoxygenated: yes  Mask difficulty assessment: 0 - not attempted    Final Airway Details  Final airway type: endotracheal airway      Successful airway: ETT  Cuffed: yes   Successful intubation technique: direct laryngoscopy  Endotracheal tube insertion site: oral  Blade: Vanesa  Blade size: #3  ETT size: 7.5 mm  Cormack-Lehane Classification: grade I - full view of glottis  Placement verified by: chest auscultation and capnometry   Measured from: lips  ETT to lips (cm): 20  Number of attempts at approach: 1

## 2018-07-02 NOTE — PROGRESS NOTES
Discharge Planning Assessment  [facility]     Patient Name: Sun Rodriguez  MRN: [id number]  Today's Date: 7/2/2018    Admit Date: 7/1/2018          Discharge Needs Assessment     Row Name 07/02/18 1149       Living Environment    Lives With spouse    Current Living Arrangements home/apartment/condo    Primary Care Provided by self    Provides Primary Care For no one    Family Caregiver if Needed spouse    Quality of Family Relationships helpful       Resource/Environmental Concerns    Resource/Environmental Concerns none       Transition Planning    Patient/Family Anticipates Transition to home with family    Patient/Family Anticipated Services at Transition home health care    Transportation Anticipated family or friend will provide       Discharge Needs Assessment    Concerns to be Addressed denies needs/concerns at this time    Equipment Currently Used at Home nebulizer    Anticipated Changes Related to Illness none    Equipment Needed After Discharge other (see comments)   Ostomy supplies            Discharge Plan     Row Name 07/02/18 1158       Plan    Plan Return home with spouse    Patient/Family in Agreement with Plan yes    Plan Comments Spoke with patient at bedside.  Patient lives with  Simeon [phone], is IADL, she has a nebulizer that she uses if needed, she has never used HH or been to SNF.  Patient is aware that she will need  to follow at [place] - given list of providers.  Patient states to ask [facility] to follow - spoke with Ren.  [facility] will follow.  BHumeniuk RN        Destination     No service coordination in this encounter.      Durable Medical Equipment     No service coordination in this encounter.      Dialysis/Infusion     No service coordination in this encounter.      Home Medical Care     Service Request Status Selected Specialties Address Phone Number Fax Number    [facility] [place] [facility] [address] [address]  [phone]      Social Care     No service coordination in this encounter.                Demographic Summary     Row Name 07/02/18 1148       General Information    Admission Type inpatient    Arrived From home    Referral Source admission list    Reason for Consult discharge planning    Preferred Language English     Used During This Interaction no            Functional Status     Row Name 07/02/18 1149       Functional Status    Usual Activity Tolerance good    Current Activity Tolerance moderate       Functional Status, IADL    Medications independent    Meal Preparation independent    Housekeeping independent    Laundry independent    Shopping independent       Mental Status    General Appearance WDL WDL       Mental Status Summary    Recent Changes in Mental Status/Cognitive Functioning no changes            Psychosocial    No documentation.           Abuse/Neglect    No documentation.           Legal    No documentation.           Substance Abuse    No documentation.           Patient Forms     Row Name 07/02/18 1147       Patient Forms    Provider Choice List Delivered   Home Health Agencies    Delivered to Patient    Method of delivery In person          Becky S. Humeniuk, RN

## 2018-07-02 NOTE — PROGRESS NOTES
Adult Nutrition  Assessment/PES    Patient Name:  Sun Rodriguez  YOB: 1965  MRN: [id number]  Admit Date:  7/1/2018    Assessment Date:  7/2/2018    Comments:  Nutrition assessment complete due to low BMI. Familiar with pt from last visit.  Currently NPO with NG to suction and new ileostomy.  Will follow for readiness to start po nutrition vs need for TPN.          Reason for Assessment     Row Name 07/02/18 1421          Reason for Assessment    Reason For Assessment identified at risk by screening criteria     Diagnosis gastrointestinal disease;renal disease   S/P Sigmoid colon stricture/rupture resection with anastomosis and Diverting loop ileostomy , CKD     Identified At Risk by Screening Criteria BMI               Anthropometrics     Row Name 07/02/18 1430          Admit Weight    Admit Weight 47.7 kg (105 lb 2.6 oz)        Body Mass Index (BMI)    BMI Assessment BMI 16-16.9: protein-energy malnutrition grade II             Labs/Tests/Procedures/Meds     Row Name 07/02/18 1430          Labs/Procedures/Meds    Lab Results Reviewed reviewed, pertinent     Lab Results Comments K, BUN, Cr, Phos, wbc, hgb        Diagnostic Tests/Procedures    Diagnostic Test/Procedure Reviewed reviewed, pertinent     Diagnostic Test/Procedures Comments KUB        Medications    Pertinent Medications Reviewed reviewed, pertinent     Pertinent Medications Comments vanc, PCA pain meds             Physical Findings     Row Name 07/02/18 1431          Physical Findings    Gastrointestinal ostomy   ileostomy     Tubes nasogastric tube;other (see comments)   to suction, Drain     Skin other (see comments)   surgical wound to abd               Nutrition Prescription Ordered     Row Name 07/02/18 1432          Nutrition Prescription PO    Current PO Diet NPO             Evaluation of Received Nutrient/Fluid Intake     Row Name 07/02/18 1433          Fluid Intake Evaluation    IV Fluid (mL) 3000       Problem/Interventions:         Problem 1     Row Name 07/02/18 1433          Nutrition Diagnoses Problem 1    Problem 1 Altered GI Function     Etiology (related to) Medical Diagnosis     Gastrointestinal Other (comment)   Sigmoid colon stricture/rupture resection with anastomosis & Diverting loop ileostomy                Intervention Goal     Row Name 07/02/18 1434          Intervention Goal    General Maintain nutrition;Reduce/improve symptoms;Meet nutritional needs for age/condition;Disease management/therapy     PO Initiate feeding;Tolerate PO     Weight Maintain weight             Nutrition Intervention     Row Name 07/02/18 1434          Nutrition Intervention    RD/Tech Action Care plan reviewd;Follow Tx progress;Await begin PO               Education/Evaluation     Row Name 07/02/18 1434          Education    Education Will Instruct as appropriate        Monitor/Evaluation    Monitor Per protocol;I&O;PO intake;Pertinent labs;Weight;Skin status;Symptoms         Electronically signed by:  Diann Jamil RD  07/02/18 3:08 PM

## 2018-07-02 NOTE — PLAN OF CARE
Problem: Patient Care Overview  Goal: Plan of Care Review  Outcome: Ongoing (interventions implemented as appropriate)   07/02/18 1430   Coping/Psychosocial   Plan of Care Reviewed With patient   Plan of Care Review   Progress no change   OTHER   Outcome Summary Continue inhaler, encourage deep breathe and cough

## 2018-07-02 NOTE — PLAN OF CARE
Problem: Patient Care Overview  Goal: Plan of Care Review  Outcome: Ongoing (interventions implemented as appropriate)   07/02/18 7615   Coping/Psychosocial   Plan of Care Reviewed With patient   Plan of Care Review   Progress no change   OTHER   Outcome Summary Patient to emergenecy surgery early in the shift from increased abdominal pain and distention. Exploratory lap was done, resected part of colon with diverting ileostomy. Patient returned to the unit with PCA running. NG to LWS. MARILYN to LLQ with good output. Ileostomy RLQ with no output, bowel sounds not audible currently. Midline incision clean, dry, and intact. IVF continued. Adair catheter in place, may be discontinued on 7/3/18. VSS. On 1 L O2. Pain is controlled with PCA currently. Will continue to monitor closely.     Goal: Individualization and Mutuality  Outcome: Ongoing (interventions implemented as appropriate)    Goal: Discharge Needs Assessment  Outcome: Ongoing (interventions implemented as appropriate)      Problem: Pain, Acute (Adult)  Goal: Identify Related Risk Factors and Signs and Symptoms  Outcome: Outcome(s) achieved Date Met: 07/02/18    Goal: Acceptable Pain Control/Comfort Level  Outcome: Ongoing (interventions implemented as appropriate)      Problem: Fall Risk (Adult)  Goal: Identify Related Risk Factors and Signs and Symptoms  Outcome: Outcome(s) achieved Date Met: 07/02/18    Goal: Absence of Fall  Outcome: Ongoing (interventions implemented as appropriate)

## 2018-07-02 NOTE — OP NOTE
Operative Note:    Pre-op Dx:  Acute Abdomen with pneumoperitoneum      Post-op Dx: colonic stricture with rupture     Procedure:  Sigmoid colon stricture/rupture resection with anastomosis                        Diverting loop ileostomy     Surgeon:  Bee Carreon M.D.    Anesthesia:  GET    EBL:  200ml    Specimen:  Sigmoid colon    Complications:  None    Findings: colonic stricture ruptured from balloon dilation on 7/1/18    Indications:  This is a pleasant 53 year old female patient that present with a colonic stricture. Patient had undergone two previous balloon dilations and was undergoing her third balloon dilation on 7/1/2018.  Patient began having increasing abdominal pain in the evening.  A KUB was ordered which revealed free air.  Patient was taken emergently to the operating room.    Procedure:   Procedure: After general endotracheal anesthesia, the patient was prepped and draped in the usual sterile fashion.  A 10 blade scalpel was used for skin incision extending supraumbilically down to the pubic symphysis.  The subcutaneous tissues dissected using cautery down to the linea alba.  The linea alba was then opened under direct visualization was extended superiorly and inferiorly.  Edges of the fascia was grasp with Kocker's and lysis of adhesions was carried out using cautery.  A Deondre retractor was then placed with good exposure.  The colonic stricture was easily identified as well as the perforation at stricture.  The left ureter was identified and protected under direct visualization throughout the dissection.  Using a 75 ALIREZA green stapling device, the proximal end of the colon was divided at the soft, pliable, healthy colon.  Using a curved echelon stapling device the rectum was divided below the identified perforation.  Copious irrigations with normal saline was carried out and meticulous hemostasis was maintained throughout the procedure.  The proximal colon and rectal stump appeared to be  viable and there was minimal contamination.  A colonic anastomosis was created using a 29 EEA stapler.  The anvil was placed in the proximal colon with the pursestring.  Stapler was placed through the anus and into the rectum and the anastomosis was created.  The proximal and distal rings were complete.  The anastomosis was evaluated with insufflation under a year and there was no evidence of an air leak.  A 19 [place] Rahul drain was placed in the pelvis and exiting the left lower quadrant.  The drain was secured with a silk suture.  Skin was grasped with an Allis grasper midway between the umbilicus and ASIS on the right lower quadrant and a circular skin and subcutaneous tissue was excised.  The anterior rectus abdominis sheath then was scored using cautery in a cruciate fashion.  Using blunt finger dilation, an opening was created and a [place] was placed through the opening to grasp a loop of the distal ileum.  The loop of ileum was brought out side of the abdomen.  Part of the 19 [place] Rahul drain was used for the Bridge and sutured to the skin on both sides.  Copious irrigations was applied and meticulous hemostasis was maintained throughout the procedure.  All needles and sponge counts were correct ×2.  The fascia was closed using a running #1 PDS superiorly and inferiorly.  The subcutaneous tissue was irrigated.  The skin was then closed using staples.  The incision was covered with a blue towel.  The ostomy was then matured by everting the edges of the loop ileostomy with 3-0 silk sutures in a circumferential manner over the bridge.  An ostomy appliance was placed.  A sterile dressing was placed over the midline incision.  The patient was transferred to recovery room in stable condition.    Bee Carreon MD  General, Minimally Invasive and Endoscopic Surgery  [facility]    [address], [address]  [place], [place], [address]  P: [phone]  F: [phone]    Cc:  Pino Templeton,  MD

## 2018-07-02 NOTE — ANESTHESIA PREPROCEDURE EVALUATION
Anesthesia Evaluation     Patient summary reviewed   history of anesthetic complications: PONV  NPO Solid Status: > 6 hours  NPO Liquid Status: > 4 hours           Airway   No difficulty expected  Dental      Pulmonary - normal exam   (+) asthma,   Cardiovascular - normal exam    ECG reviewed    (+) hypertension,   (-) angina      Neuro/Psych  GI/Hepatic/Renal/Endo    (+)   renal disease CRI, hypothyroidism,     ROS Comment: S/P colon dilatation this AM with XRay suggest free air    Musculoskeletal     Abdominal    Substance History      OB/GYN          Other                        Anesthesia Plan    ASA 3 - emergent     general     Anesthetic plan and risks discussed with patient.

## 2018-07-02 NOTE — CONSULTS
Referring Provider: Bee Carreon MD  Reason for Consultation: Evaluation and management of patient with acute on chronic kidney disease and hyperkalemia    Subjective     Chief complaint No chief complaint on file.      History of present illness:    This is a 53-year-old  female was admitted yesterday with abdominal pain and had evidence of the colonic stenosis she underwent dilatation of the the stenotic segment and unfortunately she had perforation later on in the day she had the bowel resection and ileostomy.  Her creatinine up to 2.66 today from baseline around 2, and her potassium noted to be 6.  She denies history of chronic kidney disease associated with Wegener's granulomatosis with renal involvement, treated with Cytoxan and 2007, apparently she is in remission but she developed chronic kidney disease which has been fluctuating in the past few months and she has history of bronchiectasis and prior history of diverticulitis, hypertension and dyslipidemia.  She has been treated for C. difficile with oral vancomycin  She presented with abdominal pain with nausea and vomiting, currently has an NG tube and place and getting IV fluids with the potassium.  Denies any chest pain or shortness of air, no headache no dysuria or gross hematuria.  She is not diabetic and he has hypothyroidism.    Past Medical History:   Diagnosis Date   • Asthma    • Bronchiectasis    • Chronic kidney disease    • Colitis    • Diverticulitis    • Diverticulosis    • Hypercholesteremia    • Hypothyroidism    • Joint pain    • PONV (postoperative nausea and vomiting)    • Postoperative urinary retention    • Wegener's granulomatosis with renal involvement    • Weight loss      Past Surgical History:   Procedure Laterality Date   • COLON RESECTION N/A 5/1/2018    Procedure: COLON RESECTION LAPAROSCOPIC SIGMOID with splenic flexure takedown;  Surgeon: Bee Carreon MD;  Location: [facility];  Service: General   •  COLONOSCOPY N/A 6/24/2016    Procedure: COLONOSCOPY INTO CECUM;  Surgeon: Bee Carreon MD;  Location: [facility] ENDOSCOPY;  Service:    • COLONOSCOPY N/A 6/21/2018    Procedure: COLONOSCOPY WITH DECOMPRESSION;  Surgeon: Bee Carreon MD;  Location: [facility] MAIN OR;  Service: Gastroenterology   • LUNG BIOPSY Right 2014   • OOPHORECTOMY Right    • RENAL BIOPSY  2007   • SALPINGECTOMY Right    • SIGMOIDOSCOPY N/A 6/23/2018    Procedure: FLEXIBLE SIGMOIDOSCOPY WITH 12MM BALLOON DILATATION;  Surgeon: Bee Carreon MD;  Location: [facility] ENDOSCOPY;  Service: General   • WISDOM TOOTH EXTRACTION       Family History   Problem Relation Age of Onset   • Prostate cancer Father    • Malig Hyperthermia Neg Hx      Negative family history for kidney disease   Social History   Substance Use Topics   • Smoking status: Never Smoker   • Smokeless tobacco: Never Used   • Alcohol use No     Prescriptions Prior to Admission   Medication Sig Dispense Refill Last Dose   • albuterol (PROVENTIL HFA;VENTOLIN HFA) 108 (90 Base) MCG/ACT inhaler Inhale 2 puffs Every 4 (Four) Hours As Needed for Wheezing.   6/30/2018 at Unknown time   • budesonide-formoterol (SYMBICORT) 160-4.5 MCG/ACT inhaler Inhale 2 puffs 2 (Two) Times a Day.   6/30/2018 at Unknown time   • febuxostat (ULORIC) 40 MG tablet Take 40 mg by mouth Daily.   6/30/2018 at Unknown time   • fluticasone (FLONASE) 50 MCG/ACT nasal spray SPRAY 2 SPRAYS IN EACH NOSTRIL DAILY  1 6/30/2018 at Unknown time   • levothyroxine (SYNTHROID, LEVOTHROID) 125 MCG tablet Take 125 mcg by mouth Daily.   6/30/2018 at Unknown time   • ondansetron ODT (ZOFRAN-ODT) 4 MG disintegrating tablet Take 4 mg by mouth Every 8 (Eight) Hours As Needed for Nausea or Vomiting.   6/30/2018 at Unknown time   • promethazine (PHENERGAN) 25 MG tablet Take 25 mg by mouth Every 8 (Eight) Hours As Needed.   Past Month at Unknown time   • rosuvastatin (CRESTOR) 20 MG tablet Take 20 mg by mouth Daily.   6/30/2018 at  "Unknown time   • sodium bicarbonate 650 MG tablet Take 650 mg by mouth 2 (Two) Times a Day.   6/30/2018 at Unknown time     Allergies:  Patient has no known allergies.    Review of Systems  14 point review of system were all negative other than what is noted above in the history of present illness.    Objective     Vital Signs  Temp:  [98 °F (36.7 °C)-102 °F (38.9 °C)] 98.4 °F (36.9 °C)  Heart Rate:  [] 69  Resp:  [12-20] 16  BP: (118-146)/() 125/75    Flowsheet Rows      First Filed Value   Admission Height  170 cm (66.93\") Documented at 07/01/2018 1138   Admission Weight  47.7 kg (105 lb 1.6 oz) Documented at 07/01/2018 1138           No intake/output data recorded.  I/O last 3 completed shifts:  In: 2610 [I.V.:2600; Other:10]  Out: 885 [Urine:250; Emesis/NG output:470; Drains:165]    Intake/Output Summary (Last 24 hours) at 07/02/18 0915  Last data filed at 07/02/18 0637   Gross per 24 hour   Intake             2610 ml   Output              885 ml   Net             1725 ml       Physical Exam:  General Appearance: alert, oriented x 3, no acute distress,   Skin: warm and dry  HEENT: pupils round and reactive to light, oral mucosa dry, NG tube is inserted via the right nostril connected to wall suction Neck: supple, no JVD, trachea midline  Lungs: CTA, unlabored breathing effort  Heart: RRR, normal S1 and S2, no S3, no rub  Abdomen: Postoperative abdomen diffuse tenderness, has the ileostomy, no bowel sounds tenderness  : no palpable bladder,  Joints: No significant deformities noted, no crepitation over the knees or the ankles.  Lymphatics: No cervical or supraclavicular lymphadenopathy.  Extremities: no edema, cyanosis or clubbing  Neuro: normal speech and mental status  Psych: Normal affect    Results Review:  Results for orders placed or performed during the hospital encounter of 07/01/18   Basic Metabolic Panel   Result Value Ref Range    Glucose 90 65 - 99 mg/dL    BUN 21 (H) 6 - 20 mg/dL    " Creatinine 2.18 (H) 0.57 - 1.00 mg/dL    Sodium 138 136 - 145 mmol/L    Potassium 4.8 3.5 - 5.2 mmol/L    Chloride 103 98 - 107 mmol/L    CO2 22.2 22.0 - 29.0 mmol/L    Calcium 9.1 8.6 - 10.5 mg/dL    eGFR Non African Amer 24 (L) >60 mL/min/1.73    BUN/Creatinine Ratio 9.6 7.0 - 25.0    Anion Gap 12.8 mmol/L   CBC Auto Differential   Result Value Ref Range    WBC 10.14 4.50 - 10.70 10*3/mm3    RBC 3.61 (L) 3.90 - 5.20 10*6/mm3    Hemoglobin 9.9 (L) 11.9 - 15.5 g/dL    Hematocrit 32.5 (L) 35.6 - 45.5 %    MCV 90.0 80.5 - 98.2 fL    MCH 27.4 26.9 - 32.0 pg    MCHC 30.5 (L) 32.4 - 36.3 g/dL    RDW 15.8 (H) 11.7 - 13.0 %    RDW-SD 51.8 37.0 - 54.0 fl    MPV 9.1 6.0 - 12.0 fL    Platelets 346 140 - 500 10*3/mm3    Neutrophil % 72.4 42.7 - 76.0 %    Lymphocyte % 15.2 (L) 19.6 - 45.3 %    Monocyte % 9.3 5.0 - 12.0 %    Eosinophil % 2.8 0.3 - 6.2 %    Basophil % 0.3 0.0 - 1.5 %    Immature Grans % 0.1 0.0 - 0.5 %    Neutrophils, Absolute 7.35 1.90 - 8.10 10*3/mm3    Lymphocytes, Absolute 1.54 0.90 - 4.80 10*3/mm3    Monocytes, Absolute 0.94 0.20 - 1.20 10*3/mm3    Eosinophils, Absolute 0.28 0.00 - 0.70 10*3/mm3    Basophils, Absolute 0.03 0.00 - 0.20 10*3/mm3    Immature Grans, Absolute 0.01 0.00 - 0.03 10*3/mm3    nRBC 0.0 0.0 - 0.0 /100 WBC   CK   Result Value Ref Range    Creatine Kinase 40 20 - 180 U/L   Troponin   Result Value Ref Range    Troponin T <0.010 0.000 - 0.030 ng/mL   Magnesium   Result Value Ref Range    Magnesium 2.0 1.6 - 2.6 mg/dL   CBC Auto Differential   Result Value Ref Range    WBC 14.56 (H) 4.50 - 10.70 10*3/mm3    RBC 3.57 (L) 3.90 - 5.20 10*6/mm3    Hemoglobin 10.0 (L) 11.9 - 15.5 g/dL    Hematocrit 32.4 (L) 35.6 - 45.5 %    MCV 90.8 80.5 - 98.2 fL    MCH 28.0 26.9 - 32.0 pg    MCHC 30.9 (L) 32.4 - 36.3 g/dL    RDW 15.7 (H) 11.7 - 13.0 %    RDW-SD 52.0 37.0 - 54.0 fl    MPV 9.2 6.0 - 12.0 fL    Platelets 346 140 - 500 10*3/mm3    Neutrophil % 93.5 (H) 42.7 - 76.0 %    Lymphocyte % 4.3 (L) 19.6 -  45.3 %    Monocyte % 2.1 (L) 5.0 - 12.0 %    Eosinophil % 0.0 (L) 0.3 - 6.2 %    Basophil % 0.1 0.0 - 1.5 %    Immature Grans % 0.2 0.0 - 0.5 %    Neutrophils, Absolute 13.61 (H) 1.90 - 8.10 10*3/mm3    Lymphocytes, Absolute 0.63 (L) 0.90 - 4.80 10*3/mm3    Monocytes, Absolute 0.31 0.20 - 1.20 10*3/mm3    Eosinophils, Absolute 0.00 0.00 - 0.70 10*3/mm3    Basophils, Absolute 0.01 0.00 - 0.20 10*3/mm3    Immature Grans, Absolute 0.03 0.00 - 0.03 10*3/mm3   Basic Metabolic Panel   Result Value Ref Range    Glucose 193 (H) 65 - 99 mg/dL    BUN 23 (H) 6 - 20 mg/dL    Creatinine 2.66 (H) 0.57 - 1.00 mg/dL    Sodium 134 (L) 136 - 145 mmol/L    Potassium 6.0 (H) 3.5 - 5.2 mmol/L    Chloride 100 98 - 107 mmol/L    CO2 20.9 (L) 22.0 - 29.0 mmol/L    Calcium 8.5 (L) 8.6 - 10.5 mg/dL    eGFR Non African Amer 19 (L) >60 mL/min/1.73    BUN/Creatinine Ratio 8.6 7.0 - 25.0    Anion Gap 13.1 mmol/L     Imaging Results (last 72 hours)     Procedure Component Value Units Date/Time    XR Abdomen Flat & Upright [[id number]] Collected:  07/01/18 2029     Updated:  07/01/18 2029    Narrative:       X-RAY ABDOMEN ONE VIEW KUB.     HISTORY:  Abdominal pain.     COMPARISON:  No prior studies for comparison.     FINDINGS:   Bowel gas pattern is unremarkable. No abnormal calcifications are seen.     Questionable small amount of free air under the right hemidiaphragm.      Postsurgical changes of the right upper quadrant.       Impression:       Questionable small amount of free air under the right hemidiaphragm, CT  scan of the abdomen is recommended.         Findings called to patient's nurse Zenon, 8:29 PM.                       budesonide-formoterol 2 puff Inhalation BID   [START ON 7/3/2018] enoxaparin 30 mg Subcutaneous Daily   febuxostat 40 mg Oral Daily   fluticasone 2 spray Each Nare BID   HYDROmorphone      levothyroxine 125 mcg Oral Daily   rosuvastatin 20 mg Oral Daily   sodium bicarbonate 650 mg Oral Daily   vancomycin 125 mg Oral  Q6H       dextrose 5 % and sodium chloride 0.45 % with KCl 20 mEq/L 100 mL/hr Last Rate: Stopped (07/01/18 2225)   dextrose 5 % and sodium chloride 0.45 % with KCl 20 mEq/L 100 mL/hr Last Rate: 100 mL/hr (07/01/18 2055)   dextrose 5 % and sodium chloride 0.45 % with KCl 20 mEq/L 125 mL/hr Last Rate: 125 mL/hr (07/02/18 0440)   HYDROmorphone HCl-NaCl     lactated ringers 9 mL/hr Last Rate: Stopped (07/02/18 0310)   sodium chloride 50 mL/hr Last Rate: Stopped (07/01/18 1334)       Assessment/Plan   1.  Acute kidney injury on chronic kidney disease multifactorial in etiology , Creatinine baseline is around 2 and has risen since admission up to 2.66 probably associated with a perforated bowel and the hemodynamic changes and the fluid shift.  2.  Chronic kidney disease stage IV status associated with Wegener's granulomatosis with renal involvement has been in remission with stable renal function for few years.    3.   history of chronic anemia, hemoglobin is very stable 12.5.  4.  History of bronchiectasis  5.  Wegener's granulomatosis in remission  6.   Colonic stricture, S/P perforated the bowel status post resection with ileostomy  7.  Hypertension, well controlled   8.   hyperkalemia, multifactorial associated with potassium added to IV fluid and acute kidney injury  9.  Gout stable      Plan:  1.  Change IV fluid to normal saline to run at 125 cc an hour  2.  Albuterol mini neb 10 mg the ×1, and dextrose and insulin to be given to help with the hypokalemia will recheck potassium and make more adjustments in the treatment.  3.  Will check random urine for sodium, chloride, protein to creatinine ratio  4.  Surveillance labs  5.  No indication for dialysis at this time      Thank you for asking me to see this patient in consultation    I discussed the patients findings and my recommendations with the patient and the nursing staff    Ryder Santoyo MD  07/02/18  9:15 AM

## 2018-07-02 NOTE — ANESTHESIA POSTPROCEDURE EVALUATION
Patient: Sun Rodriguez    Procedure Summary     Date:  07/01/18 Room / Location:  [facility] OR 06 / [facility] MAIN OR    Anesthesia Start:  2306 Anesthesia Stop:  07/02/18 0219    Procedure:  EXPLORATORY LAPOARATOMY WITH RESECTION OF SIGMOID COLON WITH DIVERTING ILEOSTOMY (N/A Abdomen) Diagnosis:      Surgeon:  Bee Carreon MD Provider:  Hieu Jones MD    Anesthesia Type:  general ASA Status:  3 - Emergent          Anesthesia Type: general  Last vitals  BP   128/80 (07/01/18 2225)   Temp   (!) 38.9 °C (102 °F) (07/01/18 2211)   Pulse   88 (07/01/18 2225)   Resp   16 (07/01/18 2225)     SpO2   97 % (07/01/18 2225)     Post Anesthesia Care and Evaluation    Patient location during evaluation: PACU  Anesthetic complications: No anesthetic complications

## 2018-07-02 NOTE — ADDENDUM NOTE
Addendum  created 07/02/18 0230 by Hieu Jones MD    Anesthesia Intra Meds edited, Visit Navigator Flowsheet section accepted

## 2018-07-03 LAB
ALBUMIN SERPL-MCNC: 2.7 G/DL (ref 3.5–5.2)
ALBUMIN SERPL-MCNC: 2.8 G/DL (ref 3.5–5.2)
ALBUMIN/GLOB SERPL: 1 G/DL
ALP SERPL-CCNC: 37 U/L (ref 39–117)
ALT SERPL W P-5'-P-CCNC: 8 U/L (ref 1–33)
ANION GAP SERPL CALCULATED.3IONS-SCNC: 10.7 MMOL/L
ANION GAP SERPL CALCULATED.3IONS-SCNC: 11.1 MMOL/L
ANION GAP SERPL CALCULATED.3IONS-SCNC: 9.8 MMOL/L
AST SERPL-CCNC: 14 U/L (ref 1–32)
BASOPHILS # BLD AUTO: 0.01 10*3/MM3 (ref 0–0.2)
BASOPHILS NFR BLD AUTO: 0.1 % (ref 0–1.5)
BILIRUB SERPL-MCNC: 0.2 MG/DL (ref 0.1–1.2)
BUN BLD-MCNC: 27 MG/DL (ref 6–20)
BUN BLD-MCNC: 28 MG/DL (ref 6–20)
BUN BLD-MCNC: 29 MG/DL (ref 6–20)
BUN/CREAT SERPL: 11.1 (ref 7–25)
BUN/CREAT SERPL: 12.2 (ref 7–25)
BUN/CREAT SERPL: 13.2 (ref 7–25)
CALCIUM SPEC-SCNC: 8.3 MG/DL (ref 8.6–10.5)
CALCIUM SPEC-SCNC: 9 MG/DL (ref 8.6–10.5)
CALCIUM SPEC-SCNC: 9.1 MG/DL (ref 8.6–10.5)
CHLORIDE SERPL-SCNC: 105 MMOL/L (ref 98–107)
CHLORIDE SERPL-SCNC: 107 MMOL/L (ref 98–107)
CHLORIDE SERPL-SCNC: 107 MMOL/L (ref 98–107)
CO2 SERPL-SCNC: 20.3 MMOL/L (ref 22–29)
CO2 SERPL-SCNC: 20.9 MMOL/L (ref 22–29)
CO2 SERPL-SCNC: 22.2 MMOL/L (ref 22–29)
CREAT BLD-MCNC: 2.2 MG/DL (ref 0.57–1)
CREAT BLD-MCNC: 2.3 MG/DL (ref 0.57–1)
CREAT BLD-MCNC: 2.44 MG/DL (ref 0.57–1)
CYTO UR: NORMAL
DEPRECATED RDW RBC AUTO: 52.7 FL (ref 37–54)
EOSINOPHIL # BLD AUTO: 0.15 10*3/MM3 (ref 0–0.7)
EOSINOPHIL NFR BLD AUTO: 1 % (ref 0.3–6.2)
ERYTHROCYTE [DISTWIDTH] IN BLOOD BY AUTOMATED COUNT: 15.7 % (ref 11.7–13)
GFR SERPL CREATININE-BSD FRML MDRD: 21 ML/MIN/1.73
GFR SERPL CREATININE-BSD FRML MDRD: 22 ML/MIN/1.73
GFR SERPL CREATININE-BSD FRML MDRD: 23 ML/MIN/1.73
GLOBULIN UR ELPH-MCNC: 2.7 GM/DL
GLUCOSE BLD-MCNC: 83 MG/DL (ref 65–99)
GLUCOSE BLD-MCNC: 87 MG/DL (ref 65–99)
GLUCOSE BLD-MCNC: 91 MG/DL (ref 65–99)
HCT VFR BLD AUTO: 27.3 % (ref 35.6–45.5)
HGB BLD-MCNC: 8.3 G/DL (ref 11.9–15.5)
IMM GRANULOCYTES # BLD: 0.03 10*3/MM3 (ref 0–0.03)
IMM GRANULOCYTES NFR BLD: 0.2 % (ref 0–0.5)
LAB AP CASE REPORT: NORMAL
LAB AP INTRADEPARTMENTAL CONSULT: NORMAL
LYMPHOCYTES # BLD AUTO: 0.86 10*3/MM3 (ref 0.9–4.8)
LYMPHOCYTES NFR BLD AUTO: 5.7 % (ref 19.6–45.3)
MAGNESIUM SERPL-MCNC: 2 MG/DL (ref 1.6–2.6)
MCH RBC QN AUTO: 27.9 PG (ref 26.9–32)
MCHC RBC AUTO-ENTMCNC: 30.4 G/DL (ref 32.4–36.3)
MCV RBC AUTO: 91.9 FL (ref 80.5–98.2)
MONOCYTES # BLD AUTO: 1.39 10*3/MM3 (ref 0.2–1.2)
MONOCYTES NFR BLD AUTO: 9.2 % (ref 5–12)
NEUTROPHILS # BLD AUTO: 12.61 10*3/MM3 (ref 1.9–8.1)
NEUTROPHILS NFR BLD AUTO: 83.8 % (ref 42.7–76)
PATH REPORT.FINAL DX SPEC: NORMAL
PATH REPORT.GROSS SPEC: NORMAL
PHOSPHATE SERPL-MCNC: 4.6 MG/DL (ref 2.5–4.5)
PLATELET # BLD AUTO: 244 10*3/MM3 (ref 140–500)
PMV BLD AUTO: 9.1 FL (ref 6–12)
POTASSIUM BLD-SCNC: 5.2 MMOL/L (ref 3.5–5.2)
POTASSIUM BLD-SCNC: 5.7 MMOL/L (ref 3.5–5.2)
POTASSIUM BLD-SCNC: 5.9 MMOL/L (ref 3.5–5.2)
PROT SERPL-MCNC: 5.5 G/DL (ref 6–8.5)
RBC # BLD AUTO: 2.97 10*6/MM3 (ref 3.9–5.2)
SODIUM BLD-SCNC: 137 MMOL/L (ref 136–145)
SODIUM BLD-SCNC: 138 MMOL/L (ref 136–145)
SODIUM BLD-SCNC: 139 MMOL/L (ref 136–145)
URATE SERPL-MCNC: 3.1 MG/DL (ref 2.4–5.7)
WBC NRBC COR # BLD: 15.05 10*3/MM3 (ref 4.5–10.7)

## 2018-07-03 PROCEDURE — 25010000002 ENOXAPARIN PER 10 MG: Performed by: SURGERY

## 2018-07-03 PROCEDURE — 94799 UNLISTED PULMONARY SVC/PX: CPT

## 2018-07-03 PROCEDURE — 85025 COMPLETE CBC W/AUTO DIFF WBC: CPT | Performed by: SURGERY

## 2018-07-03 PROCEDURE — 80048 BASIC METABOLIC PNL TOTAL CA: CPT | Performed by: INTERNAL MEDICINE

## 2018-07-03 PROCEDURE — 84550 ASSAY OF BLOOD/URIC ACID: CPT | Performed by: INTERNAL MEDICINE

## 2018-07-03 PROCEDURE — 83735 ASSAY OF MAGNESIUM: CPT | Performed by: INTERNAL MEDICINE

## 2018-07-03 PROCEDURE — 80053 COMPREHEN METABOLIC PANEL: CPT | Performed by: INTERNAL MEDICINE

## 2018-07-03 PROCEDURE — 80069 RENAL FUNCTION PANEL: CPT | Performed by: INTERNAL MEDICINE

## 2018-07-03 PROCEDURE — 99024 POSTOP FOLLOW-UP VISIT: CPT | Performed by: SURGERY

## 2018-07-03 RX ADMIN — BUDESONIDE AND FORMOTEROL FUMARATE DIHYDRATE 2 PUFF: 160; 4.5 AEROSOL RESPIRATORY (INHALATION) at 19:42

## 2018-07-03 RX ADMIN — ENOXAPARIN SODIUM 30 MG: 40 INJECTION SUBCUTANEOUS at 08:22

## 2018-07-03 RX ADMIN — SODIUM CHLORIDE 125 ML/HR: 9 INJECTION, SOLUTION INTRAVENOUS at 06:55

## 2018-07-03 RX ADMIN — ROSUVASTATIN CALCIUM 10 MG: 10 TABLET, FILM COATED ORAL at 08:22

## 2018-07-03 RX ADMIN — VANCOMYCIN 125 MG: KIT at 18:38

## 2018-07-03 RX ADMIN — SODIUM CHLORIDE 150 ML/HR: 9 INJECTION, SOLUTION INTRAVENOUS at 18:37

## 2018-07-03 RX ADMIN — VANCOMYCIN 125 MG: KIT at 12:29

## 2018-07-03 RX ADMIN — FLUTICASONE PROPIONATE 2 SPRAY: 50 SPRAY, METERED NASAL at 08:23

## 2018-07-03 RX ADMIN — BUDESONIDE AND FORMOTEROL FUMARATE DIHYDRATE 2 PUFF: 160; 4.5 AEROSOL RESPIRATORY (INHALATION) at 07:30

## 2018-07-03 RX ADMIN — VANCOMYCIN 125 MG: KIT at 00:21

## 2018-07-03 RX ADMIN — FEBUXOSTAT 40 MG: 40 TABLET ORAL at 08:22

## 2018-07-03 RX ADMIN — LEVOTHYROXINE SODIUM 125 MCG: 125 TABLET ORAL at 08:22

## 2018-07-03 RX ADMIN — SODIUM CHLORIDE 150 ML/HR: 9 INJECTION, SOLUTION INTRAVENOUS at 11:47

## 2018-07-03 RX ADMIN — HYDROMORPHONE HYDROCHLORIDE: 10 INJECTION INTRAMUSCULAR; INTRAVENOUS; SUBCUTANEOUS at 12:35

## 2018-07-03 RX ADMIN — VANCOMYCIN 125 MG: KIT at 05:10

## 2018-07-03 RX ADMIN — SODIUM BICARBONATE 650 MG: 650 TABLET ORAL at 08:22

## 2018-07-03 NOTE — PROGRESS NOTES
Chief Complaint: POD # 1    Subjective   Pt reports pain improved, no vomiting, nausea this am but not this evening, reports up in chair for long time today    Objective     Vital signs in last 24 hours:  Temp:  [97.9 °F (36.6 °C)-102 °F (38.9 °C)] 97.9 °F (36.6 °C)  Heart Rate:  [] 72  Resp:  [16-20] 18  BP: (108-146)/() 114/68    Intake/Output last 3 shifts:  I/O last 3 completed shifts:  In: 2610 [I.V.:2600; Other:10]  Out: 1260 [Urine:525; Emesis/NG output:470; Drains:265]    Intake/Output this shift:  No intake/output data recorded.    Physical Exam:  Respiratory: CTA, good inspiratory effort  CV: RRR  Abd: no BS, soft, ND, usual post-op tenderness, no rebound, no guarding, incision  Dressing dry, MARILYN serous, loop ileostomy pink, no stool, no air     Results from last 7 days  Lab Units 07/02/18  0708   WBC 10*3/mm3 14.56*   HEMOGLOBIN g/dL 10.0*   HEMATOCRIT % 32.4*   PLATELETS 10*3/mm3 346       Results from last 7 days  Lab Units 07/02/18  1202   SODIUM mmol/L 137   POTASSIUM mmol/L 5.3*   CHLORIDE mmol/L 101   CO2 mmol/L 21.6*   BUN mg/dL 25*   CREATININE mg/dL 2.73*   GLUCOSE mg/dL 196*   CALCIUM mg/dL 8.7     Assessment/Plan   S/P Sigmoid colon stricture/rupture resection with anastomosis         Diverting loop ileostomy   Expected post-op ileus, continue NPO, NG, and IVF   +C.Diff - continue oral vanco  Hyperkalemia - protocol per nephrology     Bee Carreon MD

## 2018-07-03 NOTE — PLAN OF CARE
Problem: Patient Care Overview  Goal: Plan of Care Review  Outcome: Ongoing (interventions implemented as appropriate)   07/03/18 1536   Coping/Psychosocial   Plan of Care Reviewed With patient   Plan of Care Review   Progress no change   OTHER   Outcome Summary continue inhaler

## 2018-07-03 NOTE — PLAN OF CARE
Problem: Patient Care Overview  Goal: Plan of Care Review  Outcome: Ongoing (interventions implemented as appropriate)   07/03/18 4492   Coping/Psychosocial   Plan of Care Reviewed With patient   Plan of Care Review   Progress improving   OTHER   Outcome Summary Catheter removed prior to shift change. Patient voids spontaneously. Potassium elevated and fluids increased. Potassium lab improved this afternoon. NG removed. Patient ambulated to bathroom. PCA continue. VS and labs monitored.     Goal: Individualization and Mutuality  Outcome: Ongoing (interventions implemented as appropriate)    Goal: Interprofessional Rounds/Family Conf  Outcome: Ongoing (interventions implemented as appropriate)      Problem: Pain, Acute (Adult)  Goal: Acceptable Pain Control/Comfort Level  Outcome: Ongoing (interventions implemented as appropriate)      Problem: Fall Risk (Adult)  Goal: Absence of Fall  Outcome: Ongoing (interventions implemented as appropriate)

## 2018-07-03 NOTE — PROGRESS NOTES
"   LOS: 2 days    Patient Care Team:  Pino Templeton MD as PCP - General  Pino Templeton MD as PCP - Family Medicine  Liliya Giraldo MD as Consulting Physician (Rheumatology)  Moisés Corado MD as Consulting Physician (Nephrology)  Grey Dia MD as Consulting Physician (Pulmonary Disease)    Chief Complaint:  No chief complaint on file.    Follow up Arianna on CKD IV, hyperkalemia  Subjective     Interval History:   Throat sore.  Pain at MARILYN site.  Walked in room.  Urinating. Not soa.     Review of Systems:   See above.      Objective     Vital Signs  Temp:  [97.9 °F (36.6 °C)-98.2 °F (36.8 °C)] 98.2 °F (36.8 °C)  Heart Rate:  [65-98] 65  Resp:  [16-20] 20  BP: (108-122)/(68-71) 122/71    Flowsheet Rows      First Filed Value   Admission Height  170 cm (66.93\") Documented at 07/01/2018 1138   Admission Weight  47.7 kg (105 lb 1.6 oz) Documented at 07/01/2018 1138          I/O this shift:  In: 1000 [I.V.:1000]  Out: 200 [Urine:200]  I/O last 3 completed shifts:  In: 2710 [I.V.:2700; Other:10]  Out: 2720 [Urine:1975; Emesis/NG output:470; Drains:275]    Intake/Output Summary (Last 24 hours) at 07/03/18 1237  Last data filed at 07/03/18 1147   Gross per 24 hour   Intake             2000 ml   Output             1975 ml   Net               25 ml       Physical Exam:  Chronically ill   Sitting in chair.  NG tube.  Oral mucosa dry.  No JVD.  Heart RRR no s3 or rub. Lungs clear to auscultation .  ABd Few BS,  MARILYN drain LLQ,  Ostomy RLQ.  Tender appropriately .  Ext no edema.  Skin dry.       Results Review:      Results from last 7 days  Lab Units 07/03/18  0657 07/02/18  1202 07/02/18  0708   SODIUM mmol/L 138  137 137 134*   POTASSIUM mmol/L 5.9*  5.7* 5.3* 6.0*   CHLORIDE mmol/L 107  105 101 100   CO2 mmol/L 20.3*  22.2 21.6* 20.9*   BUN mg/dL 27*  29* 25* 23*   CREATININE mg/dL 2.44*  2.20* 2.73* 2.66*   CALCIUM mg/dL 9.0  8.3* 8.7 8.5*   BILIRUBIN mg/dL 0.2  --   --    ALK PHOS U/L 37*  --   " --    ALT (SGPT) U/L 8  --   --    AST (SGOT) U/L 14  --   --    GLUCOSE mg/dL 87  91 196* 193*       Estimated Creatinine Clearance: 23.3 mL/min (A) (by C-G formula based on SCr of 2.2 mg/dL (H)).      Results from last 7 days  Lab Units 07/03/18  0657 07/02/18  1202 07/01/18  2125   MAGNESIUM mg/dL 2.0  --  2.0   PHOSPHORUS mg/dL 4.6* 5.2*  --          Results from last 7 days  Lab Units 07/03/18  0657   URIC ACID mg/dL 3.1         Results from last 7 days  Lab Units 07/03/18  0658 07/02/18  0708 07/01/18  1722   WBC 10*3/mm3 15.05* 14.56* 10.14   HEMOGLOBIN g/dL 8.3* 10.0* 9.9*   PLATELETS 10*3/mm3 244 346 346               Imaging Results (last 24 hours)     Procedure Component Value Units Date/Time    XR Abdomen Flat & Upright [[id number]] Collected:  07/01/18 2029     Updated:  07/02/18 2242    Narrative:       X-RAY ABDOMEN ONE VIEW KUB.     HISTORY:  Abdominal pain.     COMPARISON:  No prior studies for comparison.     FINDINGS:   Bowel gas pattern is unremarkable. No abnormal calcifications are seen.     Questionable small amount of free air under the right hemidiaphragm.      Postsurgical changes of the right upper quadrant.       Impression:       Questionable small amount of free air under the right hemidiaphragm, CT  scan of the abdomen is recommended.         Findings called to patient's nurse Zenon, 8:29 PM.     This report was finalized on 7/2/2018 10:39 PM by Dr. Franko River M.D.             budesonide-formoterol 2 puff Inhalation BID   enoxaparin 30 mg Subcutaneous Daily   febuxostat 40 mg Oral Daily   fluticasone 2 spray Each Nare BID   levothyroxine 125 mcg Oral Daily   rosuvastatin 10 mg Oral Daily   sodium bicarbonate 650 mg Oral Daily   vancomycin 125 mg Oral Q6H       HYDROmorphone HCl-NaCl     sodium chloride 150 mL/hr Last Rate: 150 mL/hr (07/03/18 1148)       Medication Review:   Current Facility-Administered Medications   Medication Dose Route Frequency Provider Last Rate Last Dose    • acetaminophen (TYLENOL) tablet 650 mg  650 mg Oral Q4H PRN Bee Carreon MD        Or   • acetaminophen (TYLENOL) 160 MG/5ML solution 650 mg  650 mg Oral Q4H PRN Bee Carreon MD        Or   • acetaminophen (TYLENOL) suppository 650 mg  650 mg Rectal Q4H PRN Bee Carreon MD       • albuterol (PROVENTIL HFA;VENTOLIN HFA) inhaler 2 puff  2 puff Inhalation Q4H PRN Bee Carreon MD       • budesonide-formoterol (SYMBICORT) 160-4.5 MCG/ACT inhaler 2 puff  2 puff Inhalation BID Bee Carreon MD   2 puff at 07/03/18 0730   • enoxaparin (LOVENOX) syringe 30 mg  30 mg Subcutaneous Daily Bee Carreon MD   30 mg at 07/03/18 0822   • febuxostat (ULORIC) tablet 40 mg  40 mg Oral Daily Bee Carreon MD   40 mg at 07/03/18 0822   • fluticasone (FLONASE) 50 MCG/ACT nasal spray 2 spray  2 spray Each Nare BID Bee Carreon MD   2 spray at 07/03/18 0823   • HYDROmorphone (DILAUDID) injection 0.5 mg  0.5 mg Intravenous Q2H PRN Bee Carreon MD   0.5 mg at 07/01/18 1430   • HYDROmorphone (DILAUDID) injection 0.5 mg  0.5 mg Intravenous Q2H PRN Bee Carreon MD   0.5 mg at 07/01/18 1935    And   • naloxone (NARCAN) injection 0.1 mg  0.1 mg Intravenous Q5 Min PRN Bee Carreon MD       • HYDROmorphone (DILAUDID) PCA 0.2 mg/ml 50 mL syringe   Intravenous Continuous Bee Carreon MD       • levothyroxine (SYNTHROID, LEVOTHROID) tablet 125 mcg  125 mcg Oral Daily Bee Carreon MD   125 mcg at 07/03/18 0822   • naloxone (NARCAN) injection 0.1 mg  0.1 mg Intravenous Q5 Min PRN Bee Carreon MD       • ondansetron (ZOFRAN) tablet 4 mg  4 mg Oral Q6H PRN Bee Carreon MD        Or   • ondansetron ODT (ZOFRAN-ODT) disintegrating tablet 4 mg  4 mg Oral Q6H PRN Bee Carreon MD        Or   • ondansetron (ZOFRAN) injection 4 mg  4 mg Intravenous Q6H PRN Bee Carreon MD   4 mg at 07/01/18 1755   • ondansetron (ZOFRAN) tablet 4 mg  4 mg Oral Q6H PRN Bee BRADY  MD Velma        Or   • ondansetron ODT (ZOFRAN-ODT) disintegrating tablet 4 mg  4 mg Oral Q6H PRN Bee Carreon MD        Or   • ondansetron (ZOFRAN) injection 4 mg  4 mg Intravenous Q6H PRN Bee Carreon MD       • ondansetron ODT (ZOFRAN-ODT) disintegrating tablet 4 mg  4 mg Oral Q8H PRN Bee Carreon MD       • promethazine (PHENERGAN) tablet 25 mg  25 mg Oral Q8H PRN Bee Carreon MD       • rosuvastatin (CRESTOR) tablet 10 mg  10 mg Oral Daily Bee Carreon MD   10 mg at 07/03/18 0822   • sodium bicarbonate tablet 650 mg  650 mg Oral Daily Bee Carreon MD   650 mg at 07/03/18 0822   • sodium chloride 0.9 % flush 1-10 mL  1-10 mL Intravenous PRN Bee Carreon MD       • sodium chloride 0.9 % infusion  150 mL/hr Intravenous Continuous Laura Francis  mL/hr at 07/03/18 1148 150 mL/hr at 07/03/18 1148   • vancomycin oral solution 125 mg  125 mg Oral Q6H Bee Carreon MD   125 mg at 07/03/18 1229       Assessment/Plan      1.  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  Multifactorial in etiology , most likely due to volume depletion.  Chronic kidney disease stage IV status associated with Wegener's granulomatosis with renal involvement has been in remission with stable renal function . Baseline crt 2.  Increase IVF rate to 150 to keep up with volume losses.  Recheck chem this afternoon to make sure K coming down.   2. Recent C. dif  3. Recurrent diverticulitis sp lap resection 5/1/18. Colonic stricture sp dilation with subsequent perforation requiring ileostomy 7/1.   4. Hypothyroid on replacement.    5. Anemia  6. Protein calorie malnutrition. Will need nutrition soon.  Says she was eating the week after dc, but has been NPO in here.         Laura Francis MD  07/03/18  12:37 PM

## 2018-07-03 NOTE — PLAN OF CARE
Problem: Patient Care Overview  Goal: Plan of Care Review  Outcome: Ongoing (interventions implemented as appropriate)   07/03/18 0456   Coping/Psychosocial   Plan of Care Reviewed With patient   Plan of Care Review   Progress no change   OTHER   Outcome Summary VSS, A&Ox4. MARILYN drain barely with output this shift. NG in right nare. Good output. Ileostomy RLQ. F/C to DC this aM per MD order. PCA effective for pain management per pt. Will continue to monitor.       Problem: Pain, Acute (Adult)  Goal: Acceptable Pain Control/Comfort Level  Outcome: Ongoing (interventions implemented as appropriate)      Problem: Fall Risk (Adult)  Goal: Absence of Fall  Outcome: Ongoing (interventions implemented as appropriate)

## 2018-07-04 ENCOUNTER — APPOINTMENT (OUTPATIENT)
Dept: GENERAL RADIOLOGY | Facility: HOSPITAL | Age: 53
End: 2018-07-04

## 2018-07-04 LAB
ALBUMIN SERPL-MCNC: 2.8 G/DL (ref 3.5–5.2)
ALBUMIN/GLOB SERPL: 1 G/DL
ALP SERPL-CCNC: 44 U/L (ref 39–117)
ALT SERPL W P-5'-P-CCNC: 5 U/L (ref 1–33)
ANION GAP SERPL CALCULATED.3IONS-SCNC: 10 MMOL/L
AST SERPL-CCNC: 13 U/L (ref 1–32)
BASOPHILS # BLD AUTO: 0.02 10*3/MM3 (ref 0–0.2)
BASOPHILS NFR BLD AUTO: 0.1 % (ref 0–1.5)
BILIRUB SERPL-MCNC: 0.2 MG/DL (ref 0.1–1.2)
BUN BLD-MCNC: 29 MG/DL (ref 6–20)
BUN/CREAT SERPL: 14.3 (ref 7–25)
CALCIUM SPEC-SCNC: 8.5 MG/DL (ref 8.6–10.5)
CHLORIDE SERPL-SCNC: 106 MMOL/L (ref 98–107)
CO2 SERPL-SCNC: 21 MMOL/L (ref 22–29)
CREAT BLD-MCNC: 2.03 MG/DL (ref 0.57–1)
CREAT FLD-MCNC: 2.1 MG/DL
DEPRECATED RDW RBC AUTO: 53.2 FL (ref 37–54)
EOSINOPHIL # BLD AUTO: 1.34 10*3/MM3 (ref 0–0.7)
EOSINOPHIL NFR BLD AUTO: 9 % (ref 0.3–6.2)
ERYTHROCYTE [DISTWIDTH] IN BLOOD BY AUTOMATED COUNT: 15.6 % (ref 11.7–13)
GFR SERPL CREATININE-BSD FRML MDRD: 26 ML/MIN/1.73
GLOBULIN UR ELPH-MCNC: 2.8 GM/DL
GLUCOSE BLD-MCNC: 75 MG/DL (ref 65–99)
HCT VFR BLD AUTO: 29.3 % (ref 35.6–45.5)
HGB BLD-MCNC: 8.8 G/DL (ref 11.9–15.5)
IMM GRANULOCYTES # BLD: 0.02 10*3/MM3 (ref 0–0.03)
IMM GRANULOCYTES NFR BLD: 0.1 % (ref 0–0.5)
LYMPHOCYTES # BLD AUTO: 1.21 10*3/MM3 (ref 0.9–4.8)
LYMPHOCYTES NFR BLD AUTO: 8.1 % (ref 19.6–45.3)
MCH RBC QN AUTO: 27.8 PG (ref 26.9–32)
MCHC RBC AUTO-ENTMCNC: 30 G/DL (ref 32.4–36.3)
MCV RBC AUTO: 92.7 FL (ref 80.5–98.2)
MONOCYTES # BLD AUTO: 0.68 10*3/MM3 (ref 0.2–1.2)
MONOCYTES NFR BLD AUTO: 4.5 % (ref 5–12)
NEUTROPHILS # BLD AUTO: 11.68 10*3/MM3 (ref 1.9–8.1)
NEUTROPHILS NFR BLD AUTO: 78.2 % (ref 42.7–76)
PHOSPHATE SERPL-MCNC: 3.2 MG/DL (ref 2.5–4.5)
PLATELET # BLD AUTO: 296 10*3/MM3 (ref 140–500)
PMV BLD AUTO: 9.2 FL (ref 6–12)
POTASSIUM BLD-SCNC: 4.5 MMOL/L (ref 3.5–5.2)
PREALB SERPL-MCNC: 14.2 MG/DL (ref 20–40)
PROT SERPL-MCNC: 5.6 G/DL (ref 6–8.5)
RBC # BLD AUTO: 3.16 10*6/MM3 (ref 3.9–5.2)
SODIUM BLD-SCNC: 137 MMOL/L (ref 136–145)
WBC NRBC COR # BLD: 14.95 10*3/MM3 (ref 4.5–10.7)

## 2018-07-04 PROCEDURE — 99024 POSTOP FOLLOW-UP VISIT: CPT | Performed by: SURGERY

## 2018-07-04 PROCEDURE — 25010000002 ONDANSETRON PER 1 MG: Performed by: SURGERY

## 2018-07-04 PROCEDURE — 84134 ASSAY OF PREALBUMIN: CPT | Performed by: INTERNAL MEDICINE

## 2018-07-04 PROCEDURE — 94799 UNLISTED PULMONARY SVC/PX: CPT

## 2018-07-04 PROCEDURE — 84100 ASSAY OF PHOSPHORUS: CPT | Performed by: INTERNAL MEDICINE

## 2018-07-04 PROCEDURE — 85025 COMPLETE CBC W/AUTO DIFF WBC: CPT | Performed by: SURGERY

## 2018-07-04 PROCEDURE — 63710000001 PROMETHAZINE PER 25 MG: Performed by: SURGERY

## 2018-07-04 PROCEDURE — 82570 ASSAY OF URINE CREATININE: CPT | Performed by: SURGERY

## 2018-07-04 PROCEDURE — 74018 RADEX ABDOMEN 1 VIEW: CPT

## 2018-07-04 PROCEDURE — 80053 COMPREHEN METABOLIC PANEL: CPT | Performed by: INTERNAL MEDICINE

## 2018-07-04 RX ORDER — MORPHINE SULFATE 2 MG/ML
2 INJECTION, SOLUTION INTRAMUSCULAR; INTRAVENOUS
Status: DISCONTINUED | OUTPATIENT
Start: 2018-07-04 | End: 2018-07-13 | Stop reason: HOSPADM

## 2018-07-04 RX ADMIN — PROMETHAZINE HYDROCHLORIDE 25 MG: 25 TABLET ORAL at 07:17

## 2018-07-04 RX ADMIN — ONDANSETRON 4 MG: 2 INJECTION, SOLUTION INTRAMUSCULAR; INTRAVENOUS at 04:24

## 2018-07-04 RX ADMIN — VANCOMYCIN 125 MG: KIT at 12:45

## 2018-07-04 RX ADMIN — BUDESONIDE AND FORMOTEROL FUMARATE DIHYDRATE 2 PUFF: 160; 4.5 AEROSOL RESPIRATORY (INHALATION) at 18:53

## 2018-07-04 RX ADMIN — ONDANSETRON 4 MG: 2 INJECTION, SOLUTION INTRAMUSCULAR; INTRAVENOUS at 14:24

## 2018-07-04 RX ADMIN — SODIUM CHLORIDE 150 ML/HR: 9 INJECTION, SOLUTION INTRAVENOUS at 00:52

## 2018-07-04 RX ADMIN — VANCOMYCIN 125 MG: KIT at 18:19

## 2018-07-04 RX ADMIN — VANCOMYCIN 125 MG: KIT at 00:09

## 2018-07-04 RX ADMIN — SODIUM CHLORIDE 150 ML/HR: 9 INJECTION, SOLUTION INTRAVENOUS at 14:24

## 2018-07-04 RX ADMIN — VANCOMYCIN 125 MG: KIT at 05:13

## 2018-07-04 RX ADMIN — SODIUM CHLORIDE 150 ML/HR: 9 INJECTION, SOLUTION INTRAVENOUS at 08:07

## 2018-07-04 NOTE — PROGRESS NOTES
Chief Complaint: POD # 3    Subjective   Pt reports intermittent cramping sharp pain and nausea, no vomiting     Objective     Vital signs in last 24 hours:  Temp:  [98.3 °F (36.8 °C)-98.9 °F (37.2 °C)] 98.4 °F (36.9 °C)  Heart Rate:  [70-96] 90  Resp:  [18-20] 18  BP: (129-148)/(69-95) 148/95    Intake/Output last 3 shifts:  I/O last 3 completed shifts:  In: 4000 [I.V.:4000]  Out: 2800 [Urine:2350; Emesis/NG output:100; Drains:250; Stool:100]    Intake/Output this shift:  I/O this shift:  In: 900 [I.V.:900]  Out: 990 [Urine:800; Drains:190]    Physical Exam:  Respiratory: CTA, good inspiratory effort  CV: RRR  Abd: + BS, soft, ND, usual post-op tenderness, no rebound, no guarding, incision C/D/I, MARILYN serous 240cc/24hrs, ileostomy functioning with stool and air.    Results from last 7 days  Lab Units 07/04/18  0526   WBC 10*3/mm3 14.95*   HEMOGLOBIN g/dL 8.8*   HEMATOCRIT % 29.3*   PLATELETS 10*3/mm3 296       Results from last 7 days  Lab Units 07/04/18  0526   SODIUM mmol/L 137   POTASSIUM mmol/L 4.5   CHLORIDE mmol/L 106   CO2 mmol/L 21.0*   BUN mg/dL 29*   CREATININE mg/dL 2.03*   GLUCOSE mg/dL 75   CALCIUM mg/dL 8.5*     KUB was supine an inadequate for evaluation of free air, revealed ileus     Assessment/Plan   S/P Sigmoid colon stricture/rupture resection with anastomosis         Diverting loop ileostomy   MARILYN - fluid send for creatine   Post-op ileus - npo and continue ivfs     Bee Carreon MD

## 2018-07-04 NOTE — PROGRESS NOTES
"   LOS: 3 days    Patient Care Team:  Pino Templeton MD as PCP - General  Pino Templeton MD as PCP - Family Medicine  Liliya Giraldo MD as Consulting Physician (Rheumatology)  Moisés Corado MD as Consulting Physician (Nephrology)  Grey Dia MD as Consulting Physician (Pulmonary Disease)    Chief Complaint:  No chief complaint on file.    Follow up Arianna on CKD IV, hyperkalemia  Subjective     Interval History:   Rough day.  Nausea and pain.  Ate apple sauce last night. Urinating. Ostomy working.     Review of Systems:   See above.      Objective     Vital Signs  Temp:  [98.3 °F (36.8 °C)-99 °F (37.2 °C)] 99 °F (37.2 °C)  Heart Rate:  [73-96] 81  Resp:  [16-20] 16  BP: (129-148)/(69-95) 142/90    Flowsheet Rows      First Filed Value   Admission Height  170 cm (66.93\") Documented at 07/01/2018 1138   Admission Weight  47.7 kg (105 lb 1.6 oz) Documented at 07/01/2018 1138          I/O this shift:  In: 1900 [I.V.:1900]  Out: 990 [Urine:800; Drains:190]  I/O last 3 completed shifts:  In: 4000 [I.V.:4000]  Out: 2800 [Urine:2350; Emesis/NG output:100; Drains:250; Stool:100]    Intake/Output Summary (Last 24 hours) at 07/04/18 1439  Last data filed at 07/04/18 1424   Gross per 24 hour   Intake             3900 ml   Output             2130 ml   Net             1770 ml       Physical Exam:  Chronically ill . Lying in bed. NG out.   Oral mucosa dry.  No JVD.  Heart RRR no s3 or rub. Lungs clear to auscultation .  Abd +BS,  MARILYN drain LLQ,  Ostomy RLQ.  Tender appropriately .  Ext no edema.  Skin dry.       Results Review:      Results from last 7 days  Lab Units 07/04/18  0526 07/03/18  1449 07/03/18  0657   SODIUM mmol/L 137 139 138  137   POTASSIUM mmol/L 4.5 5.2 5.9*  5.7*   CHLORIDE mmol/L 106 107 107  105   CO2 mmol/L 21.0* 20.9* 20.3*  22.2   BUN mg/dL 29* 28* 27*  29*   CREATININE mg/dL 2.03* 2.30* 2.44*  2.20*   CALCIUM mg/dL 8.5* 9.1 9.0  8.3*   BILIRUBIN mg/dL 0.2  --  0.2   ALK PHOS " U/L 44  --  37*   ALT (SGPT) U/L 5  --  8   AST (SGOT) U/L 13  --  14   GLUCOSE mg/dL 75 83 87  91       Estimated Creatinine Clearance: 25.3 mL/min (A) (by C-G formula based on SCr of 2.03 mg/dL (H)).      Results from last 7 days  Lab Units 07/04/18  0526 07/03/18  0657 07/02/18  1202 07/01/18  2125   MAGNESIUM mg/dL  --  2.0  --  2.0   PHOSPHORUS mg/dL 3.2 4.6* 5.2*  --          Results from last 7 days  Lab Units 07/03/18  0657   URIC ACID mg/dL 3.1         Results from last 7 days  Lab Units 07/04/18  0526 07/03/18  0658 07/02/18  0708 07/01/18  1722   WBC 10*3/mm3 14.95* 15.05* 14.56* 10.14   HEMOGLOBIN g/dL 8.8* 8.3* 10.0* 9.9*   PLATELETS 10*3/mm3 296 244 346 346               Imaging Results (last 24 hours)     Procedure Component Value Units Date/Time    XR Abdomen KUB [[id number]] Collected:  07/04/18 1011     Updated:  07/04/18 1016    Narrative:       XR ABDOMEN KUB-     INDICATIONS: Abdominal pain and nausea     TECHNIQUE: SUPINE VIEWS OF THE ABDOMEN     COMPARISON: 7/1/2018     FINDINGS:     Midline skin staples are seen. A drain is apparent in the left aspect of  the abdomen. Free intraperitoneal gas cannot be excluded on a supine  radiograph. Loops of bowel in the left aspect of the abdomen show mildly  prominent caliber, may reflect regional ileus or potentially early or  partial small bowel obstruction, continued follow-up suggested.         Impression:          As described.     This report was finalized on 7/4/2018 10:13 AM by Dr. Gadiel Morejon M.D.             budesonide-formoterol 2 puff Inhalation BID   enoxaparin 30 mg Subcutaneous Daily   febuxostat 40 mg Oral Daily   fluticasone 2 spray Each Nare BID   levothyroxine 125 mcg Oral Daily   rosuvastatin 10 mg Oral Daily   sodium bicarbonate 650 mg Oral Daily   vancomycin 125 mg Oral Q6H       HYDROmorphone HCl-NaCl     sodium chloride 150 mL/hr Last Rate: 150 mL/hr (07/04/18 1424)       Medication Review:   Current Facility-Administered  Medications   Medication Dose Route Frequency Provider Last Rate Last Dose   • acetaminophen (TYLENOL) 160 MG/5ML solution 650 mg  650 mg Oral Q4H PRN Bee Carreon MD        Or   • acetaminophen (TYLENOL) suppository 650 mg  650 mg Rectal Q4H PRN Bee Carreon MD       • albuterol (PROVENTIL HFA;VENTOLIN HFA) inhaler 2 puff  2 puff Inhalation Q4H PRN Bee Carreon MD       • budesonide-formoterol (SYMBICORT) 160-4.5 MCG/ACT inhaler 2 puff  2 puff Inhalation BID Bee Carreon MD   2 puff at 07/03/18 1942   • enoxaparin (LOVENOX) syringe 30 mg  30 mg Subcutaneous Daily Bee Carreon MD   30 mg at 07/03/18 0822   • febuxostat (ULORIC) tablet 40 mg  40 mg Oral Daily Bee Carreon MD   40 mg at 07/03/18 0822   • fluticasone (FLONASE) 50 MCG/ACT nasal spray 2 spray  2 spray Each Nare BID Bee Carreon MD   2 spray at 07/03/18 0823   • HYDROmorphone (DILAUDID) injection 0.5 mg  0.5 mg Intravenous Q2H PRN Bee Carreon MD   0.5 mg at 07/01/18 1430   • HYDROmorphone (DILAUDID) injection 0.5 mg  0.5 mg Intravenous Q2H PRN Bee Carreon MD   0.5 mg at 07/01/18 1935    And   • naloxone (NARCAN) injection 0.1 mg  0.1 mg Intravenous Q5 Min PRN Bee Carreon MD       • HYDROmorphone (DILAUDID) PCA 0.2 mg/ml 50 mL syringe   Intravenous Continuous Bee Carreon MD       • levothyroxine (SYNTHROID, LEVOTHROID) tablet 125 mcg  125 mcg Oral Daily Bee Carreon MD   125 mcg at 07/03/18 0822   • morphine injection 2 mg  2 mg Intravenous Q2H PRN Bee Carreon MD       • naloxone (NARCAN) injection 0.1 mg  0.1 mg Intravenous Q5 Min PRN Bee Carreon MD       • ondansetron (ZOFRAN) tablet 4 mg  4 mg Oral Q6H PRN Bee Carreon MD        Or   • ondansetron ODT (ZOFRAN-ODT) disintegrating tablet 4 mg  4 mg Oral Q6H PRN Bee Carreon MD        Or   • ondansetron (ZOFRAN) injection 4 mg  4 mg Intravenous Q6H PRN Bee Carreon MD   4 mg at 07/01/18 1753   •  ondansetron (ZOFRAN) tablet 4 mg  4 mg Oral Q6H PRN Bee Carreon MD        Or   • ondansetron ODT (ZOFRAN-ODT) disintegrating tablet 4 mg  4 mg Oral Q6H PRN Bee Carreon MD        Or   • ondansetron (ZOFRAN) injection 4 mg  4 mg Intravenous Q6H PRN Bee Carreon MD   4 mg at 07/04/18 1424   • ondansetron ODT (ZOFRAN-ODT) disintegrating tablet 4 mg  4 mg Oral Q8H PRN Bee Carreon MD       • promethazine (PHENERGAN) tablet 25 mg  25 mg Oral Q8H PRN Bee Carreon MD   25 mg at 07/04/18 0717   • rosuvastatin (CRESTOR) tablet 10 mg  10 mg Oral Daily Bee Carreon MD   10 mg at 07/03/18 0822   • sodium bicarbonate tablet 650 mg  650 mg Oral Daily Bee Carreon MD   650 mg at 07/03/18 0822   • sodium chloride 0.9 % flush 1-10 mL  1-10 mL Intravenous PRN Bee Carreon MD       • sodium chloride 0.9 % infusion  150 mL/hr Intravenous Continuous Laura Francis  mL/hr at 07/04/18 1424 150 mL/hr at 07/04/18 1424   • vancomycin oral solution 125 mg  125 mg Oral Q6H Bee Carreon MD   125 mg at 07/04/18 1245       Assessment/Plan      1.  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  Multifactorial in etiology , most likely due to volume depletion.  Chronic kidney disease stage IV status associated with Wegener's granulomatosis with renal involvement has been in remission with stable renal function . Baseline crt 2.   Creatinine better today.  K better. Continue IVF.   2. Recent C. dif  3. Recurrent diverticulitis sp lap resection 5/1/18. Colonic stricture sp dilation with subsequent perforation requiring ileostomy 7/1.   4. Hypothyroid on replacement.    5. Anemia  6. Protein calorie malnutrition. Will need nutrition soon.  Says she was eating the week after dc, but has been NPO in here.         Laura Francis MD  07/04/18  2:39 PM

## 2018-07-04 NOTE — PROGRESS NOTES
Chief Complaint: POD # 2    Subjective   Pt ambulated to bathroom, no vomiting, urinating   Objective     Vital signs in last 24 hours:  Temp:  [98 °F (36.7 °C)-98.9 °F (37.2 °C)] 98.3 °F (36.8 °C)  Heart Rate:  [65-96] 96  Resp:  [16-20] 18  BP: (114-132)/(71-79) 132/79    Intake/Output last 3 shifts:  I/O last 3 completed shifts:  In: 3000 [I.V.:3000]  Out: 2395 [Urine:2125; Emesis/NG output:100; Drains:170]    Intake/Output this shift:  No intake/output data recorded.    Physical Exam:  Respiratory: CTA, good inspiratory effort  CV: RRR  Abd: no BS, soft, ND, usual post-op tenderness, no rebound, no guarding, incision dressing dry, MARILYN serous, ileostomy pink without stool    Results from last 7 days  Lab Units 07/03/18  0658   WBC 10*3/mm3 15.05*   HEMOGLOBIN g/dL 8.3*   HEMATOCRIT % 27.3*   PLATELETS 10*3/mm3 244       Results from last 7 days  Lab Units 07/03/18  1449   SODIUM mmol/L 139   POTASSIUM mmol/L 5.2   CHLORIDE mmol/L 107   CO2 mmol/L 20.9*   BUN mg/dL 28*   CREATININE mg/dL 2.30*   GLUCOSE mg/dL 83   CALCIUM mg/dL 9.1     Assessment/Plan   S/P  Sigmoid colon stricture/rupture resection with anastomosis         Diverting loop ileostomy   Expected post-op ileus, continue and IVF   Will try sips of liquids     Bee Carreon MD

## 2018-07-04 NOTE — PLAN OF CARE
Problem: Patient Care Overview  Goal: Plan of Care Review  Outcome: Ongoing (interventions implemented as appropriate)   07/04/18 0943   Coping/Psychosocial   Plan of Care Reviewed With patient   Plan of Care Review   Progress improving   OTHER   Outcome Summary VSS, A&Ox4. Pt still has a tremble from anesthesia but improving. Pain control PCA adequete per pt. Nausea better. MARILYN with small output. Diet advanced to clear liq per MD. Dressing in tact. No drainage present. NG tube and FC pulled today. Will monitor.       Problem: Pain, Acute (Adult)  Goal: Acceptable Pain Control/Comfort Level  Outcome: Ongoing (interventions implemented as appropriate)      Problem: Fall Risk (Adult)  Goal: Absence of Fall  Outcome: Ongoing (interventions implemented as appropriate)

## 2018-07-05 LAB
ALBUMIN SERPL-MCNC: 3.1 G/DL (ref 3.5–5.2)
ALBUMIN/GLOB SERPL: 1.1 G/DL
ALP SERPL-CCNC: 52 U/L (ref 39–117)
ALT SERPL W P-5'-P-CCNC: 7 U/L (ref 1–33)
ANION GAP SERPL CALCULATED.3IONS-SCNC: 21.8 MMOL/L
AST SERPL-CCNC: 17 U/L (ref 1–32)
BACTERIA SPEC AEROBE CULT: ABNORMAL
BASOPHILS # BLD AUTO: 0.02 10*3/MM3 (ref 0–0.2)
BASOPHILS NFR BLD AUTO: 0.1 % (ref 0–1.5)
BILIRUB SERPL-MCNC: 0.3 MG/DL (ref 0.1–1.2)
BUN BLD-MCNC: 30 MG/DL (ref 6–20)
BUN/CREAT SERPL: 13.3 (ref 7–25)
CALCIUM SPEC-SCNC: 8.6 MG/DL (ref 8.6–10.5)
CHLORIDE SERPL-SCNC: 104 MMOL/L (ref 98–107)
CO2 SERPL-SCNC: 14.2 MMOL/L (ref 22–29)
CREAT BLD-MCNC: 2.25 MG/DL (ref 0.57–1)
DEPRECATED RDW RBC AUTO: 53.6 FL (ref 37–54)
EOSINOPHIL # BLD AUTO: 1.36 10*3/MM3 (ref 0–0.7)
EOSINOPHIL NFR BLD AUTO: 7.4 % (ref 0.3–6.2)
ERYTHROCYTE [DISTWIDTH] IN BLOOD BY AUTOMATED COUNT: 15.5 % (ref 11.7–13)
GFR SERPL CREATININE-BSD FRML MDRD: 23 ML/MIN/1.73
GLOBULIN UR ELPH-MCNC: 2.8 GM/DL
GLUCOSE BLD-MCNC: 50 MG/DL (ref 65–99)
GRAM STN SPEC: ABNORMAL
GRAM STN SPEC: ABNORMAL
HCT VFR BLD AUTO: 34.5 % (ref 35.6–45.5)
HGB BLD-MCNC: 10.2 G/DL (ref 11.9–15.5)
IMM GRANULOCYTES # BLD: 0.05 10*3/MM3 (ref 0–0.03)
IMM GRANULOCYTES NFR BLD: 0.3 % (ref 0–0.5)
LYMPHOCYTES # BLD AUTO: 1.46 10*3/MM3 (ref 0.9–4.8)
LYMPHOCYTES NFR BLD AUTO: 7.9 % (ref 19.6–45.3)
MAGNESIUM SERPL-MCNC: 2.2 MG/DL (ref 1.6–2.6)
MCH RBC QN AUTO: 27.9 PG (ref 26.9–32)
MCHC RBC AUTO-ENTMCNC: 29.6 G/DL (ref 32.4–36.3)
MCV RBC AUTO: 94.3 FL (ref 80.5–98.2)
MONOCYTES # BLD AUTO: 1.29 10*3/MM3 (ref 0.2–1.2)
MONOCYTES NFR BLD AUTO: 7 % (ref 5–12)
NEUTROPHILS # BLD AUTO: 14.23 10*3/MM3 (ref 1.9–8.1)
NEUTROPHILS NFR BLD AUTO: 77.3 % (ref 42.7–76)
PHOSPHATE SERPL-MCNC: 4.3 MG/DL (ref 2.5–4.5)
PLATELET # BLD AUTO: 426 10*3/MM3 (ref 140–500)
PMV BLD AUTO: 9.7 FL (ref 6–12)
POTASSIUM BLD-SCNC: 4.6 MMOL/L (ref 3.5–5.2)
PROT SERPL-MCNC: 5.9 G/DL (ref 6–8.5)
RBC # BLD AUTO: 3.66 10*6/MM3 (ref 3.9–5.2)
SODIUM BLD-SCNC: 140 MMOL/L (ref 136–145)
WBC NRBC COR # BLD: 18.41 10*3/MM3 (ref 4.5–10.7)

## 2018-07-05 PROCEDURE — 99024 POSTOP FOLLOW-UP VISIT: CPT | Performed by: SURGERY

## 2018-07-05 PROCEDURE — 25010000002 ONDANSETRON PER 1 MG: Performed by: SURGERY

## 2018-07-05 PROCEDURE — 97161 PT EVAL LOW COMPLEX 20 MIN: CPT

## 2018-07-05 PROCEDURE — 83735 ASSAY OF MAGNESIUM: CPT | Performed by: INTERNAL MEDICINE

## 2018-07-05 PROCEDURE — 84100 ASSAY OF PHOSPHORUS: CPT | Performed by: INTERNAL MEDICINE

## 2018-07-05 PROCEDURE — 97110 THERAPEUTIC EXERCISES: CPT

## 2018-07-05 PROCEDURE — 94799 UNLISTED PULMONARY SVC/PX: CPT

## 2018-07-05 PROCEDURE — 25010000002 ENOXAPARIN PER 10 MG: Performed by: SURGERY

## 2018-07-05 PROCEDURE — 80053 COMPREHEN METABOLIC PANEL: CPT | Performed by: INTERNAL MEDICINE

## 2018-07-05 PROCEDURE — 93005 ELECTROCARDIOGRAM TRACING: CPT | Performed by: SURGERY

## 2018-07-05 PROCEDURE — 93010 ELECTROCARDIOGRAM REPORT: CPT | Performed by: INTERNAL MEDICINE

## 2018-07-05 PROCEDURE — 25010000002 ERTAPENEM PER 500 MG: Performed by: SURGERY

## 2018-07-05 PROCEDURE — 85025 COMPLETE CBC W/AUTO DIFF WBC: CPT | Performed by: SURGERY

## 2018-07-05 RX ADMIN — SODIUM CHLORIDE 150 ML/HR: 9 INJECTION, SOLUTION INTRAVENOUS at 10:11

## 2018-07-05 RX ADMIN — HYDROMORPHONE HYDROCHLORIDE: 10 INJECTION INTRAMUSCULAR; INTRAVENOUS; SUBCUTANEOUS at 00:43

## 2018-07-05 RX ADMIN — FEBUXOSTAT 40 MG: 40 TABLET ORAL at 08:32

## 2018-07-05 RX ADMIN — METRONIDAZOLE 500 MG: 500 INJECTION, SOLUTION INTRAVENOUS at 14:00

## 2018-07-05 RX ADMIN — LEVOTHYROXINE SODIUM 125 MCG: 125 TABLET ORAL at 08:32

## 2018-07-05 RX ADMIN — SODIUM CHLORIDE 500 MG: 9 INJECTION, SOLUTION INTRAVENOUS at 15:48

## 2018-07-05 RX ADMIN — ONDANSETRON 4 MG: 2 INJECTION INTRAMUSCULAR; INTRAVENOUS at 21:23

## 2018-07-05 RX ADMIN — VANCOMYCIN 125 MG: KIT at 14:00

## 2018-07-05 RX ADMIN — METRONIDAZOLE 500 MG: 500 INJECTION, SOLUTION INTRAVENOUS at 21:16

## 2018-07-05 RX ADMIN — SODIUM BICARBONATE 650 MG: 650 TABLET ORAL at 08:36

## 2018-07-05 RX ADMIN — ONDANSETRON 4 MG: 2 INJECTION INTRAMUSCULAR; INTRAVENOUS at 14:00

## 2018-07-05 RX ADMIN — ONDANSETRON 4 MG: 2 INJECTION, SOLUTION INTRAMUSCULAR; INTRAVENOUS at 00:46

## 2018-07-05 RX ADMIN — SODIUM CHLORIDE 150 ML/HR: 9 INJECTION, SOLUTION INTRAVENOUS at 00:02

## 2018-07-05 RX ADMIN — ONDANSETRON 4 MG: 2 INJECTION, SOLUTION INTRAMUSCULAR; INTRAVENOUS at 06:01

## 2018-07-05 RX ADMIN — BUDESONIDE AND FORMOTEROL FUMARATE DIHYDRATE 2 PUFF: 160; 4.5 AEROSOL RESPIRATORY (INHALATION) at 21:01

## 2018-07-05 RX ADMIN — SODIUM CHLORIDE 150 ML/HR: 9 INJECTION, SOLUTION INTRAVENOUS at 18:37

## 2018-07-05 RX ADMIN — SODIUM CHLORIDE 150 ML/HR: 9 INJECTION, SOLUTION INTRAVENOUS at 08:32

## 2018-07-05 RX ADMIN — VANCOMYCIN 125 MG: KIT at 00:02

## 2018-07-05 RX ADMIN — BUDESONIDE AND FORMOTEROL FUMARATE DIHYDRATE 2 PUFF: 160; 4.5 AEROSOL RESPIRATORY (INHALATION) at 07:31

## 2018-07-05 RX ADMIN — SODIUM CHLORIDE 150 ML/HR: 9 INJECTION, SOLUTION INTRAVENOUS at 15:45

## 2018-07-05 RX ADMIN — VANCOMYCIN 125 MG: KIT at 05:56

## 2018-07-05 RX ADMIN — ENOXAPARIN SODIUM 30 MG: 40 INJECTION SUBCUTANEOUS at 08:35

## 2018-07-05 RX ADMIN — ROSUVASTATIN CALCIUM 10 MG: 10 TABLET, FILM COATED ORAL at 08:32

## 2018-07-05 NOTE — PLAN OF CARE
Problem: Patient Care Overview  Goal: Plan of Care Review  Outcome: Ongoing (interventions implemented as appropriate)   07/05/18 0256   Coping/Psychosocial   Plan of Care Reviewed With patient   Plan of Care Review   Progress improving   OTHER   Outcome Summary VSS, Nausea improving. Dilaudid PCA syringe changed out this HS. Ileosotomy with good output. MARILYN with large serous output. Conitinue to monitor.       Problem: Pain, Acute (Adult)  Goal: Acceptable Pain Control/Comfort Level  Outcome: Ongoing (interventions implemented as appropriate)      Problem: Fall Risk (Adult)  Goal: Absence of Fall  Outcome: Ongoing (interventions implemented as appropriate)

## 2018-07-05 NOTE — PLAN OF CARE
Problem: Patient Care Overview  Goal: Plan of Care Review   07/05/18 0935   Coping/Psychosocial   Plan of Care Reviewed With patient   OTHER   Outcome Summary Pt. will benefit from skilled inpt. P.T. to address her functional deficits and to assist pt. in regaining her independence with functional mobility.

## 2018-07-05 NOTE — THERAPY EVALUATION
Acute Care - Physical Therapy Initial Evaluation  [facility]     Patient Name: Sun Rodriguez  : 1965  MRN: [id number]  Today's Date: 2018   Onset of Illness/Injury or Date of Surgery: 18  Date of Referral to PT: 18  Referring Physician: Bee Lou      Admit Date: 2018    Visit Dx:     ICD-10-CM ICD-9-CM   1. Muscle weakness (generalized) M62.81 728.87   2. Acute abdomen R10.0 789.00     Patient Active Problem List   Diagnosis   • Hypertension   • CKD (chronic kidney disease) stage 4, GFR 15-29 ml/min (CMS/[facility])   • Dizziness   • Underweight BMI 17.6   • Acute sigmoid diverticulitis   • Sepsis present on admission   • History of colonic diverticulitis   • Colitis, acute   • Colonic stricture   • Acute abdomen     Past Medical History:   Diagnosis Date   • Asthma    • Bronchiectasis (CMS/[facility])    • Chronic kidney disease    • Colitis    • Diverticulitis    • Diverticulosis    • Hypercholesteremia    • Hypothyroidism    • Joint pain    • PONV (postoperative nausea and vomiting)    • Postoperative urinary retention    • Wegener's granulomatosis with renal involvement (CMS/[facility])    • Weight loss      Past Surgical History:   Procedure Laterality Date   • COLON RESECTION N/A 2018    Procedure: COLON RESECTION LAPAROSCOPIC SIGMOID with splenic flexure takedown;  Surgeon: Bee Carreon MD;  Location: [facility];  Service: General   • COLONOSCOPY N/A 2016    Procedure: COLONOSCOPY INTO CECUM;  Surgeon: Bee Carreon MD;  Location: [facility] ENDOSCOPY;  Service:    • COLONOSCOPY N/A 2018    Procedure: COLONOSCOPY WITH DECOMPRESSION;  Surgeon: Bee Carreon MD;  Location: [facility] OR;  Service: Gastroenterology   • EXPLORATORY LAPAROTOMY N/A 2018    Procedure: EXPLORATORY LAPAROTOMY WITH RESECTION OF SIGMOID COLON WITH DIVERTING ILEOSTOMY;  Surgeon: Bee Carreon MD;  Location: [facility] OR;  Service: General   • LUNG BIOPSY Right    •  "OOPHORECTOMY Right    • RENAL BIOPSY  2007   • SALPINGECTOMY Right    • SIGMOIDOSCOPY N/A 6/23/2018    Procedure: FLEXIBLE SIGMOIDOSCOPY WITH 12MM BALLOON DILATATION;  Surgeon: Bee Carreon MD;  Location: [facility] ENDOSCOPY;  Service: General   • SIGMOIDOSCOPY N/A 7/1/2018    Procedure: FLEXIBLE SIGMOIDOSCOPY WITH DILATATION with colonic balloons 8 TO 15MM;  Surgeon: Bee Carreon MD;  Location: [facility] ENDOSCOPY;  Service: General   • WISDOM TOOTH EXTRACTION          PT ASSESSMENT (last 12 hours)      Physical Therapy Evaluation     Row Name 07/05/18 0930          PT Evaluation Time/Intention    Subjective Information complains of;fatigue;pain  -MS     Document Type evaluation  -MS     Mode of Treatment physical therapy;individual therapy  -MS     Patient Effort good  -MS     Comment Pt. reports pain in her \"drain\" site Left abdomen this AM.  Otherwise, pt. agreeable to work with P.T.  -MS     Row Name 07/05/18 0997          General Information    Onset of Illness/Injury or Date of Surgery 07/01/18  -MS     Referring Physician Bee Lou  -MS     Patient Observations agree to therapy;cooperative  -MS     Prior Level of Function independent:  -MS     Equipment Currently Used at Home none  -MS     Pertinent History of Current Functional Problem Pt. is s/p Exp. Lap. with resection of Sigmoid Colon with diverting ileostomy (7/1/18)  -MS     Existing Precautions/Restrictions fall   J.P. Drain; Colostomy; C. Diff Isolation  -MS     Risks Reviewed patient:  -MS     Benefits Reviewed patient:  -MS     Barriers to Rehab none identified  -MS     Row Name 07/05/18 0929          Cognitive Assessment/Intervention- PT/OT    Orientation Status (Cognition) oriented x 3  -MS     Follows Commands (Cognition) WNL  -MS     Personal Safety Interventions fall prevention program maintained;gait belt;nonskid shoes/slippers when out of bed;supervised activity  -MS     Row Name 07/05/18 0912          Bed Mobility " Assessment/Treatment    Bed Mobility Assessment/Treatment supine-sit;sit-supine  -MS     Supine-Sit [place] (Bed Mobility) independent  -MS     Row Name 07/05/18 0930          Transfer Assessment/Treatment    Transfer Assessment/Treatment sit-stand transfer;stand-sit transfer  -MS     Sit-Stand [place] (Transfers) contact guard  -MS     Stand-Sit [place] (Transfers) contact guard  -MS     Row Name 07/05/18 0930          Gait/Stairs Assessment/Training    [place] Level (Gait) contact guard;1 person to manage equipment  -MS     Distance in Feet (Gait) 300 feet  -MS     Pattern (Gait) step-through  -MS     Deviations/Abnormal Patterns (Gait) antalgic;david decreased  -MS     Row Name 07/05/18 0930          General ROM    GENERAL ROM COMMENTS BUE/LE (WFL's)  -MS     Row Name 07/05/18 0930          General Assessment (Manual Muscle Testing)    Comment, General Manual Muscle Testing (MMT) Assessment BUE/LE (3+/5);  Limited by abdominal pain  -MS     Row Name 07/05/18 0930          Pain Assessment    Additional Documentation Pain Scale: Numbers Pre/Post-Treatment (Group)  -MS     Row Name 07/05/18 0930          Pain Scale: Numbers Pre/Post-Treatment    Pain Scale: Numbers, Pretreatment 4/10  -MS     Pain Scale: Numbers, Post-Treatment 4/10  -MS     Pain Location - Side Left  -MS     Pain Location abdomen  -MS     Pain Intervention(s) Medication (See MAR);Repositioned;Rest  -MS     Row Name             Wound 07/02/18 0232 Other (See comments) abdomen incision    Wound - Properties Group Date first assessed: 07/02/18  -KM Time first assessed: 0232  -KM Side: Other (See comments)  -KM Location: abdomen  -KM Type: incision  -KM    Row Name 07/05/18 0930          Physical Therapy Clinical Impression    Date of Referral to PT 07/03/18  -MS     Criteria for Skilled Interventions Met (PT Clinical Impression) treatment indicated  -MS     Rehab Potential (PT Clinical Summary) good, to achieve stated therapy  goals  -MS     Row Name 07/05/18 0930          Physical Therapy Goals    Transfer Goal Selection (PT) transfer, PT goal 1  -MS     Gait Training Goal Selection (PT) gait training, PT goal 1  -MS     Row Name 07/05/18 0930          Transfer Goal 1 (PT)    Activity/Assistive Device (Transfer Goal 1, PT) transfers, all  -MS     [place] Level/Cues Needed (Transfer Goal 1, PT) independent  -MS     Time Frame (Transfer Goal 1, PT) long term goal (LTG);2 - 3 days  -MS     Row Name 07/05/18 0930          Gait Training Goal 1 (PT)    Activity/Assistive Device (Gait Training Goal 1, PT) gait (walking locomotion)  -MS     [place] Level (Gait Training Goal 1, PT) independent  -MS     Distance (Gait Goal 1, PT) 400 feet  -MS     Time Frame (Gait Training Goal 1, PT) long term goal (LTG);2 - 3 days  -MS     Row Name 07/05/18 0930          Positioning and Restraints    Pre-Treatment Position in bed  -MS     Post Treatment Position bed  -MS     In Bed notified nsg;sitting EOB;call light within reach;encouraged to call for assist;exit alarm on   All lines/tubes intact.  -MS       User Key  (r) = Recorded By, (t) = Taken By, (c) = Cosigned By    Initials Name Provider Type    KAYLIN Antoine, RN Registered Nurse    MS Luis Brown, PT Physical Therapist          Physical Therapy Education     Title: PT OT SLP Therapies (Done)     Topic: Physical Therapy (Done)     Point: Mobility training (Done)    Learning Progress Summary     Learner Status Readiness Method Response Comment Documented by    Patient Done Acceptance KATYA COSBY NR  MS 07/05/18 0935          Point: Home exercise program (Done)    Learning Progress Summary     Learner Status Readiness Method Response Comment Documented by    Patient Done Acceptance KATYA COSBY NR  MS 07/05/18 0935          Point: Body mechanics (Done)    Learning Progress Summary     Learner Status Readiness Method Response Comment Documented by    Patient Done Acceptance KATYA COSBY NR  MS  07/05/18 0935          Point: Precautions (Done)    Learning Progress Summary     Learner Status Readiness Method Response Comment Documented by    Patient Done Acceptance E,KATYA PRUITT,NR  MS 07/05/18 0935                      User Key     Initials Effective Dates Name Provider Type Discipline    MS 04/03/18 -  Luis Brown, PT Physical Therapist PT                PT Recommendation and Plan  Anticipated Discharge Disposition (PT): home with assist, home with home health  Planned Therapy Interventions (PT Eval): balance training, gait training, home exercise program, patient/family education, postural re-education, strengthening, transfer training  Therapy Frequency (PT Clinical Impression): daily  Outcome Summary/Treatment Plan (PT)  Anticipated Discharge Disposition (PT): home with assist, home with home health  Plan of Care Reviewed With: patient  Outcome Summary: Pt. will benefit from skilled inpt. P.T. to address her functional deficits and to assist pt. in regaining her independence with functional mobility.          Outcome Measures     Row Name 07/05/18 0900             How much help from another person do you currently need...    Turning from your back to your side while in flat bed without using bedrails? 4  -MS      Moving from lying on back to sitting on the side of a flat bed without bedrails? 4  -MS      Moving to and from a bed to a chair (including a wheelchair)? 3  -MS      Standing up from a chair using your arms (e.g., wheelchair, bedside chair)? 3  -MS      Climbing 3-5 steps with a railing? 3  -MS      To walk in hospital room? 3  -MS      AM-PAC 6 Clicks Score 20  -MS         Functional Assessment    Outcome Measure Options AM-PAC 6 Clicks Basic Mobility (PT)  -MS        User Key  (r) = Recorded By, (t) = Taken By, (c) = Cosigned By    Initials Name Provider Type    MS Luis CAITLYN Brown, PT Physical Therapist           Time Calculation:         PT Charges     Row Name 07/05/18 0936             Time  Calculation    Start Time 0915  -MS      Stop Time 0930  -MS      Time Calculation (min) 15 min  -MS      PT Received On 07/05/18  -MS      PT - Next Appointment 07/06/18  -MS      PT Goal Re-Cert Due Date 07/08/18  -MS         Time Calculation- PT    Total Timed Code Minutes- PT 11 minute(s)  -MS        User Key  (r) = Recorded By, (t) = Taken By, (c) = Cosigned By    Initials Name Provider Type    MS Luis Brown, PT Physical Therapist        Therapy Suggested Charges     Code   Minutes Charges    None           Therapy Charges for Today     Code Description Service Date Service Provider Modifiers Qty    26803943237 HC PT EVAL LOW COMPLEXITY 1 7/5/2018 Luis Brown, PT GP 1    72797039339 HC PT THER PROC EA 15 MIN 7/5/2018 Luis Brown, PT GP 1    97781658070 HC PT THER SUPP EA 15 MIN 7/5/2018 Luis Brown, PT GP 1          PT G-Codes  Outcome Measure Options: AM-PAC 6 Clicks Basic Mobility (PT)      Luis Brown, PT  7/5/2018

## 2018-07-05 NOTE — NURSING NOTE
CWOCN follow up. Patient does not feel well today. There is thin green bile in ostomy pouch. Will change it tomorrow. Patient not up for teaching today.

## 2018-07-05 NOTE — PROGRESS NOTES
Chief Complaint: POD # 4    Subjective   Pt feels nauseated, no vomiting, urinating well, ambulating   Objective     Vital signs in last 24 hours:  Temp:  [98.1 °F (36.7 °C)-98.6 °F (37 °C)] 98.6 °F (37 °C)  Heart Rate:  [80-98] 91  Resp:  [16-18] 18  BP: (131-139)/(82-87) 131/82    Intake/Output last 3 shifts:  I/O last 3 completed shifts:  In: 3900 [I.V.:3900]  Out: 5155 [Urine:3550; Drains:1005; Stool:600]    Intake/Output this shift:  I/O this shift:  In: -   Out: 965 [Drains:365; Stool:600]    Physical Exam:  Respiratory: CTA, good inspiratory effort  CV: RRR  Abd: + BS, soft, ND, usual post-op tenderness, no rebound, no guarding, incision C/D/I, MARILYN serous 825cc, ileostomy pink with stool 500cc,     Results from last 7 days  Lab Units 07/05/18  0624   WBC 10*3/mm3 18.41*   HEMOGLOBIN g/dL 10.2*   HEMATOCRIT % 34.5*   PLATELETS 10*3/mm3 426       Results from last 7 days  Lab Units 07/05/18  0624   SODIUM mmol/L 140   POTASSIUM mmol/L 4.6   CHLORIDE mmol/L 104   CO2 mmol/L 14.2*   BUN mg/dL 30*   CREATININE mg/dL 2.25*   GLUCOSE mg/dL 50*   CALCIUM mg/dL 8.6       Assessment/Plan   S/P Sigmoid colon stricture/rupture resection with anastomosis         Diverting loop ileostomy   MARILYN - fluid 2.1 creatine   Post-op ileus - npo and continue ivfs     Bee Carreon MD

## 2018-07-05 NOTE — NURSING NOTE
Pt's abd noted to be distended and taught. Pt complains of increased pain around LLQ MARILYN drain, though satisfied with current PCA for pain treatment. Call to Dr. Carreon. Awaiting call back.     Received call back from Dr. Carreon. No new orders at this time.

## 2018-07-05 NOTE — PROGRESS NOTES
"   LOS: 4 days    Patient Care Team:  Pino Templeton MD as PCP - General  Pino Templeton MD as PCP - Family Medicine  Liliya Giraldo MD as Consulting Physician (Rheumatology)  Moisés Corado MD as Consulting Physician (Nephrology)  Grey Dia MD as Consulting Physician (Pulmonary Disease)    Chief Complaint:  No chief complaint on file.    Follow up Arianna on CKD IV, hyperkalemia  Subjective     Interval History:   The patient is feeling somewhat better, and ileostomy is functional now, no nausea or vomiting, no abdominal pain.  No chest pain or shortness of air, no dysuria or gross hematuria.      Review of Systems:   As noted above.      Objective     Vital Signs  Temp:  [98.1 °F (36.7 °C)-99 °F (37.2 °C)] 98.6 °F (37 °C)  Heart Rate:  [80-98] 89  Resp:  [16-18] 18  BP: (133-142)/(83-90) 133/86    Flowsheet Rows      First Filed Value   Admission Height  170 cm (66.93\") Documented at 07/01/2018 1138   Admission Weight  47.7 kg (105 lb 1.6 oz) Documented at 07/01/2018 1138          I/O this shift:  In: -   Out: 200 [Drains:50; Stool:150]  I/O last 3 completed shifts:  In: 3900 [I.V.:3900]  Out: 5155 [Urine:3550; Drains:1005; Stool:600]    Intake/Output Summary (Last 24 hours) at 07/05/18 0834  Last data filed at 07/05/18 0742   Gross per 24 hour   Intake             2000 ml   Output             4075 ml   Net            -2075 ml       Physical Exam:  General Appearance: alert, oriented x 3, no acute distress,   Skin: warm and dry  HEENT: Nonicteric sclerae, oral mucosa normal,   Neck: supple, no JVD, trachea midline  Lungs: CTA, unlabored breathing effort  Heart: RRR, normal S1 and S2, no S3, no rub  Abdomen: soft, functional ileostomy,  present bowel sounds to auscultation  : no palpable bladder,  Extremities: no edema, cyanosis or clubbing  Neuro: normal speech and mental status         Results Review:      Results from last 7 days  Lab Units 07/05/18  0624 07/04/18  0526 07/03/18  1449 " 07/03/18  0657   SODIUM mmol/L 140 137 139 138  137   POTASSIUM mmol/L 4.6 4.5 5.2 5.9*  5.7*   CHLORIDE mmol/L 104 106 107 107  105   CO2 mmol/L 14.2* 21.0* 20.9* 20.3*  22.2   BUN mg/dL 30* 29* 28* 27*  29*   CREATININE mg/dL 2.25* 2.03* 2.30* 2.44*  2.20*   CALCIUM mg/dL 8.6 8.5* 9.1 9.0  8.3*   BILIRUBIN mg/dL 0.3 0.2  --  0.2   ALK PHOS U/L 52 44  --  37*   ALT (SGPT) U/L 7 5  --  8   AST (SGOT) U/L 17 13  --  14   GLUCOSE mg/dL 50* 75 83 87  91       Estimated Creatinine Clearance: 23.2 mL/min (A) (by C-G formula based on SCr of 2.25 mg/dL (H)).      Results from last 7 days  Lab Units 07/05/18  0624 07/04/18  0526 07/03/18  0657  07/01/18  2125   MAGNESIUM mg/dL 2.2  --  2.0  --  2.0   PHOSPHORUS mg/dL 4.3 3.2 4.6*  < >  --    < > = values in this interval not displayed.      Results from last 7 days  Lab Units 07/03/18  0657   URIC ACID mg/dL 3.1         Results from last 7 days  Lab Units 07/05/18  0624 07/04/18  0526 07/03/18  0658 07/02/18  0708 07/01/18  1722   WBC 10*3/mm3 18.41* 14.95* 15.05* 14.56* 10.14   HEMOGLOBIN g/dL 10.2* 8.8* 8.3* 10.0* 9.9*   PLATELETS 10*3/mm3 426 296 244 346 346               Imaging Results (last 24 hours)     Procedure Component Value Units Date/Time    XR Abdomen KUB [[id number]] Collected:  07/04/18 1011     Updated:  07/04/18 1016    Narrative:       XR ABDOMEN KUB-     INDICATIONS: Abdominal pain and nausea     TECHNIQUE: SUPINE VIEWS OF THE ABDOMEN     COMPARISON: 7/1/2018     FINDINGS:     Midline skin staples are seen. A drain is apparent in the left aspect of  the abdomen. Free intraperitoneal gas cannot be excluded on a supine  radiograph. Loops of bowel in the left aspect of the abdomen show mildly  prominent caliber, may reflect regional ileus or potentially early or  partial small bowel obstruction, continued follow-up suggested.         Impression:          As described.     This report was finalized on 7/4/2018 10:13 AM by Dr. Gadiel GALAN  JOSE Morejon             budesonide-formoterol 2 puff Inhalation BID   enoxaparin 30 mg Subcutaneous Daily   febuxostat 40 mg Oral Daily   fluticasone 2 spray Each Nare BID   levothyroxine 125 mcg Oral Daily   rosuvastatin 10 mg Oral Daily   sodium bicarbonate 650 mg Oral Daily   vancomycin 125 mg Oral Q6H       HYDROmorphone HCl-NaCl     sodium chloride 150 mL/hr Last Rate: 150 mL/hr (07/05/18 0832)       Medication Review:   Current Facility-Administered Medications   Medication Dose Route Frequency Provider Last Rate Last Dose   • acetaminophen (TYLENOL) 160 MG/5ML solution 650 mg  650 mg Oral Q4H PRN Bee Carreon MD        Or   • acetaminophen (TYLENOL) suppository 650 mg  650 mg Rectal Q4H PRN Bee Carreon MD       • albuterol (PROVENTIL HFA;VENTOLIN HFA) inhaler 2 puff  2 puff Inhalation Q4H PRN Bee Carreon MD       • budesonide-formoterol (SYMBICORT) 160-4.5 MCG/ACT inhaler 2 puff  2 puff Inhalation BID Bee Carreon MD   2 puff at 07/05/18 0731   • enoxaparin (LOVENOX) syringe 30 mg  30 mg Subcutaneous Daily Bee Carreon MD   30 mg at 07/03/18 0822   • febuxostat (ULORIC) tablet 40 mg  40 mg Oral Daily Bee Carreon MD   40 mg at 07/05/18 0832   • fluticasone (FLONASE) 50 MCG/ACT nasal spray 2 spray  2 spray Each Nare BID Bee Carreon MD   2 spray at 07/03/18 0823   • HYDROmorphone (DILAUDID) injection 0.5 mg  0.5 mg Intravenous Q2H PRN Bee Carreon MD   0.5 mg at 07/01/18 1430   • HYDROmorphone (DILAUDID) injection 0.5 mg  0.5 mg Intravenous Q2H PRN Bee Carreon MD   0.5 mg at 07/01/18 1935    And   • naloxone (NARCAN) injection 0.1 mg  0.1 mg Intravenous Q5 Min PRN Bee Carreon MD       • HYDROmorphone (DILAUDID) PCA 0.2 mg/ml 50 mL syringe   Intravenous Continuous Bee Carreon MD       • levothyroxine (SYNTHROID, LEVOTHROID) tablet 125 mcg  125 mcg Oral Daily Bee Carreon MD   125 mcg at 07/05/18 0832   • morphine injection 2 mg  2  mg Intravenous Q2H PRN Bee Carreon MD       • naloxone (NARCAN) injection 0.1 mg  0.1 mg Intravenous Q5 Min PRN Bee Carreon MD       • ondansetron (ZOFRAN) tablet 4 mg  4 mg Oral Q6H PRN Bee Carreon MD        Or   • ondansetron ODT (ZOFRAN-ODT) disintegrating tablet 4 mg  4 mg Oral Q6H PRN Bee Carreon MD        Or   • ondansetron (ZOFRAN) injection 4 mg  4 mg Intravenous Q6H PRN Bee Carreon MD   4 mg at 07/01/18 1753   • ondansetron (ZOFRAN) tablet 4 mg  4 mg Oral Q6H PRN Bee Carreon MD        Or   • ondansetron ODT (ZOFRAN-ODT) disintegrating tablet 4 mg  4 mg Oral Q6H PRN Bee Carreon MD        Or   • ondansetron (ZOFRAN) injection 4 mg  4 mg Intravenous Q6H PRN Bee Carreon MD   4 mg at 07/05/18 0601   • ondansetron ODT (ZOFRAN-ODT) disintegrating tablet 4 mg  4 mg Oral Q8H PRN Bee Carreon MD       • promethazine (PHENERGAN) tablet 25 mg  25 mg Oral Q8H PRN Bee Carreon MD   25 mg at 07/04/18 0717   • rosuvastatin (CRESTOR) tablet 10 mg  10 mg Oral Daily Bee Carreon MD   10 mg at 07/05/18 0832   • sodium bicarbonate tablet 650 mg  650 mg Oral Daily Bee Carreon MD   650 mg at 07/03/18 0822   • sodium chloride 0.9 % flush 1-10 mL  1-10 mL Intravenous PRN Bee Carreon MD       • sodium chloride 0.9 % infusion  150 mL/hr Intravenous Continuous Laura Francis  mL/hr at 07/05/18 0832 150 mL/hr at 07/05/18 0832   • vancomycin oral solution 125 mg  125 mg Oral Q6H Bee Carreon MD   125 mg at 07/05/18 0556       Assessment/Plan      1.  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  Multifactorial in etiology , most likely due to volume depletion.  Chronic kidney disease stage IV status associated with Wegener's granulomatosis with renal involvement has been in remission with stable renal function . Creatinine today is 2.25, near baseline   2. Recent C. diff  3. Recurrent diverticulitis sp lap  resection 5/1/18. Colonic stricture sp dilation with subsequent perforation requiring ileostomy 7/1.   4. Hypothyroid on replacement.    5. Anemia, globin today is 10 point  6. Protein calorie malnutrition. Will need nutrition soon.   7.  Hyperkalemia resolved      Plan:  1.  We'll continue the same treatment.  2.  Surveillance labs        Ryder Santoyo MD  07/05/18  8:34 AM

## 2018-07-05 NOTE — PROGRESS NOTES
Continued Stay Note  [facility]     Patient Name: Sun Rodriguez  MRN: [id number]  Today's Date: 7/5/2018    Admit Date: 7/1/2018          Discharge Plan     Row Name 07/05/18 1435       Plan    Plan return home with spouse and [facility]    Patient/Family in Agreement with Plan yes    Plan Comments Spoke with patient and she confirms that plan is to return home with [facility] to follow. Jennifer Flor RN              Discharge Codes    No documentation.           Jennifer Flor RN

## 2018-07-05 NOTE — PROGRESS NOTES
Adult Nutrition  Assessment/PES    Patient Name:  Sun Rodriguez  YOB: 1965  MRN: [id number]  Admit Date:  7/1/2018    Assessment Date:  7/5/2018    Comments:  Nutrition follow up.  Remains NPO.  C/o nausea, abd pain and distention. + stool per ostomy. ^ output in drain per RN. ? CT of abd. If anticipate pt will be NPO for a few more days, would consider TPN as pt already with compromised nutritional status.  WIll follow.          Reason for Assessment     Row Name 07/05/18 1507          Reason for Assessment    Reason For Assessment follow-up protocol             Nutrition/Diet History     Row Name 07/05/18 1507          Nutrition/Diet History    Factors Affecting Nutritional Intake altered gastrointestinal function             Anthropometrics     Row Name 07/05/18 0742          Anthropometrics    Weight 50.2 kg (110 lb 9.6 oz)             Labs/Tests/Procedures/Meds     Row Name 07/05/18 1508          Labs/Procedures/Meds    Lab Results Reviewed reviewed, pertinent     Lab Results Comments BUN, Cr, prealb, glu, wbc        Diagnostic Tests/Procedures    Diagnostic Test/Procedure Reviewed reviewed, pertinent     Diagnostic Test/Procedures Comments KUB 7/4        Medications    Pertinent Medications Reviewed reviewed, pertinent     Pertinent Medications Comments Abx             Physical Findings     Row Name 07/05/18 1509          Physical Findings    Overall Physical Appearance other (see comments)   pain     Gastrointestinal ostomy;nausea;abdominal distension   ileostomy     Tubes other (see comments)   drain     Skin other (see comments)   abd incision               Nutrition Prescription Ordered     Row Name 07/05/18 1512          Nutrition Prescription PO    Current PO Diet NPO             Evaluation of Received Nutrient/Fluid Intake     Row Name 07/05/18 1512          Fluid Intake Evaluation    IV Fluid (mL) 3000       Problem/Interventions:        Intervention Goal     Row Name 07/05/18 1511           Intervention Goal    General Maintain nutrition;Meet nutritional needs for age/condition;Reduce/improve symptoms;Disease management/therapy;Improved nutrition related lab(s)     TF/PN Inititiate TF/PN     Weight Maintain weight             Nutrition Intervention     Row Name 07/05/18 1513          Nutrition Intervention    RD/Tech Action Follow Tx progress;Care plan reviewd;Recommend/ordered     Recommended/Ordered PN               Education/Evaluation     Row Name 07/05/18 1513          Education    Education Will Instruct as appropriate        Monitor/Evaluation    Monitor Per protocol;Skin status;Symptoms;I&O;Pertinent labs;Weight         Electronically signed by:  Diann Jamil RD  07/05/18 3:13 PM

## 2018-07-06 ENCOUNTER — APPOINTMENT (OUTPATIENT)
Dept: GENERAL RADIOLOGY | Facility: HOSPITAL | Age: 53
End: 2018-07-06
Attending: SURGERY

## 2018-07-06 LAB
ALBUMIN SERPL-MCNC: 2.6 G/DL (ref 3.5–5.2)
ANION GAP SERPL CALCULATED.3IONS-SCNC: 20.3 MMOL/L
BASOPHILS # BLD AUTO: 0.02 10*3/MM3 (ref 0–0.2)
BASOPHILS NFR BLD AUTO: 0.2 % (ref 0–1.5)
BUN BLD-MCNC: 28 MG/DL (ref 6–20)
BUN/CREAT SERPL: 13.3 (ref 7–25)
CALCIUM SPEC-SCNC: 8.1 MG/DL (ref 8.6–10.5)
CHLORIDE SERPL-SCNC: 105 MMOL/L (ref 98–107)
CO2 SERPL-SCNC: 12.7 MMOL/L (ref 22–29)
CREAT BLD-MCNC: 2.11 MG/DL (ref 0.57–1)
DEPRECATED RDW RBC AUTO: 51.3 FL (ref 37–54)
EOSINOPHIL # BLD AUTO: 0.91 10*3/MM3 (ref 0–0.7)
EOSINOPHIL NFR BLD AUTO: 9.9 % (ref 0.3–6.2)
ERYTHROCYTE [DISTWIDTH] IN BLOOD BY AUTOMATED COUNT: 15.3 % (ref 11.7–13)
GFR SERPL CREATININE-BSD FRML MDRD: 25 ML/MIN/1.73
GLUCOSE BLD-MCNC: 71 MG/DL (ref 65–99)
HCT VFR BLD AUTO: 27.3 % (ref 35.6–45.5)
HGB BLD-MCNC: 8.2 G/DL (ref 11.9–15.5)
IMM GRANULOCYTES # BLD: 0 10*3/MM3 (ref 0–0.03)
IMM GRANULOCYTES NFR BLD: 0 % (ref 0–0.5)
LYMPHOCYTES # BLD AUTO: 1.04 10*3/MM3 (ref 0.9–4.8)
LYMPHOCYTES NFR BLD AUTO: 11.4 % (ref 19.6–45.3)
MAGNESIUM SERPL-MCNC: 2.2 MG/DL (ref 1.6–2.6)
MCH RBC QN AUTO: 27.5 PG (ref 26.9–32)
MCHC RBC AUTO-ENTMCNC: 30 G/DL (ref 32.4–36.3)
MCV RBC AUTO: 91.6 FL (ref 80.5–98.2)
MONOCYTES # BLD AUTO: 1.13 10*3/MM3 (ref 0.2–1.2)
MONOCYTES NFR BLD AUTO: 12.3 % (ref 5–12)
NEUTROPHILS # BLD AUTO: 6.06 10*3/MM3 (ref 1.9–8.1)
NEUTROPHILS NFR BLD AUTO: 66.2 % (ref 42.7–76)
NRBC BLD MANUAL-RTO: 0 /100 WBC (ref 0–0)
PHOSPHATE SERPL-MCNC: 3.6 MG/DL (ref 2.5–4.5)
PLATELET # BLD AUTO: 344 10*3/MM3 (ref 140–500)
PMV BLD AUTO: 9.1 FL (ref 6–12)
POTASSIUM BLD-SCNC: 4.5 MMOL/L (ref 3.5–5.2)
RBC # BLD AUTO: 2.98 10*6/MM3 (ref 3.9–5.2)
SODIUM BLD-SCNC: 138 MMOL/L (ref 136–145)
URATE SERPL-MCNC: 5.1 MG/DL (ref 2.4–5.7)
WBC NRBC COR # BLD: 9.16 10*3/MM3 (ref 4.5–10.7)

## 2018-07-06 PROCEDURE — 94799 UNLISTED PULMONARY SVC/PX: CPT

## 2018-07-06 PROCEDURE — 80069 RENAL FUNCTION PANEL: CPT | Performed by: INTERNAL MEDICINE

## 2018-07-06 PROCEDURE — 25010000002 HYDROMORPHONE PER 4 MG: Performed by: SURGERY

## 2018-07-06 PROCEDURE — 74022 RADEX COMPL AQT ABD SERIES: CPT

## 2018-07-06 PROCEDURE — 83735 ASSAY OF MAGNESIUM: CPT | Performed by: INTERNAL MEDICINE

## 2018-07-06 PROCEDURE — 84550 ASSAY OF BLOOD/URIC ACID: CPT | Performed by: INTERNAL MEDICINE

## 2018-07-06 PROCEDURE — 25010000002 ONDANSETRON PER 1 MG: Performed by: SURGERY

## 2018-07-06 PROCEDURE — 25010000002 ENOXAPARIN PER 10 MG: Performed by: SURGERY

## 2018-07-06 PROCEDURE — 94760 N-INVAS EAR/PLS OXIMETRY 1: CPT

## 2018-07-06 PROCEDURE — 99024 POSTOP FOLLOW-UP VISIT: CPT | Performed by: SURGERY

## 2018-07-06 PROCEDURE — 85025 COMPLETE CBC W/AUTO DIFF WBC: CPT | Performed by: SURGERY

## 2018-07-06 PROCEDURE — 25010000002 ERTAPENEM PER 500 MG: Performed by: SURGERY

## 2018-07-06 RX ORDER — SODIUM BICARBONATE 650 MG/1
650 TABLET ORAL DAILY
Status: DISCONTINUED | OUTPATIENT
Start: 2018-07-06 | End: 2018-07-06

## 2018-07-06 RX ORDER — SODIUM BICARBONATE 650 MG/1
1300 TABLET ORAL 3 TIMES DAILY
Status: DISCONTINUED | OUTPATIENT
Start: 2018-07-06 | End: 2018-07-10

## 2018-07-06 RX ADMIN — BUDESONIDE AND FORMOTEROL FUMARATE DIHYDRATE 2 PUFF: 160; 4.5 AEROSOL RESPIRATORY (INHALATION) at 06:50

## 2018-07-06 RX ADMIN — METRONIDAZOLE 500 MG: 500 INJECTION, SOLUTION INTRAVENOUS at 03:24

## 2018-07-06 RX ADMIN — SODIUM BICARBONATE 650 MG: 650 TABLET ORAL at 08:44

## 2018-07-06 RX ADMIN — HYDROMORPHONE HYDROCHLORIDE 0.5 MG: 1 INJECTION, SOLUTION INTRAMUSCULAR; INTRAVENOUS; SUBCUTANEOUS at 18:35

## 2018-07-06 RX ADMIN — SODIUM BICARBONATE 1300 MG: 650 TABLET ORAL at 18:35

## 2018-07-06 RX ADMIN — ROSUVASTATIN CALCIUM 10 MG: 10 TABLET, FILM COATED ORAL at 08:44

## 2018-07-06 RX ADMIN — SODIUM CHLORIDE 150 ML/HR: 9 INJECTION, SOLUTION INTRAVENOUS at 12:58

## 2018-07-06 RX ADMIN — BUDESONIDE AND FORMOTEROL FUMARATE DIHYDRATE 2 PUFF: 160; 4.5 AEROSOL RESPIRATORY (INHALATION) at 21:12

## 2018-07-06 RX ADMIN — ONDANSETRON 4 MG: 2 INJECTION INTRAMUSCULAR; INTRAVENOUS at 17:55

## 2018-07-06 RX ADMIN — SODIUM CHLORIDE 500 MG: 9 INJECTION, SOLUTION INTRAVENOUS at 14:21

## 2018-07-06 RX ADMIN — LEVOTHYROXINE SODIUM 125 MCG: 125 TABLET ORAL at 08:44

## 2018-07-06 RX ADMIN — FEBUXOSTAT 40 MG: 40 TABLET ORAL at 08:44

## 2018-07-06 RX ADMIN — SODIUM CHLORIDE 150 ML/HR: 9 INJECTION, SOLUTION INTRAVENOUS at 03:24

## 2018-07-06 RX ADMIN — METRONIDAZOLE 500 MG: 500 INJECTION, SOLUTION INTRAVENOUS at 13:16

## 2018-07-06 RX ADMIN — METRONIDAZOLE 500 MG: 500 INJECTION, SOLUTION INTRAVENOUS at 19:36

## 2018-07-06 RX ADMIN — ENOXAPARIN SODIUM 30 MG: 40 INJECTION SUBCUTANEOUS at 08:44

## 2018-07-06 NOTE — PLAN OF CARE
Problem: Patient Care Overview  Goal: Plan of Care Review  Outcome: Ongoing (interventions implemented as appropriate)   07/06/18 3248   Coping/Psychosocial   Plan of Care Reviewed With patient   Plan of Care Review   Progress improving   OTHER   Outcome Summary Fewer complaints of pain today, no complaints of nausea, VSS, illeostomy putting out stool, tolerating clear liq well, will ct to monitor.       Problem: Pain, Acute (Adult)  Goal: Acceptable Pain Control/Comfort Level  Outcome: Ongoing (interventions implemented as appropriate)      Problem: Fall Risk (Adult)  Goal: Absence of Fall  Outcome: Ongoing (interventions implemented as appropriate)      Problem: Ileostomy (Adult)  Goal: Signs and Symptoms of Listed Potential Problems Will be Absent, Minimized or Managed (Ileostomy)  Outcome: Ongoing (interventions implemented as appropriate)

## 2018-07-06 NOTE — PLAN OF CARE
"Problem: Patient Care Overview  Goal: Plan of Care Review  Outcome: Ongoing (interventions implemented as appropriate)   07/06/18 7506   Coping/Psychosocial   Plan of Care Reviewed With patient   Plan of Care Review   Progress improving   OTHER   Outcome Summary CWOCN follow up. Patient looks better than yesterday and has no nausea complaints at this time. She has been up to the bathroom and around the room. She observed the ostomy pouch change and stated \"that doesn't seem so bad.\" She is getting more used to the idea of the temporary ileostomy. She will do fine taking care of it, I believe. She will help change it next week- Mon or Tues depending on home schedule. Discussed food, eating slow, chewing food well related to the swelling. She is not eating yet but broth sounds better to her today- yesterday she could not think of eating anything. Teaching ongoing.        Problem: Ileostomy (Adult)  Goal: Signs and Symptoms of Listed Potential Problems Will be Absent, Minimized or Managed (Ileostomy)  Outcome: Ongoing (interventions implemented as appropriate)   07/06/18 0163   Goal/Outcome Evaluation   Problems Assessed (Ileostomy) high-output stoma;situational response;stomal complications;postoperative nausea and vomiting   Problems Present (Ileostomy) situational response         "

## 2018-07-06 NOTE — PLAN OF CARE
Problem: Patient Care Overview  Goal: Plan of Care Review  Outcome: Ongoing (interventions implemented as appropriate)   07/06/18 0400   Coping/Psychosocial   Plan of Care Reviewed With patient   Plan of Care Review   Progress improving   OTHER   Outcome Summary Sat up in chair most of evening and ambulated around the nurses station x 2. with assistance. Pain tolerable this PCA dilaudid. Complaint of nausea x 1 this shift. Zofran administered. VSS. Will continue to monitor.     Goal: Discharge Needs Assessment  Outcome: Ongoing (interventions implemented as appropriate)      Problem: Pain, Acute (Adult)  Goal: Acceptable Pain Control/Comfort Level  Outcome: Ongoing (interventions implemented as appropriate)      Problem: Fall Risk (Adult)  Goal: Absence of Fall  Outcome: Ongoing (interventions implemented as appropriate)

## 2018-07-06 NOTE — PROGRESS NOTES
Chief Complaint: POD # 5    Subjective   Pt feels better today, passed a large bm per rectum, no nausea today, tolerating clear liquids    Objective     Vital signs in last 24 hours:  Temp:  [97.9 °F (36.6 °C)-99 °F (37.2 °C)] 97.9 °F (36.6 °C)  Heart Rate:  [] 100  Resp:  [16-18] 16  BP: (124-146)/(84-91) 124/91    Intake/Output last 3 shifts:  I/O last 3 completed shifts:  In: 1100 [I.V.:1000; IV Piggyback:100]  Out: 5230 [Urine:2400; Drains:1030; Stool:1800]    Intake/Output this shift:  I/O this shift:  In: -   Out: 460 [Urine:200; Drains:160; Stool:100]    Physical Exam:  Respiratory: CTA, good inspiratory effort  CV: RRR  Abd: + BS, soft, ND, usual post-op tenderness, no rebound, no guarding, incision C/D/I, MARILYN serous 615 cc    Results from last 7 days  Lab Units 07/06/18  0641   WBC 10*3/mm3 9.16   HEMOGLOBIN g/dL 8.2*   HEMATOCRIT % 27.3*   PLATELETS 10*3/mm3 344       Results from last 7 days  Lab Units 07/06/18  0641 07/05/18  0624   SODIUM mmol/L 138 140   POTASSIUM mmol/L 4.5 4.6   CHLORIDE mmol/L 105 104   CO2 mmol/L 12.7* 14.2*   BUN mg/dL 28* 30*   CREATININE mg/dL 2.11* 2.25*   CALCIUM mg/dL 8.1* 8.6   BILIRUBIN mg/dL  --  0.3   ALK PHOS U/L  --  52   ALT (SGPT) U/L  --  7   AST (SGOT) U/L  --  17   GLUCOSE mg/dL 71 50*     Assessment/Plan   S/P Sigmoid colon stricture/rupture resection with anastomosis         Diverting loop ileostomy      Post-op ileus - slowly resolving, adv diet, decrease IVFs    Bee Carreon MD

## 2018-07-06 NOTE — PROGRESS NOTES
"   LOS: 5 days    Patient Care Team:  Pino Templeton MD as PCP - General  Pino Templeton MD as PCP - Family Medicine  Liliya Giraldo MD as Consulting Physician (Rheumatology)  Moisés Corado MD as Consulting Physician (Nephrology)  Grey Dia MD as Consulting Physician (Pulmonary Disease)    Chief Complaint:  No chief complaint on file.    Follow up Arianna on CKD IV, hyperkalemia  Subjective     Interval History:   The patient is feeling somewhat better, and ileostomy is functional now, no nausea or vomiting, she has abdominal discomfort.  No chest pain or shortness of air, no dysuria or gross hematuria.      Review of Systems:   As noted above.      Objective     Vital Signs  Temp:  [97.9 °F (36.6 °C)-99 °F (37.2 °C)] 97.9 °F (36.6 °C)  Heart Rate:  [] 100  Resp:  [16-18] 16  BP: (124-146)/(82-91) 124/91    Flowsheet Rows      First Filed Value   Admission Height  170 cm (66.93\") Documented at 07/01/2018 1138   Admission Weight  47.7 kg (105 lb 1.6 oz) Documented at 07/01/2018 1138          I/O this shift:  In: -   Out: 90 [Drains:90]  I/O last 3 completed shifts:  In: 1100 [I.V.:1000; IV Piggyback:100]  Out: 5230 [Urine:2400; Drains:1030; Stool:1800]    Intake/Output Summary (Last 24 hours) at 07/06/18 1048  Last data filed at 07/06/18 0900   Gross per 24 hour   Intake              100 ml   Output             2770 ml   Net            -2670 ml       Physical Exam:  General Appearance: alert, oriented x 3, no acute distress,   Skin: warm and dry  HEENT: Nonicteric sclerae, oral mucosa normal,   Neck: supple, no JVD, trachea midline  Lungs: CTA, unlabored breathing effort  Heart: RRR, normal S1 and S2, no S3, no rub  Abdomen: soft, functional ileostomy,  present bowel sounds to auscultation  : no palpable bladder,  Extremities: no edema, cyanosis or clubbing  Neuro: normal speech and mental status         Results Review:      Results from last 7 days  Lab Units 07/06/18  0641 " 07/05/18  0624 07/04/18  0526  07/03/18  0657   SODIUM mmol/L 138 140 137  < > 138  137   POTASSIUM mmol/L 4.5 4.6 4.5  < > 5.9*  5.7*   CHLORIDE mmol/L 105 104 106  < > 107  105   CO2 mmol/L 12.7* 14.2* 21.0*  < > 20.3*  22.2   BUN mg/dL 28* 30* 29*  < > 27*  29*   CREATININE mg/dL 2.11* 2.25* 2.03*  < > 2.44*  2.20*   CALCIUM mg/dL 8.1* 8.6 8.5*  < > 9.0  8.3*   BILIRUBIN mg/dL  --  0.3 0.2  --  0.2   ALK PHOS U/L  --  52 44  --  37*   ALT (SGPT) U/L  --  7 5  --  8   AST (SGOT) U/L  --  17 13  --  14   GLUCOSE mg/dL 71 50* 75  < > 87  91   < > = values in this interval not displayed.    Estimated Creatinine Clearance: 24.7 mL/min (A) (by C-G formula based on SCr of 2.11 mg/dL (H)).      Results from last 7 days  Lab Units 07/06/18  0641 07/05/18 0624 07/04/18  0526 07/03/18  0657   MAGNESIUM mg/dL 2.2 2.2  --  2.0   PHOSPHORUS mg/dL 3.6 4.3 3.2 4.6*         Results from last 7 days  Lab Units 07/06/18  0641 07/03/18  0657   URIC ACID mg/dL 5.1 3.1         Results from last 7 days  Lab Units 07/06/18  0641 07/05/18  0624 07/04/18  0526 07/03/18  0658 07/02/18  0708   WBC 10*3/mm3 9.16 18.41* 14.95* 15.05* 14.56*   HEMOGLOBIN g/dL 8.2* 10.2* 8.8* 8.3* 10.0*   PLATELETS 10*3/mm3 344 426 296 244 346               Imaging Results (last 24 hours)     Procedure Component Value Units Date/Time    XR Abdomen 2 View With Chest 1 View [[id number]] Updated:  07/06/18 1003          budesonide-formoterol 2 puff Inhalation BID   enoxaparin 30 mg Subcutaneous Daily   ertapenem 500 mg Intravenous Q24H   febuxostat 40 mg Oral Daily   levothyroxine 125 mcg Oral Daily   metroNIDAZOLE 500 mg Intravenous Q8H   rosuvastatin 10 mg Oral Daily   sodium bicarbonate 650 mg Oral Daily       HYDROmorphone HCl-NaCl     sodium chloride 150 mL/hr Last Rate: 150 mL/hr (07/06/18 0324)       Medication Review:   Current Facility-Administered Medications   Medication Dose Route Frequency Provider Last Rate Last Dose   • acetaminophen  (TYLENOL) 160 MG/5ML solution 650 mg  650 mg Oral Q4H PRN Bee Carreon MD        Or   • acetaminophen (TYLENOL) suppository 650 mg  650 mg Rectal Q4H PRN Bee Carreon MD       • albuterol (PROVENTIL HFA;VENTOLIN HFA) inhaler 2 puff  2 puff Inhalation Q4H PRN Bee Carreon MD       • budesonide-formoterol (SYMBICORT) 160-4.5 MCG/ACT inhaler 2 puff  2 puff Inhalation BID Bee Carreon MD   2 puff at 07/06/18 0650   • enoxaparin (LOVENOX) syringe 30 mg  30 mg Subcutaneous Daily Bee Carreon MD   30 mg at 07/06/18 0844   • ertapenem (INVanz) 500 mg in sodium chloride 0.9 % 50 mL IVPB  500 mg Intravenous Q24H Bee Carreon  mL/hr at 07/05/18 1548 500 mg at 07/05/18 1548   • febuxostat (ULORIC) tablet 40 mg  40 mg Oral Daily Bee Carreon MD   40 mg at 07/06/18 0844   • HYDROmorphone (DILAUDID) injection 0.5 mg  0.5 mg Intravenous Q2H PRN Bee Carreon MD   0.5 mg at 07/01/18 1430   • HYDROmorphone (DILAUDID) injection 0.5 mg  0.5 mg Intravenous Q2H PRN Bee Carreon MD   0.5 mg at 07/01/18 1935    And   • naloxone (NARCAN) injection 0.1 mg  0.1 mg Intravenous Q5 Min PRN Bee Carreon MD       • HYDROmorphone (DILAUDID) PCA 0.2 mg/ml 50 mL syringe   Intravenous Continuous Bee Carreon MD       • levothyroxine (SYNTHROID, LEVOTHROID) tablet 125 mcg  125 mcg Oral Daily Bee Carreon MD   125 mcg at 07/06/18 0844   • metroNIDAZOLE (FLAGYL) IVPB 500 mg  500 mg Intravenous Q8H Bee Carreon MD   500 mg at 07/06/18 0324   • morphine injection 2 mg  2 mg Intravenous Q2H PRN Bee Carreon MD       • naloxone (NARCAN) injection 0.1 mg  0.1 mg Intravenous Q5 Min PRN Bee Carreon MD       • ondansetron (ZOFRAN) tablet 4 mg  4 mg Oral Q6H PRN Bee Carreon MD        Or   • ondansetron ODT (ZOFRAN-ODT) disintegrating tablet 4 mg  4 mg Oral Q6H PRN Bee Carreon MD        Or   • ondansetron (ZOFRAN) injection 4 mg  4 mg Intravenous Q6H PRN  Bee Carreon MD   4 mg at 07/05/18 2123   • ondansetron (ZOFRAN) tablet 4 mg  4 mg Oral Q6H PRN Bee Carreon MD        Or   • ondansetron ODT (ZOFRAN-ODT) disintegrating tablet 4 mg  4 mg Oral Q6H PRN Bee Carreon MD        Or   • ondansetron (ZOFRAN) injection 4 mg  4 mg Intravenous Q6H PRN Bee Carreon MD   4 mg at 07/05/18 0601   • ondansetron ODT (ZOFRAN-ODT) disintegrating tablet 4 mg  4 mg Oral Q8H PRN Bee Carreon MD       • promethazine (PHENERGAN) tablet 25 mg  25 mg Oral Q8H PRN Bee Carreon MD   25 mg at 07/04/18 0717   • rosuvastatin (CRESTOR) tablet 10 mg  10 mg Oral Daily Bee Carreon MD   10 mg at 07/06/18 0844   • sodium bicarbonate tablet 650 mg  650 mg Oral Daily Bee Carreon MD   650 mg at 07/06/18 0844   • sodium chloride 0.9 % flush 1-10 mL  1-10 mL Intravenous PRN Bee Carreon MD       • sodium chloride 0.9 % infusion  150 mL/hr Intravenous Continuous Bee Carreon  mL/hr at 07/06/18 0324 150 mL/hr at 07/06/18 0324       Assessment/Plan      1.  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  Multifactorial in etiology , most likely due to volume depletion.  Chronic kidney disease stage IV status associated with Wegener's granulomatosis with renal involvement has been in remission with stable renal function . Creatinine Is down to 2.11 near baseline   2. Recent C. diff  3. Recurrent diverticulitis sp lap resection 5/1/18. Colonic stricture sp dilation with subsequent perforation requiring ileostomy 7/1.   4. Hypothyroid on replacement.    5. Anemia, the hemoglobin today is down to 8.2  6. Protein calorie malnutrition. Will need nutrition soon.   7.  Hyperkalemia resolved  8.  Metabolic acidosis, on oral sodium bicarbonate but very small dose      Plan:  1.  We will increase sodium bicarbonate to 1300 mg 3 times a day  2.  Surveillance labs        Ryder Santoyo MD  07/06/18  10:48 AM

## 2018-07-07 LAB
ALBUMIN SERPL-MCNC: 2.6 G/DL (ref 3.5–5.2)
ANION GAP SERPL CALCULATED.3IONS-SCNC: 15.4 MMOL/L
BACTERIA SPEC ANAEROBE CULT: NORMAL
BASOPHILS # BLD AUTO: 0.02 10*3/MM3 (ref 0–0.2)
BASOPHILS NFR BLD AUTO: 0.2 % (ref 0–1.5)
BUN BLD-MCNC: 21 MG/DL (ref 6–20)
BUN/CREAT SERPL: 11.4 (ref 7–25)
CALCIUM SPEC-SCNC: 8 MG/DL (ref 8.6–10.5)
CHLORIDE SERPL-SCNC: 105 MMOL/L (ref 98–107)
CO2 SERPL-SCNC: 15.6 MMOL/L (ref 22–29)
CREAT BLD-MCNC: 1.84 MG/DL (ref 0.57–1)
DEPRECATED RDW RBC AUTO: 50.5 FL (ref 37–54)
EOSINOPHIL # BLD AUTO: 1.16 10*3/MM3 (ref 0–0.7)
EOSINOPHIL NFR BLD AUTO: 13.3 % (ref 0.3–6.2)
ERYTHROCYTE [DISTWIDTH] IN BLOOD BY AUTOMATED COUNT: 15.5 % (ref 11.7–13)
GFR SERPL CREATININE-BSD FRML MDRD: 29 ML/MIN/1.73
GLUCOSE BLD-MCNC: 80 MG/DL (ref 65–99)
HCT VFR BLD AUTO: 27 % (ref 35.6–45.5)
HGB BLD-MCNC: 8.5 G/DL (ref 11.9–15.5)
IMM GRANULOCYTES # BLD: 0.03 10*3/MM3 (ref 0–0.03)
IMM GRANULOCYTES NFR BLD: 0.3 % (ref 0–0.5)
LYMPHOCYTES # BLD AUTO: 1.15 10*3/MM3 (ref 0.9–4.8)
LYMPHOCYTES NFR BLD AUTO: 13.2 % (ref 19.6–45.3)
MAGNESIUM SERPL-MCNC: 1.9 MG/DL (ref 1.6–2.6)
MCH RBC QN AUTO: 28.1 PG (ref 26.9–32)
MCHC RBC AUTO-ENTMCNC: 31.5 G/DL (ref 32.4–36.3)
MCV RBC AUTO: 89.1 FL (ref 80.5–98.2)
MONOCYTES # BLD AUTO: 0.81 10*3/MM3 (ref 0.2–1.2)
MONOCYTES NFR BLD AUTO: 9.3 % (ref 5–12)
NEUTROPHILS # BLD AUTO: 5.6 10*3/MM3 (ref 1.9–8.1)
NEUTROPHILS NFR BLD AUTO: 64 % (ref 42.7–76)
PHOSPHATE SERPL-MCNC: 2.9 MG/DL (ref 2.5–4.5)
PLATELET # BLD AUTO: 374 10*3/MM3 (ref 140–500)
PMV BLD AUTO: 8.7 FL (ref 6–12)
POTASSIUM BLD-SCNC: 4 MMOL/L (ref 3.5–5.2)
RBC # BLD AUTO: 3.03 10*6/MM3 (ref 3.9–5.2)
SODIUM BLD-SCNC: 136 MMOL/L (ref 136–145)
URATE SERPL-MCNC: 4.5 MG/DL (ref 2.4–5.7)
WBC NRBC COR # BLD: 8.74 10*3/MM3 (ref 4.5–10.7)

## 2018-07-07 PROCEDURE — 94799 UNLISTED PULMONARY SVC/PX: CPT

## 2018-07-07 PROCEDURE — 80069 RENAL FUNCTION PANEL: CPT | Performed by: INTERNAL MEDICINE

## 2018-07-07 PROCEDURE — 99024 POSTOP FOLLOW-UP VISIT: CPT | Performed by: SURGERY

## 2018-07-07 PROCEDURE — 84550 ASSAY OF BLOOD/URIC ACID: CPT | Performed by: INTERNAL MEDICINE

## 2018-07-07 PROCEDURE — 25010000002 ERTAPENEM PER 500 MG: Performed by: SURGERY

## 2018-07-07 PROCEDURE — 83735 ASSAY OF MAGNESIUM: CPT | Performed by: INTERNAL MEDICINE

## 2018-07-07 PROCEDURE — 25010000002 ENOXAPARIN PER 10 MG: Performed by: SURGERY

## 2018-07-07 PROCEDURE — 85025 COMPLETE CBC W/AUTO DIFF WBC: CPT | Performed by: SURGERY

## 2018-07-07 PROCEDURE — 25010000002 ONDANSETRON PER 1 MG: Performed by: SURGERY

## 2018-07-07 PROCEDURE — 94760 N-INVAS EAR/PLS OXIMETRY 1: CPT

## 2018-07-07 PROCEDURE — 97110 THERAPEUTIC EXERCISES: CPT

## 2018-07-07 RX ORDER — BUMETANIDE 0.25 MG/ML
4 INJECTION INTRAMUSCULAR; INTRAVENOUS ONCE
Status: COMPLETED | OUTPATIENT
Start: 2018-07-07 | End: 2018-07-07

## 2018-07-07 RX ORDER — OXYCODONE HYDROCHLORIDE AND ACETAMINOPHEN 5; 325 MG/1; MG/1
2 TABLET ORAL EVERY 4 HOURS PRN
Status: DISCONTINUED | OUTPATIENT
Start: 2018-07-07 | End: 2018-07-13 | Stop reason: HOSPADM

## 2018-07-07 RX ORDER — SODIUM CHLORIDE 9 MG/ML
9 INJECTION, SOLUTION INTRAVENOUS CONTINUOUS
Status: DISCONTINUED | OUTPATIENT
Start: 2018-07-07 | End: 2018-07-07

## 2018-07-07 RX ADMIN — ROSUVASTATIN CALCIUM 10 MG: 10 TABLET, FILM COATED ORAL at 10:36

## 2018-07-07 RX ADMIN — HYDROMORPHONE HYDROCHLORIDE: 10 INJECTION INTRAMUSCULAR; INTRAVENOUS; SUBCUTANEOUS at 00:33

## 2018-07-07 RX ADMIN — SODIUM BICARBONATE 1300 MG: 650 TABLET ORAL at 18:39

## 2018-07-07 RX ADMIN — OXYCODONE HYDROCHLORIDE AND ACETAMINOPHEN 2 TABLET: 5; 325 TABLET ORAL at 15:07

## 2018-07-07 RX ADMIN — BUDESONIDE AND FORMOTEROL FUMARATE DIHYDRATE 2 PUFF: 160; 4.5 AEROSOL RESPIRATORY (INHALATION) at 07:05

## 2018-07-07 RX ADMIN — ONDANSETRON 4 MG: 2 INJECTION INTRAMUSCULAR; INTRAVENOUS at 20:58

## 2018-07-07 RX ADMIN — METRONIDAZOLE 500 MG: 500 INJECTION, SOLUTION INTRAVENOUS at 02:30

## 2018-07-07 RX ADMIN — ENOXAPARIN SODIUM 30 MG: 40 INJECTION SUBCUTANEOUS at 10:36

## 2018-07-07 RX ADMIN — FEBUXOSTAT 40 MG: 40 TABLET ORAL at 10:35

## 2018-07-07 RX ADMIN — SODIUM CHLORIDE 500 MG: 9 INJECTION, SOLUTION INTRAVENOUS at 11:56

## 2018-07-07 RX ADMIN — BUMETANIDE 4 MG: 0.25 INJECTION INTRAMUSCULAR; INTRAVENOUS at 13:40

## 2018-07-07 RX ADMIN — METRONIDAZOLE 500 MG: 500 INJECTION, SOLUTION INTRAVENOUS at 12:31

## 2018-07-07 RX ADMIN — SODIUM BICARBONATE 1300 MG: 650 TABLET ORAL at 20:57

## 2018-07-07 RX ADMIN — LEVOTHYROXINE SODIUM 125 MCG: 125 TABLET ORAL at 10:36

## 2018-07-07 RX ADMIN — ONDANSETRON 4 MG: 2 INJECTION INTRAMUSCULAR; INTRAVENOUS at 15:07

## 2018-07-07 RX ADMIN — SODIUM BICARBONATE 1300 MG: 650 TABLET ORAL at 10:36

## 2018-07-07 RX ADMIN — OXYCODONE HYDROCHLORIDE AND ACETAMINOPHEN 2 TABLET: 5; 325 TABLET ORAL at 20:57

## 2018-07-07 RX ADMIN — BUDESONIDE AND FORMOTEROL FUMARATE DIHYDRATE 2 PUFF: 160; 4.5 AEROSOL RESPIRATORY (INHALATION) at 21:05

## 2018-07-07 RX ADMIN — SODIUM CHLORIDE 75 ML/HR: 9 INJECTION, SOLUTION INTRAVENOUS at 02:30

## 2018-07-07 RX ADMIN — METRONIDAZOLE 500 MG: 500 INJECTION, SOLUTION INTRAVENOUS at 18:38

## 2018-07-07 NOTE — PLAN OF CARE
Problem: Patient Care Overview  Goal: Plan of Care Review  Outcome: Ongoing (interventions implemented as appropriate)   07/07/18 2851   Coping/Psychosocial   Plan of Care Reviewed With patient   Plan of Care Review   Progress no change   OTHER   Outcome Summary PCA d/c today, started percocet for pain management and zofran, full liquids cont, up to chair and ambulate with assistance, IV abx cont, bumex one time this shift, andreas/ileostomy, will cont to monitor       Problem: Pain, Acute (Adult)  Goal: Acceptable Pain Control/Comfort Level  Outcome: Ongoing (interventions implemented as appropriate)      Problem: Fall Risk (Adult)  Goal: Absence of Fall  Outcome: Ongoing (interventions implemented as appropriate)      Problem: Ileostomy (Adult)  Goal: Signs and Symptoms of Listed Potential Problems Will be Absent, Minimized or Managed (Ileostomy)  Outcome: Ongoing (interventions implemented as appropriate)

## 2018-07-07 NOTE — PLAN OF CARE
Problem: Patient Care Overview  Goal: Plan of Care Review  Outcome: Ongoing (interventions implemented as appropriate)   07/07/18 3937   Coping/Psychosocial   Plan of Care Reviewed With patient   OTHER   Outcome Summary Pt ambulated 300' CGA this visit with mild abdominal pain. Pt tolerated walking well.

## 2018-07-07 NOTE — THERAPY TREATMENT NOTE
"Acute Care - Physical Therapy Treatment Note  [facility]     Patient Name: Sun Rodriguez  : 1965  MRN: [id number]  Today's Date: 2018  Onset of Illness/Injury or Date of Surgery: 18  Date of Referral to PT: 18  Referring Physician: Bee Lou    Admit Date: 2018    Visit Dx:    ICD-10-CM ICD-9-CM   1. Muscle weakness (generalized) M62.81 728.87   2. Acute abdomen R10.0 789.00     Patient Active Problem List   Diagnosis   • Hypertension   • CKD (chronic kidney disease) stage 4, GFR 15-29 ml/min (CMS/[facility])   • Dizziness   • Underweight BMI 17.6   • Acute sigmoid diverticulitis   • Sepsis present on admission   • History of colonic diverticulitis   • Colitis, acute   • Colonic stricture   • Acute abdomen       Therapy Treatment          Rehabilitation Treatment Summary     Row Name 18 0950             Treatment Time/Intention    Discipline physical therapist  -CS      Document Type therapy note (daily note)  -CS      Subjective Information pain;fatigue  -CS      Mode of Treatment physical therapy;individual therapy  -CS      Therapy Frequency (PT Clinical Impression) daily  -CS      Patient Effort good  -CS      Comment Pt says she has pain at her \"drain sit\" left abdomen. Agreeable to PT.   -CS      Existing Precautions/Restrictions fall  -CS      Recorded by [CS] Idris Seaman, PT 18 0954      Row Name 18 0950             Cognitive Assessment/Intervention- PT/OT    Orientation Status (Cognition) oriented x 3  -CS      Follows Commands (Cognition) WNL  -CS      Personal Safety Interventions fall prevention program maintained  -CS      Recorded by [CS] Idris Seaman, PT 18 0954      Row Name 18 0950             Bed Mobility Assessment/Treatment    Bed Mobility Assessment/Treatment supine-sit;sit-supine  -CS      Supine-Sit [place] (Bed Mobility) independent  -CS      Recorded by [CS] Idris Seaman, PT 18 0954      Row Name 18 " 0950             Transfer Assessment/Treatment    Transfer Assessment/Treatment sit-stand transfer;stand-sit transfer  -CS      Recorded by [CS] Idris Seaman, PT 07/07/18 0954      Row Name 07/07/18 0950             Sit-Stand Transfer    Sit-Stand [place] (Transfers) contact guard  -CS      Recorded by [CS] Idris Seaman, PT 07/07/18 0954      Row Name 07/07/18 0950             Stand-Sit Transfer    Stand-Sit [place] (Transfers) contact guard  -CS      Recorded by [CS] Idris Seaman, PT 07/07/18 0954      Row Name 07/07/18 0950             Gait/Stairs Assessment/Training    [place] Level (Gait) contact guard  -CS      Distance in Feet (Gait) 300 feet  -CS      Pattern (Gait) step-through  -CS      Deviations/Abnormal Patterns (Gait) antalgic;david decreased  -CS      Recorded by [CS] Idris Seaman, PT 07/07/18 0954      Row Name 07/07/18 0950             Positioning and Restraints    Pre-Treatment Position in bed  -CS      Post Treatment Position bed  -CS      In Bed encouraged to call for assist;call light within reach;sitting EOB   informed nursing aid pt is ready for bath, sitting EOB  -CS      Recorded by [CS] Idris Seaman, PT 07/07/18 0954      Row Name                Wound 07/02/18 0232 Other (See comments) abdomen incision    Wound - Properties Group Date first assessed: 07/02/18 [KM] Time first assessed: 0232 [KM] Side: Other (See comments) [KM] Location: abdomen [KM] Type: incision [KM] Recorded by:  [KM] Elizabet Antoine RN 07/02/18 0232    Row Name 07/07/18 0950             Coping    Observed Emotional State calm;cooperative  -CS      Verbalized Emotional State acceptance  -CS      Recorded by [CS] Idris Seaman, PT 07/07/18 0954      Row Name 07/07/18 0950             Plan of Care Review    Plan of Care Reviewed With patient  -CS      Recorded by [CS] Idris Seaman, PT 07/07/18 0954      Row Name 07/07/18 0950             Outcome Summary/Treatment Plan (PT)     Anticipated Discharge Disposition (PT) home with assist;home with home health  -CS      Recorded by [CS] Idris Seaman, PT 07/07/18 0954        User Key  (r) = Recorded By, (t) = Taken By, (c) = Cosigned By    Initials Name Effective Dates Discipline     Elizabet Antoine, RN 06/16/16 -  Nurse    AMELIA Seaman, PT 05/14/18 -  PT          Wound 07/02/18 0232 Other (See comments) abdomen incision (Active)   Dressing Appearance open to air 7/7/2018 12:00 AM   Closure [place] 7/7/2018 12:00 AM   Periwound intact;dry 7/7/2018 12:00 AM   Periwound Temperature warm 7/6/2018  2:21 PM   Periwound Skin Turgor soft 7/7/2018 12:00 AM   Drainage Amount none 7/7/2018 12:00 AM   Dressing Care, Wound open to air 7/6/2018  7:58 PM             Physical Therapy Education     Title: PT OT SLP Therapies (Done)     Topic: Physical Therapy (Done)     Point: Mobility training (Done)    Learning Progress Summary     Learner Status Readiness Method Response Comment Documented by    Patient Done Acceptance KATYA COSBY NR  MS 07/05/18 0935          Point: Home exercise program (Done)    Learning Progress Summary     Learner Status Readiness Method Response Comment Documented by    Patient Done Acceptance KATYA COSBY NR  MS 07/05/18 0935          Point: Body mechanics (Done)    Learning Progress Summary     Learner Status Readiness Method Response Comment Documented by    Patient Done Acceptance KATYA COSBY NR  MS 07/05/18 0935          Point: Precautions (Done)    Learning Progress Summary     Learner Status Readiness Method Response Comment Documented by    Patient Done Acceptance KATYA COSBY NR  MS 07/05/18 0935                      User Key     Initials Effective Dates Name Provider Type Discipline    MS 04/03/18 -  Luis Brown, PT Physical Therapist PT                    PT Recommendation and Plan  Anticipated Discharge Disposition (PT): home with assist, home with home health  Therapy Frequency (PT Clinical Impression): daily  Outcome  Summary/Treatment Plan (PT)  Anticipated Discharge Disposition (PT): home with assist, home with home health  Plan of Care Reviewed With: patient  Outcome Summary: Pt ambulated 300' CGA this visit with mild abdominal pain. Pt tolerated walking well.           Outcome Measures     Row Name 07/07/18 0900 07/05/18 0900          How much help from another person do you currently need...    Turning from your back to your side while in flat bed without using bedrails? 4  -CS 4  -MS     Moving from lying on back to sitting on the side of a flat bed without bedrails? 4  -CS 4  -MS     Moving to and from a bed to a chair (including a wheelchair)? 3  -CS 3  -MS     Standing up from a chair using your arms (e.g., wheelchair, bedside chair)? 3  -CS 3  -MS     Climbing 3-5 steps with a railing? 3  -CS 3  -MS     To walk in hospital room? 3  -CS 3  -MS     AM-PAC 6 Clicks Score 20  -CS 20  -MS        Functional Assessment    Outcome Measure Options AM-PAC 6 Clicks Basic Mobility (PT)  -CS AM-PAC 6 Clicks Basic Mobility (PT)  -MS       User Key  (r) = Recorded By, (t) = Taken By, (c) = Cosigned By    Initials Name Provider Type    MS Luis Brown, PT Physical Therapist    CS Idris Seaman, PT Physical Therapist           Time Calculation:         PT Charges     Row Name 07/07/18 0957             Time Calculation    Start Time 0935  -      Stop Time 0948  -      Time Calculation (min) 13 min  -CS      PT Received On 07/07/18  -      PT - Next Appointment 07/08/18  -        User Key  (r) = Recorded By, (t) = Taken By, (c) = Cosigned By    Initials Name Provider Type     Idris Seaman, PT Physical Therapist        Therapy Suggested Charges     Code   Minutes Charges    None           Therapy Charges for Today     Code Description Service Date Service Provider Modifiers Qty    17879269102 HC PT THER PROC EA 15 MIN 7/7/2018 Idris Seaman, PT GP 1          PT G-Codes  Outcome Measure Options: AM-PAC 6 Clicks Basic  Mobility (PT)    Idris Seaman, PT  7/7/2018

## 2018-07-07 NOTE — PLAN OF CARE
Problem: Patient Care Overview  Goal: Plan of Care Review  Outcome: Ongoing (interventions implemented as appropriate)   07/07/18 0401   Coping/Psychosocial   Plan of Care Reviewed With patient   Plan of Care Review   Progress improving   OTHER   Outcome Summary Pain well managed with PCA. MARILYN continues to put out large amount of serous exudate, but volume is slowly reducing. Ileostomy producing liquid green stool without complications. VSS       Problem: Pain, Acute (Adult)  Goal: Acceptable Pain Control/Comfort Level  Outcome: Ongoing (interventions implemented as appropriate)      Problem: Fall Risk (Adult)  Goal: Absence of Fall  Outcome: Ongoing (interventions implemented as appropriate)      Problem: Ileostomy (Adult)  Goal: Signs and Symptoms of Listed Potential Problems Will be Absent, Minimized or Managed (Ileostomy)  Outcome: Ongoing (interventions implemented as appropriate)

## 2018-07-07 NOTE — PROGRESS NOTES
"   LOS: 6 days    Patient Care Team:  Pino Templeton MD as PCP - General  Pino Templeton MD as PCP - Family Medicine  Liliya Giraldo MD as Consulting Physician (Rheumatology)  Moisés Corado MD as Consulting Physician (Nephrology)  Grey Dia MD as Consulting Physician (Pulmonary Disease)    Chief Complaint:  No chief complaint on file.    Follow up Arianna on CKD IV, hyperkalemia  Subjective     Interval History:   The patient is feeling somewhat better, and ileostomy is functional now, no nausea or vomiting, she has abdominal discomfort.  No chest pain or shortness of air, no dysuria or gross hematuria.  She has increased ankle edema      Review of Systems:   As noted above.      Objective     Vital Signs  Temp:  [96.2 °F (35.7 °C)-97.7 °F (36.5 °C)] 97.7 °F (36.5 °C)  Heart Rate:  [77-86] 86  Resp:  [16-18] 18  BP: (122-131)/(74-83) 131/79    Flowsheet Rows      First Filed Value   Admission Height  170 cm (66.93\") Documented at 07/01/2018 1138   Admission Weight  47.7 kg (105 lb 1.6 oz) Documented at 07/01/2018 1138          I/O this shift:  In: -   Out: 185 [Drains:85; Stool:100]  I/O last 3 completed shifts:  In: 100 [IV Piggyback:100]  Out: 4220 [Urine:2350; Drains:620; Stool:1250]    Intake/Output Summary (Last 24 hours) at 07/07/18 0938  Last data filed at 07/07/18 0719   Gross per 24 hour   Intake                0 ml   Output             2265 ml   Net            -2265 ml       Physical Exam:  General Appearance: alert, oriented x 3, no acute distress,   Skin: warm and dry  HEENT: Nonicteric sclerae, oral mucosa normal,   Neck: supple, no JVD, trachea midline  Lungs: CTA, unlabored breathing effort  Heart: RRR, normal S1 and S2, no S3, no rub  Abdomen: soft, functional ileostomy,  present bowel sounds to auscultation  : no palpable bladder,  Extremities: 1-2+ ankle edema, no cyanosis or clubbing  Neuro: normal speech and mental status         Results Review:      Results from last 7 " days  Lab Units 07/07/18  0532 07/06/18  0641 07/05/18  0624 07/04/18  0526  07/03/18  0657   SODIUM mmol/L 136 138 140 137  < > 138  137   POTASSIUM mmol/L 4.0 4.5 4.6 4.5  < > 5.9*  5.7*   CHLORIDE mmol/L 105 105 104 106  < > 107  105   CO2 mmol/L 15.6* 12.7* 14.2* 21.0*  < > 20.3*  22.2   BUN mg/dL 21* 28* 30* 29*  < > 27*  29*   CREATININE mg/dL 1.84* 2.11* 2.25* 2.03*  < > 2.44*  2.20*   CALCIUM mg/dL 8.0* 8.1* 8.6 8.5*  < > 9.0  8.3*   BILIRUBIN mg/dL  --   --  0.3 0.2  --  0.2   ALK PHOS U/L  --   --  52 44  --  37*   ALT (SGPT) U/L  --   --  7 5  --  8   AST (SGOT) U/L  --   --  17 13  --  14   GLUCOSE mg/dL 80 71 50* 75  < > 87  91   < > = values in this interval not displayed.    Estimated Creatinine Clearance: 30.2 mL/min (A) (by C-G formula based on SCr of 1.84 mg/dL (H)).      Results from last 7 days  Lab Units 07/07/18  0532 07/06/18  0641 07/05/18  0624   MAGNESIUM mg/dL 1.9 2.2 2.2   PHOSPHORUS mg/dL 2.9 3.6 4.3         Results from last 7 days  Lab Units 07/07/18  0532 07/06/18  0641 07/03/18  0657   URIC ACID mg/dL 4.5 5.1 3.1         Results from last 7 days  Lab Units 07/07/18  0532 07/06/18  0641 07/05/18  0624 07/04/18  0526 07/03/18  0658   WBC 10*3/mm3 8.74 9.16 18.41* 14.95* 15.05*   HEMOGLOBIN g/dL 8.5* 8.2* 10.2* 8.8* 8.3*   PLATELETS 10*3/mm3 374 344 426 296 244               Imaging Results (last 24 hours)     Procedure Component Value Units Date/Time    XR Abdomen 2 View With Chest 1 View [[id number]] Collected:  07/06/18 1038     Updated:  07/06/18 1051    Narrative:       XR ABDOMEN 2 VW W CHEST 1 VW-           Clinical: Sigmoid resection with diverting ileostomy, abdominal pain     COMPARISON chest radiograph 5/5/2018     FINDINGS: Blunting of the left costophrenic angle similar to the  previous examination. Trace pleural thickening or pleural fluid. No  pulmonary edema or acute airspace disease has developed. The heart size  normal, mediastinum and maryann are stable and  satisfactory in appearance.     There are surgical skin staples along the midline with a right lower  quadrant ostomy device. No free air. Moderately distended loops of small  bowel seen within the mid abdomen. Air-fluid levels on the upright  projection. Ileus versus obstruction. Caliber of the small bowel more  pronounced compared 7/4/2018. There is a small amount of gas  demonstrated within a segment of what appears to be transverse colon.  There appears to be a drainage catheter superimposing the left abdomen.  The remainder is unremarkable.     This report was finalized on 7/6/2018 10:48 AM by Dr. Cristino Lee M.D.             budesonide-formoterol 2 puff Inhalation BID   enoxaparin 30 mg Subcutaneous Daily   ertapenem 500 mg Intravenous Q24H   febuxostat 40 mg Oral Daily   levothyroxine 125 mcg Oral Daily   metroNIDAZOLE 500 mg Intravenous Q8H   rosuvastatin 10 mg Oral Daily   sodium bicarbonate 1,300 mg Oral TID       HYDROmorphone HCl-NaCl     sodium chloride 75 mL/hr Last Rate: 75 mL/hr (07/07/18 0230)       Medication Review:   Current Facility-Administered Medications   Medication Dose Route Frequency Provider Last Rate Last Dose   • acetaminophen (TYLENOL) 160 MG/5ML solution 650 mg  650 mg Oral Q4H PRN Bee Carreon MD        Or   • acetaminophen (TYLENOL) suppository 650 mg  650 mg Rectal Q4H PRN Bee Carreon MD       • albuterol (PROVENTIL HFA;VENTOLIN HFA) inhaler 2 puff  2 puff Inhalation Q4H PRN Bee Carreon MD       • budesonide-formoterol (SYMBICORT) 160-4.5 MCG/ACT inhaler 2 puff  2 puff Inhalation BID Bee Carreon MD   2 puff at 07/07/18 0705   • enoxaparin (LOVENOX) syringe 30 mg  30 mg Subcutaneous Daily Bee Carreon MD   30 mg at 07/06/18 0844   • ertapenem (INVanz) 500 mg in sodium chloride 0.9 % 50 mL IVPB  500 mg Intravenous Q24H Bee Carreon  mL/hr at 07/06/18 1421 500 mg at 07/06/18 1421   • febuxostat (ULORIC) tablet 40 mg  40 mg Oral Daily  Bee Carreon MD   40 mg at 07/06/18 0844   • HYDROmorphone (DILAUDID) injection 0.5 mg  0.5 mg Intravenous Q2H PRN Bee Carreon MD   0.5 mg at 07/06/18 1835   • HYDROmorphone (DILAUDID) injection 0.5 mg  0.5 mg Intravenous Q2H PRN Bee Carreon MD   0.5 mg at 07/01/18 1935    And   • naloxone (NARCAN) injection 0.1 mg  0.1 mg Intravenous Q5 Min PRN Bee Carreon MD       • HYDROmorphone (DILAUDID) PCA 0.2 mg/ml 50 mL syringe   Intravenous Continuous Bee Carreon MD       • levothyroxine (SYNTHROID, LEVOTHROID) tablet 125 mcg  125 mcg Oral Daily Bee Carreon MD   125 mcg at 07/06/18 0844   • metroNIDAZOLE (FLAGYL) IVPB 500 mg  500 mg Intravenous Q8H Bee Carreon MD   500 mg at 07/07/18 0230   • morphine injection 2 mg  2 mg Intravenous Q2H PRN Bee Carreon MD       • naloxone (NARCAN) injection 0.1 mg  0.1 mg Intravenous Q5 Min PRN Bee Carreon MD       • ondansetron (ZOFRAN) tablet 4 mg  4 mg Oral Q6H PRN Bee Carreon MD        Or   • ondansetron ODT (ZOFRAN-ODT) disintegrating tablet 4 mg  4 mg Oral Q6H PRN Bee Carreon MD        Or   • ondansetron (ZOFRAN) injection 4 mg  4 mg Intravenous Q6H PRN Bee Carreon MD   4 mg at 07/06/18 1755   • ondansetron (ZOFRAN) tablet 4 mg  4 mg Oral Q6H PRN Bee Carreon MD        Or   • ondansetron ODT (ZOFRAN-ODT) disintegrating tablet 4 mg  4 mg Oral Q6H PRN Bee Carreon MD        Or   • ondansetron (ZOFRAN) injection 4 mg  4 mg Intravenous Q6H PRN Bee Carreon MD   4 mg at 07/05/18 0601   • ondansetron ODT (ZOFRAN-ODT) disintegrating tablet 4 mg  4 mg Oral Q8H PRN Bee Carreon MD       • promethazine (PHENERGAN) tablet 25 mg  25 mg Oral Q8H PRN Bee Carreon MD   25 mg at 07/04/18 0717   • rosuvastatin (CRESTOR) tablet 10 mg  10 mg Oral Daily Bee Carreon MD   10 mg at 07/06/18 0844   • sodium bicarbonate tablet 1,300 mg  1,300 mg Oral TID Ryder Santoyo MD   1,300 mg  at 07/06/18 1835   • sodium chloride 0.9 % flush 1-10 mL  1-10 mL Intravenous PRN Bee Carreon MD       • sodium chloride 0.9 % infusion  75 mL/hr Intravenous Continuous Bee Carreon MD 75 mL/hr at 07/07/18 0230 75 mL/hr at 07/07/18 0230       Assessment/Plan      1.  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  Multifactorial in etiology , most likely due to volume depletion.  Chronic kidney disease stage IV status associated with Wegener's granulomatosis with renal involvement has been in remission with stable renal function . Creatinine Is down to 1.84 near baseline   2. Recent C. diff  3. Recurrent diverticulitis sp lap resection 5/1/18. Colonic stricture sp dilation with subsequent perforation requiring ileostomy 7/1.   4. Hypothyroid on replacement.    5. Anemia, the hemoglobin today is down to 8.5  6. Protein calorie malnutrition.  Started the oral intake and she is tolerating her diet.   7.  Hyperkalemia resolved  8.  Metabolic acidosis, treated  9.  Fluid excess      Plan:  1.  To discontinue the IV fluid  2.  One dose of diuretics today, Bumex 4 mg IV ×1  3.  Surveillance labs        Ryder Santoyo MD  07/07/18  9:38 AM

## 2018-07-07 NOTE — PROGRESS NOTES
Chief Complaint: POD # 6    Subjective   Pt up in chair tolerating full liquids     Objective     Vital signs in last 24 hours:  Temp:  [96.2 °F (35.7 °C)-97.7 °F (36.5 °C)] 97.7 °F (36.5 °C)  Heart Rate:  [77-86] 86  Resp:  [16-18] 18  BP: (122-131)/(74-83) 131/79    Intake/Output last 3 shifts:  I/O last 3 completed shifts:  In: 100 [IV Piggyback:100]  Out: 4220 [Urine:2350; Drains:620; Stool:1250]    Intake/Output this shift:  I/O this shift:  In: 24 [P.O.:24]  Out: 405 [Drains:185; Stool:220]    Physical Exam:  Respiratory: CTA, good inspiratory effort  CV: RRR  Abd: + BS, soft, ND, usual post-op tenderness, no rebound, no guarding, incision C/D/I, MARILYN serous 370cc/24hrs, ostomy functioning   Ext: bilateral edema    Results from last 7 days  Lab Units 07/07/18  0532   WBC 10*3/mm3 8.74   HEMOGLOBIN g/dL 8.5*   HEMATOCRIT % 27.0*   PLATELETS 10*3/mm3 374       Results from last 7 days  Lab Units 07/07/18  0532   SODIUM mmol/L 136   POTASSIUM mmol/L 4.0   CHLORIDE mmol/L 105   CO2 mmol/L 15.6*   BUN mg/dL 21*   CREATININE mg/dL 1.84*   GLUCOSE mg/dL 80   CALCIUM mg/dL 8.0*     Assessment/Plan   S/P Sigmoid colon stricture/rupture resection with anastomosis         Diverting loop ileostomy   Add protein shakes   D/c IVFs and PCA, oral percocet     Bee Carreon MD

## 2018-07-08 LAB
ALBUMIN SERPL-MCNC: 2.9 G/DL (ref 3.5–5.2)
ANION GAP SERPL CALCULATED.3IONS-SCNC: 11.8 MMOL/L
BASOPHILS # BLD AUTO: 0.02 10*3/MM3 (ref 0–0.2)
BASOPHILS NFR BLD AUTO: 0.2 % (ref 0–1.5)
BUN BLD-MCNC: 17 MG/DL (ref 6–20)
BUN/CREAT SERPL: 8.5 (ref 7–25)
CALCIUM SPEC-SCNC: 8.5 MG/DL (ref 8.6–10.5)
CHLORIDE SERPL-SCNC: 100 MMOL/L (ref 98–107)
CO2 SERPL-SCNC: 24.2 MMOL/L (ref 22–29)
CREAT BLD-MCNC: 2.01 MG/DL (ref 0.57–1)
DEPRECATED RDW RBC AUTO: 48.3 FL (ref 37–54)
EOSINOPHIL # BLD AUTO: 1.42 10*3/MM3 (ref 0–0.7)
EOSINOPHIL NFR BLD AUTO: 16.7 % (ref 0.3–6.2)
ERYTHROCYTE [DISTWIDTH] IN BLOOD BY AUTOMATED COUNT: 15.1 % (ref 11.7–13)
GFR SERPL CREATININE-BSD FRML MDRD: 26 ML/MIN/1.73
GLUCOSE BLD-MCNC: 104 MG/DL (ref 65–99)
HCT VFR BLD AUTO: 30.6 % (ref 35.6–45.5)
HGB BLD-MCNC: 9.6 G/DL (ref 11.9–15.5)
IMM GRANULOCYTES # BLD: 0.03 10*3/MM3 (ref 0–0.03)
IMM GRANULOCYTES NFR BLD: 0.4 % (ref 0–0.5)
LYMPHOCYTES # BLD AUTO: 1.51 10*3/MM3 (ref 0.9–4.8)
LYMPHOCYTES NFR BLD AUTO: 17.8 % (ref 19.6–45.3)
MAGNESIUM SERPL-MCNC: 1.7 MG/DL (ref 1.6–2.6)
MCH RBC QN AUTO: 27.7 PG (ref 26.9–32)
MCHC RBC AUTO-ENTMCNC: 31.4 G/DL (ref 32.4–36.3)
MCV RBC AUTO: 88.2 FL (ref 80.5–98.2)
MONOCYTES # BLD AUTO: 0.97 10*3/MM3 (ref 0.2–1.2)
MONOCYTES NFR BLD AUTO: 11.4 % (ref 5–12)
NEUTROPHILS # BLD AUTO: 4.53 10*3/MM3 (ref 1.9–8.1)
NEUTROPHILS NFR BLD AUTO: 53.5 % (ref 42.7–76)
PHOSPHATE SERPL-MCNC: 2.6 MG/DL (ref 2.5–4.5)
PLATELET # BLD AUTO: 348 10*3/MM3 (ref 140–500)
PMV BLD AUTO: 9 FL (ref 6–12)
POTASSIUM BLD-SCNC: 3.8 MMOL/L (ref 3.5–5.2)
RBC # BLD AUTO: 3.47 10*6/MM3 (ref 3.9–5.2)
SODIUM BLD-SCNC: 136 MMOL/L (ref 136–145)
URATE SERPL-MCNC: 4.6 MG/DL (ref 2.4–5.7)
WBC NRBC COR # BLD: 8.48 10*3/MM3 (ref 4.5–10.7)

## 2018-07-08 PROCEDURE — 85025 COMPLETE CBC W/AUTO DIFF WBC: CPT | Performed by: INTERNAL MEDICINE

## 2018-07-08 PROCEDURE — 97110 THERAPEUTIC EXERCISES: CPT | Performed by: PHYSICAL THERAPIST

## 2018-07-08 PROCEDURE — 83735 ASSAY OF MAGNESIUM: CPT | Performed by: INTERNAL MEDICINE

## 2018-07-08 PROCEDURE — 80069 RENAL FUNCTION PANEL: CPT | Performed by: INTERNAL MEDICINE

## 2018-07-08 PROCEDURE — 97116 GAIT TRAINING THERAPY: CPT | Performed by: PHYSICAL THERAPIST

## 2018-07-08 PROCEDURE — 25010000002 ERTAPENEM PER 500 MG: Performed by: SURGERY

## 2018-07-08 PROCEDURE — 94799 UNLISTED PULMONARY SVC/PX: CPT

## 2018-07-08 PROCEDURE — 25010000002 ONDANSETRON PER 1 MG: Performed by: SURGERY

## 2018-07-08 PROCEDURE — 84550 ASSAY OF BLOOD/URIC ACID: CPT | Performed by: INTERNAL MEDICINE

## 2018-07-08 PROCEDURE — 25010000002 ENOXAPARIN PER 10 MG: Performed by: SURGERY

## 2018-07-08 PROCEDURE — 99024 POSTOP FOLLOW-UP VISIT: CPT | Performed by: SURGERY

## 2018-07-08 RX ADMIN — METRONIDAZOLE 500 MG: 500 INJECTION, SOLUTION INTRAVENOUS at 03:04

## 2018-07-08 RX ADMIN — SODIUM BICARBONATE 1300 MG: 650 TABLET ORAL at 21:44

## 2018-07-08 RX ADMIN — METRONIDAZOLE 500 MG: 500 INJECTION, SOLUTION INTRAVENOUS at 12:17

## 2018-07-08 RX ADMIN — BUDESONIDE AND FORMOTEROL FUMARATE DIHYDRATE 2 PUFF: 160; 4.5 AEROSOL RESPIRATORY (INHALATION) at 19:49

## 2018-07-08 RX ADMIN — SODIUM BICARBONATE 1300 MG: 650 TABLET ORAL at 17:35

## 2018-07-08 RX ADMIN — ENOXAPARIN SODIUM 30 MG: 40 INJECTION SUBCUTANEOUS at 09:42

## 2018-07-08 RX ADMIN — ONDANSETRON 4 MG: 2 INJECTION INTRAMUSCULAR; INTRAVENOUS at 17:35

## 2018-07-08 RX ADMIN — BUDESONIDE AND FORMOTEROL FUMARATE DIHYDRATE 2 PUFF: 160; 4.5 AEROSOL RESPIRATORY (INHALATION) at 10:49

## 2018-07-08 RX ADMIN — OXYCODONE HYDROCHLORIDE AND ACETAMINOPHEN 2 TABLET: 5; 325 TABLET ORAL at 17:35

## 2018-07-08 RX ADMIN — FEBUXOSTAT 40 MG: 40 TABLET ORAL at 09:42

## 2018-07-08 RX ADMIN — ONDANSETRON 4 MG: 2 INJECTION INTRAMUSCULAR; INTRAVENOUS at 11:37

## 2018-07-08 RX ADMIN — ROSUVASTATIN CALCIUM 10 MG: 10 TABLET, FILM COATED ORAL at 09:42

## 2018-07-08 RX ADMIN — ONDANSETRON 4 MG: 2 INJECTION INTRAMUSCULAR; INTRAVENOUS at 23:35

## 2018-07-08 RX ADMIN — SODIUM BICARBONATE 1300 MG: 650 TABLET ORAL at 09:42

## 2018-07-08 RX ADMIN — METRONIDAZOLE 500 MG: 500 INJECTION, SOLUTION INTRAVENOUS at 20:00

## 2018-07-08 RX ADMIN — LEVOTHYROXINE SODIUM 125 MCG: 125 TABLET ORAL at 09:42

## 2018-07-08 RX ADMIN — OXYCODONE HYDROCHLORIDE AND ACETAMINOPHEN 2 TABLET: 5; 325 TABLET ORAL at 23:08

## 2018-07-08 RX ADMIN — SODIUM CHLORIDE 500 MG: 9 INJECTION, SOLUTION INTRAVENOUS at 11:37

## 2018-07-08 NOTE — PLAN OF CARE
Problem: Patient Care Overview  Goal: Plan of Care Review  Outcome: Ongoing (interventions implemented as appropriate)   07/08/18 6399   Coping/Psychosocial   Plan of Care Reviewed With patient   Plan of Care Review   Progress improving   OTHER   Outcome Summary Pt tolerating po pain medication with zofran well, having only requested it once overnight. MARILYN drain decreasing output. VSS       Problem: Pain, Acute (Adult)  Goal: Acceptable Pain Control/Comfort Level  Outcome: Ongoing (interventions implemented as appropriate)      Problem: Fall Risk (Adult)  Goal: Absence of Fall  Outcome: Ongoing (interventions implemented as appropriate)      Problem: Ileostomy (Adult)  Goal: Signs and Symptoms of Listed Potential Problems Will be Absent, Minimized or Managed (Ileostomy)  Outcome: Ongoing (interventions implemented as appropriate)

## 2018-07-08 NOTE — PROGRESS NOTES
Chief Complaint: POD # 7    Subjective   Pt tolerating full liquids, not as shaky, pain improved    Objective     Vital signs in last 24 hours:  Temp:  [97.6 °F (36.4 °C)-99 °F (37.2 °C)] 99 °F (37.2 °C)  Heart Rate:  [71-87] 85  Resp:  [16-18] 18  BP: (115-136)/(84-95) 115/84    Intake/Output last 3 shifts:  I/O last 3 completed shifts:  In: 504 [P.O.:504]  Out: 5765 [Urine:4100; Drains:645; Stool:1020]    Intake/Output this shift:  I/O this shift:  In: 480 [P.O.:480]  Out: 1870 [Urine:1200; Drains:170; Stool:500]    Physical Exam:  Respiratory: CTA, good inspiratory effort  CV: RRR  Abd: + BS, soft, ND, usual post-op tenderness, no rebound, no guarding, incision C/D/I, MARILYN serous with 435cc, ileostomy function with 870cc    Results from last 7 days  Lab Units 07/08/18  0510   WBC 10*3/mm3 8.48   HEMOGLOBIN g/dL 9.6*   HEMATOCRIT % 30.6*   PLATELETS 10*3/mm3 348       Results from last 7 days  Lab Units 07/08/18  0510   SODIUM mmol/L 136   POTASSIUM mmol/L 3.8   CHLORIDE mmol/L 100   CO2 mmol/L 24.2   BUN mg/dL 17   CREATININE mg/dL 2.01*   GLUCOSE mg/dL 104*   CALCIUM mg/dL 8.5*     Assessment/Plan   S/P Sigmoid colon stricture/rupture resection with anastomosis         Diverting loop ileostomy   Malnutrition - tolerating protein shakes       Bee Carreon MD

## 2018-07-08 NOTE — PLAN OF CARE
Problem: Patient Care Overview  Goal: Plan of Care Review   07/08/18 1038   Coping/Psychosocial   Plan of Care Reviewed With patient   Plan of Care Review   Progress improving   OTHER   Outcome Summary She ambulated down/up 1/2 flight of stairs with CG-1.

## 2018-07-08 NOTE — PROGRESS NOTES
"   LOS: 7 days    Patient Care Team:  Pino Templeton MD as PCP - General  Pino Templeton MD as PCP - Family Medicine  Liliya Giraldo MD as Consulting Physician (Rheumatology)  Moisés Corado MD as Consulting Physician (Nephrology)  Grey Dia MD as Consulting Physician (Pulmonary Disease)    Chief Complaint:  No chief complaint on file.    Follow up Arianna on CKD IV, hyperkalemia  Subjective     Interval History:   The patient is feeling better today, sitting up in the chair,  no nausea or vomiting, abdominal discomfort improved, has not taken any IV narcotics.  Her edema has improved.  No dysuria or gross hematuria.  She was diuresed yesterday      Review of Systems:   As noted above.      Objective     Vital Signs  Temp:  [97.6 °F (36.4 °C)-97.9 °F (36.6 °C)] 97.7 °F (36.5 °C)  Heart Rate:  [71-87] 87  Resp:  [16] 16  BP: (126-136)/(81-95) 126/86    Flowsheet Rows      First Filed Value   Admission Height  170 cm (66.93\") Documented at 07/01/2018 1138   Admission Weight  47.7 kg (105 lb 1.6 oz) Documented at 07/01/2018 1138          I/O this shift:  In: 240 [P.O.:240]  Out: 1330 [Urine:900; Drains:130; Stool:300]  I/O last 3 completed shifts:  In: 504 [P.O.:504]  Out: 5765 [Urine:4100; Drains:645; Stool:1020]    Intake/Output Summary (Last 24 hours) at 07/08/18 1032  Last data filed at 07/08/18 0945   Gross per 24 hour   Intake              720 ml   Output             4980 ml   Net            -4260 ml       Physical Exam:  General Appearance: alert, oriented x 3, no acute distress,   Skin: warm and dry  HEENT: Nonicteric sclerae, oral mucosa normal,   Neck: supple, no JVD, trachea midline  Lungs: CTA, unlabored breathing effort  Heart: RRR, normal S1 and S2, no S3, no rub  Abdomen: soft, functional ileostomy,  present bowel sounds to auscultation  : no palpable bladder,  Extremities: Trace 1+ ankle edema, no cyanosis or clubbing  Neuro: normal speech and mental status         Results " Review:      Results from last 7 days  Lab Units 07/08/18 0510 07/07/18 0532 07/06/18  0641 07/05/18 0624 07/04/18  0526 07/03/18  0657   SODIUM mmol/L 136 136 138 140 137  < > 138  137   POTASSIUM mmol/L 3.8 4.0 4.5 4.6 4.5  < > 5.9*  5.7*   CHLORIDE mmol/L 100 105 105 104 106  < > 107  105   CO2 mmol/L 24.2 15.6* 12.7* 14.2* 21.0*  < > 20.3*  22.2   BUN mg/dL 17 21* 28* 30* 29*  < > 27*  29*   CREATININE mg/dL 2.01* 1.84* 2.11* 2.25* 2.03*  < > 2.44*  2.20*   CALCIUM mg/dL 8.5* 8.0* 8.1* 8.6 8.5*  < > 9.0  8.3*   BILIRUBIN mg/dL  --   --   --  0.3 0.2  --  0.2   ALK PHOS U/L  --   --   --  52 44  --  37*   ALT (SGPT) U/L  --   --   --  7 5  --  8   AST (SGOT) U/L  --   --   --  17 13  --  14   GLUCOSE mg/dL 104* 80 71 50* 75  < > 87  91   < > = values in this interval not displayed.    Estimated Creatinine Clearance: 24.1 mL/min (A) (by C-G formula based on SCr of 2.01 mg/dL (H)).      Results from last 7 days  Lab Units 07/08/18 0510 07/07/18 0532 07/06/18  0641   MAGNESIUM mg/dL 1.7 1.9 2.2   PHOSPHORUS mg/dL 2.6 2.9 3.6         Results from last 7 days  Lab Units 07/08/18 0510 07/07/18 0532 07/06/18  0641 07/03/18  0657   URIC ACID mg/dL 4.6 4.5 5.1 3.1         Results from last 7 days  Lab Units 07/08/18 0510 07/07/18 0532 07/06/18  0641 07/05/18 0624 07/04/18  0526   WBC 10*3/mm3 8.48 8.74 9.16 18.41* 14.95*   HEMOGLOBIN g/dL 9.6* 8.5* 8.2* 10.2* 8.8*   PLATELETS 10*3/mm3 348 374 344 426 296               Imaging Results (last 24 hours)     ** No results found for the last 24 hours. **          budesonide-formoterol 2 puff Inhalation BID   enoxaparin 30 mg Subcutaneous Daily   ertapenem 500 mg Intravenous Q24H   febuxostat 40 mg Oral Daily   levothyroxine 125 mcg Oral Daily   metroNIDAZOLE 500 mg Intravenous Q8H   rosuvastatin 10 mg Oral Daily   sodium bicarbonate 1,300 mg Oral TID          Medication Review:   Current Facility-Administered Medications   Medication Dose Route Frequency  Provider Last Rate Last Dose   • acetaminophen (TYLENOL) 160 MG/5ML solution 650 mg  650 mg Oral Q4H PRN Bee Carreon MD        Or   • acetaminophen (TYLENOL) suppository 650 mg  650 mg Rectal Q4H PRN Bee Carreon MD       • albuterol (PROVENTIL HFA;VENTOLIN HFA) inhaler 2 puff  2 puff Inhalation Q4H PRN Bee Carreon MD       • budesonide-formoterol (SYMBICORT) 160-4.5 MCG/ACT inhaler 2 puff  2 puff Inhalation BID Bee Carreon MD   2 puff at 07/07/18 2105   • enoxaparin (LOVENOX) syringe 30 mg  30 mg Subcutaneous Daily Bee Carreon MD   30 mg at 07/08/18 0942   • ertapenem (INVanz) 500 mg in sodium chloride 0.9 % 50 mL IVPB  500 mg Intravenous Q24H Bee Carreon  mL/hr at 07/07/18 1156 500 mg at 07/07/18 1156   • febuxostat (ULORIC) tablet 40 mg  40 mg Oral Daily Bee Carreon MD   40 mg at 07/08/18 0942   • HYDROmorphone (DILAUDID) injection 0.5 mg  0.5 mg Intravenous Q2H PRN Bee aCrreon MD   0.5 mg at 07/06/18 1835   • HYDROmorphone (DILAUDID) injection 0.5 mg  0.5 mg Intravenous Q2H PRN Bee Carreon MD   0.5 mg at 07/01/18 1935    And   • naloxone (NARCAN) injection 0.1 mg  0.1 mg Intravenous Q5 Min PRN Bee Carreon MD       • levothyroxine (SYNTHROID, LEVOTHROID) tablet 125 mcg  125 mcg Oral Daily Bee Carreon MD   125 mcg at 07/08/18 0942   • metroNIDAZOLE (FLAGYL) IVPB 500 mg  500 mg Intravenous Q8H Bee Carreon MD   500 mg at 07/08/18 0304   • morphine injection 2 mg  2 mg Intravenous Q2H PRN Bee Carreon MD       • ondansetron (ZOFRAN) tablet 4 mg  4 mg Oral Q6H PRN Bee Carreon MD        Or   • ondansetron ODT (ZOFRAN-ODT) disintegrating tablet 4 mg  4 mg Oral Q6H PRN Bee Carreon MD        Or   • ondansetron (ZOFRAN) injection 4 mg  4 mg Intravenous Q6H PRN Bee Carreon MD   Stopped at 07/08/18 0310   • ondansetron (ZOFRAN) tablet 4 mg  4 mg Oral Q6H PRN Bee Carreon MD        Or   • ondansetron ODT  (ZOFRAN-ODT) disintegrating tablet 4 mg  4 mg Oral Q6H PRN Bee Carreon MD        Or   • ondansetron (ZOFRAN) injection 4 mg  4 mg Intravenous Q6H PRN Bee Carreon MD   4 mg at 07/05/18 0601   • ondansetron ODT (ZOFRAN-ODT) disintegrating tablet 4 mg  4 mg Oral Q8H PRN Bee Carreon MD       • oxyCODONE-acetaminophen (PERCOCET) 5-325 MG per tablet 2 tablet  2 tablet Oral Q4H PRN Bee Carreon MD   2 tablet at 07/07/18 2057   • promethazine (PHENERGAN) tablet 25 mg  25 mg Oral Q8H PRN Bee Carreon MD   25 mg at 07/04/18 0717   • rosuvastatin (CRESTOR) tablet 10 mg  10 mg Oral Daily Bee Carreon MD   10 mg at 07/08/18 0942   • sodium bicarbonate tablet 1,300 mg  1,300 mg Oral TID Ryder Santoyo MD   1,300 mg at 07/08/18 0942   • sodium chloride 0.9 % flush 1-10 mL  1-10 mL Intravenous PRN Bee Carreon MD           Assessment/Plan      1.  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  Multifactorial in etiology , most likely due to volume depletion.  Chronic kidney disease stage IV status associated with Wegener's granulomatosis with renal involvement has been in remission with stable renal function . Creatinine Is 2.1 today, after the diuresis, the patient renal function seems to be at baseline.  2. Recent C. diff  3. Recurrent diverticulitis sp lap resection 5/1/18. Colonic stricture sp dilation with subsequent perforation requiring ileostomy 7/1.   4. Hypothyroid on replacement.    5. Anemia, the hemoglobin today is  9.6  6. Protein calorie malnutrition.  Started the oral intake and she is tolerating her diet.   7.  Hyperkalemia resolved  8.  Metabolic acidosis, treated  9.  Fluid excess, improved significantly      Plan:  1.  To continue the same treatment  2.  No diuresis at this  3.  Surveillance labs        Ryder Santoyo MD  07/08/18  10:32 AM

## 2018-07-08 NOTE — PLAN OF CARE
Problem: Patient Care Overview  Goal: Plan of Care Review  Outcome: Ongoing (interventions implemented as appropriate)   07/08/18 6487   Coping/Psychosocial   Plan of Care Reviewed With patient   Plan of Care Review   Progress improving   OTHER   Outcome Summary Patient up to chair and ambulated today, pain medication given once this shift, zofran given once this shift, IV abx cont, andreas and stoma, will cont to monitor       Problem: Pain, Acute (Adult)  Goal: Acceptable Pain Control/Comfort Level  Outcome: Ongoing (interventions implemented as appropriate)      Problem: Fall Risk (Adult)  Goal: Absence of Fall  Outcome: Ongoing (interventions implemented as appropriate)      Problem: Ileostomy (Adult)  Goal: Signs and Symptoms of Listed Potential Problems Will be Absent, Minimized or Managed (Ileostomy)  Outcome: Ongoing (interventions implemented as appropriate)

## 2018-07-08 NOTE — THERAPY PROGRESS REPORT/RE-CERT
Acute Care - Physical Therapy Re-Assessment  [facility]     Patient Name: Sun Rodriguez  : 1965  MRN: [id number]  Today's Date: 2018   Onset of Illness/Injury or Date of Surgery: 18  Date of Referral to PT: 18  Referring Physician: Bee Lou      Admit Date: 2018    Visit Dx:     ICD-10-CM ICD-9-CM   1. Muscle weakness (generalized) M62.81 728.87   2. Acute abdomen R10.0 789.00     Patient Active Problem List   Diagnosis   • Hypertension   • CKD (chronic kidney disease) stage 4, GFR 15-29 ml/min (CMS/HCC)   • Dizziness   • Underweight BMI 17.6   • Acute sigmoid diverticulitis   • Sepsis present on admission   • History of colonic diverticulitis   • Colitis, acute   • Colonic stricture   • Acute abdomen     Past Medical History:   Diagnosis Date   • Asthma    • Bronchiectasis (CMS/[facility])    • Chronic kidney disease    • Colitis    • Diverticulitis    • Diverticulosis    • Hypercholesteremia    • Hypothyroidism    • Joint pain    • PONV (postoperative nausea and vomiting)    • Postoperative urinary retention    • Wegener's granulomatosis with renal involvement (CMS/[facility])    • Weight loss      Past Surgical History:   Procedure Laterality Date   • COLON RESECTION N/A 2018    Procedure: COLON RESECTION LAPAROSCOPIC SIGMOID with splenic flexure takedown;  Surgeon: Bee Carreon MD;  Location: [facility] OR;  Service: General   • COLONOSCOPY N/A 2016    Procedure: COLONOSCOPY INTO CECUM;  Surgeon: Bee Carreon MD;  Location: [facility] ENDOSCOPY;  Service:    • COLONOSCOPY N/A 2018    Procedure: COLONOSCOPY WITH DECOMPRESSION;  Surgeon: Bee Carreon MD;  Location: [facility] OR;  Service: Gastroenterology   • EXPLORATORY LAPAROTOMY N/A 2018    Procedure: EXPLORATORY LAPAROTOMY WITH RESECTION OF SIGMOID COLON WITH DIVERTING ILEOSTOMY;  Surgeon: Bee Carreon MD;  Location: [facility] OR;  Service: General   • LUNG BIOPSY Right    •  OOPHORECTOMY Right    • RENAL BIOPSY  2007   • SALPINGECTOMY Right    • SIGMOIDOSCOPY N/A 6/23/2018    Procedure: FLEXIBLE SIGMOIDOSCOPY WITH 12MM BALLOON DILATATION;  Surgeon: Bee Carreon MD;  Location: [facility] ENDOSCOPY;  Service: General   • SIGMOIDOSCOPY N/A 7/1/2018    Procedure: FLEXIBLE SIGMOIDOSCOPY WITH DILATATION with colonic balloons 8 TO 15MM;  Surgeon: Bee Carreon MD;  Location: [facility] ENDOSCOPY;  Service: General   • WISDOM TOOTH EXTRACTION          PT ASSESSMENT (last 12 hours)      Physical Therapy Evaluation     Row Name             Wound 07/02/18 0232 Other (See comments) abdomen incision    Wound - Properties Group Date first assessed: 07/02/18  -KM Time first assessed: 0232  -KM Side: Other (See comments)  -KM Location: abdomen  -KM Type: incision  -KM      User Key  (r) = Recorded By, (t) = Taken By, (c) = Cosigned By    Initials Name Provider Type    KM Elizabet Antoine, RN Registered Nurse          Physical Therapy Education     Title: PT OT SLP Therapies (Done)     Topic: Physical Therapy (Done)     Point: Mobility training (Done)    Learning Progress Summary     Learner Status Readiness Method Response Comment Documented by    Patient Done Guillermo PRUITT   07/08/18 1038     Done Acceptance KATYA COSBY NR  MS 07/05/18 0935          Point: Home exercise program (Done)    Learning Progress Summary     Learner Status Readiness Method Response Comment Documented by    Patient Done Guillermo PRUITT   07/08/18 1038     Done Acceptance KATYA COSBY NR  MS 07/05/18 0935          Point: Body mechanics (Done)    Learning Progress Summary     Learner Status Readiness Method Response Comment Documented by    Patient Done Guillermo PRUITT   07/08/18 1038     Done Acceptance KATYA COSBY NR  MS 07/05/18 0935          Point: Precautions (Done)    Learning Progress Summary     Learner Status Readiness Method Response Comment Documented by    Patient Done Guillermo PRUITT   07/08/18 1038     Done Acceptance KATYA COSBY NR  MS  07/05/18 0935                      User Key     Initials Effective Dates Name Provider Type Discipline    MS 04/03/18 -  Luis Brown, PT Physical Therapist PT     06/22/16 -  Arias Clark, PT Physical Therapist PT                PT Recommendation and Plan  Anticipated Discharge Disposition (PT): home  Outcome Summary/Treatment Plan (PT)  Daily Summary of Progress (PT): progress toward functional goals is good  Anticipated Discharge Disposition (PT): home  Plan of Care Reviewed With: patient  Progress: improving  Outcome Summary: She ambulated down/up 1/2 flight of stairs with CG-1.          Outcome Measures     Row Name 07/08/18 1000 07/07/18 0900          How much help from another person do you currently need...    Turning from your back to your side while in flat bed without using bedrails? 4  -MP 4  -CS     Moving from lying on back to sitting on the side of a flat bed without bedrails? 4  -MP 4  -CS     Moving to and from a bed to a chair (including a wheelchair)? 3  -MP 3  -CS     Standing up from a chair using your arms (e.g., wheelchair, bedside chair)? 4  -MP 3  -CS     Climbing 3-5 steps with a railing? 3  -MP 3  -CS     To walk in hospital room? 3  -MP 3  -CS     AM-PAC 6 Clicks Score 21  -MP 20  -CS        Functional Assessment    Outcome Measure Options AM-PAC 6 Clicks Basic Mobility (PT)  -MP AM-PAC 6 Clicks Basic Mobility (PT)  -CS       User Key  (r) = Recorded By, (t) = Taken By, (c) = Cosigned By    Initials Name Provider Type    MP Arias Clark, PT Physical Therapist    CS Idris Seaman, PT Physical Therapist           Time Calculation:         PT Charges     Row Name 07/08/18 1041             Time Calculation    Start Time 1005  -MP      Stop Time 1035  -MP      Time Calculation (min) 30 min  -MP      PT Received On 07/08/18  -MP      PT - Next Appointment 07/09/18  -MP      PT Goal Re-Cert Due Date 07/10/18  -MP        User Key  (r) = Recorded By, (t) = Taken By, (c) = Cosigned By     Initials Name Provider Type    MP Arias Clark PT Physical Therapist        Therapy Suggested Charges     Code   Minutes Charges    None           Therapy Charges for Today     Code Description Service Date Service Provider Modifiers Qty    60189167927 HC PT THER PROC EA 15 MIN 7/8/2018 Arias Clark, PT GP 1    42902118878 HC GAIT TRAINING EA 15 MIN 7/8/2018 Arias Clark PT GP 1          PT G-Codes  Outcome Measure Options: AM-PAC 6 Clicks Basic Mobility (PT)      Arias Clark PT  7/8/2018

## 2018-07-09 LAB
ALBUMIN SERPL-MCNC: 2.6 G/DL (ref 3.5–5.2)
ANION GAP SERPL CALCULATED.3IONS-SCNC: 10.2 MMOL/L
BASOPHILS # BLD AUTO: 0.02 10*3/MM3 (ref 0–0.2)
BASOPHILS NFR BLD AUTO: 0.3 % (ref 0–1.5)
BUN BLD-MCNC: 16 MG/DL (ref 6–20)
BUN/CREAT SERPL: 8.7 (ref 7–25)
CALCIUM SPEC-SCNC: 8.2 MG/DL (ref 8.6–10.5)
CHLORIDE SERPL-SCNC: 101 MMOL/L (ref 98–107)
CO2 SERPL-SCNC: 25.8 MMOL/L (ref 22–29)
CREAT BLD-MCNC: 1.84 MG/DL (ref 0.57–1)
DEPRECATED RDW RBC AUTO: 49 FL (ref 37–54)
EOSINOPHIL # BLD AUTO: 1.02 10*3/MM3 (ref 0–0.7)
EOSINOPHIL NFR BLD AUTO: 13.9 % (ref 0.3–6.2)
ERYTHROCYTE [DISTWIDTH] IN BLOOD BY AUTOMATED COUNT: 15.2 % (ref 11.7–13)
GFR SERPL CREATININE-BSD FRML MDRD: 29 ML/MIN/1.73
GLUCOSE BLD-MCNC: 101 MG/DL (ref 65–99)
HCT VFR BLD AUTO: 28 % (ref 35.6–45.5)
HGB BLD-MCNC: 8.9 G/DL (ref 11.9–15.5)
IMM GRANULOCYTES # BLD: 0.04 10*3/MM3 (ref 0–0.03)
IMM GRANULOCYTES NFR BLD: 0.5 % (ref 0–0.5)
LYMPHOCYTES # BLD AUTO: 1.93 10*3/MM3 (ref 0.9–4.8)
LYMPHOCYTES NFR BLD AUTO: 26.3 % (ref 19.6–45.3)
MAGNESIUM SERPL-MCNC: 1.7 MG/DL (ref 1.6–2.6)
MCH RBC QN AUTO: 27.8 PG (ref 26.9–32)
MCHC RBC AUTO-ENTMCNC: 31.8 G/DL (ref 32.4–36.3)
MCV RBC AUTO: 87.5 FL (ref 80.5–98.2)
MONOCYTES # BLD AUTO: 0.93 10*3/MM3 (ref 0.2–1.2)
MONOCYTES NFR BLD AUTO: 12.7 % (ref 5–12)
NEUTROPHILS # BLD AUTO: 3.4 10*3/MM3 (ref 1.9–8.1)
NEUTROPHILS NFR BLD AUTO: 46.3 % (ref 42.7–76)
PHOSPHATE SERPL-MCNC: 2.4 MG/DL (ref 2.5–4.5)
PLATELET # BLD AUTO: 332 10*3/MM3 (ref 140–500)
PMV BLD AUTO: 9.1 FL (ref 6–12)
POTASSIUM BLD-SCNC: 4 MMOL/L (ref 3.5–5.2)
RBC # BLD AUTO: 3.2 10*6/MM3 (ref 3.9–5.2)
SODIUM BLD-SCNC: 137 MMOL/L (ref 136–145)
URATE SERPL-MCNC: 4 MG/DL (ref 2.4–5.7)
WBC NRBC COR # BLD: 7.34 10*3/MM3 (ref 4.5–10.7)

## 2018-07-09 PROCEDURE — 80069 RENAL FUNCTION PANEL: CPT | Performed by: INTERNAL MEDICINE

## 2018-07-09 PROCEDURE — 83735 ASSAY OF MAGNESIUM: CPT | Performed by: INTERNAL MEDICINE

## 2018-07-09 PROCEDURE — 25010000002 ERTAPENEM PER 500 MG: Performed by: SURGERY

## 2018-07-09 PROCEDURE — 84550 ASSAY OF BLOOD/URIC ACID: CPT | Performed by: INTERNAL MEDICINE

## 2018-07-09 PROCEDURE — 94799 UNLISTED PULMONARY SVC/PX: CPT

## 2018-07-09 PROCEDURE — 99024 POSTOP FOLLOW-UP VISIT: CPT | Performed by: SURGERY

## 2018-07-09 PROCEDURE — 85025 COMPLETE CBC W/AUTO DIFF WBC: CPT | Performed by: SURGERY

## 2018-07-09 PROCEDURE — 25010000002 ENOXAPARIN PER 10 MG: Performed by: SURGERY

## 2018-07-09 PROCEDURE — 97110 THERAPEUTIC EXERCISES: CPT

## 2018-07-09 PROCEDURE — 25010000002 ONDANSETRON PER 1 MG: Performed by: SURGERY

## 2018-07-09 RX ADMIN — METRONIDAZOLE 500 MG: 500 INJECTION, SOLUTION INTRAVENOUS at 21:19

## 2018-07-09 RX ADMIN — OXYCODONE HYDROCHLORIDE AND ACETAMINOPHEN 2 TABLET: 5; 325 TABLET ORAL at 09:33

## 2018-07-09 RX ADMIN — OXYCODONE HYDROCHLORIDE AND ACETAMINOPHEN 2 TABLET: 5; 325 TABLET ORAL at 15:22

## 2018-07-09 RX ADMIN — METRONIDAZOLE 500 MG: 500 INJECTION, SOLUTION INTRAVENOUS at 10:40

## 2018-07-09 RX ADMIN — BUDESONIDE AND FORMOTEROL FUMARATE DIHYDRATE 2 PUFF: 160; 4.5 AEROSOL RESPIRATORY (INHALATION) at 20:04

## 2018-07-09 RX ADMIN — ENOXAPARIN SODIUM 30 MG: 40 INJECTION SUBCUTANEOUS at 09:34

## 2018-07-09 RX ADMIN — ROSUVASTATIN CALCIUM 10 MG: 10 TABLET, FILM COATED ORAL at 09:34

## 2018-07-09 RX ADMIN — LEVOTHYROXINE SODIUM 125 MCG: 125 TABLET ORAL at 09:34

## 2018-07-09 RX ADMIN — SODIUM BICARBONATE 1300 MG: 650 TABLET ORAL at 21:24

## 2018-07-09 RX ADMIN — ONDANSETRON 4 MG: 2 INJECTION INTRAMUSCULAR; INTRAVENOUS at 09:33

## 2018-07-09 RX ADMIN — SODIUM BICARBONATE 1300 MG: 650 TABLET ORAL at 15:22

## 2018-07-09 RX ADMIN — ONDANSETRON 4 MG: 2 INJECTION INTRAMUSCULAR; INTRAVENOUS at 15:22

## 2018-07-09 RX ADMIN — SODIUM BICARBONATE 1300 MG: 650 TABLET ORAL at 09:34

## 2018-07-09 RX ADMIN — METRONIDAZOLE 500 MG: 500 INJECTION, SOLUTION INTRAVENOUS at 02:56

## 2018-07-09 RX ADMIN — BUDESONIDE AND FORMOTEROL FUMARATE DIHYDRATE 2 PUFF: 160; 4.5 AEROSOL RESPIRATORY (INHALATION) at 09:07

## 2018-07-09 RX ADMIN — FEBUXOSTAT 40 MG: 40 TABLET ORAL at 09:34

## 2018-07-09 RX ADMIN — SODIUM CHLORIDE 500 MG: 9 INJECTION, SOLUTION INTRAVENOUS at 11:49

## 2018-07-09 NOTE — PLAN OF CARE
Problem: Patient Care Overview  Goal: Plan of Care Review   07/09/18 1516   Coping/Psychosocial   Plan of Care Reviewed With patient   Plan of Care Review   Progress improving   OTHER   Outcome Summary Pt demonstrates excellent progress w/mobility. States she has already ambulated several times prior to PT session; demonstrates independence with transfers and ambulation today. No LOB or safety concerns noted. Pt has met all long term acute PT goals; no further PT concerns. Encouraged pt to continue ambulating independently or with family during remainder of stay; pt verbalizes understanding of walking recommendations. Will sign off.

## 2018-07-09 NOTE — PROGRESS NOTES
Chief Complaint: POD # 8    Subjective   Pt reports tolerating diet, ambulating around nursing station     Objective     Vital signs in last 24 hours:  Temp:  [97.3 °F (36.3 °C)-98.6 °F (37 °C)] 98.6 °F (37 °C)  Heart Rate:  [70-86] 86  Resp:  [16-18] 18  BP: (116-128)/(83-89) 116/86    Intake/Output last 3 shifts:  I/O last 3 completed shifts:  In: 1460 [P.O.:1260; IV Piggyback:200]  Out: 4640 [Urine:3250; Drains:415; Stool:975]    Intake/Output this shift:  I/O this shift:  In: -   Out: 1650 [Urine:1300; Drains:150; Stool:200]    Physical Exam:  Respiratory: CTA, good inspiratory effort  CV: RRR  Abd: + BS, soft, ND, usual post-op tenderness, no rebound, no guarding, incision C/D/I with staples, MARILYN serous, ileostomy functioning and pink     Results from last 7 days  Lab Units 07/09/18  0543   WBC 10*3/mm3 7.34   HEMOGLOBIN g/dL 8.9*   HEMATOCRIT % 28.0*   PLATELETS 10*3/mm3 332       Results from last 7 days  Lab Units 07/09/18  0543   SODIUM mmol/L 137   POTASSIUM mmol/L 4.0   CHLORIDE mmol/L 101   CO2 mmol/L 25.8   BUN mg/dL 16   CREATININE mg/dL 1.84*   GLUCOSE mg/dL 101*   CALCIUM mg/dL 8.2*     Assessment/Plan   S/P Sigmoid colon stricture/rupture resection with anastomosis         Diverting loop ileostomy   Advance diet to reg  May take a shower   Continue antibotics    Bee Carreon MD

## 2018-07-09 NOTE — PROGRESS NOTES
"   LOS: 8 days    Patient Care Team:  Pino Templeton MD as PCP - General  Pino Templetno MD as PCP - Family Medicine  Liliya Giraldo MD as Consulting Physician (Rheumatology)  Moisés Corado MD as Consulting Physician (Nephrology)  Grey Dia MD as Consulting Physician (Pulmonary Disease)    Chief Complaint:  No chief complaint on file.    Follow up Arianna on CKD IV, hyperkalemia  Subjective     Interval History:   The patient is feeling better, no chest pain or shortness of air, no nausea or vomiting, abdominal discomfort improved, has not taken any IV narcotics.  Her edema has improved.  No dysuria or gross hematuria.      Review of Systems:   As noted above.      Objective     Vital Signs  Temp:  [97.3 °F (36.3 °C)-99 °F (37.2 °C)] 98.3 °F (36.8 °C)  Heart Rate:  [70-87] 83  Resp:  [16-18] 18  BP: (115-128)/(83-89) 122/89    Flowsheet Rows      First Filed Value   Admission Height  170 cm (66.93\") Documented at 07/01/2018 1138   Admission Weight  47.7 kg (105 lb 1.6 oz) Documented at 07/01/2018 1138          I/O this shift:  In: -   Out: 1000 [Urine:900; Drains:100]  I/O last 3 completed shifts:  In: 1460 [P.O.:1260; IV Piggyback:200]  Out: 4640 [Urine:3250; Drains:415; Stool:975]    Intake/Output Summary (Last 24 hours) at 07/09/18 0907  Last data filed at 07/09/18 0803   Gross per 24 hour   Intake             1460 ml   Output             4540 ml   Net            -3080 ml       Physical Exam:  General Appearance: alert, oriented x 3, no acute distress,   Skin: warm and dry  HEENT: Nonicteric sclerae, oral mucosa normal,   Neck: supple, no JVD, trachea midline  Lungs: CTA, unlabored breathing effort  Heart: RRR, normal S1 and S2, no S3, no rub  Abdomen: soft, functional ileostomy,  present bowel sounds to auscultation  : no palpable bladder,  Extremities: Trace  ankle edema, no cyanosis or clubbing  Neuro: normal speech and mental status         Results Review:      Results from last 7 " days  Lab Units 07/09/18  0543 07/08/18  0510 07/07/18  0532  07/05/18  0624 07/04/18  0526  07/03/18  0657   SODIUM mmol/L 137 136 136  < > 140 137  < > 138  137   POTASSIUM mmol/L 4.0 3.8 4.0  < > 4.6 4.5  < > 5.9*  5.7*   CHLORIDE mmol/L 101 100 105  < > 104 106  < > 107  105   CO2 mmol/L 25.8 24.2 15.6*  < > 14.2* 21.0*  < > 20.3*  22.2   BUN mg/dL 16 17 21*  < > 30* 29*  < > 27*  29*   CREATININE mg/dL 1.84* 2.01* 1.84*  < > 2.25* 2.03*  < > 2.44*  2.20*   CALCIUM mg/dL 8.2* 8.5* 8.0*  < > 8.6 8.5*  < > 9.0  8.3*   BILIRUBIN mg/dL  --   --   --   --  0.3 0.2  --  0.2   ALK PHOS U/L  --   --   --   --  52 44  --  37*   ALT (SGPT) U/L  --   --   --   --  7 5  --  8   AST (SGOT) U/L  --   --   --   --  17 13  --  14   GLUCOSE mg/dL 101* 104* 80  < > 50* 75  < > 87  91   < > = values in this interval not displayed.    Estimated Creatinine Clearance: 26.3 mL/min (A) (by C-G formula based on SCr of 1.84 mg/dL (H)).      Results from last 7 days  Lab Units 07/09/18  0543 07/08/18  0510 07/07/18  0532   MAGNESIUM mg/dL 1.7 1.7 1.9   PHOSPHORUS mg/dL 2.4* 2.6 2.9         Results from last 7 days  Lab Units 07/09/18  0543 07/08/18  0510 07/07/18  0532 07/06/18  0641 07/03/18  0657   URIC ACID mg/dL 4.0 4.6 4.5 5.1 3.1         Results from last 7 days  Lab Units 07/09/18  0543 07/08/18  0510 07/07/18  0532 07/06/18  0641 07/05/18  0624   WBC 10*3/mm3 7.34 8.48 8.74 9.16 18.41*   HEMOGLOBIN g/dL 8.9* 9.6* 8.5* 8.2* 10.2*   PLATELETS 10*3/mm3 332 348 374 344 426               Imaging Results (last 24 hours)     ** No results found for the last 24 hours. **          budesonide-formoterol 2 puff Inhalation BID   enoxaparin 30 mg Subcutaneous Daily   ertapenem 500 mg Intravenous Q24H   febuxostat 40 mg Oral Daily   levothyroxine 125 mcg Oral Daily   metroNIDAZOLE 500 mg Intravenous Q8H   rosuvastatin 10 mg Oral Daily   sodium bicarbonate 1,300 mg Oral TID          Medication Review:   Current Facility-Administered  Medications   Medication Dose Route Frequency Provider Last Rate Last Dose   • acetaminophen (TYLENOL) 160 MG/5ML solution 650 mg  650 mg Oral Q4H PRN Bee Carreon MD        Or   • acetaminophen (TYLENOL) suppository 650 mg  650 mg Rectal Q4H PRN Bee Carreon MD       • albuterol (PROVENTIL HFA;VENTOLIN HFA) inhaler 2 puff  2 puff Inhalation Q4H PRN Bee Carroen MD       • budesonide-formoterol (SYMBICORT) 160-4.5 MCG/ACT inhaler 2 puff  2 puff Inhalation BID Bee Carreon MD   2 puff at 07/08/18 1949   • enoxaparin (LOVENOX) syringe 30 mg  30 mg Subcutaneous Daily Bee Carreon MD   30 mg at 07/08/18 0942   • ertapenem (INVanz) 500 mg in sodium chloride 0.9 % 50 mL IVPB  500 mg Intravenous Q24H Bee Carreon  mL/hr at 07/08/18 1137 500 mg at 07/08/18 1137   • febuxostat (ULORIC) tablet 40 mg  40 mg Oral Daily Bee Carreon MD   40 mg at 07/08/18 0942   • HYDROmorphone (DILAUDID) injection 0.5 mg  0.5 mg Intravenous Q2H PRN Bee Carreon MD   0.5 mg at 07/06/18 1835   • HYDROmorphone (DILAUDID) injection 0.5 mg  0.5 mg Intravenous Q2H PRN Bee Carreon MD   0.5 mg at 07/01/18 1935    And   • naloxone (NARCAN) injection 0.1 mg  0.1 mg Intravenous Q5 Min PRN Bee Carreon MD       • levothyroxine (SYNTHROID, LEVOTHROID) tablet 125 mcg  125 mcg Oral Daily Bee Carreon MD   125 mcg at 07/08/18 0942   • metroNIDAZOLE (FLAGYL) IVPB 500 mg  500 mg Intravenous Q8H Bee Carreon MD   500 mg at 07/09/18 0256   • morphine injection 2 mg  2 mg Intravenous Q2H PRN Bee Carreon MD       • ondansetron (ZOFRAN) tablet 4 mg  4 mg Oral Q6H PRN Bee Carreon MD        Or   • ondansetron ODT (ZOFRAN-ODT) disintegrating tablet 4 mg  4 mg Oral Q6H PRN Bee Carreon MD        Or   • ondansetron (ZOFRAN) injection 4 mg  4 mg Intravenous Q6H PRN Bee Carreon MD   4 mg at 07/08/18 2043   • ondansetron (ZOFRAN) tablet 4 mg  4 mg Oral Q6H PRN Bee BRADY  MD Velma        Or   • ondansetron ODT (ZOFRAN-ODT) disintegrating tablet 4 mg  4 mg Oral Q6H PRN Bee Carreon MD        Or   • ondansetron (ZOFRAN) injection 4 mg  4 mg Intravenous Q6H PRN Bee Carreon MD   4 mg at 07/05/18 0601   • ondansetron ODT (ZOFRAN-ODT) disintegrating tablet 4 mg  4 mg Oral Q8H PRN Bee Carreon MD       • oxyCODONE-acetaminophen (PERCOCET) 5-325 MG per tablet 2 tablet  2 tablet Oral Q4H PRN Bee Carreon MD   2 tablet at 07/08/18 2308   • promethazine (PHENERGAN) tablet 25 mg  25 mg Oral Q8H PRN Bee Carreon MD   25 mg at 07/04/18 0717   • rosuvastatin (CRESTOR) tablet 10 mg  10 mg Oral Daily Bee Carreon MD   10 mg at 07/08/18 0942   • sodium bicarbonate tablet 1,300 mg  1,300 mg Oral TID Ryder Santoyo MD   1,300 mg at 07/08/18 2144   • sodium chloride 0.9 % flush 1-10 mL  1-10 mL Intravenous PRN Bee Carreon MD           Assessment/Plan      1.  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  Multifactorial in etiology , most likely due to volume depletion.  Chronic kidney disease stage IV status associated with Wegener's granulomatosis with renal involvement has been in remission with stable renal function . Creatinine Today is 1.84 ,  practically at baseline  2. Recent C. diff  3. Recurrent diverticulitis sp lap resection 5/1/18. Colonic stricture sp dilation with subsequent perforation requiring ileostomy 7/1.   4. Hypothyroid on replacement.    5. Anemia, the hemoglobin today is  8.9  6. Protein calorie malnutrition.  Started the oral intake and she is tolerating her diet.   7.  Hyperkalemia resolved  8.  Metabolic acidosis, treated  9.  Fluid excess, resolved      Plan:  1.  To continue the same treatment  2.  No diuresis at this time  3.  Surveillance labs        Ryder Santoyo MD  07/09/18  9:07 AM

## 2018-07-09 NOTE — NURSING NOTE
CWOCN checked on patient who has company. Patient reports no problems with pouch. Will plan to change with her tomorrow.

## 2018-07-09 NOTE — PLAN OF CARE
Problem: Patient Care Overview  Goal: Plan of Care Review  Outcome: Outcome(s) achieved Date Met: 07/09/18 07/09/18 8552   Coping/Psychosocial   Plan of Care Reviewed With patient   Plan of Care Review   Progress improving   OTHER   Outcome Summary PO pain med and IV zofran given 2x today; MARILYN drain moderate output of serous fluid; illeostomy in place- draining green brown fluid with sediment; Pt on IV abx; tolerating full liquid diet; working with PT; VSS     Goal: Individualization and Mutuality  Outcome: Ongoing (interventions implemented as appropriate)    Goal: Discharge Needs Assessment  Outcome: Ongoing (interventions implemented as appropriate)    Goal: Interprofessional Rounds/Family Conf  Outcome: Ongoing (interventions implemented as appropriate)      Problem: Pain, Acute (Adult)  Goal: Acceptable Pain Control/Comfort Level  Outcome: Ongoing (interventions implemented as appropriate)      Problem: Fall Risk (Adult)  Goal: Absence of Fall  Outcome: Ongoing (interventions implemented as appropriate)      Problem: Ileostomy (Adult)  Goal: Signs and Symptoms of Listed Potential Problems Will be Absent, Minimized or Managed (Ileostomy)  Outcome: Ongoing (interventions implemented as appropriate)

## 2018-07-09 NOTE — THERAPY DISCHARGE NOTE
Acute Care - Physical Therapy Treatment Note/Discharge  [facility]     Patient Name: Sun Rodriguez  : 1965  MRN: [id number]  Today's Date: 2018  Onset of Illness/Injury or Date of Surgery: 18  Date of Referral to PT: 18  Referring Physician: Bee Lou    Admit Date: 2018    Visit Dx:    ICD-10-CM ICD-9-CM   1. Muscle weakness (generalized) M62.81 728.87   2. Acute abdomen R10.0 789.00     Patient Active Problem List   Diagnosis   • Hypertension   • CKD (chronic kidney disease) stage 4, GFR 15-29 ml/min (CMS/HCC)   • Dizziness   • Underweight BMI 17.6   • Acute sigmoid diverticulitis   • Sepsis present on admission   • History of colonic diverticulitis   • Colitis, acute   • Colonic stricture   • Acute abdomen       Physical Therapy Education     Title: PT OT SLP Therapies (Resolved)     Topic: Physical Therapy (Resolved)     Point: Mobility training (Resolved)    Learning Progress Summary     Learner Status Readiness Method Response Comment Documented by    Patient Done DEVYN Nunez reviewed activity recommendations and encouraged pt to continue ambulating several times per day while inpatient  18 1514     Done Guillermo PRUITT   18 1038     Done KATYA Lyn NR  MS 18 0935          Point: Home exercise program (Resolved)    Learning Progress Summary     Learner Status Readiness Method Response Comment Documented by    Patient Done Guillermo PRUITT   18 1038     Done Acceptance KATYA COSBY NR  MS 18 0935          Point: Body mechanics (Resolved)    Learning Progress Summary     Learner Status Readiness Method Response Comment Documented by    Patient Done Guillermo PRUITT   18 1038     Done Acceptance KATYA COSBY NR  MS 18 0935          Point: Precautions (Resolved)    Learning Progress Summary     Learner Status Readiness Method Response Comment Documented by    Patient Done Guillermo PRUITT   18 1038     Done KATYA Lyn NR  MS 18  0935                      User Key     Initials Effective Dates Name Provider Type Discipline    EE 04/03/18 -  Dayana Escobar, PT Physical Therapist PT    MS 04/03/18 -  Luis Brown, PT Physical Therapist PT    MP 06/22/16 -  Arias Clark, PT Physical Therapist PT                    PT Rehab Goals     Row Name 07/09/18 1500             Transfer Goal 1 (PT)    Progress/Outcome (Transfer Goal 1, PT) goal met  -EE         Gait Training Goal 1 (PT)    Progress/Outcome (Gait Training Goal 1, PT) goal partially met   only amb partial distance-had already amb multiple times   -EE        User Key  (r) = Recorded By, (t) = Taken By, (c) = Cosigned By    Initials Name Provider Type Discipline    EE Dayana Escobar, PT Physical Therapist PT        Therapy Treatment        Rehabilitation Treatment Summary     Row Name 07/09/18 1505             Treatment Time/Intention    Discipline physical therapist  -EE      Document Type therapy note (daily note);discharge treatment  -EE      Subjective Information complains of;pain  -EE      Mode of Treatment physical therapy  -EE      Patient/Family Observations Sitting up in chair in no acute distress  -EE      Patient Effort excellent  -EE      Existing Precautions/Restrictions no known precautions/restrictions  -EE      Recorded by [EE] Dayana Escobar, PT 07/09/18 1514      Row Name 07/09/18 1505             Cognitive Assessment/Intervention- PT/OT    Orientation Status (Cognition) oriented x 4  -EE      Follows Commands (Cognition) WNL  -EE      Recorded by [EE] Dayana Escobar, PT 07/09/18 1514      Row Name 07/09/18 1505             Sit-Stand Transfer    Sit-Stand [place] (Transfers) independent  -EE      Recorded by [EE] Dayana Escobar, PT 07/09/18 1514      Row Name 07/09/18 1505             Stand-Sit Transfer    Stand-Sit [place] (Transfers) independent  -EE      Recorded by [EE] Dayana Escobar, PT 07/09/18 1514      Row Name 07/09/18 1505             Gait/Stairs Assessment/Training     [place] Level (Gait) independent  -EE      Distance in Feet (Gait) 200  -EE      Deviations/Abnormal Patterns (Gait) david decreased  -EE      Comment (Gait/Stairs) no LOB or safety concerns; further distance deferred due to pain  -EE      Recorded by [EE] Dayana Shawn, PT 07/09/18 1514      Row Name 07/09/18 1505             Positioning and Restraints    Pre-Treatment Position sitting in chair/recliner  -EE      Post Treatment Position chair  -EE      In Chair sitting;call light within reach;encouraged to call for assist;with family/caregiver  -EE      Recorded by [EE] Dayana Shawn, PT 07/09/18 1514      Row Name 07/09/18 1505             Pain Scale: Numbers Pre/Post-Treatment    Pain Scale: Numbers, Pretreatment 4/10  -EE      Pain Location - Side Left  -EE      Pain Location abdomen   drain site  -EE      Pain Intervention(s) Repositioned;Ambulation/increased activity;Medication (See MAR)  -EE      Recorded by [EE] Dayana Escobar, PT 07/09/18 1514      Row Name                Wound 07/02/18 0232 Other (See comments) abdomen incision    Wound - Properties Group Date first assessed: 07/02/18 [KM] Time first assessed: 0232 [KM] Side: Other (See comments) [KM] Location: abdomen [KM] Type: incision [KM] Recorded by:  [KM] Elizabet Antoine RN 07/02/18 0232    Row Name 07/09/18 1505             Outcome Summary/Treatment Plan (PT)    Anticipated Discharge Disposition (PT) home;home with assist  -EE      Recorded by [EE] Dayana Escobar, PT 07/09/18 1514        User Key  (r) = Recorded By, (t) = Taken By, (c) = Cosigned By    Initials Name Effective Dates Discipline    EE Dayana Escobar, PT 04/03/18 -  PT    KM Elizabet Antoine RN 06/16/16 -  Nurse        Wound 07/02/18 0232 Other (See comments) abdomen incision (Active)   Dressing Appearance dry;intact;open to air 7/9/2018  2:45 PM   Closure [place] 7/9/2018  2:45 PM   Periwound intact;dry 7/9/2018  2:51 AM   Periwound Temperature warm 7/9/2018  2:51 AM   Periwound  Skin Turgor soft 7/9/2018  2:51 AM   Drainage Amount none 7/8/2018  4:28 PM   Dressing Care, Wound open to air 7/9/2018  9:43 AM       PT Recommendation and Plan  Anticipated Discharge Disposition (PT): home  Outcome Summary/Treatment Plan (PT)  Anticipated Discharge Disposition (PT): home  Plan of Care Reviewed With: patient  Progress: improving  Outcome Summary: Pt demonstrates excellent progress w/mobility. States she has already ambulated several times prior to PT session; demonstrates independence with transfers and ambulation today. No LOB or safety concerns noted. Pt has met all long term acute PT goals; no further PT concerns. Encouraged pt to continue ambulating independently or with family during remainder of stay; pt verbalizes understanding of walking recommendations. Will sign off.           Outcome Measures     Row Name 07/09/18 1500 07/08/18 1000 07/07/18 0900       How much help from another person do you currently need...    Turning from your back to your side while in flat bed without using bedrails? 4  -EE 4  -MP 4  -CS    Moving from lying on back to sitting on the side of a flat bed without bedrails? 4  -EE 4  -MP 4  -CS    Moving to and from a bed to a chair (including a wheelchair)? 4  -EE 3  -MP 3  -CS    Standing up from a chair using your arms (e.g., wheelchair, bedside chair)? 4  -EE 4  -MP 3  -CS    Climbing 3-5 steps with a railing? 3  -EE 3  -MP 3  -CS    To walk in hospital room? 4  -EE 3  -MP 3  -CS    AM-PAC 6 Clicks Score 23  -EE 21  -MP 20  -CS       Functional Assessment    Outcome Measure Options AM-PAC 6 Clicks Basic Mobility (PT)  -EE AM-PAC 6 Clicks Basic Mobility (PT)  -MP AM-PAC 6 Clicks Basic Mobility (PT)  -CS      User Key  (r) = Recorded By, (t) = Taken By, (c) = Cosigned By    Initials Name Provider Type    EE Dayana Escobar, PT Physical Therapist    BRETT Clark, PT Physical Therapist    AMELIA Seaman, PT Physical Therapist           Time Calculation:         PT  Charges     Row Name 07/09/18 1517             Time Calculation    Start Time 1505  -EE      Stop Time 1513  -EE      Time Calculation (min) 8 min  -EE      PT Received On 07/09/18  -EE         Time Calculation- PT    Total Timed Code Minutes- PT 8 minute(s)  -EE        User Key  (r) = Recorded By, (t) = Taken By, (c) = Cosigned By    Initials Name Provider Type    EE Dayana Escobar PT Physical Therapist        Therapy Suggested Charges     Code   Minutes Charges    None             Therapy Charges for Today     Code Description Service Date Service Provider Modifiers Qty    [id number] HC PT THER PROC EA 15 MIN 7/9/2018 Dayana Escobar, PT GP 1          PT G-Codes  Outcome Measure Options: AM-PAC 6 Clicks Basic Mobility (PT)    PT Discharge Summary  Anticipated Discharge Disposition (PT): home  Reason for Discharge: All goals achieved, Independent  Outcomes Achieved: Able to achieve all goals within established timeline    Dayana Escobar, PT  7/9/2018

## 2018-07-09 NOTE — PROGRESS NOTES
Continued Stay Note  [facility]     Patient Name: Sun Rodriguez  MRN: [id number]  Today's Date: 7/9/2018    Admit Date: 7/1/2018          Discharge Plan     Row Name 07/09/18 1151       Plan    Plan Return home with spouse and [facility] following    Patient/Family in Agreement with Plan yes    Plan Comments Patient up in chair.  Plan remains for her to return home at [place] with [facility] following.  CCP will follow as needed.  BH[place] RN Becky S. [place], RN

## 2018-07-10 LAB
ALBUMIN SERPL-MCNC: 3 G/DL (ref 3.5–5.2)
ANION GAP SERPL CALCULATED.3IONS-SCNC: 11.4 MMOL/L
BUN BLD-MCNC: 16 MG/DL (ref 6–20)
BUN/CREAT SERPL: 8.6 (ref 7–25)
CALCIUM SPEC-SCNC: 8.6 MG/DL (ref 8.6–10.5)
CHLORIDE SERPL-SCNC: 99 MMOL/L (ref 98–107)
CO2 SERPL-SCNC: 27.6 MMOL/L (ref 22–29)
CREAT BLD-MCNC: 1.85 MG/DL (ref 0.57–1)
GFR SERPL CREATININE-BSD FRML MDRD: 29 ML/MIN/1.73
GLUCOSE BLD-MCNC: 102 MG/DL (ref 65–99)
MAGNESIUM SERPL-MCNC: 1.7 MG/DL (ref 1.6–2.6)
PHOSPHATE SERPL-MCNC: 2.7 MG/DL (ref 2.5–4.5)
POTASSIUM BLD-SCNC: 4.5 MMOL/L (ref 3.5–5.2)
SODIUM BLD-SCNC: 138 MMOL/L (ref 136–145)
URATE SERPL-MCNC: 3.8 MG/DL (ref 2.4–5.7)

## 2018-07-10 PROCEDURE — 80069 RENAL FUNCTION PANEL: CPT | Performed by: INTERNAL MEDICINE

## 2018-07-10 PROCEDURE — 99024 POSTOP FOLLOW-UP VISIT: CPT | Performed by: SURGERY

## 2018-07-10 PROCEDURE — 94799 UNLISTED PULMONARY SVC/PX: CPT

## 2018-07-10 PROCEDURE — 25010000002 ENOXAPARIN PER 10 MG: Performed by: SURGERY

## 2018-07-10 PROCEDURE — 83735 ASSAY OF MAGNESIUM: CPT | Performed by: INTERNAL MEDICINE

## 2018-07-10 PROCEDURE — 84550 ASSAY OF BLOOD/URIC ACID: CPT | Performed by: INTERNAL MEDICINE

## 2018-07-10 PROCEDURE — 25010000002 ERTAPENEM PER 500 MG: Performed by: SURGERY

## 2018-07-10 RX ORDER — PSYLLIUM SEED (WITH DEXTROSE)
2 POWDER (GRAM) ORAL 3 TIMES DAILY
Status: DISCONTINUED | OUTPATIENT
Start: 2018-07-10 | End: 2018-07-11

## 2018-07-10 RX ORDER — SODIUM BICARBONATE 650 MG/1
1300 TABLET ORAL 2 TIMES DAILY
Status: DISCONTINUED | OUTPATIENT
Start: 2018-07-10 | End: 2018-07-11

## 2018-07-10 RX ADMIN — ENOXAPARIN SODIUM 30 MG: 40 INJECTION SUBCUTANEOUS at 09:52

## 2018-07-10 RX ADMIN — FEBUXOSTAT 40 MG: 40 TABLET ORAL at 09:52

## 2018-07-10 RX ADMIN — ROSUVASTATIN CALCIUM 10 MG: 10 TABLET, FILM COATED ORAL at 09:52

## 2018-07-10 RX ADMIN — METRONIDAZOLE 500 MG: 500 INJECTION, SOLUTION INTRAVENOUS at 03:45

## 2018-07-10 RX ADMIN — BUDESONIDE AND FORMOTEROL FUMARATE DIHYDRATE 2 PUFF: 160; 4.5 AEROSOL RESPIRATORY (INHALATION) at 19:24

## 2018-07-10 RX ADMIN — Medication 2 WAFER: at 17:39

## 2018-07-10 RX ADMIN — METRONIDAZOLE 500 MG: 500 INJECTION, SOLUTION INTRAVENOUS at 12:47

## 2018-07-10 RX ADMIN — LEVOTHYROXINE SODIUM 125 MCG: 125 TABLET ORAL at 09:52

## 2018-07-10 RX ADMIN — SODIUM CHLORIDE 500 MG: 9 INJECTION, SOLUTION INTRAVENOUS at 14:10

## 2018-07-10 RX ADMIN — BUDESONIDE AND FORMOTEROL FUMARATE DIHYDRATE 2 PUFF: 160; 4.5 AEROSOL RESPIRATORY (INHALATION) at 08:50

## 2018-07-10 RX ADMIN — METRONIDAZOLE 500 MG: 500 INJECTION, SOLUTION INTRAVENOUS at 17:39

## 2018-07-10 RX ADMIN — SODIUM BICARBONATE 1300 MG: 650 TABLET ORAL at 09:52

## 2018-07-10 RX ADMIN — Medication 2 WAFER: at 20:29

## 2018-07-10 RX ADMIN — SODIUM BICARBONATE 1300 MG: 650 TABLET ORAL at 20:29

## 2018-07-10 NOTE — PROGRESS NOTES
Chief Complaint: POD # 9    Subjective   Pt tolerating diet, ambulating, pain controlled  Objective     Vital signs in last 24 hours:  Temp:  [98.6 °F (37 °C)-98.7 °F (37.1 °C)] 98.7 °F (37.1 °C)  Heart Rate:  [] 100  Resp:  [18] 18  BP: (116-122)/(71-88) 122/83    Intake/Output last 3 shifts:  I/O last 3 completed shifts:  In: 620 [P.O.:420; IV Piggyback:200]  Out: 4590 [Urine:3300; Drains:365; Stool:925]    Intake/Output this shift:  I/O this shift:  In: -   Out: 795 [Urine:600; Drains:70; Stool:125]    Physical Exam:  Respiratory: CTA, good inspiratory effort  CV: RRR  Abd: +  BS, soft, ND, usual post-op tenderness, no rebound, no guarding, incision C/D/I, MARILYN drain serous (250cc), ileostomy functioning (925cc)     Assessment/Plan   S/P Sigmoid colon stricture/rupture resection with anastomosis         Diverting loop ileostomy   Tolerating regular diet   High ostomy output, may need fluid bolus  Will add metamucil   Continue IV Abx    Bee Carreon MD

## 2018-07-10 NOTE — PLAN OF CARE
Problem: Patient Care Overview  Goal: Plan of Care Review  Outcome: Ongoing (interventions implemented as appropriate)   07/10/18 1145   Coping/Psychosocial   Plan of Care Reviewed With patient   Plan of Care Review   Progress improving   OTHER   Outcome Summary CWOCN follow up. Patient emptied and changed ileostomy appliance today. She did very well and is getting more comfortable with it. Her stool is much thicker today. All of her questions answered- diet, adls, work, showering, emptying, changing. Additional supplies in room and she has given permission for me to have samples sent.       Problem: Ileostomy (Adult)  Goal: Signs and Symptoms of Listed Potential Problems Will be Absent, Minimized or Managed (Ileostomy)  Outcome: Ongoing (interventions implemented as appropriate)   07/10/18 1145   Goal/Outcome Evaluation   Problems Assessed (Ileostomy) high-output stoma;situational response;stomal complications   Problems Present (Ileostomy) none

## 2018-07-10 NOTE — PROGRESS NOTES
"   LOS: 9 days    Patient Care Team:  Pino Templeton MD as PCP - General  Pino Templeton MD as PCP - Family Medicine  Liliya Giraldo MD as Consulting Physician (Rheumatology)  Moisés Corado MD as Consulting Physician (Nephrology)  Grey Dia MD as Consulting Physician (Pulmonary Disease)    Chief Complaint:  No chief complaint on file.    Follow up Arianna on CKD IV, hyperkalemia  Subjective     Interval History:   Starting regular food this am.   A little hesitant.  Urinating.  Ambulating. Not soa. Weak.     Review of Systems:   See above.      Objective     Vital Signs  Temp:  [98.6 °F (37 °C)-98.7 °F (37.1 °C)] 98.7 °F (37.1 °C)  Heart Rate:  [] 126  Resp:  [18] 18  BP: (116-122)/(71-88) 122/83    Flowsheet Rows      First Filed Value   Admission Height  170 cm (66.93\") Documented at 07/01/2018 1138   Admission Weight  47.7 kg (105 lb 1.6 oz) Documented at 07/01/2018 1138          No intake/output data recorded.  I/O last 3 completed shifts:  In: 620 [P.O.:420; IV Piggyback:200]  Out: 4590 [Urine:3300; Drains:365; Stool:925]    Intake/Output Summary (Last 24 hours) at 07/10/18 1003  Last data filed at 07/10/18 0500   Gross per 24 hour   Intake                0 ml   Output             2875 ml   Net            -2875 ml       Physical Exam:  Cachectic. Sitting on side of bed.    Oral mucosa dry.  No JVD.  Heart RRR no s3 or rub. Lungs clear to auscultation .  Abd +BS,  MARILYN drain LLQ,  Ostomy RLQ.  Tender appropriately .  Ext no edema.  Skin dry.       Results Review:      Results from last 7 days  Lab Units 07/10/18  0656 07/09/18  0543 07/08/18  0510  07/05/18  0624 07/04/18  0526   SODIUM mmol/L 138 137 136  < > 140 137   POTASSIUM mmol/L 4.5 4.0 3.8  < > 4.6 4.5   CHLORIDE mmol/L 99 101 100  < > 104 106   CO2 mmol/L 27.6 25.8 24.2  < > 14.2* 21.0*   BUN mg/dL 16 16 17  < > 30* 29*   CREATININE mg/dL 1.85* 1.84* 2.01*  < > 2.25* 2.03*   CALCIUM mg/dL 8.6 8.2* 8.5*  < > 8.6 8.5* "   BILIRUBIN mg/dL  --   --   --   --  0.3 0.2   ALK PHOS U/L  --   --   --   --  52 44   ALT (SGPT) U/L  --   --   --   --  7 5   AST (SGOT) U/L  --   --   --   --  17 13   GLUCOSE mg/dL 102* 101* 104*  < > 50* 75   < > = values in this interval not displayed.    Estimated Creatinine Clearance: 25.5 mL/min (A) (by C-G formula based on SCr of 1.85 mg/dL (H)).      Results from last 7 days  Lab Units 07/10/18  0656 07/09/18  0543 07/08/18  0510   MAGNESIUM mg/dL 1.7 1.7 1.7   PHOSPHORUS mg/dL 2.7 2.4* 2.6         Results from last 7 days  Lab Units 07/10/18  0656 07/09/18  0543 07/08/18  0510 07/07/18  0532 07/06/18  0641   URIC ACID mg/dL 3.8 4.0 4.6 4.5 5.1         Results from last 7 days  Lab Units 07/09/18  0543 07/08/18  0510 07/07/18  0532 07/06/18  0641 07/05/18  0624   WBC 10*3/mm3 7.34 8.48 8.74 9.16 18.41*   HEMOGLOBIN g/dL 8.9* 9.6* 8.5* 8.2* 10.2*   PLATELETS 10*3/mm3 332 348 374 344 426               Imaging Results (last 24 hours)     ** No results found for the last 24 hours. **          budesonide-formoterol 2 puff Inhalation BID   enoxaparin 30 mg Subcutaneous Daily   ertapenem 500 mg Intravenous Q24H   febuxostat 40 mg Oral Daily   levothyroxine 125 mcg Oral Daily   metroNIDAZOLE 500 mg Intravenous Q8H   rosuvastatin 10 mg Oral Daily   sodium bicarbonate 1,300 mg Oral BID          Medication Review:   Current Facility-Administered Medications   Medication Dose Route Frequency Provider Last Rate Last Dose   • acetaminophen (TYLENOL) 160 MG/5ML solution 650 mg  650 mg Oral Q4H PRN Bee Carreon MD        Or   • acetaminophen (TYLENOL) suppository 650 mg  650 mg Rectal Q4H PRN Bee Carreon MD       • albuterol (PROVENTIL HFA;VENTOLIN HFA) inhaler 2 puff  2 puff Inhalation Q4H PRN Bee Carreon MD       • budesonide-formoterol (SYMBICORT) 160-4.5 MCG/ACT inhaler 2 puff  2 puff Inhalation BID Bee Carreon MD   2 puff at 07/10/18 0850   • enoxaparin (LOVENOX) syringe 30 mg  30 mg  Subcutaneous Daily Bee Carreon MD   30 mg at 07/10/18 0952   • ertapenem (INVanz) 500 mg in sodium chloride 0.9 % 50 mL IVPB  500 mg Intravenous Q24H Bee Carreon  mL/hr at 07/09/18 1149 500 mg at 07/09/18 1149   • febuxostat (ULORIC) tablet 40 mg  40 mg Oral Daily Bee Carreon MD   40 mg at 07/10/18 0952   • HYDROmorphone (DILAUDID) injection 0.5 mg  0.5 mg Intravenous Q2H PRN Bee Carreon MD   0.5 mg at 07/06/18 1835   • HYDROmorphone (DILAUDID) injection 0.5 mg  0.5 mg Intravenous Q2H PRN Bee Carreon MD   0.5 mg at 07/01/18 1935    And   • naloxone (NARCAN) injection 0.1 mg  0.1 mg Intravenous Q5 Min PRN Bee Carreon MD       • levothyroxine (SYNTHROID, LEVOTHROID) tablet 125 mcg  125 mcg Oral Daily Bee Carreon MD   125 mcg at 07/10/18 0952   • metroNIDAZOLE (FLAGYL) IVPB 500 mg  500 mg Intravenous Q8H Bee Carreon MD   500 mg at 07/10/18 0345   • morphine injection 2 mg  2 mg Intravenous Q2H PRN Bee Carreon MD       • ondansetron (ZOFRAN) tablet 4 mg  4 mg Oral Q6H PRN Bee Carreon MD        Or   • ondansetron ODT (ZOFRAN-ODT) disintegrating tablet 4 mg  4 mg Oral Q6H PRN Bee Carreon MD        Or   • ondansetron (ZOFRAN) injection 4 mg  4 mg Intravenous Q6H PRN Bee Carreon MD   4 mg at 07/09/18 1522   • ondansetron (ZOFRAN) tablet 4 mg  4 mg Oral Q6H PRN Bee Carreon MD        Or   • ondansetron ODT (ZOFRAN-ODT) disintegrating tablet 4 mg  4 mg Oral Q6H PRN Bee Carreon MD        Or   • ondansetron (ZOFRAN) injection 4 mg  4 mg Intravenous Q6H PRN Bee Carreon MD   4 mg at 07/05/18 0601   • ondansetron ODT (ZOFRAN-ODT) disintegrating tablet 4 mg  4 mg Oral Q8H PRN Bee Carreon MD       • oxyCODONE-acetaminophen (PERCOCET) 5-325 MG per tablet 2 tablet  2 tablet Oral Q4H PRN Bee Carreon MD   2 tablet at 07/09/18 1522   • promethazine (PHENERGAN) tablet 25 mg  25 mg Oral Q8H PRN Bee Carreon MD    25 mg at 07/04/18 0717   • rosuvastatin (CRESTOR) tablet 10 mg  10 mg Oral Daily Bee Carreon MD   10 mg at 07/10/18 0952   • sodium bicarbonate tablet 1,300 mg  1,300 mg Oral BID Laura Francis MD   1,300 mg at 07/10/18 0952   • sodium chloride 0.9 % flush 1-10 mL  1-10 mL Intravenous PRN Bee Carreon MD           Assessment/Plan      1.  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  Multifactorial in etiology , most likely due to volume depletion. Improved.  Chronic kidney disease stage IV status associated with Wegener's granulomatosis, now in remission.   Baseline crt usually 2. Reduce po bicarb as acidosis controlled.   2. Recent C. dif  3. Recurrent diverticulitis sp lap resection 5/1/18. Colonic stricture sp dilation with subsequent perforation requiring ileostomy 7/1.   4. Hypothyroid on replacement.    5. Anemia  6. Protein calorie malnutrition. Reiterated importance of nutrition supplement.         Laura Francis MD  07/10/18  10:03 AM

## 2018-07-11 LAB
ALBUMIN SERPL-MCNC: 3 G/DL (ref 3.5–5.2)
ALBUMIN/GLOB SERPL: 1.1 G/DL
ALP SERPL-CCNC: 58 U/L (ref 39–117)
ALT SERPL W P-5'-P-CCNC: 10 U/L (ref 1–33)
ANION GAP SERPL CALCULATED.3IONS-SCNC: 12.6 MMOL/L
AST SERPL-CCNC: 30 U/L (ref 1–32)
BILIRUB SERPL-MCNC: <0.2 MG/DL (ref 0.1–1.2)
BUN BLD-MCNC: 16 MG/DL (ref 6–20)
BUN/CREAT SERPL: 7.3 (ref 7–25)
CALCIUM SPEC-SCNC: 9.1 MG/DL (ref 8.6–10.5)
CHLORIDE SERPL-SCNC: 101 MMOL/L (ref 98–107)
CO2 SERPL-SCNC: 25.4 MMOL/L (ref 22–29)
CREAT BLD-MCNC: 2.19 MG/DL (ref 0.57–1)
DEPRECATED RDW RBC AUTO: 51.6 FL (ref 37–54)
ERYTHROCYTE [DISTWIDTH] IN BLOOD BY AUTOMATED COUNT: 16.4 % (ref 11.7–13)
GFR SERPL CREATININE-BSD FRML MDRD: 24 ML/MIN/1.73
GLOBULIN UR ELPH-MCNC: 2.8 GM/DL
GLUCOSE BLD-MCNC: 105 MG/DL (ref 65–99)
HCT VFR BLD AUTO: 29.7 % (ref 35.6–45.5)
HGB BLD-MCNC: 9.5 G/DL (ref 11.9–15.5)
MCH RBC QN AUTO: 28 PG (ref 26.9–32)
MCHC RBC AUTO-ENTMCNC: 32 G/DL (ref 32.4–36.3)
MCV RBC AUTO: 87.6 FL (ref 80.5–98.2)
PHOSPHATE SERPL-MCNC: 3 MG/DL (ref 2.5–4.5)
PLATELET # BLD AUTO: 493 10*3/MM3 (ref 140–500)
PMV BLD AUTO: 9 FL (ref 6–12)
POTASSIUM BLD-SCNC: 4.5 MMOL/L (ref 3.5–5.2)
PROT SERPL-MCNC: 5.8 G/DL (ref 6–8.5)
RBC # BLD AUTO: 3.39 10*6/MM3 (ref 3.9–5.2)
SODIUM BLD-SCNC: 139 MMOL/L (ref 136–145)
WBC NRBC COR # BLD: 8.34 10*3/MM3 (ref 4.5–10.7)

## 2018-07-11 PROCEDURE — 85027 COMPLETE CBC AUTOMATED: CPT | Performed by: INTERNAL MEDICINE

## 2018-07-11 PROCEDURE — 80053 COMPREHEN METABOLIC PANEL: CPT | Performed by: INTERNAL MEDICINE

## 2018-07-11 PROCEDURE — 99024 POSTOP FOLLOW-UP VISIT: CPT | Performed by: SURGERY

## 2018-07-11 PROCEDURE — 25010000002 ENOXAPARIN PER 10 MG: Performed by: SURGERY

## 2018-07-11 PROCEDURE — 94799 UNLISTED PULMONARY SVC/PX: CPT

## 2018-07-11 PROCEDURE — 25010000002 ONDANSETRON PER 1 MG: Performed by: SURGERY

## 2018-07-11 PROCEDURE — 25010000002 ERTAPENEM PER 500 MG: Performed by: SURGERY

## 2018-07-11 PROCEDURE — 84100 ASSAY OF PHOSPHORUS: CPT | Performed by: INTERNAL MEDICINE

## 2018-07-11 RX ORDER — ALBUTEROL SULFATE 2.5 MG/3ML
2.5 SOLUTION RESPIRATORY (INHALATION) EVERY 4 HOURS PRN
Status: DISCONTINUED | OUTPATIENT
Start: 2018-07-11 | End: 2018-07-13 | Stop reason: HOSPADM

## 2018-07-11 RX ORDER — SODIUM CHLORIDE 9 MG/ML
75 INJECTION, SOLUTION INTRAVENOUS CONTINUOUS
Status: DISCONTINUED | OUTPATIENT
Start: 2018-07-11 | End: 2018-07-12

## 2018-07-11 RX ORDER — PSYLLIUM SEED (WITH DEXTROSE)
2 POWDER (GRAM) ORAL 2 TIMES DAILY
Status: DISCONTINUED | OUTPATIENT
Start: 2018-07-11 | End: 2018-07-13 | Stop reason: HOSPADM

## 2018-07-11 RX ADMIN — LEVOTHYROXINE SODIUM 125 MCG: 125 TABLET ORAL at 09:43

## 2018-07-11 RX ADMIN — METRONIDAZOLE 500 MG: 500 INJECTION, SOLUTION INTRAVENOUS at 02:36

## 2018-07-11 RX ADMIN — BUDESONIDE AND FORMOTEROL FUMARATE DIHYDRATE 2 PUFF: 160; 4.5 AEROSOL RESPIRATORY (INHALATION) at 08:27

## 2018-07-11 RX ADMIN — METRONIDAZOLE 500 MG: 500 INJECTION, SOLUTION INTRAVENOUS at 11:43

## 2018-07-11 RX ADMIN — BUDESONIDE AND FORMOTEROL FUMARATE DIHYDRATE 2 PUFF: 160; 4.5 AEROSOL RESPIRATORY (INHALATION) at 20:29

## 2018-07-11 RX ADMIN — METRONIDAZOLE 500 MG: 500 INJECTION, SOLUTION INTRAVENOUS at 19:09

## 2018-07-11 RX ADMIN — Medication 2 WAFER: at 20:19

## 2018-07-11 RX ADMIN — Medication 2 WAFER: at 09:43

## 2018-07-11 RX ADMIN — SODIUM CHLORIDE 500 ML: 9 INJECTION, SOLUTION INTRAVENOUS at 15:28

## 2018-07-11 RX ADMIN — FEBUXOSTAT 40 MG: 40 TABLET ORAL at 09:43

## 2018-07-11 RX ADMIN — ONDANSETRON 4 MG: 2 INJECTION, SOLUTION INTRAMUSCULAR; INTRAVENOUS at 17:47

## 2018-07-11 RX ADMIN — OXYCODONE HYDROCHLORIDE AND ACETAMINOPHEN 2 TABLET: 5; 325 TABLET ORAL at 17:47

## 2018-07-11 RX ADMIN — ENOXAPARIN SODIUM 30 MG: 40 INJECTION SUBCUTANEOUS at 09:44

## 2018-07-11 RX ADMIN — SODIUM CHLORIDE 500 MG: 9 INJECTION, SOLUTION INTRAVENOUS at 13:20

## 2018-07-11 RX ADMIN — SODIUM CHLORIDE 75 ML/HR: 9 INJECTION, SOLUTION INTRAVENOUS at 15:28

## 2018-07-11 RX ADMIN — OXYCODONE HYDROCHLORIDE AND ACETAMINOPHEN 2 TABLET: 5; 325 TABLET ORAL at 12:00

## 2018-07-11 RX ADMIN — ONDANSETRON 4 MG: 2 INJECTION INTRAMUSCULAR; INTRAVENOUS at 11:52

## 2018-07-11 RX ADMIN — SODIUM BICARBONATE 1300 MG: 650 TABLET ORAL at 09:43

## 2018-07-11 RX ADMIN — ROSUVASTATIN CALCIUM 10 MG: 10 TABLET, FILM COATED ORAL at 09:43

## 2018-07-11 NOTE — PLAN OF CARE
Problem: Patient Care Overview  Goal: Plan of Care Review  Outcome: Ongoing (interventions implemented as appropriate)   07/11/18 0539   Coping/Psychosocial   Plan of Care Reviewed With patient   Plan of Care Review   Progress improving   OTHER   Outcome Summary No complaints this shift. Up ad andrew. Taking care of ostomy herself. IV antibiotics continued per order. MARILYN with serous drainage. VSS, will continue to monitor.        Problem: Pain, Acute (Adult)  Goal: Acceptable Pain Control/Comfort Level  Outcome: Ongoing (interventions implemented as appropriate)      Problem: Fall Risk (Adult)  Goal: Absence of Fall  Outcome: Ongoing (interventions implemented as appropriate)      Problem: Ileostomy (Adult)  Goal: Signs and Symptoms of Listed Potential Problems Will be Absent, Minimized or Managed (Ileostomy)  Outcome: Ongoing (interventions implemented as appropriate)

## 2018-07-11 NOTE — PLAN OF CARE
Problem: Patient Care Overview  Goal: Plan of Care Review  Outcome: Ongoing (interventions implemented as appropriate)   07/11/18 1401   Coping/Psychosocial   Plan of Care Reviewed With patient   OTHER   Outcome Summary CWOCN- patient emptying appliance fine and current pouch is intact from changing it yesterday. She stated that she does not feel as well as yesterday though after taking the fiber bars/metamucil. She states that she has not had much output either. She feels full. I encouraged her to drink water as with metamucil you do need to increase your fluid intake. She verbalized understanding. Will need to find a balance between liquid and thicker stool.        Problem: Ileostomy (Adult)  Goal: Signs and Symptoms of Listed Potential Problems Will be Absent, Minimized or Managed (Ileostomy)  Outcome: Ongoing (interventions implemented as appropriate)   07/11/18 1403   Goal/Outcome Evaluation   Problems Assessed (Ileostomy) postoperative nausea and vomiting;stomal complications;situational response   Problems Present (Ileostomy) stomal complications

## 2018-07-11 NOTE — PROGRESS NOTES
"   LOS: 10 days    Patient Care Team:  Pino Templeton MD as PCP - General  Pino Templeton MD as PCP - Family Medicine  Liliya Giraldo MD as Consulting Physician (Rheumatology)  oMisés Corado MD as Consulting Physician (Nephrology)  Grey Dia MD as Consulting Physician (Pulmonary Disease)    Chief Complaint:  No chief complaint on file.    Follow up Arianna on CKD IV, hyperkalemia  Subjective     Interval History:   Not able to eat.  Feels very full.  High output from ostomy yesterday.  Metamucil fiber wafers extremely dry.  Less output today from ostomy. Tachycardic.     Review of Systems:   See above.      Objective     Vital Signs  Temp:  [98.3 °F (36.8 °C)-98.8 °F (37.1 °C)] 98.8 °F (37.1 °C)  Heart Rate:  [] 116  Resp:  [16-18] 18  BP: (105-112)/(64-80) 111/75    Flowsheet Rows      First Filed Value   Admission Height  170 cm (66.93\") Documented at 07/01/2018 1138   Admission Weight  47.7 kg (105 lb 1.6 oz) Documented at 07/01/2018 1138          No intake/output data recorded.  I/O last 3 completed shifts:  In: 240 [P.O.:240]  Out: 5860 [Urine:4000; Drains:360; Stool:1500]    Intake/Output Summary (Last 24 hours) at 07/11/18 1301  Last data filed at 07/11/18 0642   Gross per 24 hour   Intake              240 ml   Output             2740 ml   Net            -2500 ml       Physical Exam:  Cachectic. Does not look as well. No distress.     Oral mucosa dry.  No JVD.  Heart RRR no s3 or rub. Lungs clear to auscultation .  Abd +BS,  MARILYN drain LLQ,  Ostomy RLQ.  Tender appropriately .  Ext no edema.  Skin dry.       Results Review:      Results from last 7 days  Lab Units 07/11/18  0636 07/10/18  0656 07/09/18  0543  07/05/18  0624   SODIUM mmol/L 139 138 137  < > 140   POTASSIUM mmol/L 4.5 4.5 4.0  < > 4.6   CHLORIDE mmol/L 101 99 101  < > 104   CO2 mmol/L 25.4 27.6 25.8  < > 14.2*   BUN mg/dL 16 16 16  < > 30*   CREATININE mg/dL 2.19* 1.85* 1.84*  < > 2.25*   CALCIUM mg/dL 9.1 8.6 8.2*  < " > 8.6   BILIRUBIN mg/dL <0.2  --   --   --  0.3   ALK PHOS U/L 58  --   --   --  52   ALT (SGPT) U/L 10  --   --   --  7   AST (SGOT) U/L 30  --   --   --  17   GLUCOSE mg/dL 105* 102* 101*  < > 50*   < > = values in this interval not displayed.    Estimated Creatinine Clearance: 20.2 mL/min (A) (by C-G formula based on SCr of 2.19 mg/dL (H)).      Results from last 7 days  Lab Units 07/11/18  0636 07/10/18  0656 07/09/18  0543 07/08/18  0510   MAGNESIUM mg/dL  --  1.7 1.7 1.7   PHOSPHORUS mg/dL 3.0 2.7 2.4* 2.6         Results from last 7 days  Lab Units 07/10/18  0656 07/09/18  0543 07/08/18  0510 07/07/18  0532 07/06/18  0641   URIC ACID mg/dL 3.8 4.0 4.6 4.5 5.1         Results from last 7 days  Lab Units 07/11/18  0636 07/09/18  0543 07/08/18  0510 07/07/18  0532 07/06/18  0641   WBC 10*3/mm3 8.34 7.34 8.48 8.74 9.16   HEMOGLOBIN g/dL 9.5* 8.9* 9.6* 8.5* 8.2*   PLATELETS 10*3/mm3 493 332 348 374 344               Imaging Results (last 24 hours)     Procedure Component Value Units Date/Time    XR Abdomen Flat & Upright [[id number]] Collected:  07/01/18 2029     Updated:  07/11/18 0150    Addenda:        X-RAY ABDOMEN FLAT AND UPRIGHT.     This report was finalized on 7/11/2018 1:46 AM by Dr. Franko River M.D.     Signed:  07/11/18 0146 by Franok River MD    Narrative:       X-RAY ABDOMEN ONE VIEW KUB.     HISTORY:  Abdominal pain.     COMPARISON:  No prior studies for comparison.     FINDINGS:   Bowel gas pattern is unremarkable. No abnormal calcifications are seen.     Questionable small amount of free air under the right hemidiaphragm.      Postsurgical changes of the right upper quadrant.       Impression:       Questionable small amount of free air under the right hemidiaphragm, CT  scan of the abdomen is recommended.         Findings called to patient's nurse Zenon, 8:29 PM.     This report was finalized on 7/2/2018 10:39 PM by Dr. Franko River M.D.             budesonide-formoterol 2 puff Inhalation  BID   enoxaparin 30 mg Subcutaneous Daily   ertapenem 500 mg Intravenous Q24H   levothyroxine 125 mcg Oral Daily   METAMUCIL 2 wafer Oral TID   metroNIDAZOLE 500 mg Intravenous Q8H   sodium chloride 500 mL Intravenous Once       sodium chloride 75 mL/hr       Medication Review:   Current Facility-Administered Medications   Medication Dose Route Frequency Provider Last Rate Last Dose   • acetaminophen (TYLENOL) 160 MG/5ML solution 650 mg  650 mg Oral Q4H PRN Bee Carreon MD        Or   • acetaminophen (TYLENOL) suppository 650 mg  650 mg Rectal Q4H PRN Bee Carreon MD       • albuterol (PROVENTIL HFA;VENTOLIN HFA) inhaler 2 puff  2 puff Inhalation Q4H PRN eBe Carreon MD       • budesonide-formoterol (SYMBICORT) 160-4.5 MCG/ACT inhaler 2 puff  2 puff Inhalation BID Bee Carreon MD   2 puff at 07/11/18 0827   • enoxaparin (LOVENOX) syringe 30 mg  30 mg Subcutaneous Daily Bee Carreon MD   30 mg at 07/11/18 0944   • ertapenem (INVanz) 500 mg in sodium chloride 0.9 % 50 mL IVPB  500 mg Intravenous Q24H Bee Carreon  mL/hr at 07/10/18 1410 500 mg at 07/10/18 1410   • HYDROmorphone (DILAUDID) injection 0.5 mg  0.5 mg Intravenous Q2H PRN Bee Carreon MD   0.5 mg at 07/01/18 1935    And   • naloxone (NARCAN) injection 0.1 mg  0.1 mg Intravenous Q5 Min PRN Bee Carreon MD       • levothyroxine (SYNTHROID, LEVOTHROID) tablet 125 mcg  125 mcg Oral Daily Bee Carreon MD   125 mcg at 07/11/18 0943   • METAMUCIL wafer 2 wafer  2 wafer Oral TID Bee Carreon MD   2 wafer at 07/11/18 0943   • metroNIDAZOLE (FLAGYL) IVPB 500 mg  500 mg Intravenous Q8H Bee Carreon MD   500 mg at 07/11/18 1143   • morphine injection 2 mg  2 mg Intravenous Q2H PRN Bee Carreon MD       • ondansetron (ZOFRAN) tablet 4 mg  4 mg Oral Q6H PRN Bee Carreon MD        Or   • ondansetron ODT (ZOFRAN-ODT) disintegrating tablet 4 mg  4 mg Oral Q6H PRN Bee Carreon MD         Or   • ondansetron (ZOFRAN) injection 4 mg  4 mg Intravenous Q6H PRN Bee Carreon MD   4 mg at 07/11/18 1152   • ondansetron (ZOFRAN) tablet 4 mg  4 mg Oral Q6H PRN Bee Carreon MD        Or   • ondansetron ODT (ZOFRAN-ODT) disintegrating tablet 4 mg  4 mg Oral Q6H PRN Bee Carreon MD        Or   • ondansetron (ZOFRAN) injection 4 mg  4 mg Intravenous Q6H PRN Bee Carreon MD   4 mg at 07/05/18 0601   • ondansetron ODT (ZOFRAN-ODT) disintegrating tablet 4 mg  4 mg Oral Q8H PRN Bee Carreon MD       • oxyCODONE-acetaminophen (PERCOCET) 5-325 MG per tablet 2 tablet  2 tablet Oral Q4H PRN Bee Carreon MD   2 tablet at 07/09/18 1522   • promethazine (PHENERGAN) tablet 25 mg  25 mg Oral Q8H PRN Bee Carreon MD   25 mg at 07/04/18 0717   • sodium chloride 0.9 % bolus 500 mL  500 mL Intravenous Once Laura Francis MD       • sodium chloride 0.9 % flush 1-10 mL  1-10 mL Intravenous PRN Bee Carreon MD       • sodium chloride 0.9 % infusion  75 mL/hr Intravenous Continuous Laura Francis MD           Assessment/Plan      1.  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  Multifactorial in etiology , most likely due to volume depletion. Improved.  Chronic kidney disease stage IV status associated with Wegener's granulomatosis, now in remission.   Baseline crt usually 2.  Dc po bicarb, Give IVF today.   2. Recent C. dif  3. Recurrent diverticulitis sp lap resection 5/1/18. Colonic stricture sp dilation with subsequent perforation requiring ileostomy 7/1.   4. Hypothyroid on replacement.    5. Anemia  6. Protein calorie malnutrition. Ask dietician to help with high value protein.          Laura Francis MD  07/11/18  1:01 PM

## 2018-07-11 NOTE — PROGRESS NOTES
Chief Complaint: POD # 10    Subjective   Pt feels nauseated today, c/o pain around drain   Objective     Vital signs in last 24 hours:  Temp:  [98 °F (36.7 °C)-98.8 °F (37.1 °C)] 98 °F (36.7 °C)  Heart Rate:  [] 99  Resp:  [16-18] 16  BP: (103-112)/(71-80) 103/71    Intake/Output last 3 shifts:  I/O last 3 completed shifts:  In: 240 [P.O.:240]  Out: 5860 [Urine:4000; Drains:360; Stool:1500]    Intake/Output this shift:  I/O this shift:  In: 240 [P.O.:240]  Out: 375 [Urine:300; Drains:75]    Physical Exam:  Respiratory: CTA, good inspiratory effort  CV: RRR  Abd: + BS, soft, ND, usual post-op tenderness, no rebound, no guarding, incision C/D/I, ileostomy pink with stool 650cc, MARILYN serous 260cc    Results from last 7 days  Lab Units 07/11/18  0636   WBC 10*3/mm3 8.34   HEMOGLOBIN g/dL 9.5*   HEMATOCRIT % 29.7*   PLATELETS 10*3/mm3 493       Results from last 7 days  Lab Units 07/11/18  0636   SODIUM mmol/L 139   POTASSIUM mmol/L 4.5   CHLORIDE mmol/L 101   CO2 mmol/L 25.4   BUN mg/dL 16   CREATININE mg/dL 2.19*   GLUCOSE mg/dL 105*   CALCIUM mg/dL 9.1     Assessment/Plan   S/P Sigmoid colon stricture/rupture resection with anastomosis         Diverting loop ileostomy   Tolerating regular diet   High ostomy output, fluid bolus-  metamucil   Continue IV Abx   D/C everyother radha Carreon MD      10

## 2018-07-11 NOTE — PLAN OF CARE
Problem: Patient Care Overview  Goal: Plan of Care Review  Outcome: Ongoing (interventions implemented as appropriate)   07/11/18 3690   Coping/Psychosocial   Plan of Care Reviewed With patient   Plan of Care Review   Progress no change   OTHER   Outcome Summary Complaints of pain and nausea x2 this shift. Pt complaining of more pain at MARILYN site this AM and fewer bowel sounds and ct tachycardia. Dr. Carreon notified, encouraged pain medications. Dr. Francis started IV fluids, will ct to monitor.        Problem: Pain, Acute (Adult)  Goal: Acceptable Pain Control/Comfort Level  Outcome: Ongoing (interventions implemented as appropriate)      Problem: Fall Risk (Adult)  Goal: Absence of Fall  Outcome: Ongoing (interventions implemented as appropriate)      Problem: Ileostomy (Adult)  Goal: Signs and Symptoms of Listed Potential Problems Will be Absent, Minimized or Managed (Ileostomy)  Outcome: Ongoing (interventions implemented as appropriate)

## 2018-07-12 ENCOUNTER — APPOINTMENT (OUTPATIENT)
Dept: GENERAL RADIOLOGY | Facility: HOSPITAL | Age: 53
End: 2018-07-12

## 2018-07-12 LAB
ALBUMIN SERPL-MCNC: 3 G/DL (ref 3.5–5.2)
ANION GAP SERPL CALCULATED.3IONS-SCNC: 11.1 MMOL/L
BUN BLD-MCNC: 16 MG/DL (ref 6–20)
BUN/CREAT SERPL: 7.9 (ref 7–25)
CALCIUM SPEC-SCNC: 8.6 MG/DL (ref 8.6–10.5)
CHLORIDE SERPL-SCNC: 104 MMOL/L (ref 98–107)
CO2 SERPL-SCNC: 24.9 MMOL/L (ref 22–29)
CREAT BLD-MCNC: 2.02 MG/DL (ref 0.57–1)
GFR SERPL CREATININE-BSD FRML MDRD: 26 ML/MIN/1.73
GLUCOSE BLD-MCNC: 104 MG/DL (ref 65–99)
PHOSPHATE SERPL-MCNC: 3.7 MG/DL (ref 2.5–4.5)
POTASSIUM BLD-SCNC: 4.7 MMOL/L (ref 3.5–5.2)
SODIUM BLD-SCNC: 140 MMOL/L (ref 136–145)

## 2018-07-12 PROCEDURE — 25010000002 ENOXAPARIN PER 10 MG: Performed by: SURGERY

## 2018-07-12 PROCEDURE — 94799 UNLISTED PULMONARY SVC/PX: CPT

## 2018-07-12 PROCEDURE — 74018 RADEX ABDOMEN 1 VIEW: CPT

## 2018-07-12 PROCEDURE — 80069 RENAL FUNCTION PANEL: CPT | Performed by: INTERNAL MEDICINE

## 2018-07-12 PROCEDURE — 99024 POSTOP FOLLOW-UP VISIT: CPT | Performed by: SURGERY

## 2018-07-12 PROCEDURE — 25010000002 ERTAPENEM PER 500 MG: Performed by: SURGERY

## 2018-07-12 RX ADMIN — Medication 2 WAFER: at 21:17

## 2018-07-12 RX ADMIN — ENOXAPARIN SODIUM 30 MG: 40 INJECTION SUBCUTANEOUS at 10:55

## 2018-07-12 RX ADMIN — SODIUM CHLORIDE 75 ML/HR: 9 INJECTION, SOLUTION INTRAVENOUS at 00:41

## 2018-07-12 RX ADMIN — BUDESONIDE AND FORMOTEROL FUMARATE DIHYDRATE 2 PUFF: 160; 4.5 AEROSOL RESPIRATORY (INHALATION) at 20:42

## 2018-07-12 RX ADMIN — METRONIDAZOLE 500 MG: 500 INJECTION, SOLUTION INTRAVENOUS at 03:50

## 2018-07-12 RX ADMIN — METRONIDAZOLE 500 MG: 500 INJECTION, SOLUTION INTRAVENOUS at 18:05

## 2018-07-12 RX ADMIN — METRONIDAZOLE 500 MG: 500 INJECTION, SOLUTION INTRAVENOUS at 10:56

## 2018-07-12 RX ADMIN — SODIUM CHLORIDE 500 MG: 9 INJECTION, SOLUTION INTRAVENOUS at 13:42

## 2018-07-12 RX ADMIN — LEVOTHYROXINE SODIUM 125 MCG: 125 TABLET ORAL at 05:57

## 2018-07-12 RX ADMIN — BUDESONIDE AND FORMOTEROL FUMARATE DIHYDRATE 2 PUFF: 160; 4.5 AEROSOL RESPIRATORY (INHALATION) at 10:15

## 2018-07-12 NOTE — PROGRESS NOTES
"   LOS: 11 days    Patient Care Team:  Pino Templeton MD as PCP - General  Pino Templeton MD as PCP - Family Medicine  Liliya Giraldo MD as Consulting Physician (Rheumatology)  Moisés Corado MD as Consulting Physician (Nephrology)  Grey Dia MD as Consulting Physician (Pulmonary Disease)    Chief Complaint:  No chief complaint on file.    Follow up Arianna on CKD IV, hyperkalemia  Subjective     Interval History:   Still feels full.  Stool very thick and hard per RN.  Ate one metamucil wafer yesterday.   Tolerating [place] instant breakfast.  Urinating.       Review of Systems:   See above.      Objective     Vital Signs  Temp:  [97.4 °F (36.3 °C)-98.4 °F (36.9 °C)] 98.3 °F (36.8 °C)  Heart Rate:  [71-99] 98  Resp:  [16-20] 20  BP: (103-125)/(71-83) 120/80    Flowsheet Rows      First Filed Value   Admission Height  170 cm (66.93\") Documented at 07/01/2018 1138   Admission Weight  47.7 kg (105 lb 1.6 oz) Documented at 07/01/2018 1138          I/O this shift:  In: 100 [IV Piggyback:100]  Out: 800 [Urine:400; Drains:400]  I/O last 3 completed shifts:  In: 2320 [P.O.:720; I.V.:1600]  Out: 3880 [Urine:3200; Drains:530; Stool:150]    Intake/Output Summary (Last 24 hours) at 07/12/18 1232  Last data filed at 07/12/18 1132   Gross per 24 hour   Intake             2180 ml   Output             2125 ml   Net               55 ml       Physical Exam:  Cachectic. Sitting in chair.  No distress.     Oral mucosa dry.  No JVD.  Heart RRR no s3 or rub. Lungs clear to auscultation .  Abd +BS, distended,  MARILYN drain LLQ,  Ostomy RLQ. No guarding.   Ext no edema.  Skin dry.       Results Review:      Results from last 7 days  Lab Units 07/12/18  0722 07/11/18  0636 07/10/18  0656   SODIUM mmol/L 140 139 138   POTASSIUM mmol/L 4.7 4.5 4.5   CHLORIDE mmol/L 104 101 99   CO2 mmol/L 24.9 25.4 27.6   BUN mg/dL 16 16 16   CREATININE mg/dL 2.02* 2.19* 1.85*   CALCIUM mg/dL 8.6 9.1 8.6   BILIRUBIN mg/dL  --  <0.2  --  "   ALK PHOS U/L  --  58  --    ALT (SGPT) U/L  --  10  --    AST (SGOT) U/L  --  30  --    GLUCOSE mg/dL 104* 105* 102*       Estimated Creatinine Clearance: 23 mL/min (A) (by C-G formula based on SCr of 2.02 mg/dL (H)).      Results from last 7 days  Lab Units 07/12/18  0722 07/11/18  0636 07/10/18  0656 07/09/18  0543 07/08/18  0510   MAGNESIUM mg/dL  --   --  1.7 1.7 1.7   PHOSPHORUS mg/dL 3.7 3.0 2.7 2.4* 2.6         Results from last 7 days  Lab Units 07/10/18  0656 07/09/18  0543 07/08/18  0510 07/07/18  0532 07/06/18  0641   URIC ACID mg/dL 3.8 4.0 4.6 4.5 5.1         Results from last 7 days  Lab Units 07/11/18  0636 07/09/18  0543 07/08/18  0510 07/07/18  0532 07/06/18  0641   WBC 10*3/mm3 8.34 7.34 8.48 8.74 9.16   HEMOGLOBIN g/dL 9.5* 8.9* 9.6* 8.5* 8.2*   PLATELETS 10*3/mm3 493 332 348 374 344               Imaging Results (last 24 hours)     ** No results found for the last 24 hours. **          budesonide-formoterol 2 puff Inhalation BID   enoxaparin 30 mg Subcutaneous Daily   ertapenem 500 mg Intravenous Q24H   levothyroxine 125 mcg Oral Daily   METAMUCIL 2 wafer Oral BID   metroNIDAZOLE 500 mg Intravenous Q8H          Medication Review:   Current Facility-Administered Medications   Medication Dose Route Frequency Provider Last Rate Last Dose   • acetaminophen (TYLENOL) 160 MG/5ML solution 650 mg  650 mg Oral Q4H PRN Bee Carreon MD        Or   • acetaminophen (TYLENOL) suppository 650 mg  650 mg Rectal Q4H PRN Bee Carreon MD       • albuterol (PROVENTIL) nebulizer solution 0.083% 2.5 mg/3mL  2.5 mg Nebulization Q4H PRN Bee Carreon MD       • budesonide-formoterol (SYMBICORT) 160-4.5 MCG/ACT inhaler 2 puff  2 puff Inhalation BID Bee Carreon MD   2 puff at 07/12/18 1015   • enoxaparin (LOVENOX) syringe 30 mg  30 mg Subcutaneous Daily Bee Carreon MD   30 mg at 07/12/18 1055   • ertapenem (INVanz) 500 mg in sodium chloride 0.9 % 50 mL IVPB  500 mg Intravenous Q24H  Bee Carreon  mL/hr at 07/11/18 1320 500 mg at 07/11/18 1320   • levothyroxine (SYNTHROID, LEVOTHROID) tablet 125 mcg  125 mcg Oral Daily Bee Carreon MD   125 mcg at 07/12/18 0557   • METAMUCIL wafer 2 wafer  2 wafer Oral BID Bee Carreon MD   2 wafer at 07/11/18 2019   • metroNIDAZOLE (FLAGYL) IVPB 500 mg  500 mg Intravenous Q8H Bee Carreon MD   500 mg at 07/12/18 1056   • morphine injection 2 mg  2 mg Intravenous Q2H PRN Bee Carreon MD       • naloxone (NARCAN) injection 0.1 mg  0.1 mg Intravenous Q5 Min PRN Bee Carreon MD       • ondansetron (ZOFRAN) tablet 4 mg  4 mg Oral Q6H PRN Bee Carreon MD        Or   • ondansetron ODT (ZOFRAN-ODT) disintegrating tablet 4 mg  4 mg Oral Q6H PRN Bee Carreon MD        Or   • ondansetron (ZOFRAN) injection 4 mg  4 mg Intravenous Q6H PRN Bee Carreon MD   4 mg at 07/11/18 1747   • ondansetron ODT (ZOFRAN-ODT) disintegrating tablet 4 mg  4 mg Oral Q8H PRN Bee Carreon MD       • oxyCODONE-acetaminophen (PERCOCET) 5-325 MG per tablet 2 tablet  2 tablet Oral Q4H PRN Bee Carreon MD   2 tablet at 07/11/18 1747   • promethazine (PHENERGAN) tablet 25 mg  25 mg Oral Q8H PRN Bee Carreon MD   25 mg at 07/04/18 0717   • sodium chloride 0.9 % flush 1-10 mL  1-10 mL Intravenous PRN Bee Carreon MD           Assessment/Plan      1.  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  Multifactorial in etiology , most likely due to volume depletion. Improved.  At baseline.   Chronic kidney disease stage IV, Wegener's granulomatosis, now in remission.    2. Recent C. Dif on IV flagyl.   3. Recurrent diverticulitis sp lap resection 5/1/18. Colonic stricture sp dilation with subsequent perforation requiring ileostomy 7/1.   4. Hypothyroid on replacement.    5. Anemia  6. Protein calorie malnutrition.         Laura Francis MD  07/12/18  12:32 PM

## 2018-07-12 NOTE — CONSULTS
Adult Nutrition  Assessment/PES    Patient Name:  Sun Rodriguez  YOB: 1965  MRN: [id number]  Admit Date:  7/1/2018    Assessment Date:  7/12/2018    Comments:  Consult for nutrition assessment.  Patient reports fair appetite, says still getting used to ostomy.  Nausea yesterday.  She reports she did well with [place] Instant Breakfast this am and liked it.  Will increase to TID for increased protein and kcal intake.  75% x 2 meals per chart PO data.    Discussed ways to add calories and protein to food items with patient.  Discussed adding protein powders and oral nutrition supplements to daily routine in order to promote nutrition and weight gain.    Will continue to follow.          Reason for Assessment     Row Name 07/12/18 1209          Reason for Assessment    Reason For Assessment physician consult             Nutrition/Diet History     Row Name 07/12/18 1209          Nutrition/Diet History    Typical Food/Fluid Intake nausea yesterday, reports fair appetite     Supplemental Drinks/Foods/Additives likes CIB             Anthropometrics     Row Name 07/12/18 1209 07/12/18 0535       Anthropometrics    Weight  -- 44.7 kg (98 lb 8 oz)       Admit Weight    Admit Weight --   98# current 7/12  --       Body Mass Index (BMI)    BMI Assessment BMI less than 16: protein-energy malnutrition grade III  --            Labs/Tests/Procedures/Meds     Row Name 07/12/18 1209          Labs/Procedures/Meds    Lab Results Reviewed reviewed, pertinent        Diagnostic Tests/Procedures    Diagnostic Test/Procedure Reviewed reviewed, pertinent        Medications    Pertinent Medications Reviewed reviewed, pertinent             Physical Findings     Row Name 07/12/18 1210          Physical Findings    Overall Physical Appearance underweight;generalized wasting     Gastrointestinal nausea;ostomy;abdominal distension   ileostomy     Skin other (see comments)   abd inc, B=19               Nutrition Prescription  Ordered     Row Name 07/12/18 1210          Nutrition Prescription PO    Current PO Diet Regular     Supplement [employer] Breakfast Essentials     Supplement Frequency 2 times a day             Evaluation of Received Nutrient/Fluid Intake     Row Name 07/12/18 1211          PO Evaluation    Number of Meals 2     % PO Intake 75             Problem/Interventions:                  Intervention Goal     Row Name 07/12/18 1211          Intervention Goal    General Maintain nutrition;Reduce/improve symptoms;Disease management/therapy;Meet nutritional needs for age/condition     PO Tolerate PO;Increase intake;PO intake (%)     PO Intake % 100 %     Weight Appropriate weight gain             Nutrition Intervention     Row Name 07/12/18 1211          Nutrition Intervention    RD/Tech Action Follow Tx progress;Care plan reviewd;Encourage intake;Recommend/ordered     Recommended/Ordered Supplement             Nutrition Prescription     Row Name 07/12/18 1211          Nutrition Prescription PO    PO Prescription Begin/change supplement     Supplement [employer] Breakfast Essentials;PRO powder     Supplement Frequency 3 times a day     New PO Prescription Ordered? Yes             Education/Evaluation     Row Name 07/12/18 1211          Education    Education Provided education regarding     Education Topics Protein   discussed oral nutrition supplements as well as ways to add increase kcal and protein to food items        Monitor/Evaluation    Monitor Per protocol;PO intake;Supplement intake;Pertinent labs;Weight;Skin status;Symptoms         Electronically signed by:  Sydni Almeida RD  07/12/18 12:13 PM

## 2018-07-12 NOTE — PLAN OF CARE
Problem: Patient Care Overview  Goal: Plan of Care Review  Outcome: Ongoing (interventions implemented as appropriate)   07/12/18 3519   Coping/Psychosocial   Plan of Care Reviewed With patient   Plan of Care Review   Progress improving   OTHER   Outcome Summary Pain and nausea better than yesterday. VSS, HR <10, pt refused AM fiber crackers. Pt had 2 Large illeostmy movements after unch, drain pulled. Pt feeling much beer after lleostmy otput increased, will ct to monitor.        Problem: Pain, Acute (Adult)  Goal: Acceptable Pain Control/Comfort Level  Outcome: Ongoing (interventions implemented as appropriate)      Problem: Fall Risk (Adult)  Goal: Absence of Fall  Outcome: Ongoing (interventions implemented as appropriate)      Problem: Ileostomy (Adult)  Goal: Signs and Symptoms of Listed Potential Problems Will be Absent, Minimized or Managed (Ileostomy)  Outcome: Ongoing (interventions implemented as appropriate)

## 2018-07-12 NOTE — PROGRESS NOTES
Continued Stay Note  [facility]     Patient Name: Sun Rodriguez  MRN: [id number]  Today's Date: 7/12/2018    Admit Date: 7/1/2018          Discharge Plan     Row Name 07/12/18 1270       Plan    Plan home with spouse and [facility]    Patient/Family in Agreement with Plan yes    Plan Comments Patient ambulating ad andrew in halls.  Plans remains for patient to return home with spouse and [facility] to follow.  CCP will continue to follow as needed. Jennifer Flor RN              Discharge Codes    No documentation.       Expected Discharge Date and Time     Expected Discharge Date Expected Discharge Time    Jul 12, 2018             Jennifer Flor RN

## 2018-07-12 NOTE — NURSING NOTE
CWOCN follow up. Patient's stool has picked up a little- RN emptied it earlier and now there is mushy stool in ostomy pouch again. Not enough to empty yet, but it is functioning. Will monitor discharge plan. Change appliance tomorrow with patient. She agrees to plan. Discussed increasing water and ambulation. She agreed. She also wanted to know signs of dehydration or what to watch for if her stools are liquid- discussed all. She verbalized understanding.

## 2018-07-12 NOTE — PROGRESS NOTES
Chief Complaint: POD # 11    Subjective   Pt feels better today  Objective     Vital signs in last 24 hours:  Temp:  [97.4 °F (36.3 °C)-98.6 °F (37 °C)] 98.6 °F (37 °C)  Heart Rate:  [71-98] 87  Resp:  [16-20] 20  BP: (115-125)/(73-83) 119/73    Intake/Output last 3 shifts:  I/O last 3 completed shifts:  In: 2320 [P.O.:720; I.V.:1600]  Out: 3880 [Urine:3200; Drains:530; Stool:150]    Intake/Output this shift:  I/O this shift:  In: 100 [IV Piggyback:100]  Out: 1630 [Urine:1300; Drains:180; Stool:150]    Physical Exam:  Respiratory: CTA, good inspiratory effort  CV: RRR  Abd: + BS, soft, ND, usual post-op tenderness, no rebound, no guarding, incision C/D/I    Results from last 7 days  Lab Units 07/11/18  0636   WBC 10*3/mm3 8.34   HEMOGLOBIN g/dL 9.5*   HEMATOCRIT % 29.7*   PLATELETS 10*3/mm3 493       Results from last 7 days  Lab Units 07/12/18  0722   SODIUM mmol/L 140   POTASSIUM mmol/L 4.7   CHLORIDE mmol/L 104   CO2 mmol/L 24.9   BUN mg/dL 16   CREATININE mg/dL 2.02*   GLUCOSE mg/dL 104*   CALCIUM mg/dL 8.6         Assessment/Plan   S/P Sigmoid colon stricture/rupture resection with anastomosis         Diverting loop ileostomy   Tolerating regular diet   High ostomy output,  metamucil   Continue IV Abx    Bee Carreon MD

## 2018-07-13 VITALS
HEIGHT: 67 IN | WEIGHT: 98.5 LBS | BODY MASS INDEX: 15.46 KG/M2 | SYSTOLIC BLOOD PRESSURE: 110 MMHG | DIASTOLIC BLOOD PRESSURE: 79 MMHG | HEART RATE: 81 BPM | TEMPERATURE: 98.4 F | OXYGEN SATURATION: 92 % | RESPIRATION RATE: 18 BRPM

## 2018-07-13 LAB
ALBUMIN SERPL-MCNC: 2.9 G/DL (ref 3.5–5.2)
ANION GAP SERPL CALCULATED.3IONS-SCNC: 12.5 MMOL/L
BUN BLD-MCNC: 20 MG/DL (ref 6–20)
BUN/CREAT SERPL: 11.4 (ref 7–25)
CALCIUM SPEC-SCNC: 8.7 MG/DL (ref 8.6–10.5)
CHLORIDE SERPL-SCNC: 103 MMOL/L (ref 98–107)
CO2 SERPL-SCNC: 22.5 MMOL/L (ref 22–29)
CREAT BLD-MCNC: 1.76 MG/DL (ref 0.57–1)
GFR SERPL CREATININE-BSD FRML MDRD: 30 ML/MIN/1.73
GLUCOSE BLD-MCNC: 91 MG/DL (ref 65–99)
PHOSPHATE SERPL-MCNC: 3.8 MG/DL (ref 2.5–4.5)
POTASSIUM BLD-SCNC: 4.5 MMOL/L (ref 3.5–5.2)
SODIUM BLD-SCNC: 138 MMOL/L (ref 136–145)

## 2018-07-13 PROCEDURE — 94799 UNLISTED PULMONARY SVC/PX: CPT

## 2018-07-13 PROCEDURE — 99024 POSTOP FOLLOW-UP VISIT: CPT | Performed by: SURGERY

## 2018-07-13 PROCEDURE — 80069 RENAL FUNCTION PANEL: CPT | Performed by: INTERNAL MEDICINE

## 2018-07-13 PROCEDURE — 25010000002 ERTAPENEM PER 500 MG: Performed by: SURGERY

## 2018-07-13 PROCEDURE — 25010000002 ENOXAPARIN PER 10 MG: Performed by: SURGERY

## 2018-07-13 RX ORDER — LEVOFLOXACIN 500 MG/1
250 TABLET, FILM COATED ORAL DAILY
Qty: 7 TABLET | Refills: 0 | Status: SHIPPED | OUTPATIENT
Start: 2018-07-13 | End: 2018-07-16

## 2018-07-13 RX ORDER — METRONIDAZOLE 500 MG/1
500 TABLET ORAL 3 TIMES DAILY
Qty: 42 TABLET | Refills: 0 | Status: SHIPPED | OUTPATIENT
Start: 2018-07-13 | End: 2018-07-27

## 2018-07-13 RX ADMIN — SODIUM CHLORIDE 500 MG: 9 INJECTION, SOLUTION INTRAVENOUS at 11:14

## 2018-07-13 RX ADMIN — LEVOTHYROXINE SODIUM 125 MCG: 125 TABLET ORAL at 06:08

## 2018-07-13 RX ADMIN — METRONIDAZOLE 500 MG: 500 INJECTION, SOLUTION INTRAVENOUS at 10:14

## 2018-07-13 RX ADMIN — METRONIDAZOLE 500 MG: 500 INJECTION, SOLUTION INTRAVENOUS at 03:03

## 2018-07-13 RX ADMIN — ENOXAPARIN SODIUM 30 MG: 40 INJECTION SUBCUTANEOUS at 08:59

## 2018-07-13 RX ADMIN — BUDESONIDE AND FORMOTEROL FUMARATE DIHYDRATE 2 PUFF: 160; 4.5 AEROSOL RESPIRATORY (INHALATION) at 07:16

## 2018-07-13 RX ADMIN — Medication 2 WAFER: at 08:59

## 2018-07-13 NOTE — PLAN OF CARE
Problem: Patient Care Overview  Goal: Plan of Care Review  Outcome: Ongoing (interventions implemented as appropriate)   07/13/18 5562   Coping/Psychosocial   Plan of Care Reviewed With patient   Plan of Care Review   Progress no change   OTHER   Outcome Summary continue inhaler

## 2018-07-13 NOTE — DISCHARGE SUMMARY
Admitting date:  07/01/2018    Discharging date: 7/13/2018    Admitting diagnosis: Abdominal pain    Discharging diagnoses: C. difficile, colonic stricture, bowel perforation, acute on chronic renal failure, malnutrition    Admitting/discharging physician: Dr. Carreon    Procedures: Colonic dilation, sigmoid bowel resection with ileostomy    Hospital course:  This is a pleasant 53-year-old female patient who was admitted secondary to abdominal pain with a previous stricture secondary to C. difficile.  Patient had had 2 previous dilations of the stricture and was having reoccurring symptoms.  Patient underwent a balloon dilation on 7/1/2018.  After the balloon dilation in the late afternoon the patient was having increasing abdominal pain and discomfort and underwent a KUB that revealed free air.  Patient was taken to the operating room and underwent an emergency sigmoid colon resection with a diverting loop ileostomy.  Patient has had a history of Carrasco's granuloma with chronic renal failure.  Nephrology was consulted and followed along throughout her course of hospitalization.  Patient also had physical therapy and wound care nursing for her ileostomy teaching.  Patient's ileus slowly resolved and supplemental nutrition was given with protein shakes.  On 7/13/2018 patient was tolerating a regular diet, ambulating, urinating without difficulties and at her baseline renal function, pain was controlled on oral pain medicine, patient's ostomy was well functioning and her wound appeared to be clean, dry and intact.  Patient was discharged to home with home health in stable condition.  Patient was discharged on her admitting medications and Flagyl.  Patient was instructed follow-up in my office in one week for staple removal.        Bee Carreon MD  General, Minimally Invasive and Endoscopic Surgery  [facility]    [address], [address]  [place], [place], [address]  P: [phone]  F:  [phone]    Cc:  Pino Templeton MD

## 2018-07-13 NOTE — PLAN OF CARE
Problem: Patient Care Overview  Goal: Plan of Care Review  Outcome: Ongoing (interventions implemented as appropriate)   07/13/18 1045   Coping/Psychosocial   Plan of Care Reviewed With patient   Plan of Care Review   Progress improving   OTHER   Outcome Summary CWOCN follow up. Patient hoping to go home today. Her stool is thicker and she has been emptying. She is feeling better. She changed the appliance with minimal assist from me. Discussed accomodating the bridge. Discussed home plan. She has our number if she needs assist as an outpatient. Discussed gas as she is concerned about going to work and the sound of the gas. Referred her to the diet list of gas producing foods. I also think the gas will slow after she is less bloated. She verbalized understanding of all. She is building confidence with her ostomy.       Problem: Ileostomy (Adult)  Goal: Signs and Symptoms of Listed Potential Problems Will be Absent, Minimized or Managed (Ileostomy)  Outcome: Ongoing (interventions implemented as appropriate)   07/13/18 1045   Goal/Outcome Evaluation   Problems Assessed (Ileostomy) high-output stoma;postoperative nausea and vomiting;stomal complications;situational response   Problems Present (Ileostomy) none

## 2018-07-13 NOTE — PROGRESS NOTES
Chief Complaint: POD # 12    Subjective   Pt tolerating diet, feels better with MARILYN out   Objective     Vital signs in last 24 hours:  Temp:  [97.8 °F (36.6 °C)-98.8 °F (37.1 °C)] 98.4 °F (36.9 °C)  Heart Rate:  [73-99] 81  Resp:  [16-18] 18  BP: (107-113)/(66-90) 110/79    Intake/Output last 3 shifts:  I/O last 3 completed shifts:  In: 3000 [P.O.:1100; I.V.:1600; IV Piggyback:300]  Out: 5205 [Urine:4600; Drains:455; Stool:150]    Intake/Output this shift:  I/O this shift:  In: -   Out: 100 [Stool:100]    Physical Exam:  Respiratory: CTA, good inspiratory effort  CV: RRR  Abd: + BS, soft, ND, usual post-op tenderness, no rebound, no guarding, incision C/D/I, ileostomy functioning     Results from last 7 days  Lab Units 07/11/18  0636   WBC 10*3/mm3 8.34   HEMOGLOBIN g/dL 9.5*   HEMATOCRIT % 29.7*   PLATELETS 10*3/mm3 493       Results from last 7 days  Lab Units 07/13/18  0620   SODIUM mmol/L 138   POTASSIUM mmol/L 4.5   CHLORIDE mmol/L 103   CO2 mmol/L 22.5   BUN mg/dL 20   CREATININE mg/dL 1.76*   GLUCOSE mg/dL 91   CALCIUM mg/dL 8.7     Assessment/Plan   S/P Sigmoid colon stricture/rupture resection with anastomosis         Diverting loop ileostomy   Tolerating regular diet   High ostomy output,  metamucil   D/c home with    F/u in 1 week  Bee Carreon MD

## 2018-07-13 NOTE — PLAN OF CARE
Problem: Patient Care Overview  Goal: Plan of Care Review  Outcome: Ongoing (interventions implemented as appropriate)   07/13/18 9359   Coping/Psychosocial   Plan of Care Reviewed With patient   Plan of Care Review   Progress improving   OTHER   Outcome Summary Denied pain or nausea this shift. Ambulating in [place]. Taking care of ostomy herself. Possible d/c home today. VSS, will continue to monitor.      Goal: Discharge Needs Assessment  Outcome: Ongoing (interventions implemented as appropriate)      Problem: Pain, Acute (Adult)  Goal: Acceptable Pain Control/Comfort Level  Outcome: Ongoing (interventions implemented as appropriate)      Problem: Fall Risk (Adult)  Goal: Absence of Fall  Outcome: Ongoing (interventions implemented as appropriate)      Problem: Ileostomy (Adult)  Goal: Signs and Symptoms of Listed Potential Problems Will be Absent, Minimized or Managed (Ileostomy)  Outcome: Ongoing (interventions implemented as appropriate)

## 2018-07-13 NOTE — PROGRESS NOTES
"   LOS: 12 days    Patient Care Team:  Pino Templeton MD as PCP - General  Pino Templeton MD as PCP - Family Medicine  Liliya Giraldo MD as Consulting Physician (Rheumatology)  Moisés Corado MD as Consulting Physician (Nephrology)  Grey Dia MD as Consulting Physician (Pulmonary Disease)    Chief Complaint:  No chief complaint on file.    Follow up Arianna on CKD IV, hyperkalemia  Subjective     Interval History:   Patient is feeling better, denies any chest pain or shortness of air, no nausea or vomiting, no dysuria or gross hematuria.       Review of Systems:   As noted above    Objective     Vital Signs  Temp:  [97.8 °F (36.6 °C)-98.8 °F (37.1 °C)] 98.4 °F (36.9 °C)  Heart Rate:  [73-99] 81  Resp:  [16-20] 18  BP: (107-119)/(66-90) 110/79    Flowsheet Rows      First Filed Value   Admission Height  170 cm (66.93\") Documented at 07/01/2018 1138   Admission Weight  47.7 kg (105 lb 1.6 oz) Documented at 07/01/2018 1138          No intake/output data recorded.  I/O last 3 completed shifts:  In: 3000 [P.O.:1100; I.V.:1600; IV Piggyback:300]  Out: 5205 [Urine:4600; Drains:455; Stool:150]    Intake/Output Summary (Last 24 hours) at 07/13/18 0942  Last data filed at 07/13/18 0609   Gross per 24 hour   Intake             1160 ml   Output             4030 ml   Net            -2870 ml       Physical Exam:  General Appearance: alert, oriented x 3, no acute distress,   Skin: warm and dry  HEENT: Nonicteric sclerae, oral mucosa normal,   Neck: supple, no JVD, trachea midline  Lungs: CTA, unlabored breathing effort  Heart: RRR, normal S1 and S2, no S3, no rub  Abdomen: soft, non-tender,  present bowel sounds to auscultation, ileostomy in the right lower quadrant  : no palpable bladder,  Extremities: no edema, cyanosis or clubbing  Neuro: normal speech and mental status    Results Review:      Results from last 7 days  Lab Units 07/13/18  0620 07/12/18  0722 07/11/18  0636   SODIUM mmol/L 138 140 139 "   POTASSIUM mmol/L 4.5 4.7 4.5   CHLORIDE mmol/L 103 104 101   CO2 mmol/L 22.5 24.9 25.4   BUN mg/dL 20 16 16   CREATININE mg/dL 1.76* 2.02* 2.19*   CALCIUM mg/dL 8.7 8.6 9.1   BILIRUBIN mg/dL  --   --  <0.2   ALK PHOS U/L  --   --  58   ALT (SGPT) U/L  --   --  10   AST (SGOT) U/L  --   --  30   GLUCOSE mg/dL 91 104* 105*       Estimated Creatinine Clearance: 26.4 mL/min (A) (by C-G formula based on SCr of 1.76 mg/dL (H)).      Results from last 7 days  Lab Units 07/13/18  0620 07/12/18  0722 07/11/18  0636 07/10/18  0656 07/09/18  0543 07/08/18  0510   MAGNESIUM mg/dL  --   --   --  1.7 1.7 1.7   PHOSPHORUS mg/dL 3.8 3.7 3.0 2.7 2.4* 2.6         Results from last 7 days  Lab Units 07/10/18  0656 07/09/18  0543 07/08/18  0510 07/07/18  0532   URIC ACID mg/dL 3.8 4.0 4.6 4.5         Results from last 7 days  Lab Units 07/11/18  0636 07/09/18  0543 07/08/18  0510 07/07/18  0532   WBC 10*3/mm3 8.34 7.34 8.48 8.74   HEMOGLOBIN g/dL 9.5* 8.9* 9.6* 8.5*   PLATELETS 10*3/mm3 493 332 167 374               Imaging Results (last 24 hours)     Procedure Component Value Units Date/Time    XR Abdomen KUB [[id number]] Collected:  07/12/18 1527     Updated:  07/12/18 1558    Narrative:       KUB     HISTORY: Abdominal distention and bloating. Colostomy check.     COMPARISON: Acute abdomen series 07/06/2018, KUB 07/04/2018.     FINDINGS: Midline surgical skin staples are present. There is air-filled  dilated small bowel loop within the central abdomen. The degree of  air-filled dilated small bowel loops exhibits improvement compared to  examination 6 days ago. There appears to be drainage catheter looped  overlying the left abdomen with tip superimposing the left sacrum.  Cholecystectomy clips are present. Faint density overlies the right  upper medial pelvis consistent with the location of a colostomy bag.       Impression:       Dilated central small bowel air-filled loop persists though  there is overall improvement in the  extent of air-filled distended small  bowel loops compared to examination 6 days ago.     This report was finalized on 7/12/2018 3:55 PM by Dr. Hebert Colón M.D.             budesonide-formoterol 2 puff Inhalation BID   enoxaparin 30 mg Subcutaneous Daily   ertapenem 500 mg Intravenous Q24H   levothyroxine 125 mcg Oral Daily   METAMUCIL 2 wafer Oral BID   metroNIDAZOLE 500 mg Intravenous Q8H          Medication Review:   Current Facility-Administered Medications   Medication Dose Route Frequency Provider Last Rate Last Dose   • acetaminophen (TYLENOL) 160 MG/5ML solution 650 mg  650 mg Oral Q4H PRN Bee Carreon MD        Or   • acetaminophen (TYLENOL) suppository 650 mg  650 mg Rectal Q4H PRN Bee Carreon MD       • albuterol (PROVENTIL) nebulizer solution 0.083% 2.5 mg/3mL  2.5 mg Nebulization Q4H PRN Bee Carreon MD       • budesonide-formoterol (SYMBICORT) 160-4.5 MCG/ACT inhaler 2 puff  2 puff Inhalation BID Bee Carreon MD   2 puff at 07/13/18 0716   • enoxaparin (LOVENOX) syringe 30 mg  30 mg Subcutaneous Daily Bee Carreon MD   30 mg at 07/13/18 0859   • ertapenem (INVanz) 500 mg in sodium chloride 0.9 % 50 mL IVPB  500 mg Intravenous Q24H Bee Carreon  mL/hr at 07/12/18 1342 500 mg at 07/12/18 1342   • levothyroxine (SYNTHROID, LEVOTHROID) tablet 125 mcg  125 mcg Oral Daily Bee Carreon MD   125 mcg at 07/13/18 0608   • METAMUCIL wafer 2 wafer  2 wafer Oral BID eBe Carreon MD   2 wafer at 07/13/18 0859   • metroNIDAZOLE (FLAGYL) IVPB 500 mg  500 mg Intravenous Q8H Bee Carreon MD   500 mg at 07/13/18 0303   • morphine injection 2 mg  2 mg Intravenous Q2H PRN Bee Carreon MD       • naloxone (NARCAN) injection 0.1 mg  0.1 mg Intravenous Q5 Min PRN Bee Carreon MD       • ondansetron (ZOFRAN) tablet 4 mg  4 mg Oral Q6H PRN Bee Carreon MD        Or   • ondansetron ODT (ZOFRAN-ODT) disintegrating tablet 4 mg  4 mg Oral Q6H PRN  Bee Carreon MD        Or   • ondansetron (ZOFRAN) injection 4 mg  4 mg Intravenous Q6H PRN Bee Carreon MD   4 mg at 07/11/18 1747   • ondansetron ODT (ZOFRAN-ODT) disintegrating tablet 4 mg  4 mg Oral Q8H PRN Bee Carreon MD       • oxyCODONE-acetaminophen (PERCOCET) 5-325 MG per tablet 2 tablet  2 tablet Oral Q4H PRN Bee Carreon MD   2 tablet at 07/11/18 1747   • promethazine (PHENERGAN) tablet 25 mg  25 mg Oral Q8H PRN Bee Carreon MD   25 mg at 07/04/18 0717   • sodium chloride 0.9 % flush 1-10 mL  1-10 mL Intravenous PRN Bee Carreon MD           Assessment/Plan      1.  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  Multifactorial in etiology , most likely due to volume depletion.Creatinine down to 1.76.  2. Recent C. Dif on IV flagyl.   3. Recurrent diverticulitis sp lap resection 5/1/18. Colonic stricture sp dilation with subsequent perforation requiring ileostomy 7/1.   4. Hypothyroid on replacement.    5. Anemia  6. Protein calorie malnutrition.       Plan:  1.  The patient is cleared from the renal standpoint for discharge.  2.  Follow-up with Dr. Moisés Corado in 2-3 weeks        Ryder Santoyo MD  07/13/18  9:42 AM

## 2018-07-13 NOTE — PROGRESS NOTES
Case Management Discharge Note    Final Note: DC home via private auto with [facility] to follow. Jennifer Flor RN    Destination     No service has been selected for the patient.      Durable Medical Equipment     No service has been selected for the patient.      Dialysis/Infusion     No service has been selected for the patient.      Home Medical Care - Selection Complete     Service Request Status Selected Specialties Address Phone Number Fax Number    [facility] Selected Home Health Services [address] [address] [phone]      Social Care     No service has been selected for the patient.        Other: Other (private auto)    Final Discharge Disposition Code: 06 - home with home health care

## 2018-07-19 ENCOUNTER — OFFICE VISIT (OUTPATIENT)
Dept: SURGERY | Facility: CLINIC | Age: 53
End: 2018-07-19

## 2018-07-19 VITALS
BODY MASS INDEX: 14.47 KG/M2 | DIASTOLIC BLOOD PRESSURE: 60 MMHG | WEIGHT: 92.2 LBS | HEIGHT: 67 IN | OXYGEN SATURATION: 99 % | SYSTOLIC BLOOD PRESSURE: 90 MMHG | HEART RATE: 109 BPM

## 2018-07-19 DIAGNOSIS — Z09 FOLLOW UP: Primary | ICD-10-CM

## 2018-07-19 PROCEDURE — 99024 POSTOP FOLLOW-UP VISIT: CPT | Performed by: SURGERY

## 2018-07-19 NOTE — PROGRESS NOTES
"Chief complaint:  Post-op  Follow up    History of Present Illness    This is Sun Rodriguez 53 y.o. status post sigmoid colon resection of stricture and loop ileostomy.  Patient is tolerating a diet.  Patient denies fever, chills, nausea or vomiting.  Patient's pain is well-controlled.  Patient has home health who is working with her ileostomy.    The following portions of the patient's history were reviewed and updated as appropriate: allergies, current medications, past family history, past medical history, past social history, past surgical history and problem list.    Vitals:    07/19/18 1520   BP: 90/60   Pulse: 109   SpO2: 99%   Weight: 41.8 kg (92 lb 3.2 oz)   Height: 169.9 cm (66.9\")       Physical Exam  Incision is well-healed without evidence of infection, herniation or seroma.  Ileostomy is functioning and pink.  I have removed her midline staples.    Patient does not use tobacco products currently and I have counseled the patient not to start using tobacco products in the future.    Assessment/plan:  I will see patient back in 3 weeks.  At that time would like to set the patient up for reversal of her loop ileostomy.    Bee Carreon MD  General, Minimally Invasive and Endoscopic Surgery  [facility]    [address], [address]  [place], [place], [address]  P: [phone]  F: [phone]    Cc:  Pino Templeton MD  "

## 2018-08-02 ENCOUNTER — OFFICE VISIT (OUTPATIENT)
Dept: SURGERY | Facility: CLINIC | Age: 53
End: 2018-08-02

## 2018-08-02 VITALS
DIASTOLIC BLOOD PRESSURE: 64 MMHG | BODY MASS INDEX: 14.7 KG/M2 | OXYGEN SATURATION: 99 % | HEART RATE: 92 BPM | WEIGHT: 93.6 LBS | SYSTOLIC BLOOD PRESSURE: 98 MMHG

## 2018-08-02 DIAGNOSIS — Z09 FOLLOW UP: ICD-10-CM

## 2018-08-02 DIAGNOSIS — R10.10 PAIN OF UPPER ABDOMEN: Primary | ICD-10-CM

## 2018-08-02 PROCEDURE — 99024 POSTOP FOLLOW-UP VISIT: CPT | Performed by: SURGERY

## 2018-08-02 NOTE — PROGRESS NOTES
Chief complaint:  Post-op  Follow up    History of Present Illness    This is Sun Rodriguez 53 y.o. status post ileostomy.  Patient denies fever, chills, nausea or vomiting.  Patient's pain is well-controlled.      The following portions of the patient's history were reviewed and updated as appropriate: allergies, current medications, past family history, past medical history, past social history, past surgical history and problem list.    Vitals:    08/02/18 1141   BP: 98/64   BP Location: Left arm   Patient Position: Sitting   Cuff Size: Adult   Pulse: 92   SpO2: 99%   Weight: 42.5 kg (93 lb 9.6 oz)       Physical Exam  Incision is well-healed without evidence of infection or herniation.    Patient does not use tobacco products currently and I have counseled the patient not to start using tobacco products in the future.    Assessment/plan:  I will schedule patient for an ileostomy takedown.  I have discussed this procedure in detail with the patient.    Bee Carreon MD  General, Minimally Invasive and Endoscopic Surgery  [facility]    [address], [address]  [place], [place], [address]  P: [phone]  F: [phone]    Cc:  Pino Templeton MD

## 2018-08-07 ENCOUNTER — RESULTS ENCOUNTER (OUTPATIENT)
Dept: SURGERY | Facility: CLINIC | Age: 53
End: 2018-08-07

## 2018-08-07 DIAGNOSIS — R10.10 PAIN OF UPPER ABDOMEN: ICD-10-CM

## 2018-09-06 ENCOUNTER — OFFICE VISIT (OUTPATIENT)
Dept: SURGERY | Facility: CLINIC | Age: 53
End: 2018-09-06

## 2018-09-06 VITALS
HEART RATE: 76 BPM | BODY MASS INDEX: 16.04 KG/M2 | DIASTOLIC BLOOD PRESSURE: 70 MMHG | OXYGEN SATURATION: 99 % | HEIGHT: 67 IN | WEIGHT: 102.2 LBS | SYSTOLIC BLOOD PRESSURE: 120 MMHG

## 2018-09-06 DIAGNOSIS — K85.00 IDIOPATHIC ACUTE PANCREATITIS WITHOUT INFECTION OR NECROSIS: Primary | ICD-10-CM

## 2018-09-06 DIAGNOSIS — Z09 FOLLOW UP: ICD-10-CM

## 2018-09-06 PROCEDURE — 99212 OFFICE O/P EST SF 10 MIN: CPT | Performed by: SURGERY

## 2018-09-06 RX ORDER — LEVOTHYROXINE SODIUM 112 UG/1
112 TABLET ORAL DAILY
Refills: 6 | COMMUNITY
Start: 2018-08-29 | End: 2019-09-10

## 2018-09-06 RX ORDER — PROPRANOLOL HYDROCHLORIDE 10 MG/1
10 TABLET ORAL DAILY
Refills: 2 | COMMUNITY
Start: 2018-08-02 | End: 2018-09-11

## 2018-09-06 NOTE — PROGRESS NOTES
"Chief complaint:  Post-op  Follow up    History of Present Illness    This is Sun Rodriguez 53 y.o. status post ileostomy and is doing very well.  Patient denies fever, chills, nausea or vomiting.  Patient has gained weight.  Patient is presenting today for preparation of reversal of the ileostomy.    The following portions of the patient's history were reviewed and updated as appropriate: allergies, current medications, past family history, past medical history, past social history, past surgical history and problem list.    Vitals:    09/06/18 1006   BP: 120/70   Pulse: 76   SpO2: 99%   Weight: 46.4 kg (102 lb 3.2 oz)   Height: 169.9 cm (66.9\")       Physical Exam  Incision is well-healed without evidence of infection or herniation.  Ileostomy in the right lower quadrant is functioning quite well.    Assessment/plan:  Patient is presenting for ileostomy reversal.  Patient has a loop ileostomy and I have scheduled for reversal on September 18.  I have discussed this procedure in detail with the patient.  I have discussed the risks, benefits and alternatives.  Patient understands these risk and alternatives and wishes to proceed.    Bee Carreon MD  General, Minimally Invasive and Endoscopic Surgery  [facility]    [address], [address]  [place], [place], [address]  P: [phone]  F: [phone]    Cc:  Pino Templeton MD  "

## 2018-09-11 ENCOUNTER — APPOINTMENT (OUTPATIENT)
Dept: PREADMISSION TESTING | Facility: HOSPITAL | Age: 53
End: 2018-09-11

## 2018-09-11 ENCOUNTER — RESULTS ENCOUNTER (OUTPATIENT)
Dept: SURGERY | Facility: CLINIC | Age: 53
End: 2018-09-11

## 2018-09-11 VITALS
HEART RATE: 89 BPM | HEIGHT: 67 IN | TEMPERATURE: 97.7 F | OXYGEN SATURATION: 100 % | WEIGHT: 102.2 LBS | RESPIRATION RATE: 16 BRPM | BODY MASS INDEX: 16.04 KG/M2 | SYSTOLIC BLOOD PRESSURE: 119 MMHG | DIASTOLIC BLOOD PRESSURE: 74 MMHG

## 2018-09-11 DIAGNOSIS — K85.00 IDIOPATHIC ACUTE PANCREATITIS WITHOUT INFECTION OR NECROSIS: ICD-10-CM

## 2018-09-11 LAB
AMYLASE SERPL-CCNC: 348 U/L (ref 28–100)
ANION GAP SERPL CALCULATED.3IONS-SCNC: 12.1 MMOL/L
BUN BLD-MCNC: 43 MG/DL (ref 6–20)
BUN/CREAT SERPL: 18.9 (ref 7–25)
CALCIUM SPEC-SCNC: 11 MG/DL (ref 8.6–10.5)
CHLORIDE SERPL-SCNC: 109 MMOL/L (ref 98–107)
CO2 SERPL-SCNC: 20.9 MMOL/L (ref 22–29)
CREAT BLD-MCNC: 2.27 MG/DL (ref 0.57–1)
DEPRECATED RDW RBC AUTO: 43.8 FL (ref 37–54)
ERYTHROCYTE [DISTWIDTH] IN BLOOD BY AUTOMATED COUNT: 13.2 % (ref 11.7–13)
GFR SERPL CREATININE-BSD FRML MDRD: 23 ML/MIN/1.73
GLUCOSE BLD-MCNC: 82 MG/DL (ref 65–99)
HCT VFR BLD AUTO: 39.3 % (ref 35.6–45.5)
HGB BLD-MCNC: 11.9 G/DL (ref 11.9–15.5)
LIPASE SERPL-CCNC: 274 U/L (ref 13–60)
MCH RBC QN AUTO: 27.8 PG (ref 26.9–32)
MCHC RBC AUTO-ENTMCNC: 30.3 G/DL (ref 32.4–36.3)
MCV RBC AUTO: 91.8 FL (ref 80.5–98.2)
PLATELET # BLD AUTO: 204 10*3/MM3 (ref 140–500)
PMV BLD AUTO: 10 FL (ref 6–12)
POTASSIUM BLD-SCNC: 4.9 MMOL/L (ref 3.5–5.2)
RBC # BLD AUTO: 4.28 10*6/MM3 (ref 3.9–5.2)
SODIUM BLD-SCNC: 142 MMOL/L (ref 136–145)
WBC NRBC COR # BLD: 5.36 10*3/MM3 (ref 4.5–10.7)

## 2018-09-11 PROCEDURE — 36415 COLL VENOUS BLD VENIPUNCTURE: CPT | Performed by: SURGERY

## 2018-09-11 PROCEDURE — 85027 COMPLETE CBC AUTOMATED: CPT | Performed by: SURGERY

## 2018-09-11 PROCEDURE — 80048 BASIC METABOLIC PNL TOTAL CA: CPT | Performed by: SURGERY

## 2018-09-11 PROCEDURE — 83690 ASSAY OF LIPASE: CPT | Performed by: SURGERY

## 2018-09-11 PROCEDURE — 82150 ASSAY OF AMYLASE: CPT | Performed by: SURGERY

## 2018-09-11 RX ORDER — FLUTICASONE PROPIONATE 50 MCG
2 SPRAY, SUSPENSION (ML) NASAL DAILY
COMMUNITY

## 2018-09-18 ENCOUNTER — ANESTHESIA EVENT (OUTPATIENT)
Dept: PERIOP | Facility: HOSPITAL | Age: 53
End: 2018-09-18

## 2018-09-18 ENCOUNTER — ANESTHESIA (OUTPATIENT)
Dept: PERIOP | Facility: HOSPITAL | Age: 53
End: 2018-09-18

## 2018-09-18 ENCOUNTER — HOSPITAL ENCOUNTER (INPATIENT)
Facility: HOSPITAL | Age: 53
LOS: 3 days | Discharge: HOME | End: 2018-09-21
Attending: SURGERY | Admitting: SURGERY

## 2018-09-18 DIAGNOSIS — R10.10 PAIN OF UPPER ABDOMEN: ICD-10-CM

## 2018-09-18 LAB
B-HCG UR QL: NEGATIVE
INTERNAL NEGATIVE CONTROL: NEGATIVE
INTERNAL POSITIVE CONTROL: POSITIVE
Lab: NORMAL

## 2018-09-18 PROCEDURE — 25010000002 ONDANSETRON PER 1 MG: Performed by: SURGERY

## 2018-09-18 PROCEDURE — 25010000002 PROPOFOL 10 MG/ML EMULSION: Performed by: NURSE ANESTHETIST, CERTIFIED REGISTERED

## 2018-09-18 PROCEDURE — 25010000002 ERTAPENEM PER 500 MG: Performed by: SURGERY

## 2018-09-18 PROCEDURE — 94640 AIRWAY INHALATION TREATMENT: CPT

## 2018-09-18 PROCEDURE — 0DBB0ZZ EXCISION OF ILEUM, OPEN APPROACH: ICD-10-PCS | Performed by: SURGERY

## 2018-09-18 PROCEDURE — 25010000002 DEXAMETHASONE PER 1 MG: Performed by: NURSE ANESTHETIST, CERTIFIED REGISTERED

## 2018-09-18 PROCEDURE — 25010000002 FENTANYL CITRATE (PF) 100 MCG/2ML SOLUTION: Performed by: NURSE ANESTHETIST, CERTIFIED REGISTERED

## 2018-09-18 PROCEDURE — 44620 REPAIR BOWEL OPENING: CPT | Performed by: SURGERY

## 2018-09-18 PROCEDURE — 81025 URINE PREGNANCY TEST: CPT | Performed by: SURGERY

## 2018-09-18 PROCEDURE — 25010000002 MORPHINE SULFATE (PF) 2 MG/ML SOLUTION: Performed by: SURGERY

## 2018-09-18 PROCEDURE — 25010000002 ONDANSETRON PER 1 MG: Performed by: NURSE ANESTHETIST, CERTIFIED REGISTERED

## 2018-09-18 PROCEDURE — 25010000002 HYDROMORPHONE PER 4 MG: Performed by: NURSE ANESTHETIST, CERTIFIED REGISTERED

## 2018-09-18 PROCEDURE — 94799 UNLISTED PULMONARY SVC/PX: CPT

## 2018-09-18 PROCEDURE — 25010000002 MIDAZOLAM PER 1 MG: Performed by: ANESTHESIOLOGY

## 2018-09-18 RX ORDER — DIPHENHYDRAMINE HYDROCHLORIDE 50 MG/ML
12.5 INJECTION INTRAMUSCULAR; INTRAVENOUS
Status: DISCONTINUED | OUTPATIENT
Start: 2018-09-18 | End: 2018-09-18 | Stop reason: HOSPADM

## 2018-09-18 RX ORDER — OXYCODONE HYDROCHLORIDE AND ACETAMINOPHEN 5; 325 MG/1; MG/1
2 TABLET ORAL EVERY 4 HOURS PRN
Status: DISCONTINUED | OUTPATIENT
Start: 2018-09-18 | End: 2018-09-21 | Stop reason: HOSPADM

## 2018-09-18 RX ORDER — PROMETHAZINE HYDROCHLORIDE 25 MG/1
25 SUPPOSITORY RECTAL ONCE AS NEEDED
Status: DISCONTINUED | OUTPATIENT
Start: 2018-09-18 | End: 2018-09-18 | Stop reason: HOSPADM

## 2018-09-18 RX ORDER — DEXAMETHASONE SODIUM PHOSPHATE 10 MG/ML
INJECTION INTRAMUSCULAR; INTRAVENOUS AS NEEDED
Status: DISCONTINUED | OUTPATIENT
Start: 2018-09-18 | End: 2018-09-18 | Stop reason: SURG

## 2018-09-18 RX ORDER — MAGNESIUM HYDROXIDE 1200 MG/15ML
LIQUID ORAL AS NEEDED
Status: DISCONTINUED | OUTPATIENT
Start: 2018-09-18 | End: 2018-09-18 | Stop reason: HOSPADM

## 2018-09-18 RX ORDER — LABETALOL HYDROCHLORIDE 5 MG/ML
5 INJECTION, SOLUTION INTRAVENOUS
Status: DISCONTINUED | OUTPATIENT
Start: 2018-09-18 | End: 2018-09-18 | Stop reason: HOSPADM

## 2018-09-18 RX ORDER — ACETAMINOPHEN 650 MG/1
650 SUPPOSITORY RECTAL EVERY 4 HOURS PRN
Status: DISCONTINUED | OUTPATIENT
Start: 2018-09-18 | End: 2018-09-21 | Stop reason: HOSPADM

## 2018-09-18 RX ORDER — MIDAZOLAM HYDROCHLORIDE 1 MG/ML
1 INJECTION INTRAMUSCULAR; INTRAVENOUS
Status: DISCONTINUED | OUTPATIENT
Start: 2018-09-18 | End: 2018-09-18 | Stop reason: HOSPADM

## 2018-09-18 RX ORDER — FLUTICASONE PROPIONATE 50 MCG
2 SPRAY, SUSPENSION (ML) NASAL DAILY
Status: DISCONTINUED | OUTPATIENT
Start: 2018-09-18 | End: 2018-09-21 | Stop reason: HOSPADM

## 2018-09-18 RX ORDER — SCOLOPAMINE TRANSDERMAL SYSTEM 1 MG/1
1 PATCH, EXTENDED RELEASE TRANSDERMAL ONCE
Status: DISCONTINUED | OUTPATIENT
Start: 2018-09-18 | End: 2018-09-19

## 2018-09-18 RX ORDER — ONDANSETRON 2 MG/ML
4 INJECTION INTRAMUSCULAR; INTRAVENOUS EVERY 6 HOURS PRN
Status: DISCONTINUED | OUTPATIENT
Start: 2018-09-18 | End: 2018-09-21 | Stop reason: HOSPADM

## 2018-09-18 RX ORDER — ROCURONIUM BROMIDE 10 MG/ML
INJECTION, SOLUTION INTRAVENOUS AS NEEDED
Status: DISCONTINUED | OUTPATIENT
Start: 2018-09-18 | End: 2018-09-18 | Stop reason: SURG

## 2018-09-18 RX ORDER — ALBUTEROL SULFATE 90 UG/1
2 AEROSOL, METERED RESPIRATORY (INHALATION) EVERY 4 HOURS PRN
Status: DISCONTINUED | OUTPATIENT
Start: 2018-09-18 | End: 2018-09-21 | Stop reason: HOSPADM

## 2018-09-18 RX ORDER — MORPHINE SULFATE 2 MG/ML
4 INJECTION, SOLUTION INTRAMUSCULAR; INTRAVENOUS
Status: DISCONTINUED | OUTPATIENT
Start: 2018-09-18 | End: 2018-09-21 | Stop reason: HOSPADM

## 2018-09-18 RX ORDER — FENTANYL CITRATE 50 UG/ML
50 INJECTION, SOLUTION INTRAMUSCULAR; INTRAVENOUS
Status: DISCONTINUED | OUTPATIENT
Start: 2018-09-18 | End: 2018-09-18 | Stop reason: HOSPADM

## 2018-09-18 RX ORDER — FENTANYL CITRATE 50 UG/ML
INJECTION, SOLUTION INTRAMUSCULAR; INTRAVENOUS AS NEEDED
Status: DISCONTINUED | OUTPATIENT
Start: 2018-09-18 | End: 2018-09-18 | Stop reason: SURG

## 2018-09-18 RX ORDER — SODIUM BICARBONATE 650 MG/1
650 TABLET ORAL 2 TIMES DAILY
Status: DISCONTINUED | OUTPATIENT
Start: 2018-09-18 | End: 2018-09-21 | Stop reason: HOSPADM

## 2018-09-18 RX ORDER — ROSUVASTATIN CALCIUM 20 MG/1
20 TABLET, COATED ORAL DAILY
Status: DISCONTINUED | OUTPATIENT
Start: 2018-09-18 | End: 2018-09-19

## 2018-09-18 RX ORDER — PROMETHAZINE HYDROCHLORIDE 25 MG/ML
12.5 INJECTION, SOLUTION INTRAMUSCULAR; INTRAVENOUS ONCE AS NEEDED
Status: DISCONTINUED | OUTPATIENT
Start: 2018-09-18 | End: 2018-09-18 | Stop reason: HOSPADM

## 2018-09-18 RX ORDER — FAMOTIDINE 10 MG/ML
20 INJECTION, SOLUTION INTRAVENOUS ONCE
Status: COMPLETED | OUTPATIENT
Start: 2018-09-18 | End: 2018-09-18

## 2018-09-18 RX ORDER — NALOXONE HCL 0.4 MG/ML
0.2 VIAL (ML) INJECTION AS NEEDED
Status: DISCONTINUED | OUTPATIENT
Start: 2018-09-18 | End: 2018-09-18 | Stop reason: HOSPADM

## 2018-09-18 RX ORDER — PROMETHAZINE HYDROCHLORIDE 25 MG/1
25 TABLET ORAL ONCE AS NEEDED
Status: DISCONTINUED | OUTPATIENT
Start: 2018-09-18 | End: 2018-09-18 | Stop reason: HOSPADM

## 2018-09-18 RX ORDER — ONDANSETRON 4 MG/1
4 TABLET, FILM COATED ORAL EVERY 6 HOURS PRN
Status: DISCONTINUED | OUTPATIENT
Start: 2018-09-18 | End: 2018-09-21 | Stop reason: HOSPADM

## 2018-09-18 RX ORDER — EPHEDRINE SULFATE 50 MG/ML
INJECTION, SOLUTION INTRAVENOUS AS NEEDED
Status: DISCONTINUED | OUTPATIENT
Start: 2018-09-18 | End: 2018-09-18 | Stop reason: SURG

## 2018-09-18 RX ORDER — LIDOCAINE HYDROCHLORIDE 10 MG/ML
0.5 INJECTION, SOLUTION EPIDURAL; INFILTRATION; INTRACAUDAL; PERINEURAL ONCE AS NEEDED
Status: DISCONTINUED | OUTPATIENT
Start: 2018-09-18 | End: 2018-09-18 | Stop reason: HOSPADM

## 2018-09-18 RX ORDER — SODIUM CHLORIDE 0.9 % (FLUSH) 0.9 %
1-10 SYRINGE (ML) INJECTION AS NEEDED
Status: DISCONTINUED | OUTPATIENT
Start: 2018-09-18 | End: 2018-09-18 | Stop reason: HOSPADM

## 2018-09-18 RX ORDER — FEBUXOSTAT 40 MG/1
40 TABLET, FILM COATED ORAL DAILY
Status: DISCONTINUED | OUTPATIENT
Start: 2018-09-18 | End: 2018-09-21 | Stop reason: HOSPADM

## 2018-09-18 RX ORDER — DEXTROSE, SODIUM CHLORIDE, AND POTASSIUM CHLORIDE 5; .45; .15 G/100ML; G/100ML; G/100ML
100 INJECTION INTRAVENOUS CONTINUOUS
Status: DISCONTINUED | OUTPATIENT
Start: 2018-09-18 | End: 2018-09-19

## 2018-09-18 RX ORDER — HYDROCODONE BITARTRATE AND ACETAMINOPHEN 7.5; 325 MG/1; MG/1
1 TABLET ORAL ONCE AS NEEDED
Status: DISCONTINUED | OUTPATIENT
Start: 2018-09-18 | End: 2018-09-18 | Stop reason: HOSPADM

## 2018-09-18 RX ORDER — ONDANSETRON 4 MG/1
4 TABLET, ORALLY DISINTEGRATING ORAL EVERY 6 HOURS PRN
Status: DISCONTINUED | OUTPATIENT
Start: 2018-09-18 | End: 2018-09-21 | Stop reason: HOSPADM

## 2018-09-18 RX ORDER — EPHEDRINE SULFATE 50 MG/ML
5 INJECTION, SOLUTION INTRAVENOUS ONCE AS NEEDED
Status: DISCONTINUED | OUTPATIENT
Start: 2018-09-18 | End: 2018-09-18 | Stop reason: HOSPADM

## 2018-09-18 RX ORDER — ONDANSETRON 2 MG/ML
4 INJECTION INTRAMUSCULAR; INTRAVENOUS ONCE AS NEEDED
Status: COMPLETED | OUTPATIENT
Start: 2018-09-18 | End: 2018-09-18

## 2018-09-18 RX ORDER — ONDANSETRON 2 MG/ML
INJECTION INTRAMUSCULAR; INTRAVENOUS AS NEEDED
Status: DISCONTINUED | OUTPATIENT
Start: 2018-09-18 | End: 2018-09-18 | Stop reason: SURG

## 2018-09-18 RX ORDER — NALOXONE HCL 0.4 MG/ML
0.4 VIAL (ML) INJECTION
Status: DISCONTINUED | OUTPATIENT
Start: 2018-09-18 | End: 2018-09-21 | Stop reason: HOSPADM

## 2018-09-18 RX ORDER — PROPOFOL 10 MG/ML
VIAL (ML) INTRAVENOUS AS NEEDED
Status: DISCONTINUED | OUTPATIENT
Start: 2018-09-18 | End: 2018-09-18 | Stop reason: SURG

## 2018-09-18 RX ORDER — LIDOCAINE HYDROCHLORIDE 20 MG/ML
INJECTION, SOLUTION INFILTRATION; PERINEURAL AS NEEDED
Status: DISCONTINUED | OUTPATIENT
Start: 2018-09-18 | End: 2018-09-18 | Stop reason: SURG

## 2018-09-18 RX ORDER — PROMETHAZINE HYDROCHLORIDE 25 MG/1
12.5 TABLET ORAL ONCE AS NEEDED
Status: DISCONTINUED | OUTPATIENT
Start: 2018-09-18 | End: 2018-09-18 | Stop reason: HOSPADM

## 2018-09-18 RX ORDER — BUDESONIDE AND FORMOTEROL FUMARATE DIHYDRATE 160; 4.5 UG/1; UG/1
2 AEROSOL RESPIRATORY (INHALATION) 2 TIMES DAILY
Status: DISCONTINUED | OUTPATIENT
Start: 2018-09-18 | End: 2018-09-21 | Stop reason: HOSPADM

## 2018-09-18 RX ORDER — ACETAMINOPHEN 160 MG/5ML
650 SOLUTION ORAL EVERY 4 HOURS PRN
Status: DISCONTINUED | OUTPATIENT
Start: 2018-09-18 | End: 2018-09-21 | Stop reason: HOSPADM

## 2018-09-18 RX ORDER — ONDANSETRON 2 MG/ML
4 INJECTION INTRAMUSCULAR; INTRAVENOUS ONCE AS NEEDED
Status: DISCONTINUED | OUTPATIENT
Start: 2018-09-18 | End: 2018-09-18 | Stop reason: HOSPADM

## 2018-09-18 RX ORDER — LEVOTHYROXINE SODIUM 112 UG/1
112 TABLET ORAL DAILY
Status: DISCONTINUED | OUTPATIENT
Start: 2018-09-18 | End: 2018-09-21 | Stop reason: HOSPADM

## 2018-09-18 RX ORDER — FLUMAZENIL 0.1 MG/ML
0.2 INJECTION INTRAVENOUS AS NEEDED
Status: DISCONTINUED | OUTPATIENT
Start: 2018-09-18 | End: 2018-09-18 | Stop reason: HOSPADM

## 2018-09-18 RX ORDER — SODIUM CHLORIDE, SODIUM LACTATE, POTASSIUM CHLORIDE, CALCIUM CHLORIDE 600; 310; 30; 20 MG/100ML; MG/100ML; MG/100ML; MG/100ML
100 INJECTION, SOLUTION INTRAVENOUS CONTINUOUS
Status: DISCONTINUED | OUTPATIENT
Start: 2018-09-18 | End: 2018-09-20

## 2018-09-18 RX ORDER — OXYCODONE AND ACETAMINOPHEN 7.5; 325 MG/1; MG/1
1 TABLET ORAL ONCE AS NEEDED
Status: DISCONTINUED | OUTPATIENT
Start: 2018-09-18 | End: 2018-09-18 | Stop reason: HOSPADM

## 2018-09-18 RX ORDER — MIDAZOLAM HYDROCHLORIDE 1 MG/ML
2 INJECTION INTRAMUSCULAR; INTRAVENOUS
Status: DISCONTINUED | OUTPATIENT
Start: 2018-09-18 | End: 2018-09-18 | Stop reason: HOSPADM

## 2018-09-18 RX ORDER — ACETAMINOPHEN 325 MG/1
650 TABLET ORAL EVERY 4 HOURS PRN
Status: DISCONTINUED | OUTPATIENT
Start: 2018-09-18 | End: 2018-09-21 | Stop reason: HOSPADM

## 2018-09-18 RX ADMIN — EPHEDRINE SULFATE 20 MG: 50 INJECTION INTRAMUSCULAR; INTRAVENOUS; SUBCUTANEOUS at 08:18

## 2018-09-18 RX ADMIN — ONDANSETRON 4 MG: 2 INJECTION INTRAMUSCULAR; INTRAVENOUS at 14:57

## 2018-09-18 RX ADMIN — HYDROMORPHONE HYDROCHLORIDE 0.5 MG: 1 INJECTION, SOLUTION INTRAMUSCULAR; INTRAVENOUS; SUBCUTANEOUS at 09:45

## 2018-09-18 RX ADMIN — FENTANYL CITRATE 50 MCG: 50 INJECTION, SOLUTION INTRAMUSCULAR; INTRAVENOUS at 09:35

## 2018-09-18 RX ADMIN — DEXAMETHASONE SODIUM PHOSPHATE 8 MG: 10 INJECTION INTRAMUSCULAR; INTRAVENOUS at 08:21

## 2018-09-18 RX ADMIN — SODIUM CHLORIDE, POTASSIUM CHLORIDE, SODIUM LACTATE AND CALCIUM CHLORIDE 9 ML/HR: 600; 310; 30; 20 INJECTION, SOLUTION INTRAVENOUS at 09:50

## 2018-09-18 RX ADMIN — SODIUM CHLORIDE, POTASSIUM CHLORIDE, SODIUM LACTATE AND CALCIUM CHLORIDE: 600; 310; 30; 20 INJECTION, SOLUTION INTRAVENOUS at 08:00

## 2018-09-18 RX ADMIN — FENTANYL CITRATE 50 MCG: 50 INJECTION INTRAMUSCULAR; INTRAVENOUS at 09:14

## 2018-09-18 RX ADMIN — FENTANYL CITRATE 100 MCG: 50 INJECTION INTRAMUSCULAR; INTRAVENOUS at 08:06

## 2018-09-18 RX ADMIN — OXYCODONE HYDROCHLORIDE AND ACETAMINOPHEN 2 TABLET: 5; 325 TABLET ORAL at 21:17

## 2018-09-18 RX ADMIN — MIDAZOLAM HYDROCHLORIDE 2 MG: 2 INJECTION, SOLUTION INTRAMUSCULAR; INTRAVENOUS at 07:46

## 2018-09-18 RX ADMIN — SUGAMMADEX 100 MG: 100 INJECTION, SOLUTION INTRAVENOUS at 08:54

## 2018-09-18 RX ADMIN — ONDANSETRON 4 MG: 2 INJECTION INTRAMUSCULAR; INTRAVENOUS at 09:25

## 2018-09-18 RX ADMIN — ROCURONIUM BROMIDE 40 MG: 10 INJECTION INTRAVENOUS at 08:06

## 2018-09-18 RX ADMIN — EPHEDRINE SULFATE 10 MG: 50 INJECTION INTRAMUSCULAR; INTRAVENOUS; SUBCUTANEOUS at 08:28

## 2018-09-18 RX ADMIN — SODIUM CHLORIDE 500 MG: 9 INJECTION, SOLUTION INTRAVENOUS at 14:18

## 2018-09-18 RX ADMIN — PROPOFOL 200 MG: 10 INJECTION, EMULSION INTRAVENOUS at 08:06

## 2018-09-18 RX ADMIN — HYDROMORPHONE HYDROCHLORIDE 0.5 MG: 1 INJECTION, SOLUTION INTRAMUSCULAR; INTRAVENOUS; SUBCUTANEOUS at 10:25

## 2018-09-18 RX ADMIN — BUDESONIDE AND FORMOTEROL FUMARATE DIHYDRATE 2 PUFF: 160; 4.5 AEROSOL RESPIRATORY (INHALATION) at 20:28

## 2018-09-18 RX ADMIN — LIDOCAINE HYDROCHLORIDE 60 MG: 20 INJECTION, SOLUTION INFILTRATION; PERINEURAL at 08:06

## 2018-09-18 RX ADMIN — MORPHINE SULFATE 4 MG: 2 INJECTION, SOLUTION INTRAMUSCULAR; INTRAVENOUS at 18:05

## 2018-09-18 RX ADMIN — ONDANSETRON 4 MG: 2 INJECTION INTRAMUSCULAR; INTRAVENOUS at 21:38

## 2018-09-18 RX ADMIN — FENTANYL CITRATE 50 MCG: 50 INJECTION, SOLUTION INTRAMUSCULAR; INTRAVENOUS at 09:55

## 2018-09-18 RX ADMIN — FENTANYL CITRATE 50 MCG: 50 INJECTION, SOLUTION INTRAMUSCULAR; INTRAVENOUS at 11:00

## 2018-09-18 RX ADMIN — HYDROMORPHONE HYDROCHLORIDE 0.5 MG: 1 INJECTION, SOLUTION INTRAMUSCULAR; INTRAVENOUS; SUBCUTANEOUS at 10:40

## 2018-09-18 RX ADMIN — MORPHINE SULFATE 4 MG: 2 INJECTION, SOLUTION INTRAMUSCULAR; INTRAVENOUS at 14:27

## 2018-09-18 RX ADMIN — MORPHINE SULFATE 4 MG: 2 INJECTION, SOLUTION INTRAMUSCULAR; INTRAVENOUS at 12:09

## 2018-09-18 RX ADMIN — HYDROMORPHONE HYDROCHLORIDE 0.5 MG: 1 INJECTION, SOLUTION INTRAMUSCULAR; INTRAVENOUS; SUBCUTANEOUS at 10:05

## 2018-09-18 RX ADMIN — FENTANYL CITRATE 50 MCG: 50 INJECTION INTRAMUSCULAR; INTRAVENOUS at 08:53

## 2018-09-18 RX ADMIN — POTASSIUM CHLORIDE, DEXTROSE MONOHYDRATE AND SODIUM CHLORIDE 100 ML/HR: 150; 5; 450 INJECTION, SOLUTION INTRAVENOUS at 16:17

## 2018-09-18 RX ADMIN — ONDANSETRON 4 MG: 2 INJECTION INTRAMUSCULAR; INTRAVENOUS at 08:53

## 2018-09-18 RX ADMIN — SCOPALAMINE 1 PATCH: 1 PATCH, EXTENDED RELEASE TRANSDERMAL at 07:45

## 2018-09-18 RX ADMIN — FAMOTIDINE 20 MG: 10 INJECTION, SOLUTION INTRAVENOUS at 07:46

## 2018-09-18 RX ADMIN — SODIUM BICARBONATE 650 MG: 650 TABLET ORAL at 21:12

## 2018-09-18 NOTE — ANESTHESIA PROCEDURE NOTES
Airway  Urgency: elective    Airway not difficult    General Information and Staff    Patient location during procedure: OR  Anesthesiologist: CHERYL BEAULIEU  CRNA: HECTOR LINDO    Indications and Patient Condition  Indications for airway management: airway protection    Preoxygenated: yes  Mask difficulty assessment: 1 - vent by mask    Final Airway Details  Final airway type: endotracheal airway      Successful airway: ETT  Cuffed: yes   Successful intubation technique: direct laryngoscopy  Facilitating devices/methods: intubating stylet  Endotracheal tube insertion site: oral  Blade: Shaw  Blade size: 2  ETT size: 7.0 mm  Cormack-Lehane Classification: grade I - full view of glottis  Placement verified by: chest auscultation and capnometry   Measured from: lips  ETT to lips (cm): 20  Number of attempts at approach: 1    Additional Comments  Atraumatic, MOP to cuff, BSBE, no change to dentition, secured with tape             Physical Therapy Daily Treatment     Visit Count: 6  Plan of Care Dates: Initial: 9/5/2017 Through: 10/31/2017     Insurance Information: medical necessity   Next Referring Provider Visit: 9/18/17     Referred by: Jong Corbett PA-C  Medical Diagnosis (from order): M21.40 PTTD (posterior tibial tendon dysfunction)   Treatment Diagnosis: Ankle/Foot Symptoms with Pain, Impaired Range of Motion, Impaired Motor Function/Muscle Performance and Impaired Gait/Locomotion Deficits  Insurance: 1. MEDICARE  2. [facility]     Date of Onset: post op from left posterior tibial tendon debridement, flexor digitorum transfer to navicular, medial displacement calcaneal osteotomy, lateral column lengthening with iliac crest aspirate and allograft, achilles lengthening, DOS 7/11/17.   Diagnosis Precautions: none  Chart reviewed: Relevant co-morbidities, allergies, tests and medications: Tylenol      SUBJECTIVE   Patient reports she went shoe shopping a couple days ago and hurt her foot trying to get into a boot with a 2\" heel. Patient reports production of pain and swelling, denies feeling/hearing a pop or over-stretch of her foot. Patient reports she has been elevating and icing which has been helping with pain. Continued residual pain today but much better from initial incident.    OBJECTIVE   Patient demonstrates safe, independent, ambulation 2 point step through with straight cane and regular shoe.    Treatment   Therapeutic Exercise:   Seated mini-heel raises x 50  Seated toe-raises x 20  Toe towel curls x 20  Recumbent bike ([employer]) x 6 minutes level 1    Manual Therapy:   Grade 2 dorsal glide 1st MTP for improved extension, grade 2-3 1st MTP long axis distraction  Intermittent calcaneal unloading grade 1 x 2 minutes          Current Home Program (not performed this date except as noted above):   Adjusted Home exercise program: stop stretch, make ABC's smaller, add scar mobilization, add mini heel raises  pain-free.    ASSESSMENT   Resolved pain at end of session per patient report. Patient does show evidence of flareup of original symptoms, however this is likely related to patient reported injury (note subjective). Flareup attenuated this date, patient progressing slowly but appropriately towards recovery.    Result of above outlined education: Verbalizes understanding and Demonstrates understanding     Goals:  To be obtained by end of this plan of care:  1. Patient independent with modified and progressed home exercise program.   2. Patient will decrease involved ankle pain/symptoms to 2-4/10  to aid in normalization of gait for independent living, completion of self-care tasks/dressing, completion of household tasks for independent living, returning to community activities and age appropriate activities.   3. Patient will increase involved ankle, foot and digit/ray active range of motion to Within functional limits to aid in stair ambulation and ambulating a curb step.   4. Patient will increase involved ankle and foot strength to within functional limits to aid in stair ambulation and squatting for lifting for household independent living.  5. Patient will ambulate community distances on even and uneven terrain with normal gait pattern without assistive device and without loss of balance.  6. Patient will be able to ascend and descend 1 flight of stairs using reciprocal pattern without  pain/difficulty.  7. Patient will be able to tolerate standing activities for greater than or equal to 60 minutes without  pain/difficulty    PLAN   Frequency/Duration: 2 times per week for 8 weeks with tapering as the patient progresses  Skilled training and instruction for the following interventions:  Gait Training (65329)  Manual Therapy (79713)  Neuromuscular Re-Education (02588)  Therapeutic Activity (56675)  Therapeutic Exercise (22173)  Electrical Stimulation (88499//16770)   Heat/Cold (46803)  Iontophoresis (61812)  dexamethasone sodium phosphate, 4mg/ml  Ultrasound/Phonophoresis (23182)     The plan of care and goals were established with the patient who concurs.  Patient has been given attendance policy at time of initial evaluation.    THERAPY DAILY BILLING   Primary Insurance: MEDICARE  Secondary Insurance: [employer] [facility]    Evaluation Procedures:  No evaluation codes were used on this date of service    Timed Procedures:  Manual Therapy, 13 minutes  Therapeutic Exercise, 22 minutes    Untimed Procedures:  No untimed codes were used on this date of service    Total Treatment Time: 35 minutes    G-Code:  G-Code Score ABN form  reporting not required this treatment session  Modifier based on outcome measure(s)/functional testing/clinical judgement as listed above    The referring provider's electronic or written signature on the evaluation authorizes the therapy plan of care and certifies the need for these services, furnished under this plan of care while under their care.  Physician Signature on file.

## 2018-09-18 NOTE — H&P (VIEW-ONLY)
"Chief complaint:  Post-op  Follow up    History of Present Illness    This is Sun Rodriguez 53 y.o. status post ileostomy and is doing very well.  Patient denies fever, chills, nausea or vomiting.  Patient has gained weight.  Patient is presenting today for preparation of reversal of the ileostomy.    The following portions of the patient's history were reviewed and updated as appropriate: allergies, current medications, past family history, past medical history, past social history, past surgical history and problem list.    Vitals:    09/06/18 1006   BP: 120/70   Pulse: 76   SpO2: 99%   Weight: 46.4 kg (102 lb 3.2 oz)   Height: 169.9 cm (66.9\")       Physical Exam  Incision is well-healed without evidence of infection or herniation.  Ileostomy in the right lower quadrant is functioning quite well.    Assessment/plan:  Patient is presenting for ileostomy reversal.  Patient has a loop ileostomy and I have scheduled for reversal on September 18.  I have discussed this procedure in detail with the patient.  I have discussed the risks, benefits and alternatives.  Patient understands these risk and alternatives and wishes to proceed.    Bee Carreon MD  General, Minimally Invasive and Endoscopic Surgery  [facility]    [address], [address]  [place], [place], [address]  P: [phone]  F: [phone]    Cc:  Pino Templeton MD  "

## 2018-09-18 NOTE — ANESTHESIA PREPROCEDURE EVALUATION
Anesthesia Evaluation     Patient summary reviewed and Nursing notes reviewed   history of anesthetic complications: PONV  NPO Solid Status: > 8 hours  NPO Liquid Status: > 2 hours           Airway   Mallampati: II  TM distance: >3 FB  Neck ROM: full  no difficulty expected  Dental - normal exam     Pulmonary - normal exam   (+) asthma,   (-) COPD, not a smoker, lung cancer    ROS comment: Bronchiectasis, on MDI's bid.  Rare nebs.  Cardiovascular - normal exam  Exercise tolerance: good (4-7 METS)    ECG reviewed  Rhythm: regular  Rate: normal    (+) hypertension well controlled less than 2 medications, hyperlipidemia,   (-) past MI, CAD, dysrhythmias, angina, CHF, cardiac stents      Neuro/Psych  (+) dizziness/light headedness,     (-) seizures, TIA, CVA  GI/Hepatic/Renal/Endo    (+)   renal disease CRI, hypothyroidism,   (-) hepatitis, liver disease, diabetes    Musculoskeletal (-) negative ROS    Abdominal  - normal exam   Substance History - negative use     OB/GYN negative ob/gyn ROS         Other - negative ROS                     Anesthesia Plan    ASA 3     general     intravenous induction   Anesthetic plan, all risks, benefits, and alternatives have been provided, discussed and informed consent has been obtained with: patient and spouse/significant other.    Plan discussed with CRNA and attending.

## 2018-09-18 NOTE — PLAN OF CARE
Problem: Patient Care Overview  Goal: Plan of Care Review  Outcome: Ongoing (interventions implemented as appropriate)   09/18/18 0147   Coping/Psychosocial   Plan of Care Reviewed With patient;spouse   Plan of Care Review   Progress improving   OTHER   Outcome Summary VSS. IV pain medication given x2. IV nausea medication given x1. Dressing clean, dry, and intact. Voiding freely. Up with assist x1. IVF's infusing.      Goal: Individualization and Mutuality  Outcome: Ongoing (interventions implemented as appropriate)    Goal: Discharge Needs Assessment  Outcome: Ongoing (interventions implemented as appropriate)    Goal: Interprofessional Rounds/Family Conf  Outcome: Ongoing (interventions implemented as appropriate)      Problem: Surgery Nonspecified (Adult)  Goal: Signs and Symptoms of Listed Potential Problems Will be Absent, Minimized or Managed (Surgery Nonspecified)  Outcome: Ongoing (interventions implemented as appropriate)    Goal: Anesthesia/Sedation Recovery  Outcome: Ongoing (interventions implemented as appropriate)

## 2018-09-18 NOTE — ANESTHESIA POSTPROCEDURE EVALUATION
"Patient: Sun Rodriguez    Procedure Summary     Date:  09/18/18 Room / Location:  [facility] OR 06 / [facility]    Anesthesia Start:  0800 Anesthesia Stop:  0915    Procedure:  ILEOSTOMY TAKEDOWN (N/A ) Diagnosis:       Pain of upper abdomen      (Pain of upper abdomen [R10.10])    Surgeon:  Bee Carreon MD Provider:  Areli Conte MD    Anesthesia Type:  general ASA Status:  3          Anesthesia Type: general  Last vitals  BP   116/62 (09/18/18 1613)   Temp   36.2 °C (97.1 °F) (09/18/18 1613)   Pulse   56 (09/18/18 1613)   Resp   16 (09/18/18 1613)     SpO2   99 % (09/18/18 1613)     Post Anesthesia Care and Evaluation    Patient participation: complete - patient cannot participate  Anesthetic complications: No anesthetic complications    Cardiovascular status: acceptable  Respiratory status: acceptable  Hydration status: acceptable    Comments: Patient was discharged prior to being evaluated by Anesthesia...per chart review, no anesthetic complications were noted.  NOT MEDICALLY DIRECTED  /62 (BP Location: Left arm, Patient Position: Lying)   Pulse 56   Temp 36.2 °C (97.1 °F) (Oral)   Resp 16   Ht 170.2 cm (67\")   Wt 47.5 kg (104 lb 11.5 oz)   SpO2 99%   BMI 16.40 kg/m²         "

## 2018-09-18 NOTE — OP NOTE
Op note    Preop diagnosis: Ileostomy    Postop diagnosis: same    Procedure: Takedown ileostomy    Surgeon: Bee Carreon MD    Assistant: DIANE Bedoya    Anesthesia: Gen. Endotracheal    Specimen: None    EBL: Less than 10 cc    Complications: None    Indications: This is a pleasant 53-year-old female patient who had a low anterior colon resection with a loop ileostomy and is presenting for takedown of the loop ileostomy.    Procedure: After general endotracheal anesthesia, patient was prepped and draped in the usual sterile fashion.  An elliptical incision was performed around the loop ileostomy using a 15 blade scalpel.  The subcutaneous tissues dissected using cautery.  Peritoneum was then entered under direct visualization and the loop ileostomy was mobilized.  A side-to-side anastomosis was then created using 3-0 silk pop offs for the back wall.  The 75 ALIREZA stapler then was placed in the openings of the loop ileostomy and a side-to-side anastomosis was created.  The loop was then closed using a TX 60 stapler.  There was no evidence of any bleeding from the staple lines.  The small bowel was returned intraperitoneally.  Gown and gloves were then changed.  The wound was closed using a #1 Vicryl approximating the fascia in a running fashion.  Subcutaneous tissue was copiously irrigated.  The skin was then closed with 3-0 Vicryl subcutaneous sutures and 4-0 Vicryl subcuticular sutures.  A sterile dressing was applied.  All needles and sponge counts were correct ×2.  The patient was transferred to recovery room in stable condition.

## 2018-09-18 NOTE — CONSULTS
Patient Care Team:  Pino Templeton MD as PCP - General  Pino Templeton MD as PCP - Family Medicine  Liliya Giraldo MD as Consulting Physician (Rheumatology)  Moisés Corado MD as Consulting Physician (Nephrology)  Grey Dia MD as Consulting Physician (Pulmonary Disease)    Chief complaint:     Subjective     History of Present Illness   Mrs. Rodriguez is a 53 year old white female with past medical history signficant for history of low anterior colon resection with creation of a loop ileostomy who was admitted post operatively following take down of the loop ileostomy.She has known chronic kidney disease associated with Wegener's granulomatosis with renal involvement, treated with Cytoxan and 2007. We are consulted to manage her chronic kidney disease.    Review of Systems   14 point review of system were all negative other than what is noted above in the history of present illness.      Past Medical History:   Diagnosis Date   • Asthma    • Bronchiectasis (CMS/HCC)    • Chronic kidney disease    • Colitis    • Diverticulitis    • Diverticulosis    • Hypercholesteremia    • Hypothyroidism    • Joint pain    • PONV (postoperative nausea and vomiting)    • Postoperative urinary retention    • Wegener's granulomatosis with renal involvement (CMS/HCC)    • Weight loss    ,   Past Surgical History:   Procedure Laterality Date   • COLON RESECTION N/A 5/1/2018    Procedure: COLON RESECTION LAPAROSCOPIC SIGMOID with splenic flexure takedown;  Surgeon: Bee Carreon MD;  Location: [facility] OR;  Service: General   • COLONOSCOPY N/A 6/24/2016    Procedure: COLONOSCOPY INTO CECUM;  Surgeon: Bee Carreon MD;  Location: [facility] ENDOSCOPY;  Service:    • COLONOSCOPY N/A 6/21/2018    Procedure: COLONOSCOPY WITH DECOMPRESSION;  Surgeon: Bee Carreon MD;  Location: [facility] OR;  Service: Gastroenterology   • EXPLORATORY LAPAROTOMY N/A 7/1/2018    Procedure: EXPLORATORY LAPAROTOMY  WITH RESECTION OF SIGMOID COLON WITH DIVERTING ILEOSTOMY;  Surgeon: Bee Carreon MD;  Location: [facility] MAIN OR;  Service: General   • ILEOSTOMY     • LUNG BIOPSY Right 2014   • OOPHORECTOMY Right    • RENAL BIOPSY  2007   • SALPINGECTOMY Right    • SIGMOIDOSCOPY N/A 6/23/2018    Procedure: FLEXIBLE SIGMOIDOSCOPY WITH 12MM BALLOON DILATATION;  Surgeon: Bee Carreon MD;  Location: [facility] ENDOSCOPY;  Service: General   • SIGMOIDOSCOPY N/A 7/1/2018    Procedure: FLEXIBLE SIGMOIDOSCOPY WITH DILATATION with colonic balloons 8 TO 15MM;  Surgeon: Bee Carreon MD;  Location: [facility] ENDOSCOPY;  Service: General   • WISDOM TOOTH EXTRACTION     ,   Family History   Problem Relation Age of Onset   • Prostate cancer Father    • Malig Hyperthermia Neg Hx    ,   Social History   Substance Use Topics   • Smoking status: Never Smoker   • Smokeless tobacco: Never Used   • Alcohol use No    and Allergies:  Patient has no known allergies.    Objective     Vital Signs  Temp:  [95.4 °F (35.2 °C)-98 °F (36.7 °C)] 97.1 °F (36.2 °C)  Heart Rate:  [56-97] 56  Resp:  [16-20] 16  BP: (111-156)/(62-86) 116/62    I/O this shift:  In: 2300 [I.V.:2300]  Out: 1105 [Urine:1100; Blood:5]  No intake/output data recorded.    Physical Exam       General Appearance:    Alert, cooperative, in no acute distress   Head:    Normocephalic, without obvious abnormality, atraumatic   Eyes:            Lids and lashes normal, conjunctivae and sclerae normal, no   icterus, no pallor, corneas clear, PERRLA   Ears:    Ears appear intact with no abnormalities noted   Throat:   No oral lesions, no thrush, oral mucosa moist   Neck:   No adenopathy, supple, trachea midline, no thyromegaly, no     carotid bruit, no JVD   Back:     No kyphosis present, no scoliosis present, no skin lesions,       erythema or scars, no tenderness to percussion or                   palpation,   range of motion normal   Lungs:     Clear to auscultation,respirations regular,  even and                   unlabored    Heart:    Regular rhythm and normal rate, normal S1 and S2, no            murmur, no gallop, no rub, no click   Breast Exam:    Deferred   Abdomen:     Soft, surgical drsg c/d/i, no rebound or guarding.          Appropriately tender   Genitalia:    Deferred   Extremities:   Moves all extremities well, no edema, no cyanosis, no              redness   Pulses:   Pulses palpable and equal bilaterally   Skin:   No bleeding, bruising or rash   Lymph nodes:   No palpable adenopathy   Neurologic:   Cranial nerves 2 - 12 grossly intact, sensation intact, DTR        present and equal bilaterally     Results Review:    I reviewed the patient's other test results and agree with the interpretation    WBC No results found for: WBC   HGB No results found for: HGB   HCT No results found for: HCT   Platlets No results found for: LABPLAT   MCV No results found for: MCV       Sodium No results found for: NA   Potassium No results found for: K   Chloride No results found for: CL   CO2 No results found for: CO2   BUN No results found for: BUN   Creatinine No results found for: CREATININE   Calcium No results found for: CALCIUM   PO4 No results found for: CAPO4   Albumin No results found for: ALBUMIN   Magnesium No results found for: MG   Uric Acid No results found for: URICACID         Assessment/Plan     Principal Problem:    Pain of upper abdomen      Assessment & Plan   Chronic kidney disease stage IV status associated with Wegener's granulomatosis with renal involvement has been in remission with stable renal function for few years however recent SOPHIE. Baseline Cr ~2.0mg/dl. BMP in am.  Colonic stricture, S/P perforated the bowel status post resection with ileostomy--s/p reversal this am.  Hypertension, well controlled   Anemia--multifactorial. Check cbc in am.      Thank you for allowing us to participate in Ms Michael wolff  We will follow along with you.  I discussed the patients findings and  my recommendations with patient    Fartun Mack MD  09/18/18  4:26 PM    Time: More than 50% of time spent in counseling and coordination of care:  Total face-to-face/floor time 40 min.  Time spent in counseling 15 min. Counseling included the following topics: treatment plan

## 2018-09-19 LAB
ANION GAP SERPL CALCULATED.3IONS-SCNC: 12 MMOL/L
BASOPHILS # BLD AUTO: 0.03 10*3/MM3 (ref 0–0.2)
BASOPHILS NFR BLD AUTO: 0.3 % (ref 0–1.5)
BUN BLD-MCNC: 32 MG/DL (ref 6–20)
BUN/CREAT SERPL: 14.9 (ref 7–25)
CALCIUM SPEC-SCNC: 9.7 MG/DL (ref 8.6–10.5)
CHLORIDE SERPL-SCNC: 107 MMOL/L (ref 98–107)
CO2 SERPL-SCNC: 21 MMOL/L (ref 22–29)
CREAT BLD-MCNC: 2.15 MG/DL (ref 0.57–1)
DEPRECATED RDW RBC AUTO: 43.5 FL (ref 37–54)
EOSINOPHIL # BLD AUTO: 0.03 10*3/MM3 (ref 0–0.7)
EOSINOPHIL NFR BLD AUTO: 0.3 % (ref 0.3–6.2)
ERYTHROCYTE [DISTWIDTH] IN BLOOD BY AUTOMATED COUNT: 13.5 % (ref 11.7–13)
GFR SERPL CREATININE-BSD FRML MDRD: 24 ML/MIN/1.73
GLUCOSE BLD-MCNC: 101 MG/DL (ref 65–99)
HCT VFR BLD AUTO: 30 % (ref 35.6–45.5)
HGB BLD-MCNC: 9.6 G/DL (ref 11.9–15.5)
IMM GRANULOCYTES # BLD: 0.02 10*3/MM3 (ref 0–0.03)
IMM GRANULOCYTES NFR BLD: 0.2 % (ref 0–0.5)
LYMPHOCYTES # BLD AUTO: 1.12 10*3/MM3 (ref 0.9–4.8)
LYMPHOCYTES NFR BLD AUTO: 10.9 % (ref 19.6–45.3)
MCH RBC QN AUTO: 28.4 PG (ref 26.9–32)
MCHC RBC AUTO-ENTMCNC: 32 G/DL (ref 32.4–36.3)
MCV RBC AUTO: 88.8 FL (ref 80.5–98.2)
MONOCYTES # BLD AUTO: 1.13 10*3/MM3 (ref 0.2–1.2)
MONOCYTES NFR BLD AUTO: 11 % (ref 5–12)
NEUTROPHILS # BLD AUTO: 7.99 10*3/MM3 (ref 1.9–8.1)
NEUTROPHILS NFR BLD AUTO: 77.5 % (ref 42.7–76)
PLATELET # BLD AUTO: 183 10*3/MM3 (ref 140–500)
PMV BLD AUTO: 9.9 FL (ref 6–12)
POTASSIUM BLD-SCNC: 5 MMOL/L (ref 3.5–5.2)
RBC # BLD AUTO: 3.38 10*6/MM3 (ref 3.9–5.2)
SODIUM BLD-SCNC: 140 MMOL/L (ref 136–145)
WBC NRBC COR # BLD: 10.3 10*3/MM3 (ref 4.5–10.7)

## 2018-09-19 PROCEDURE — 25010000002 ONDANSETRON PER 1 MG: Performed by: SURGERY

## 2018-09-19 PROCEDURE — 99024 POSTOP FOLLOW-UP VISIT: CPT | Performed by: SURGERY

## 2018-09-19 PROCEDURE — 94799 UNLISTED PULMONARY SVC/PX: CPT

## 2018-09-19 PROCEDURE — 25010000002 ENOXAPARIN PER 10 MG: Performed by: SURGERY

## 2018-09-19 PROCEDURE — 80048 BASIC METABOLIC PNL TOTAL CA: CPT | Performed by: SURGERY

## 2018-09-19 PROCEDURE — 85025 COMPLETE CBC W/AUTO DIFF WBC: CPT | Performed by: SURGERY

## 2018-09-19 RX ORDER — ROSUVASTATIN CALCIUM 10 MG/1
10 TABLET, COATED ORAL DAILY
Status: DISCONTINUED | OUTPATIENT
Start: 2018-09-20 | End: 2018-09-21 | Stop reason: HOSPADM

## 2018-09-19 RX ADMIN — BUDESONIDE AND FORMOTEROL FUMARATE DIHYDRATE 2 PUFF: 160; 4.5 AEROSOL RESPIRATORY (INHALATION) at 19:30

## 2018-09-19 RX ADMIN — OXYCODONE HYDROCHLORIDE AND ACETAMINOPHEN 2 TABLET: 5; 325 TABLET ORAL at 10:05

## 2018-09-19 RX ADMIN — OXYCODONE HYDROCHLORIDE AND ACETAMINOPHEN 2 TABLET: 5; 325 TABLET ORAL at 18:12

## 2018-09-19 RX ADMIN — ROSUVASTATIN CALCIUM 20 MG: 20 TABLET, FILM COATED ORAL at 08:00

## 2018-09-19 RX ADMIN — OXYCODONE HYDROCHLORIDE AND ACETAMINOPHEN 2 TABLET: 5; 325 TABLET ORAL at 22:25

## 2018-09-19 RX ADMIN — POTASSIUM CHLORIDE, DEXTROSE MONOHYDRATE AND SODIUM CHLORIDE 100 ML/HR: 150; 5; 450 INJECTION, SOLUTION INTRAVENOUS at 11:56

## 2018-09-19 RX ADMIN — FLUTICASONE PROPIONATE 2 SPRAY: 50 SPRAY, METERED NASAL at 08:00

## 2018-09-19 RX ADMIN — LEVOTHYROXINE SODIUM 112 MCG: 112 TABLET ORAL at 08:00

## 2018-09-19 RX ADMIN — ENOXAPARIN SODIUM 30 MG: 30 INJECTION SUBCUTANEOUS at 08:05

## 2018-09-19 RX ADMIN — ONDANSETRON 4 MG: 4 TABLET, FILM COATED ORAL at 10:05

## 2018-09-19 RX ADMIN — ONDANSETRON 4 MG: 2 INJECTION INTRAMUSCULAR; INTRAVENOUS at 18:13

## 2018-09-19 RX ADMIN — SODIUM BICARBONATE 650 MG: 650 TABLET ORAL at 08:00

## 2018-09-19 RX ADMIN — BUDESONIDE AND FORMOTEROL FUMARATE DIHYDRATE 2 PUFF: 160; 4.5 AEROSOL RESPIRATORY (INHALATION) at 08:27

## 2018-09-19 RX ADMIN — FEBUXOSTAT 40 MG: 40 TABLET ORAL at 08:00

## 2018-09-19 RX ADMIN — SODIUM BICARBONATE 650 MG: 650 TABLET ORAL at 21:17

## 2018-09-19 NOTE — PLAN OF CARE
Problem: Patient Care Overview  Goal: Plan of Care Review  Outcome: Ongoing (interventions implemented as appropriate)   09/19/18 8026   Coping/Psychosocial   Plan of Care Reviewed With patient   Plan of Care Review   Progress improving   OTHER   Outcome Summary VSS. Po pain medication and nausea medication given x1. Voiding freely. Amb in halls x2. Dressing removed per MD. Site clean and dry. IVF's infusing. Garcia clear liq well.      Goal: Individualization and Mutuality  Outcome: Ongoing (interventions implemented as appropriate)    Goal: Discharge Needs Assessment  Outcome: Ongoing (interventions implemented as appropriate)    Goal: Interprofessional Rounds/Family Conf  Outcome: Ongoing (interventions implemented as appropriate)      Problem: Surgery Nonspecified (Adult)  Goal: Signs and Symptoms of Listed Potential Problems Will be Absent, Minimized or Managed (Surgery Nonspecified)  Outcome: Ongoing (interventions implemented as appropriate)    Goal: Anesthesia/Sedation Recovery  Outcome: Ongoing (interventions implemented as appropriate)

## 2018-09-19 NOTE — PROGRESS NOTES
"Discharge Planning Assessment  [facility]     Patient Name: Sun Rodriguez  MRN: [id number]  Today's Date: 9/19/2018    Admit Date: 9/18/2018          Discharge Needs Assessment     Row Name 09/19/18 1206       Living Environment    Lives With spouse    Current Living Arrangements home/apartment/condo    Primary Care Provided by self    Provides Primary Care For no one    Family Caregiver if Needed spouse   Pt said her  is off work and will be home to assist when discharged    Quality of Family Relationships supportive    Able to Return to Prior Arrangements yes       Resource/Environmental Concerns    Resource/Environmental Concerns none    Transportation Concerns car, none       Transition Planning    Patient/Family Anticipates Transition to home with family    Patient/Family Anticipated Services at Transition none    Transportation Anticipated family or friend will provide       Discharge Needs Assessment    Readmission Within the Last 30 Days no previous admission in last 30 days    Concerns to be Addressed no discharge needs identified;denies needs/concerns at this time;adjustment to diagnosis/illness    Equipment Currently Used at Home nebulizer    Anticipated Changes Related to Illness none    Equipment Needed After Discharge none    Offered/Gave Vendor List yes            Discharge Plan     Row Name 09/19/18 1202       Plan    Plan Home w/o needs    Plan Comments Pt confirmed the address, PCP and pharmacy are correct. Pt said she lives with her  Don, is IADL, only DME she uses now is a nebulizer PRN since she no longer has an ostomy.  Pt said she had [facility] when discharged in July but does not anticipate the need for home health when discharged this time. Pt said she can afford her Rx \"we have good insurance\".  Pt said she does not have a HCS or Medical POA, she ask that her dtr Cyndie Rodriguez ([phone]) be added as an emergency contact.   Yudith Chiang RN "     Demographic Summary     Row Name 09/19/18 1205       General Information    Admission Type inpatient    Arrived From home    Referral Source admission list    Reason for Consult discharge planning    Preferred Language English     Used During This Interaction no       Contact Information    Permission Granted to Share Info With family/designee            Functional Status     Row Name 09/19/18 1206       Functional Status, IADL    Medications independent    Meal Preparation independent    Housekeeping independent    Laundry independent    Shopping independent     Patient Forms     Row Name 09/19/18 1205       Patient Forms    Provider Choice List Attempted   Pt declined, she said she plans home w/o needs now that she no longer has an ostomy          Yudith Chiang RN

## 2018-09-19 NOTE — CONSULTS
Adult Nutrition  Assessment/PES    Patient Name:  Sun Rodriguez  YOB: 1965  MRN: [id number]  Admit Date:  9/18/2018    Assessment Date:  9/19/2018    Comments:  Completed nutrition assessment triggered by nurse admission screen.          Reason for Assessment     Row Name 09/19/18 1140          Reason for Assessment    Reason For Assessment nurse/nurse practitioner consult     Diagnosis surgery/postoperative complications     Identified At Risk by Screening Criteria MST SCORE 2+               Anthropometrics     Row Name 09/19/18 1141          Admit Weight    Admit Weight Method measured     Admit Weight 47.5 kg (104 lb 11.5 oz)        Ideal Body Weight (IBW)    % of Ideal Body Weight Assessment 70-79%: moderate deficit        Body Mass Index (BMI)    BMI Assessment BMI 16-16.9: protein-energy malnutrition grade II             Labs/Tests/Procedures/Meds     Row Name 09/19/18 1142          Labs/Procedures/Meds    Lab Results Reviewed reviewed     Lab Results Comments BUN, crt, glu high; H/H low        Diagnostic Tests/Procedures    Diagnostic Test/Procedure Reviewed reviewed        Medications    Pertinent Medications Reviewed reviewed             Physical Findings     Row Name 09/19/18 1142          Physical Findings    Gastrointestinal nausea     Skin other (see comments)   surgical ics abd; Issac score 20             Estimated/Assessed Needs     Row Name 09/19/18 1144 09/19/18 1143       Calculation Measurements    Weight Used For Calculations 47.5 kg (104 lb 11.5 oz) 47.5 kg (104 lb 11.5 oz)       Estimated/Assessed Needs    Additional Documentation  -- Calorie Requirements (Group);Fluid Requirements (Group);Protein Requirements (Group)       Calorie Requirements    Weight Used For Calorie Calculations  -- 47.5 kg (104 lb 11.5 oz)    Estimated Calorie Need Method  -- kcal/kg    Estimated Calorie Requirement Comment  -- 5084-7328 (30-35 wilver/kg)       KCAL/KG    14 Kcal/Kg (kcal) 046 913    15  Kcal/Kg (kcal) 712.5 712.5    18 Kcal/Kg (kcal) 855 855    20 Kcal/Kg (kcal) 950 950    25 Kcal/Kg (kcal) 1187.5 1187.5    30 Kcal/Kg (kcal) 1425 1425    35 Kcal/Kg (kcal) 1662.5 1662.5    40 Kcal/Kg (kcal) 1900 1900    45 Kcal/Kg (kcal) 2137.5 2137.5    50 Kcal/Kg (kcal) 2375 2375       Welch-St. Jeor Equation    RMR (Welch-St. Jeor Equation) 1117.63 1117.63       Protein Requirements    Weight Used For Protein Calculations  -- 47.5 kg (104 lb 11.5 oz)    Est Protein Requirement Amount (gms/kg)  -- 1.2 gm protein    Estimated Protein Requirements (gms/day)  -- 57       Fluid Requirements    Estimated Fluid Requirements (mL/day)  -- 1663    Estimated Fluid Requirement Method  -- RDA Method    RDA Method (mL)  -- 1663    Kendy Method (over 20 kg) 2450 2450            Nutrition Prescription Ordered     Row Name 09/19/18 1144          Nutrition Prescription PO    Current PO Diet Clear Liquid             Evaluation of Received Nutrient/Fluid Intake     Row Name 09/19/18 1144 09/19/18 1143       Calculation Measurements    Weight Used For Calculations 47.5 kg (104 lb 11.5 oz) 47.5 kg (104 lb 11.5 oz)       Nutrient/Fluid Evaluation    Number of Days Evaluated 1 day  --    Additional Documentation Fluid Intake Evaluation (Group)  --       Intake Assessment    Fluid Requirement Assessment meeting needs  --       Fluid Intake Evaluation    IV Fluid (mL) 2400  --       PO Evaluation    Number of Days PO Intake Evaluated Insufficient Data   no intake recorded  --            Evaluation of Prescribed Nutrient/Fluid Intake     Row Name 09/19/18 1144 09/19/18 1143       Calculation Measurements    Weight Used For Calculations 47.5 kg (104 lb 11.5 oz) 47.5 kg (104 lb 11.5 oz)          Problem/Interventions:        Problem 1     Row Name 09/19/18 1145          Nutrition Diagnoses Problem 1    Problem 1 Underweight     Etiology (related to) Factors Affecting Nutrition     Signs/Symptoms (evidenced by) BMI;% IBW     BMI  16 - 16.9     Percent (%) IBW 77 %                     Intervention Goal     Row Name 09/19/18 1145          Intervention Goal    General Maintain nutrition     PO Tolerate PO;Advance diet;PO intake (%)     PO Intake % 75 %     Weight No significant weight loss             Nutrition Intervention     Row Name 09/19/18 1145          Nutrition Intervention    RD/Tech Action Follow Tx progress;Care plan reviewd               Education/Evaluation     Row Name 09/19/18 1145          Education    Education Will Instruct as appropriate        Monitor/Evaluation    Monitor Per protocol         Electronically signed by:  Kamala Desai RD  09/19/18 11:47 AM

## 2018-09-19 NOTE — PROGRESS NOTES
" LOS: 1 day   Patient Care Team:  Pino Templeton MD as PCP - General  Pino Templeton MD as PCP - Family Medicine  Liliya Giraldo MD as Consulting Physician (Rheumatology)  Moisés Corado MD as Consulting Physician (Nephrology)  Grey Dai MD as Consulting Physician (Pulmonary Disease)    Chief Complaint: reversal of ileostomy  Subjective     History of Present Illness    Subjective  Doing well, ambulating. No urinary complaints (usually has retention following anesthesia).  History taken from: patient chart    Objective     Vital Sign Min/Max for last 24 hours  Temp  Min: 95.4 °F (35.2 °C)  Max: 98.5 °F (36.9 °C)   BP  Min: 105/56  Max: 128/60   Pulse  Min: 50  Max: 67   Resp  Min: 16  Max: 18   SpO2  Min: 95 %  Max: 100 %   No Data Recorded   No Data Recorded     Flowsheet Rows      First Filed Value   Admission Height  170.2 cm (67\") Documented at 09/18/2018 0658   Admission Weight  47.5 kg (104 lb 11.5 oz) Documented at 09/18/2018 0658          I/O this shift:  In: 1220 [P.O.:220; I.V.:1000]  Out: 2250 [Urine:2250]  I/O last 3 completed shifts:  In: 3981.7 [P.O.:210; I.V.:3771.7]  Out: 2305 [Urine:2300; Blood:5]    Objective     General Appearance:    Alert, cooperative, in no acute distress   Head:    Normocephalic, without obvious abnormality, atraumatic   Eyes:            Lids and lashes normal, conjunctivae and sclerae normal, no   icterus, no pallor, corneas clear, PERRLA   Ears:    Ears appear intact with no abnormalities noted   Throat:   No oral lesions, no thrush, oral mucosa moist   Neck:   No adenopathy, supple, trachea midline, no thyromegaly, no     carotid bruit, no JVD   Back:     No kyphosis present, no scoliosis present, no skin lesions,       erythema or scars, no tenderness to percussion or                   palpation,   range of motion normal   Lungs:     Clear to auscultation,respirations regular, even and                   unlabored    Heart:    Regular rhythm " and normal rate, normal S1 and S2, no            murmur, no gallop, no rub, no click   Breast Exam:    Deferred   Abdomen:     Normal bowel sounds, no masses, no organomegaly, soft        non-tender, non-distended, no guarding, no rebound                 tenderness   Genitalia:    Deferred   Extremities:   Moves all extremities well, no edema, no cyanosis, no              redness   Pulses:   Pulses palpable and equal bilaterally   Skin:   No bleeding, bruising or rash   Lymph nodes:   No palpable adenopathy   Neurologic:   Cranial nerves 2 - 12 grossly intact, sensation intact, DTR        present and equal bilaterally       Results Review:     I reviewed the patient's new clinical results.    WBC WBC   Date Value Ref Range Status   09/19/2018 10.30 4.50 - 10.70 10*3/mm3 Final      HGB Hemoglobin   Date Value Ref Range Status   09/19/2018 9.6 (L) 11.9 - 15.5 g/dL Final      HCT Hematocrit   Date Value Ref Range Status   09/19/2018 30.0 (L) 35.6 - 45.5 % Final      Platlets No results found for: LABPLAT   MCV MCV   Date Value Ref Range Status   09/19/2018 88.8 80.5 - 98.2 fL Final          Sodium Sodium   Date Value Ref Range Status   09/19/2018 140 136 - 145 mmol/L Final      Potassium Potassium   Date Value Ref Range Status   09/19/2018 5.0 3.5 - 5.2 mmol/L Final      Chloride Chloride   Date Value Ref Range Status   09/19/2018 107 98 - 107 mmol/L Final      CO2 CO2   Date Value Ref Range Status   09/19/2018 21.0 (L) 22.0 - 29.0 mmol/L Final      BUN BUN   Date Value Ref Range Status   09/19/2018 32 (H) 6 - 20 mg/dL Final      Creatinine Creatinine   Date Value Ref Range Status   09/19/2018 2.15 (H) 0.57 - 1.00 mg/dL Final      Calcium Calcium   Date Value Ref Range Status   09/19/2018 9.7 8.6 - 10.5 mg/dL Final      PO4 No results found for: CAPO4   Albumin No results found for: ALBUMIN   Magnesium No results found for: MG   Uric Acid No results found for: URICACID     Medication Review:     budesonide-formoterol 2  puff Inhalation BID   enoxaparin 30 mg Subcutaneous Daily   febuxostat 40 mg Oral Daily   fluticasone 2 spray Nasal Daily   levothyroxine 112 mcg Oral Daily   [START ON 9/20/2018] rosuvastatin 10 mg Oral Daily   sodium bicarbonate 650 mg Oral BID       Assessment/Plan     Principal Problem:    Pain of upper abdomen      Assessment & Plan     Chronic kidney disease stage IV status associated with Wegener's granulomatosis with renal involvement has been in remission with stable renal function for few years however recent SOPHIE. Baseline Cr ~2.0mg/dl. Cr 2.15mg/dl this am. Stable.  Colonic stricture, S/P perforated the bowel status post resection with ileostomy--s/p reversal POD #1.  Hypertension, well controlled   Anemia of chronic renal disease--hgb 9.6. Will check iron profile in am.      Fartun Mack MD  09/19/18  2:31 PM

## 2018-09-19 NOTE — PLAN OF CARE
Problem: Patient Care Overview  Goal: Plan of Care Review  Outcome: Ongoing (interventions implemented as appropriate)   09/18/18 4187   Coping/Psychosocial   Plan of Care Reviewed With patient   Plan of Care Review   Progress improving   OTHER   Outcome Summary Oral Pain meds given & zofran for nausea, DRSG clear, dry & intact, voiding well, IVF cont., AD ALEAH, VSS, will continue to monitor       Problem: Surgery Nonspecified (Adult)  Goal: Signs and Symptoms of Listed Potential Problems Will be Absent, Minimized or Managed (Surgery Nonspecified)  Outcome: Ongoing (interventions implemented as appropriate)

## 2018-09-19 NOTE — PROGRESS NOTES
Chief Complaint: POD # 1    Subjective   Pt reports nausea improved, pain controlled, no flatus, no bm     Objective     Vital signs in last 24 hours:  Temp:  [95.4 °F (35.2 °C)-97.6 °F (36.4 °C)] 97 °F (36.1 °C)  Heart Rate:  [56-97] 56  Resp:  [16-18] 16  BP: (105-156)/(56-86) 105/56    Intake/Output last 3 shifts:  I/O last 3 completed shifts:  In: 3981.7 [P.O.:210; I.V.:3771.7]  Out: 2305 [Urine:2300; Blood:5]    Intake/Output this shift:  No intake/output data recorded.    Physical Exam:  Respiratory: CTA, good inspiratory effort  CV: RRR  Abd: + BS, soft, ND, usual post-op tenderness, no rebound, no guarding, incision C/D/I        Assessment/Plan   S/P loop ileostomy reversal   Expected post-op ileus - await bowel function   May shower  Ambulate   Labs Pending     Bee Carreon MD

## 2018-09-20 LAB
ALBUMIN SERPL-MCNC: 3.5 G/DL (ref 3.5–5.2)
ANION GAP SERPL CALCULATED.3IONS-SCNC: 11.2 MMOL/L
BUN BLD-MCNC: 28 MG/DL (ref 6–20)
BUN/CREAT SERPL: 12.5 (ref 7–25)
CALCIUM SPEC-SCNC: 9.8 MG/DL (ref 8.6–10.5)
CHLORIDE SERPL-SCNC: 110 MMOL/L (ref 98–107)
CO2 SERPL-SCNC: 22.8 MMOL/L (ref 22–29)
CREAT BLD-MCNC: 2.24 MG/DL (ref 0.57–1)
FERRITIN SERPL-MCNC: 74.99 NG/ML (ref 13–150)
GFR SERPL CREATININE-BSD FRML MDRD: 23 ML/MIN/1.73
GLUCOSE BLD-MCNC: 84 MG/DL (ref 65–99)
IRON 24H UR-MRATE: 20 MCG/DL (ref 37–145)
IRON SATN MFR SERPL: 7 % (ref 20–50)
PHOSPHATE SERPL-MCNC: 3.4 MG/DL (ref 2.5–4.5)
POTASSIUM BLD-SCNC: 4.5 MMOL/L (ref 3.5–5.2)
SODIUM BLD-SCNC: 144 MMOL/L (ref 136–145)
TIBC SERPL-MCNC: 297 MCG/DL
TRANSFERRIN SERPL-MCNC: 199 MG/DL (ref 200–360)

## 2018-09-20 PROCEDURE — 80069 RENAL FUNCTION PANEL: CPT | Performed by: INTERNAL MEDICINE

## 2018-09-20 PROCEDURE — 84466 ASSAY OF TRANSFERRIN: CPT | Performed by: INTERNAL MEDICINE

## 2018-09-20 PROCEDURE — 25010000002 ENOXAPARIN PER 10 MG: Performed by: SURGERY

## 2018-09-20 PROCEDURE — 99024 POSTOP FOLLOW-UP VISIT: CPT | Performed by: SURGERY

## 2018-09-20 PROCEDURE — 25010000002 IRON SUCROSE PER 1 MG: Performed by: INTERNAL MEDICINE

## 2018-09-20 PROCEDURE — 83540 ASSAY OF IRON: CPT | Performed by: INTERNAL MEDICINE

## 2018-09-20 PROCEDURE — 82728 ASSAY OF FERRITIN: CPT | Performed by: INTERNAL MEDICINE

## 2018-09-20 PROCEDURE — 94799 UNLISTED PULMONARY SVC/PX: CPT

## 2018-09-20 RX ADMIN — ENOXAPARIN SODIUM 30 MG: 30 INJECTION SUBCUTANEOUS at 09:19

## 2018-09-20 RX ADMIN — ONDANSETRON 4 MG: 4 TABLET, FILM COATED ORAL at 11:10

## 2018-09-20 RX ADMIN — SODIUM CHLORIDE, POTASSIUM CHLORIDE, SODIUM LACTATE AND CALCIUM CHLORIDE 100 ML/HR: 600; 310; 30; 20 INJECTION, SOLUTION INTRAVENOUS at 00:39

## 2018-09-20 RX ADMIN — IRON SUCROSE 200 MG: 20 INJECTION, SOLUTION INTRAVENOUS at 21:03

## 2018-09-20 RX ADMIN — OXYCODONE HYDROCHLORIDE AND ACETAMINOPHEN 2 TABLET: 5; 325 TABLET ORAL at 12:36

## 2018-09-20 RX ADMIN — ROSUVASTATIN CALCIUM 10 MG: 10 TABLET, FILM COATED ORAL at 09:18

## 2018-09-20 RX ADMIN — SODIUM BICARBONATE 650 MG: 650 TABLET ORAL at 09:18

## 2018-09-20 RX ADMIN — SODIUM BICARBONATE 650 MG: 650 TABLET ORAL at 21:03

## 2018-09-20 RX ADMIN — LEVOTHYROXINE SODIUM 112 MCG: 112 TABLET ORAL at 09:18

## 2018-09-20 RX ADMIN — FEBUXOSTAT 40 MG: 40 TABLET ORAL at 09:18

## 2018-09-20 RX ADMIN — ONDANSETRON 4 MG: 4 TABLET, FILM COATED ORAL at 20:00

## 2018-09-20 RX ADMIN — BUDESONIDE AND FORMOTEROL FUMARATE DIHYDRATE 2 PUFF: 160; 4.5 AEROSOL RESPIRATORY (INHALATION) at 20:08

## 2018-09-20 RX ADMIN — FLUTICASONE PROPIONATE 2 SPRAY: 50 SPRAY, METERED NASAL at 09:19

## 2018-09-20 RX ADMIN — OXYCODONE HYDROCHLORIDE AND ACETAMINOPHEN 2 TABLET: 5; 325 TABLET ORAL at 20:00

## 2018-09-20 NOTE — PLAN OF CARE
Problem: Patient Care Overview  Goal: Plan of Care Review  Outcome: Outcome(s) achieved Date Met: 09/20/18 09/20/18 1250   Coping/Psychosocial   Plan of Care Reviewed With patient   Plan of Care Review   Progress improving   OTHER   Outcome Summary doing well, adequate pain control on Percocets, VSS, voiding, abdomen soft, hypoactive bowel sounds, has not passed flatus yet, up ad andrew, has been frequently ambulating     Goal: Individualization and Mutuality  Outcome: Ongoing (interventions implemented as appropriate)    Goal: Discharge Needs Assessment  Outcome: Ongoing (interventions implemented as appropriate)      Problem: Surgery Nonspecified (Adult)  Goal: Signs and Symptoms of Listed Potential Problems Will be Absent, Minimized or Managed (Surgery Nonspecified)  Outcome: Ongoing (interventions implemented as appropriate)    Goal: Anesthesia/Sedation Recovery  Outcome: Outcome(s) achieved Date Met: 09/20/18

## 2018-09-20 NOTE — PROGRESS NOTES
" LOS: 2 days   Patient Care Team:  Pino Templeton MD as PCP - General  Pino Templeton MD as PCP - Family Medicine  Liliya Giraldo MD as Consulting Physician (Rheumatology)  Moisés Corado MD as Consulting Physician (Nephrology)  Grey Dia MD as Consulting Physician (Pulmonary Disease)    Chief Complaint: reversal of ileostomy  Subjective     History of Present Illness    Subjective  Tolerating po. Had BM today. Ambulating.  Denies pain, n/v or LE edema.    History taken from: patient chart    Objective     Vital Sign Min/Max for last 24 hours  Temp  Min: 97.6 °F (36.4 °C)  Max: 99.1 °F (37.3 °C)   BP  Min: 106/67  Max: 135/78   Pulse  Min: 51  Max: 85   Resp  Min: 16  Max: 18   SpO2  Min: 96 %  Max: 100 %   No Data Recorded   No Data Recorded     Flowsheet Rows      First Filed Value   Admission Height  170.2 cm (67\") Documented at 09/18/2018 0658   Admission Weight  47.5 kg (104 lb 11.5 oz) Documented at 09/18/2018 0658          I/O this shift:  In: 525 [P.O.:525]  Out: -   I/O last 3 completed shifts:  In: 7023.3 [P.O.:2000; I.V.:5023.3]  Out: 6050 [Urine:6050]    Objective:  Vital signs: (most recent): Blood pressure 106/67, pulse 82, temperature 98.3 °F (36.8 °C), temperature source Oral, resp. rate 18, height 170.2 cm (67\"), weight 47.5 kg (104 lb 11.5 oz), SpO2 98 %, not currently breastfeeding.               General Appearance:    Alert, cooperative, in no acute distress   Head:    Normocephalic, without obvious abnormality, atraumatic   Eyes:            Lids and lashes normal, conjunctivae and sclerae normal, no   icterus, no pallor, corneas clear, PERRLA   Ears:    Ears appear intact with no abnormalities noted   Throat:   No oral lesions, no thrush, oral mucosa moist   Neck:   No adenopathy, supple, trachea midline, no thyromegaly, no     carotid bruit, no JVD   Back:     No kyphosis present, no scoliosis present, no skin lesions,       erythema or scars, no tenderness to " percussion or                   palpation,   range of motion normal   Lungs:     Clear to auscultation,respirations regular, even and                   unlabored    Heart:    Regular rhythm and normal rate, normal S1 and S2, no            murmur, no gallop, no rub, no click   Breast Exam:    Deferred   Abdomen:     Normal bowel sounds, no masses, no organomegaly, soft        non-tender, non-distended, no guarding, no rebound                 tenderness   Genitalia:    Deferred   Extremities:   Moves all extremities well, no edema, no cyanosis, no              redness   Pulses:   Pulses palpable and equal bilaterally   Skin:   No bleeding, bruising or rash   Lymph nodes:   No palpable adenopathy   Neurologic:   Cranial nerves 2 - 12 grossly intact, sensation intact, DTR        present and equal bilaterally       Results Review:     I reviewed the patient's new clinical results.    WBC WBC   Date Value Ref Range Status   09/19/2018 10.30 4.50 - 10.70 10*3/mm3 Final      HGB Hemoglobin   Date Value Ref Range Status   09/19/2018 9.6 (L) 11.9 - 15.5 g/dL Final      HCT Hematocrit   Date Value Ref Range Status   09/19/2018 30.0 (L) 35.6 - 45.5 % Final      Platlets No results found for: LABPLAT   MCV MCV   Date Value Ref Range Status   09/19/2018 88.8 80.5 - 98.2 fL Final          Sodium Sodium   Date Value Ref Range Status   09/20/2018 144 136 - 145 mmol/L Final   09/19/2018 140 136 - 145 mmol/L Final      Potassium Potassium   Date Value Ref Range Status   09/20/2018 4.5 3.5 - 5.2 mmol/L Final   09/19/2018 5.0 3.5 - 5.2 mmol/L Final      Chloride Chloride   Date Value Ref Range Status   09/20/2018 110 (H) 98 - 107 mmol/L Final   09/19/2018 107 98 - 107 mmol/L Final      CO2 CO2   Date Value Ref Range Status   09/20/2018 22.8 22.0 - 29.0 mmol/L Final   09/19/2018 21.0 (L) 22.0 - 29.0 mmol/L Final      BUN BUN   Date Value Ref Range Status   09/20/2018 28 (H) 6 - 20 mg/dL Final   09/19/2018 32 (H) 6 - 20 mg/dL Final       Creatinine Creatinine   Date Value Ref Range Status   09/20/2018 2.24 (H) 0.57 - 1.00 mg/dL Final   09/19/2018 2.15 (H) 0.57 - 1.00 mg/dL Final      Calcium Calcium   Date Value Ref Range Status   09/20/2018 9.8 8.6 - 10.5 mg/dL Final   09/19/2018 9.7 8.6 - 10.5 mg/dL Final      PO4 No results found for: CAPO4   Albumin Albumin   Date Value Ref Range Status   09/20/2018 3.50 3.50 - 5.20 g/dL Final      Magnesium No results found for: MG   Uric Acid No results found for: URICACID     Medication Review:     budesonide-formoterol 2 puff Inhalation BID   enoxaparin 30 mg Subcutaneous Daily   febuxostat 40 mg Oral Daily   fluticasone 2 spray Nasal Daily   levothyroxine 112 mcg Oral Daily   rosuvastatin 10 mg Oral Daily   sodium bicarbonate 650 mg Oral BID       Assessment/Plan     Principal Problem:    Pain of upper abdomen      Assessment & Plan     Chronic kidney disease stage IV status associated with Wegener's granulomatosis with renal involvement has been in remission with stable renal function for few years however recent SOPHIE. Baseline Cr ~2.0mg/dl. Cr 2.25mg/dl this am. Stable.  Colonic stricture, S/P perforated the bowel status post resection with ileostomy--s/p reversal POD #1.  Hypertension, well controlled   Anemia of chronic renal disease--hgb 9.6. tsat 7. IV venofer 200mg now. Will need outpatient iv iron. Will set up (sees [facility] in 3 weeks.)    Fartun Mack MD  09/20/18  4:55 PM

## 2018-09-20 NOTE — PROGRESS NOTES
Chief Complaint: POD # 2    Subjective   Pt reports + bm, no nausea, tolerating clear liquids, ambulated in [place], pain controlled   Objective     Vital signs in last 24 hours:  Temp:  [97.5 °F (36.4 °C)-98.8 °F (37.1 °C)] 98.5 °F (36.9 °C)  Heart Rate:  [50-66] 63  Resp:  [16-18] 18  BP: (115-135)/(60-75) 135/73    Intake/Output last 3 shifts:  I/O last 3 completed shifts:  In: 7023.3 [P.O.:2000; I.V.:5023.3]  Out: 6050 [Urine:6050]    Intake/Output this shift:  No intake/output data recorded.    Physical Exam:  Respiratory: CTA, good inspiratory effort  CV: RRR  Abd: + BS, soft, ND, usual post-op tenderness, no rebound, no guarding, incision C/D/I    Results from last 7 days  Lab Units 09/19/18  0749   WBC 10*3/mm3 10.30   HEMOGLOBIN g/dL 9.6*   HEMATOCRIT % 30.0*   PLATELETS 10*3/mm3 183       Results from last 7 days  Lab Units 09/20/18  0542   SODIUM mmol/L 144   POTASSIUM mmol/L 4.5   CHLORIDE mmol/L 110*   CO2 mmol/L 22.8   BUN mg/dL 28*   CREATININE mg/dL 2.24*   GLUCOSE mg/dL 84   CALCIUM mg/dL 9.8         Assessment/Plan   S/P take down ileostomy   Advance diet, hep-lock ivfs     Bee Carreon MD

## 2018-09-21 VITALS
SYSTOLIC BLOOD PRESSURE: 106 MMHG | BODY MASS INDEX: 16.44 KG/M2 | HEART RATE: 89 BPM | OXYGEN SATURATION: 98 % | RESPIRATION RATE: 16 BRPM | DIASTOLIC BLOOD PRESSURE: 65 MMHG | TEMPERATURE: 98.6 F | WEIGHT: 104.72 LBS | HEIGHT: 67 IN

## 2018-09-21 PROCEDURE — 25010000002 ENOXAPARIN PER 10 MG: Performed by: SURGERY

## 2018-09-21 PROCEDURE — 94799 UNLISTED PULMONARY SVC/PX: CPT

## 2018-09-21 PROCEDURE — 99024 POSTOP FOLLOW-UP VISIT: CPT | Performed by: SURGERY

## 2018-09-21 RX ORDER — PROMETHAZINE HYDROCHLORIDE 12.5 MG/1
12.5 TABLET ORAL EVERY 6 HOURS PRN
Qty: 10 TABLET | Refills: 0 | Status: SHIPPED | OUTPATIENT
Start: 2018-09-21 | End: 2018-10-16

## 2018-09-21 RX ORDER — OXYCODONE HYDROCHLORIDE AND ACETAMINOPHEN 5; 325 MG/1; MG/1
1 TABLET ORAL EVERY 4 HOURS PRN
Qty: 30 TABLET | Refills: 0 | Status: SHIPPED | OUTPATIENT
Start: 2018-09-21 | End: 2018-10-01

## 2018-09-21 RX ADMIN — BUDESONIDE AND FORMOTEROL FUMARATE DIHYDRATE 2 PUFF: 160; 4.5 AEROSOL RESPIRATORY (INHALATION) at 11:34

## 2018-09-21 RX ADMIN — SODIUM BICARBONATE 650 MG: 650 TABLET ORAL at 09:08

## 2018-09-21 RX ADMIN — ENOXAPARIN SODIUM 30 MG: 30 INJECTION SUBCUTANEOUS at 09:09

## 2018-09-21 RX ADMIN — FLUTICASONE PROPIONATE 2 SPRAY: 50 SPRAY, METERED NASAL at 09:09

## 2018-09-21 RX ADMIN — LEVOTHYROXINE SODIUM 112 MCG: 112 TABLET ORAL at 09:08

## 2018-09-21 RX ADMIN — FEBUXOSTAT 40 MG: 40 TABLET ORAL at 09:08

## 2018-09-21 RX ADMIN — ROSUVASTATIN CALCIUM 10 MG: 10 TABLET, FILM COATED ORAL at 09:08

## 2018-09-21 NOTE — PROGRESS NOTES
Chief Complaint: POD # 3    Subjective   Pt doing well, + bms   Objective     Vital signs in last 24 hours:  Temp:  [97.2 °F (36.2 °C)-98.8 °F (37.1 °C)] 98.6 °F (37 °C)  Heart Rate:  [63-89] 89  Resp:  [16] 16  BP: (106-143)/(61-83) 106/65    Intake/Output last 3 shifts:  I/O last 3 completed shifts:  In: 3756.7 [P.O.:2205; I.V.:1551.7]  Out: 1850 [Urine:1850]    Intake/Output this shift:  I/O this shift:  In: 575 [P.O.:575]  Out: -     Physical Exam:  Respiratory: CTA, good inspiratory effort  CV: RRR  Abd: + BS, soft, ND, usual post-op tenderness, no rebound, no guarding, incision C/D/I     Results from last 7 days  Lab Units 09/19/18  0749   WBC 10*3/mm3 10.30   HEMOGLOBIN g/dL 9.6*   HEMATOCRIT % 30.0*   PLATELETS 10*3/mm3 183       Results from last 7 days  Lab Units 09/20/18  0542   SODIUM mmol/L 144   POTASSIUM mmol/L 4.5   CHLORIDE mmol/L 110*   CO2 mmol/L 22.8   BUN mg/dL 28*   CREATININE mg/dL 2.24*   GLUCOSE mg/dL 84   CALCIUM mg/dL 9.8     Assessment/Plan   S/P ileostomy reversal   Doing well   D/c home   F/u in 2 weeks     Bee Carreon MD

## 2018-09-21 NOTE — DISCHARGE SUMMARY
Admitting date: 9/18/2018    Discharging date: 9/21/2018    Admitting diagnosis: Ileostomy    Discharging diagnoses: Takedown ileostomy    Admitting/discharging physician: Dr. Carreon    Procedures: Takedown ileostomy     Hospital course:    This is a pleasant 53-year-old female patient to had a loop ileostomy secondary to a low anterior colon resection for diverticulitis.  Patient was admitted on 9/18/2018 and underwent a takedown of a loop ileostomy.  Postoperatively the patient did well.  On postop day #3, patient was tolerating a diet, having bowel movements, ambulating in the whitman, urinating without difficulties and pain was well controlled on oral pain medicine.  Patient was discharged to home to follow-up in 2 weeks.  Patient was discharged on her admitting medications and Percocet and Phenergan.  Patient was instructed on no lifting greater than 10 pounds and no driving on pain medication.  Patient may shower.        Bee Carreon MD  General, Minimally Invasive and Endoscopic Surgery  [facility]    [address], [address]  , [address]  P: [phone]  F: [phone]    Cc:  Pino Templeton MD

## 2018-09-21 NOTE — PLAN OF CARE
Problem: Patient Care Overview  Goal: Plan of Care Review  Outcome: Ongoing (interventions implemented as appropriate)   09/21/18 0452   Coping/Psychosocial   Plan of Care Reviewed With patient   Plan of Care Review   Progress improving   OTHER   Outcome Summary pt is alert and oriented, room air, complaining of some abdominal pain/nausea, medicated PRN, no falls, ambulated in the whitman, possible D/C today, continue to monitor     Goal: Individualization and Mutuality  Outcome: Ongoing (interventions implemented as appropriate)    Goal: Discharge Needs Assessment  Outcome: Ongoing (interventions implemented as appropriate)      Problem: Pain, Acute (Adult)  Goal: Identify Related Risk Factors and Signs and Symptoms  Outcome: Outcome(s) achieved Date Met: 09/21/18    Goal: Acceptable Pain Control/Comfort Level  Outcome: Ongoing (interventions implemented as appropriate)      Problem: Nausea/Vomiting (Adult)  Goal: Identify Related Risk Factors and Signs and Symptoms  Outcome: Outcome(s) achieved Date Met: 09/21/18    Goal: Symptom Relief  Outcome: Ongoing (interventions implemented as appropriate)    Goal: Adequate Hydration  Outcome: Ongoing (interventions implemented as appropriate)

## 2018-09-22 ENCOUNTER — READMISSION MANAGEMENT (OUTPATIENT)
Dept: CALL CENTER | Facility: HOSPITAL | Age: 53
End: 2018-09-22

## 2018-09-22 NOTE — OUTREACH NOTE
Prep Survey      Responses   Facility patient discharged from?  [facility]   Is patient eligible?  Yes   Discharge diagnosis  takedown ileostomy   Does the patient have one of the following disease processes/diagnoses(primary or secondary)?  General Surgery   Does the patient have Home health ordered?  No   Is there a DME ordered?  No   Comments regarding appointments  call for [facility]   Prep survey completed?  Yes          Gertrude Richter RN

## 2018-09-24 ENCOUNTER — READMISSION MANAGEMENT (OUTPATIENT)
Dept: CALL CENTER | Facility: HOSPITAL | Age: 53
End: 2018-09-24

## 2018-09-24 NOTE — OUTREACH NOTE
General Surgery Week 1 Survey      Responses   Facility patient discharged from?  [facility]   Does the patient have one of the following disease processes/diagnoses(primary or secondary)?  General Surgery   Is there a successful TCM telephone encounter documented?  No   Week 1 attempt successful?  Yes   Call start time  1736   Call end time  1740   Discharge diagnosis  takedown ileostomy   Is patient permission given to speak with other caregiver?  No   Meds reviewed with patient/caregiver?  Yes   Is the patient having any side effects they believe may be caused by any medication additions or changes?  No   Does the patient have all medications related to this admission filled (includes all antibiotics, pain medications, etc.)  Yes   Is the patient taking all medications as directed (includes completed medication regime)?  Yes   Does the patient have a follow up appointment scheduled with their surgeon?  No   What is preventing the patient from scheduling follow up appointments?  Waiting on return call   Nursing Interventions  Educated patient on importance of making appointment   Has the patient kept scheduled appointments due by today?  N/A   Has home health visited the patient within 72 hours of discharge?  N/A   Psychosocial issues?  No   Did the patient receive a copy of their discharge instructions?  Yes   Nursing interventions  Reviewed instructions with patient, Educated on MyChart   What is the patient's perception of their health status since discharge?  Improving   Nursing interventions  Nurse provided patient education   Is the patient /caregiver able to teach back basic post-op care?  Continue use of incentive spirometry at least 1 week post discharge, Drive as instructed by MD in discharge instructions, Take showers only when approved by MD-sponge bathe until then, Do not remove steri-strips, Lifting as instructed by MD in discharge instructions, Keep incision areas clean,dry and protected   Is the  patient/caregiver able to teach back signs and symptoms of incisional infection?  Increased redness, swelling or pain at the incisonal site, Increased drainage or bleeding, Incisional warmth, Pus or odor from incision, Fever   Is the patient/caregiver able to teach back steps to recovery at home?  Set small, achievable goals for return to baseline health, Rest and rebuild strength, gradually increase activity, Eat a well-balance diet, Make a list of questions for surgeon's appointment   Is the patient/caregiver able to teach back the hierarchy of who to call/visit for symptoms/problems? PCP, Specialist, Home health nurse, Urgent Care, ED, 911  Yes   Additional teach back comments  non-smoker   Week 1 call completed?  Yes          Jeanette Hill RN

## 2018-10-03 ENCOUNTER — READMISSION MANAGEMENT (OUTPATIENT)
Dept: CALL CENTER | Facility: HOSPITAL | Age: 53
End: 2018-10-03

## 2018-10-03 NOTE — OUTREACH NOTE
General Surgery Week 2 Survey      Responses   Facility patient discharged from?  [facility]   Does the patient have one of the following disease processes/diagnoses(primary or secondary)?  General Surgery   Week 2 attempt successful?  Yes   Call start time  1339   Call end time  1344   Discharge diagnosis  takedown ileostomy   Is patient permission given to speak with other caregiver?  No   Meds reviewed with patient/caregiver?  Yes   Is the patient having any side effects they believe may be caused by any medication additions or changes?  No   Does the patient have all medications related to this admission filled (includes all antibiotics, pain medications, etc.)  Yes   Is the patient taking all medications as directed (includes completed medication regime)?  Yes   Does the patient have a follow up appointment scheduled with their surgeon?  Yes   Has the patient kept scheduled appointments due by today?  Yes   Has home health visited the patient within 72 hours of discharge?  N/A   Psychosocial issues?  No   Did the patient receive a copy of their discharge instructions?  Yes   Nursing interventions  Reviewed instructions with patient   What is the patient's perception of their health status since discharge?  Improving   Nursing interventions  Nurse provided patient education   Is the patient/caregiver able to teach back signs and symptoms of incisional infection?  Increased redness, swelling or pain at the incisonal site, Increased drainage or bleeding, Incisional warmth, Pus or odor from incision, Fever   Is the patient/caregiver able to teach back steps to recovery at home?  Set small, achievable goals for return to baseline health, Make a list of questions for surgeon's appointment   Is the patient/caregiver able to teach back the hierarchy of who to call/visit for symptoms/problems? PCP, Specialist, Home health nurse, Urgent Care, ED, 911  Yes   Week 2 call completed?  Yes          Leonor Whalen RN

## 2018-10-11 ENCOUNTER — READMISSION MANAGEMENT (OUTPATIENT)
Dept: CALL CENTER | Facility: HOSPITAL | Age: 53
End: 2018-10-11

## 2018-10-11 NOTE — OUTREACH NOTE
Sepsis Week 3 Survey      Responses   Facility patient discharged from?  [facility]   Does the patient have one of the following disease processes/diagnoses(primary or secondary)?  General Surgery          Yu Weller RN

## 2018-10-11 NOTE — OUTREACH NOTE
General Surgery Week 3 Survey      Responses   Facility patient discharged from?  [facility]   Does the patient have one of the following disease processes/diagnoses(primary or secondary)?  General Surgery   Week 3 attempt successful?  No   Unsuccessful attempts  Attempt 1          Yu Weller RN

## 2018-10-13 ENCOUNTER — READMISSION MANAGEMENT (OUTPATIENT)
Dept: CALL CENTER | Facility: HOSPITAL | Age: 53
End: 2018-10-13

## 2018-10-13 NOTE — OUTREACH NOTE
General Surgery Week 3 Survey      Responses   Facility patient discharged from?  [facility]   Does the patient have one of the following disease processes/diagnoses(primary or secondary)?  General Surgery   Week 3 attempt successful?  No   Unsuccessful attempts  Attempt 2          Mariana Nash RN

## 2018-10-16 ENCOUNTER — OFFICE VISIT (OUTPATIENT)
Dept: SURGERY | Facility: CLINIC | Age: 53
End: 2018-10-16

## 2018-10-16 VITALS
DIASTOLIC BLOOD PRESSURE: 70 MMHG | SYSTOLIC BLOOD PRESSURE: 110 MMHG | HEIGHT: 67 IN | HEART RATE: 95 BPM | WEIGHT: 107.3 LBS | BODY MASS INDEX: 16.84 KG/M2 | OXYGEN SATURATION: 98 %

## 2018-10-16 DIAGNOSIS — Z09 FOLLOW UP: Primary | ICD-10-CM

## 2018-10-16 PROCEDURE — 99024 POSTOP FOLLOW-UP VISIT: CPT | Performed by: SURGERY

## 2018-10-16 NOTE — PROGRESS NOTES
"Chief complaint:  Post-op  Follow up    History of Present Illness    This is Sun Rodriguez 53 y.o. status post takedown ileostomy and is doing very well.  Patient denies fever, chills, nausea or vomiting.  Patient's pain is well-controlled.      The following portions of the patient's history were reviewed and updated as appropriate: allergies, current medications, past family history, past medical history, past social history, past surgical history and problem list.    Vitals:    10/16/18 1155   BP: 110/70   Pulse: 95   SpO2: 98%   Weight: 48.7 kg (107 lb 4.8 oz)   Height: 170.2 cm (67\")       Physical Exam  Incision is well-healed without evidence of infection, herniation or seroma.    Patient does not use tobacco products currently and I have counseled the patient not to start using tobacco products in the future.    Assessment/plan:    This is Sun Rodriguez 53 y.o. status post takedown ileostomy and is doing very well.  I have instructed the patient not lift greater than 10 pounds for total of 6 weeks from the time of surgery. I have instructed the patient follow-up as needed.    Bee Carreon MD  General, Minimally Invasive and Endoscopic Surgery  [facility]    [address], [address]  [place], [place], [address]  P: [phone]  F: [phone]    Cc:  Pino Templeton MD  "

## 2019-04-05 ENCOUNTER — TRANSCRIBE ORDERS (OUTPATIENT)
Dept: ADMINISTRATIVE | Facility: HOSPITAL | Age: 54
End: 2019-04-05

## 2019-04-05 DIAGNOSIS — R10.9 ABDOMINAL PAIN, UNSPECIFIED ABDOMINAL LOCATION: Primary | ICD-10-CM

## 2019-04-10 ENCOUNTER — TRANSCRIBE ORDERS (OUTPATIENT)
Dept: ADMINISTRATIVE | Facility: HOSPITAL | Age: 54
End: 2019-04-10

## 2019-04-10 DIAGNOSIS — R10.9 ABDOMINAL PAIN, UNSPECIFIED ABDOMINAL LOCATION: Primary | ICD-10-CM

## 2019-05-14 ENCOUNTER — HOSPITAL ENCOUNTER (OUTPATIENT)
Dept: CT IMAGING | Facility: HOSPITAL | Age: 54
Discharge: HOME | End: 2019-05-14
Admitting: FAMILY MEDICINE

## 2019-05-14 DIAGNOSIS — R10.9 ABDOMINAL PAIN, UNSPECIFIED ABDOMINAL LOCATION: ICD-10-CM

## 2019-05-14 PROCEDURE — 74176 CT ABD & PELVIS W/O CONTRAST: CPT

## 2019-09-10 ENCOUNTER — OFFICE VISIT (OUTPATIENT)
Dept: GASTROENTEROLOGY | Facility: CLINIC | Age: 54
End: 2019-09-10

## 2019-09-10 VITALS
DIASTOLIC BLOOD PRESSURE: 64 MMHG | TEMPERATURE: 98.2 F | BODY MASS INDEX: 19.81 KG/M2 | HEIGHT: 67 IN | WEIGHT: 126.2 LBS | SYSTOLIC BLOOD PRESSURE: 112 MMHG

## 2019-09-10 DIAGNOSIS — Z90.49 HISTORY OF COLON RESECTION: ICD-10-CM

## 2019-09-10 DIAGNOSIS — R10.32 LLQ PAIN: Primary | ICD-10-CM

## 2019-09-10 DIAGNOSIS — Z87.19 HISTORY OF COLONIC DIVERTICULITIS: ICD-10-CM

## 2019-09-10 DIAGNOSIS — R10.13 EPIGASTRIC PAIN: ICD-10-CM

## 2019-09-10 PROCEDURE — 99204 OFFICE O/P NEW MOD 45 MIN: CPT | Performed by: INTERNAL MEDICINE

## 2019-09-10 RX ORDER — LEVOTHYROXINE SODIUM 0.1 MG/1
TABLET ORAL DAILY
Refills: 6 | COMMUNITY
Start: 2019-08-20

## 2019-09-10 RX ORDER — PROPRANOLOL HYDROCHLORIDE 10 MG/1
10 TABLET ORAL DAILY
Refills: 1 | COMMUNITY
Start: 2019-07-09

## 2019-09-10 RX ORDER — IRBESARTAN 300 MG/1
300 TABLET ORAL DAILY
Qty: 90 TABLET | Refills: 0 | OUTPATIENT
Start: 2019-09-10

## 2019-09-10 NOTE — TELEPHONE ENCOUNTER
This is not my patient and she does not have an upcoming appointment with me.  Different PCP listed also

## 2019-09-10 NOTE — PATIENT INSTRUCTIONS
IB Dana up to 3 times a day for pain    Zantac every night for 2 weeks    H pylori test today    For any additional questions, concerns or changes to your condition after today's office visit please contact the office at [phone].

## 2019-09-10 NOTE — PROGRESS NOTES
Chief Complaint   Patient presents with   • Abdominal Pain       Subjective     HPI    Sun Rodriguez is a 54 y.o. female with a past medical history noted below who presents for evaluation of abdominal pain. Pain has been present for over 1 year-- started following a diverticulitis flare.  She was hospitalized up with a sigmoid colectomy in May of last year.  One month later she persisted with symptoms and ended up with a cholecystectomy.  Then apparently she persisted with more symptoms and was found to have leakage from the anastomosis and had to have another surgery.  Since that time, she has persisted with abdominal pain.    Abdominal pain is intermittent, occurring most days.  Located both LLQ and and upper abdomen.  Both dull and sharp.  Pain is not severe.  Seems that pain is improved following BMs.  Does not have diarrhea nor constipation.  She does take tylenol for the pain with relief.  Sometimes associated with nausea.    No family hx of GI malignancy.    No smoking, no excess EtOH    Works as a .      Past Medical History:   Diagnosis Date   • Asthma    • Bronchiectasis (CMS/HCC)    • Chronic kidney disease    • Colitis    • Diverticulitis    • Diverticulosis    • Hypercholesteremia    • Hypothyroidism    • Joint pain    • PONV (postoperative nausea and vomiting)    • Postoperative urinary retention    • Wegener's granulomatosis with renal involvement (CMS/HCC)    • Weight loss          Current Outpatient Medications:   •  albuterol (PROVENTIL HFA;VENTOLIN HFA) 108 (90 Base) MCG/ACT inhaler, Inhale 2 puffs Every 4 (Four) Hours As Needed for Wheezing., Disp: , Rfl:   •  budesonide-formoterol (SYMBICORT) 160-4.5 MCG/ACT inhaler, Inhale 2 puffs 2 (Two) Times a Day., Disp: , Rfl:   •  Cholecalciferol (VITAMIN D) 1000 units tablet, Take 1,000 Units by mouth Daily., Disp: , Rfl:   •  febuxostat (ULORIC) 40 MG tablet, Take 40 mg by mouth Daily., Disp: , Rfl:   •  fluticasone (FLONASE) 50  MCG/ACT nasal spray, 2 sprays into the nostril(s) as directed by provider Daily., Disp: , Rfl:   •  levothyroxine (SYNTHROID, LEVOTHROID) 100 MCG tablet, Take  by mouth Daily., Disp: , Rfl: 6  •  propranolol (INDERAL) 10 MG tablet, Take 10 mg by mouth Daily., Disp: , Rfl: 1  •  rosuvastatin (CRESTOR) 20 MG tablet, Take 20 mg by mouth Daily., Disp: , Rfl:   •  sodium bicarbonate 650 MG tablet, Take 650 mg by mouth 2 (Two) Times a Day., Disp: , Rfl:     Allergies   Allergen Reactions   • Biaxin [Clarithromycin] Nausea Only       Social History     Socioeconomic History   • Marital status:      Spouse name: Not on file   • Number of children: Not on file   • Years of education: Not on file   • Highest education level: Not on file   Occupational History   • Occupation: analysis   Tobacco Use   • Smoking status: Never Smoker   • Smokeless tobacco: Never Used   Substance and Sexual Activity   • Alcohol use: No   • Drug use: No   • Sexual activity: Yes     Partners: Male     Birth control/protection: Post-menopausal       Family History   Problem Relation Age of Onset   • Prostate cancer Father    • Irritable bowel syndrome Sister    • Malig Hyperthermia Neg Hx        Review of Systems   Constitutional: Negative for activity change, appetite change and fatigue.   HENT: Negative for sore throat and trouble swallowing.    Respiratory: Negative.    Cardiovascular: Negative.    Gastrointestinal: Positive for abdominal pain. Negative for abdominal distention and blood in stool.   Endocrine: Negative for cold intolerance and heat intolerance.   Genitourinary: Negative for difficulty urinating, dysuria and frequency.   Musculoskeletal: Negative for arthralgias, back pain and myalgias.   Skin: Negative.    Hematological: Negative for adenopathy. Does not bruise/bleed easily.   All other systems reviewed and are negative.      Objective     Vitals:    09/10/19 1308   BP: 112/64   Temp: 98.2 °F (36.8 °C)          09/10/19  1308   Weight: 57.2 kg (126 lb 3.2 oz)     Body mass index is 19.77 kg/m².    Physical Exam   Constitutional: She is oriented to person, place, and time. She appears well-developed and well-nourished. No distress.   HENT:   Head: Normocephalic and atraumatic.   Right Ear: External ear normal.   Left Ear: External ear normal.   Nose: Nose normal.   Mouth/Throat: Oropharynx is clear and moist.   Eyes: Conjunctivae and EOM are normal. Right eye exhibits no discharge. Left eye exhibits no discharge. No scleral icterus.   Neck: Normal range of motion. Neck supple. No thyromegaly present.   No supraclavicular adenopathy   Cardiovascular: Normal rate, regular rhythm, normal heart sounds and intact distal pulses. Exam reveals no gallop.   No murmur heard.  No lower extremity edema   Pulmonary/Chest: Effort normal and breath sounds normal. No respiratory distress. She has no wheezes.   Abdominal: Soft. Normal appearance and bowel sounds are normal. She exhibits no distension and no mass. There is no hepatosplenomegaly. There is no tenderness. There is no rigidity, no rebound and no guarding. No hernia.   Large healed midline incision from below the navel to pubic area.  Tender to deep palpation in the left lower quadrant   Genitourinary:   Genitourinary Comments: Rectal exam deferred   Musculoskeletal: Normal range of motion. She exhibits no edema or tenderness.   No atrophy of upper or lower extremities.  Normal digits and nails of both hands.   Lymphadenopathy:     She has no cervical adenopathy.   Neurological: She is alert and oriented to person, place, and time. She displays no atrophy. Coordination normal.   Skin: Skin is warm and dry. No rash noted. She is not diaphoretic. No erythema.   Psychiatric: She has a normal mood and affect. Her behavior is normal. Judgment and thought content normal.   Vitals reviewed.      WBC   Date Value Ref Range Status   09/19/2018 10.30 4.50 - 10.70 10*3/mm3 Final     RBC    Date Value Ref Range Status   09/19/2018 3.38 (L) 3.90 - 5.20 10*6/mm3 Final     Hemoglobin   Date Value Ref Range Status   09/19/2018 9.6 (L) 11.9 - 15.5 g/dL Final     Hematocrit   Date Value Ref Range Status   09/19/2018 30.0 (L) 35.6 - 45.5 % Final     MCV   Date Value Ref Range Status   09/19/2018 88.8 80.5 - 98.2 fL Final     MCH   Date Value Ref Range Status   09/19/2018 28.4 26.9 - 32.0 pg Final     MCHC   Date Value Ref Range Status   09/19/2018 32.0 (L) 32.4 - 36.3 g/dL Final     RDW   Date Value Ref Range Status   09/19/2018 13.5 (H) 11.7 - 13.0 % Final     RDW-SD   Date Value Ref Range Status   09/19/2018 43.5 37.0 - 54.0 fl Final     MPV   Date Value Ref Range Status   09/19/2018 9.9 6.0 - 12.0 fL Final     Platelets   Date Value Ref Range Status   09/19/2018 183 140 - 500 10*3/mm3 Final     Neutrophil %   Date Value Ref Range Status   09/19/2018 77.5 (H) 42.7 - 76.0 % Final     Lymphocyte %   Date Value Ref Range Status   09/19/2018 10.9 (L) 19.6 - 45.3 % Final     Monocyte %   Date Value Ref Range Status   09/19/2018 11.0 5.0 - 12.0 % Final     Eosinophil %   Date Value Ref Range Status   09/19/2018 0.3 0.3 - 6.2 % Final     Basophil %   Date Value Ref Range Status   09/19/2018 0.3 0.0 - 1.5 % Final     Immature Grans %   Date Value Ref Range Status   09/19/2018 0.2 0.0 - 0.5 % Final     Neutrophils, Absolute   Date Value Ref Range Status   09/19/2018 7.99 1.90 - 8.10 10*3/mm3 Final     Lymphocytes, Absolute   Date Value Ref Range Status   09/19/2018 1.12 0.90 - 4.80 10*3/mm3 Final     Monocytes, Absolute   Date Value Ref Range Status   09/19/2018 1.13 0.20 - 1.20 10*3/mm3 Final     Eosinophils, Absolute   Date Value Ref Range Status   09/19/2018 0.03 0.00 - 0.70 10*3/mm3 Final     Basophils, Absolute   Date Value Ref Range Status   09/19/2018 0.03 0.00 - 0.20 10*3/mm3 Final     Immature Grans, Absolute   Date Value Ref Range Status   09/19/2018 0.02 0.00 - 0.03 10*3/mm3 Final     nRBC   Date Value  Ref Range Status   07/06/2018 0.0 0.0 - 0.0 /100 WBC Final       Glucose   Date Value Ref Range Status   09/20/2018 84 65 - 99 mg/dL Final     Sodium   Date Value Ref Range Status   09/20/2018 144 136 - 145 mmol/L Final     Potassium   Date Value Ref Range Status   09/20/2018 4.5 3.5 - 5.2 mmol/L Final     CO2   Date Value Ref Range Status   09/20/2018 22.8 22.0 - 29.0 mmol/L Final     Chloride   Date Value Ref Range Status   09/20/2018 110 (H) 98 - 107 mmol/L Final     Anion Gap   Date Value Ref Range Status   09/20/2018 11.2 mmol/L Final     Creatinine   Date Value Ref Range Status   09/20/2018 2.24 (H) 0.57 - 1.00 mg/dL Final   06/15/2018 2.90 (H) 0.60 - 1.30 mg/dL Final     Comment:     Serial Number: [id number]:  [id number]     BUN   Date Value Ref Range Status   09/20/2018 28 (H) 6 - 20 mg/dL Final     BUN/Creatinine Ratio   Date Value Ref Range Status   09/20/2018 12.5 7.0 - 25.0 Final     Calcium   Date Value Ref Range Status   09/20/2018 9.8 8.6 - 10.5 mg/dL Final     eGFR Non  Amer   Date Value Ref Range Status   09/20/2018 23 (L) >60 mL/min/1.73 Final     Alkaline Phosphatase   Date Value Ref Range Status   07/11/2018 58 39 - 117 U/L Final     Total Protein   Date Value Ref Range Status   07/11/2018 5.8 (L) 6.0 - 8.5 g/dL Final     ALT (SGPT)   Date Value Ref Range Status   07/11/2018 10 1 - 33 U/L Final     AST (SGOT)   Date Value Ref Range Status   07/11/2018 30 1 - 32 U/L Final     Total Bilirubin   Date Value Ref Range Status   07/11/2018 <0.2 0.1 - 1.2 mg/dL Final     Albumin   Date Value Ref Range Status   09/20/2018 3.50 3.50 - 5.20 g/dL Final     Globulin   Date Value Ref Range Status   07/11/2018 2.8 gm/dL Final         CT OF THE ABDOMEN AND PELVIS WITHOUT CONTRAST 05/14/2019     HISTORY: Abdominal pain.     TECHNIQUE: Axial images were obtained from the lung bases to the  symphysis pubis. No intravenous or oral contrast was given.     FINDINGS: There are chain sutures in the base of the  right lung. Small  low density liver lesion is seen consistent with a hepatic cyst.  Gallbladder has been removed. There are several low-density lesions in  the spleen which were also present on the previous study of 06/21/2018.  The pancreas, adrenals, and kidneys appear within normal limits. Small  umbilical hernia containing fat is seen. Surgical sutures are seen from  previous colon resection. No bowel wall thickening or bowel dilatation  is seen. Uterus and urinary bladder appear unremarkable.     IMPRESSION:  1. Multiple low-density lesions in the spleen appears stable.  2. Small hepatic cysts appear stable.  3. Status post cholecystectomy and partial colon resection.  4. No acute findings are seen to explain patient's abdominal pain.     Radiation dose reduction techniques were utilized, including automated  exposure control and exposure modulation based on body size.     This report was finalized on 5/14/2019 8:38 PM by Dr. Lauri Vega M.D.      No notes on file    Assessment/Plan    Left lower quadrant pain: Persistent since sigmoid colectomy.  Possibly functional versus related to scar tissue    Epigastric pain: Separate from the left lower quadrant pain.?  Reflux versus gastritis    History of colonic diverticulitis    History of colonic resection    Plan  We will check an H. pylori breath test today to rule out H. pylori infection.     Zantac every night to see if that helps with epigastric pain    Samples of IBgard given for left lower quadrant pain    May need to consider colonoscopy for formal assessment of the colon following all of her surgeries if she is not improving with symptoms    Sun was seen today for abdominal pain.    Diagnoses and all orders for this visit:    LLQ pain    Epigastric pain  -     H. Pylori Breath Test - Breath, Lung    History of colonic diverticulitis    History of colon resection        I have discussed the above plan with the patient.  They verbalize  understanding and are in agreement with the plan.  They have been advised to contact the office for any questions, concerns, or changes related to their health.    Dictated utilizing Dragon dictation

## 2019-09-11 LAB — UREA BREATH TEST QL: NEGATIVE

## 2019-09-11 RX ORDER — IRBESARTAN 300 MG/1
300 TABLET ORAL DAILY
Qty: 90 TABLET | Refills: 0 | Status: SHIPPED | OUTPATIENT
Start: 2019-09-11 | End: 2020-11-03

## 2019-09-24 ENCOUNTER — TELEPHONE (OUTPATIENT)
Dept: GASTROENTEROLOGY | Facility: CLINIC | Age: 54
End: 2019-09-24

## 2019-09-24 NOTE — TELEPHONE ENCOUNTER
----- Message from Lupe Garibay MD sent at 9/12/2019  4:15 PM EDT -----  H pylori testing was negative    Continue zantac

## 2019-09-25 NOTE — TELEPHONE ENCOUNTER
Called pt and advised per Dr Garibay that her h pylori test was neg and she recommends to stop the zantac and take 20mg of pepcid. Pt verb understanding.

## 2019-12-12 ENCOUNTER — OFFICE VISIT (OUTPATIENT)
Dept: GASTROENTEROLOGY | Facility: CLINIC | Age: 54
End: 2019-12-12

## 2019-12-12 VITALS
SYSTOLIC BLOOD PRESSURE: 116 MMHG | HEIGHT: 67 IN | WEIGHT: 129 LBS | TEMPERATURE: 97.6 F | BODY MASS INDEX: 20.25 KG/M2 | DIASTOLIC BLOOD PRESSURE: 78 MMHG

## 2019-12-12 DIAGNOSIS — R14.2 BELCHING: ICD-10-CM

## 2019-12-12 DIAGNOSIS — R10.32 LLQ PAIN: ICD-10-CM

## 2019-12-12 DIAGNOSIS — R10.10 UPPER ABDOMINAL PAIN: Primary | ICD-10-CM

## 2019-12-12 DIAGNOSIS — R10.13 DYSPEPSIA: ICD-10-CM

## 2019-12-12 PROCEDURE — 99214 OFFICE O/P EST MOD 30 MIN: CPT | Performed by: INTERNAL MEDICINE

## 2019-12-12 RX ORDER — CALCITRIOL 0.25 UG/1
CAPSULE, LIQUID FILLED ORAL
Refills: 6 | COMMUNITY
Start: 2019-11-09

## 2019-12-12 NOTE — PROGRESS NOTES
Chief Complaint   Patient presents with   • Abdominal Pain   • Nausea     Subjective     HPI  Sun Rodriguez is a 54 y.o. female who presents for follow-up.  She has been seen previously for epigastric pain, a separate left lower quadrant pain, history of diverticulitis, and history of sigmoid colectomy for complicated diverticulitis.    She had been doing well following her last visit.  However, over the past few weeks has had recurrence of symptoms.  Around thanksgiving 3 weeks ago, she had more LLQ pain, similar to past diverticulitis episodes.  It did resolve without any significant intervention.  She had no imaging or labs at that time.    She is taking pepcid mostly at night.      Persists with dyspepsia, belching, upper abdominal pain.    Taking IB tracy twice a day, uncertain if it helps.    Averages 2 BMs a day, sometimes up to 4 times per day.        Past Medical History:   Diagnosis Date   • Asthma    • Bronchiectasis (CMS/HCC)    • Chronic kidney disease    • Colitis    • Diverticulitis    • Diverticulosis    • Hypercholesteremia    • Hypothyroidism    • Joint pain    • PONV (postoperative nausea and vomiting)    • Postoperative urinary retention    • Wegener's granulomatosis with renal involvement (CMS/HCC)    • Weight loss        Social History     Socioeconomic History   • Marital status:      Spouse name: Not on file   • Number of children: Not on file   • Years of education: Not on file   • Highest education level: Not on file   Occupational History   • Occupation: analysis   Tobacco Use   • Smoking status: Never Smoker   • Smokeless tobacco: Never Used   Substance and Sexual Activity   • Alcohol use: No   • Drug use: No   • Sexual activity: Yes     Partners: Male     Birth control/protection: Post-menopausal         Current Outpatient Medications:   •  albuterol (PROVENTIL HFA;VENTOLIN HFA) 108 (90 Base) MCG/ACT inhaler, Inhale 2 puffs Every 4 (Four) Hours As Needed for Wheezing., Disp: ,  Rfl:   •  budesonide-formoterol (SYMBICORT) 160-4.5 MCG/ACT inhaler, Inhale 2 puffs 2 (Two) Times a Day., Disp: , Rfl:   •  calcitriol (ROCALTROL) 0.25 MCG capsule, TK 1 C PO 3 TIMES Q WK, Disp: , Rfl: 6  •  Cholecalciferol (VITAMIN D) 1000 units tablet, Take 1,000 Units by mouth Daily., Disp: , Rfl:   •  febuxostat (ULORIC) 40 MG tablet, Take 40 mg by mouth Daily., Disp: , Rfl:   •  levothyroxine (SYNTHROID, LEVOTHROID) 100 MCG tablet, Take  by mouth Daily., Disp: , Rfl: 6  •  propranolol (INDERAL) 10 MG tablet, Take 10 mg by mouth Daily., Disp: , Rfl: 1  •  rosuvastatin (CRESTOR) 20 MG tablet, Take 20 mg by mouth Daily., Disp: , Rfl:   •  sodium bicarbonate 650 MG tablet, Take 650 mg by mouth 2 (Two) Times a Day., Disp: , Rfl:   •  fluticasone (FLONASE) 50 MCG/ACT nasal spray, 2 sprays into the nostril(s) as directed by provider Daily., Disp: , Rfl:   •  irbesartan (AVAPRO) 300 MG tablet, Take 1 tablet by mouth Daily., Disp: 90 tablet, Rfl: 0    Review of Systems   Constitutional: Negative for activity change, appetite change, chills and fever.   HENT: Negative for trouble swallowing.    Respiratory: Negative.    Cardiovascular: Negative.  Negative for chest pain.   Gastrointestinal: Positive for abdominal pain. Negative for abdominal distention, anal bleeding, constipation, diarrhea, nausea and vomiting.        Belching, dyspepsia   Genitourinary: Negative for dysuria, frequency and hematuria.       Objective   Vitals:    12/12/19 0910   BP: 116/78   Temp: 97.6 °F (36.4 °C)         12/12/19  0910   Weight: 58.5 kg (129 lb)     Body mass index is 20.2 kg/m².      Physical Exam   Constitutional: She is oriented to person, place, and time. She appears well-developed and well-nourished. No distress.   HENT:   Head: Normocephalic and atraumatic.   Right Ear: External ear normal.   Left Ear: External ear normal.   Nose: Nose normal.   Mouth/Throat: Oropharynx is clear and moist.   Eyes: Conjunctivae and EOM are normal.  Right eye exhibits no discharge. Left eye exhibits no discharge. No scleral icterus.   Neck: Normal range of motion. Neck supple. No thyromegaly present.   No supraclavicular adenopathy   Cardiovascular: Normal rate, regular rhythm, normal heart sounds and intact distal pulses. Exam reveals no gallop.   No murmur heard.  No lower extremity edema   Pulmonary/Chest: Effort normal and breath sounds normal. No respiratory distress. She has no wheezes.   Abdominal: Soft. Normal appearance and bowel sounds are normal. She exhibits no distension and no mass. There is no hepatosplenomegaly. There is tenderness. There is no rigidity, no rebound and no guarding. No hernia.   Genitourinary:   Genitourinary Comments: Rectal exam deferred   Musculoskeletal: Normal range of motion. She exhibits no edema or tenderness.   No atrophy of upper or lower extremities.  Normal digits and nails of both hands.   Lymphadenopathy:     She has no cervical adenopathy.   Neurological: She is alert and oriented to person, place, and time. She displays no atrophy. Coordination normal.   Skin: Skin is warm and dry. No rash noted. She is not diaphoretic. No erythema.   Psychiatric: She has a normal mood and affect. Her behavior is normal. Judgment and thought content normal.   Vitals reviewed.      WBC   Date Value Ref Range Status   09/19/2018 10.30 4.50 - 10.70 10*3/mm3 Final     RBC   Date Value Ref Range Status   09/19/2018 3.38 (L) 3.90 - 5.20 10*6/mm3 Final     Hemoglobin   Date Value Ref Range Status   09/19/2018 9.6 (L) 11.9 - 15.5 g/dL Final     Hematocrit   Date Value Ref Range Status   09/19/2018 30.0 (L) 35.6 - 45.5 % Final     MCV   Date Value Ref Range Status   09/19/2018 88.8 80.5 - 98.2 fL Final     MCH   Date Value Ref Range Status   09/19/2018 28.4 26.9 - 32.0 pg Final     MCHC   Date Value Ref Range Status   09/19/2018 32.0 (L) 32.4 - 36.3 g/dL Final     RDW   Date Value Ref Range Status   09/19/2018 13.5 (H) 11.7 - 13.0 % Final      RDW-SD   Date Value Ref Range Status   09/19/2018 43.5 37.0 - 54.0 fl Final     MPV   Date Value Ref Range Status   09/19/2018 9.9 6.0 - 12.0 fL Final     Platelets   Date Value Ref Range Status   09/19/2018 183 140 - 500 10*3/mm3 Final     Neutrophil %   Date Value Ref Range Status   09/19/2018 77.5 (H) 42.7 - 76.0 % Final     Lymphocyte %   Date Value Ref Range Status   09/19/2018 10.9 (L) 19.6 - 45.3 % Final     Monocyte %   Date Value Ref Range Status   09/19/2018 11.0 5.0 - 12.0 % Final     Eosinophil %   Date Value Ref Range Status   09/19/2018 0.3 0.3 - 6.2 % Final     Basophil %   Date Value Ref Range Status   09/19/2018 0.3 0.0 - 1.5 % Final     Immature Grans %   Date Value Ref Range Status   09/19/2018 0.2 0.0 - 0.5 % Final     Neutrophils, Absolute   Date Value Ref Range Status   09/19/2018 7.99 1.90 - 8.10 10*3/mm3 Final     Lymphocytes, Absolute   Date Value Ref Range Status   09/19/2018 1.12 0.90 - 4.80 10*3/mm3 Final     Monocytes, Absolute   Date Value Ref Range Status   09/19/2018 1.13 0.20 - 1.20 10*3/mm3 Final     Eosinophils, Absolute   Date Value Ref Range Status   09/19/2018 0.03 0.00 - 0.70 10*3/mm3 Final     Basophils, Absolute   Date Value Ref Range Status   09/19/2018 0.03 0.00 - 0.20 10*3/mm3 Final     Immature Grans, Absolute   Date Value Ref Range Status   09/19/2018 0.02 0.00 - 0.03 10*3/mm3 Final     nRBC   Date Value Ref Range Status   07/06/2018 0.0 0.0 - 0.0 /100 WBC Final       Lab Results   Component Value Date    GLUCOSE 84 09/20/2018    BUN 28 (H) 09/20/2018    CREATININE 2.24 (H) 09/20/2018    EGFRIFNONA 23 (L) 09/20/2018    BCR 12.5 09/20/2018    CO2 22.8 09/20/2018    CALCIUM 9.8 09/20/2018    ALBUMIN 3.50 09/20/2018    AST 30 07/11/2018    ALT 10 07/11/2018         Imaging Results (Last 7 Days)     ** No results found for the last 168 hours. **            Assessment/Plan    Upper abdominal pain: Persistent likely dyspepsia symptoms.  She is on mostly once daily H2  blocker.  She has significant kidney dysfunction so she has not been on a PPI.    Dyspepsia: An EGD    LLQ pain: I am suspicious that this is sequelae from her sigmoid resection    Belching: Her dyspepsia    Plan  I am going to have her check with her nephrologist (she sees him in January) if she would be okay to do a short course of low dose PPI.  I am doubtful that this can happen so will increase her famotidine to twice a day.    She is going to hold IBgard to see if it was actually making any difference for her    Consider adding Carafate if we cannot go to PPI    Advised adding daily fiber to try to regulate her bowel movements    Sun was seen today for abdominal pain and nausea.    Diagnoses and all orders for this visit:    Upper abdominal pain    Dyspepsia    LLQ pain    Belching        Dictated utilizing Dragon dictation

## 2020-03-10 ENCOUNTER — APPOINTMENT (OUTPATIENT)
Dept: WOMENS IMAGING | Facility: HOSPITAL | Age: 55
End: 2020-03-10

## 2020-03-10 PROCEDURE — 77080 DXA BONE DENSITY AXIAL: CPT | Performed by: RADIOLOGY

## 2020-11-03 ENCOUNTER — TELEMEDICINE (OUTPATIENT)
Dept: GASTROENTEROLOGY | Facility: CLINIC | Age: 55
End: 2020-11-03

## 2020-11-03 VITALS — WEIGHT: 125 LBS | HEIGHT: 67 IN | BODY MASS INDEX: 19.62 KG/M2

## 2020-11-03 DIAGNOSIS — R10.13 DYSPEPSIA: ICD-10-CM

## 2020-11-03 DIAGNOSIS — R14.2 BELCHING: ICD-10-CM

## 2020-11-03 DIAGNOSIS — Z90.49 HISTORY OF COLON RESECTION: ICD-10-CM

## 2020-11-03 DIAGNOSIS — K57.92 DIVERTICULITIS: Primary | ICD-10-CM

## 2020-11-03 PROCEDURE — 99214 OFFICE O/P EST MOD 30 MIN: CPT | Performed by: NURSE PRACTITIONER

## 2020-11-03 NOTE — PROGRESS NOTES
Chief Complaint   Patient presents with   • Diverticulitis       Sun Rodriguez is a  55 y.o. female here for a MyChart telehealth video follow up visit for diverticulitis.    HPI  55-year-old female presents today for a MyChart telehealth video follow-up visit for diverticulitis.  She is a patient of Dr. Garibay.  She was last seen in the office on 12/12/2019.  She has a history of diverticulitis and had has even had to undergo a colon resection in 2018.  She admits this past July she thinks that she suffered a pretty nasty diverticulitis flare.  She tells me she tried multiple times to get a hold of someone here at the office but she never could.  She tells me since then she is definitely much better.  She is trying to eat a higher fiber diet.  She tells me she used to take fiber supplementation but since this latest flare she has not taken any.  She realizes she probably should restart it.  At the time when she tried it before she was still really have any regular bowel movements from her colon resection just felt like the fiber did not make things better it just made things worse.  She also has a history of some dyspepsia.  She tells me she has a lot of belching.  She tried some over-the-counter Pepcid but this really did not help any so she quit taking it.  She has a history of chronic kidney disease so she cannot take any PPIs.  She denies any dysphagia, reflux, abdominal pain, nausea and vomiting, diarrhea, constipation, rectal bleeding or melena.  She was appetite is good and her weight is stable.  Past Medical History:   Diagnosis Date   • Asthma    • Bronchiectasis (CMS/HCC)    • Chronic kidney disease    • Colitis    • Diverticulitis    • Diverticulosis    • Hypercholesteremia    • Hypothyroidism    • Joint pain    • PONV (postoperative nausea and vomiting)    • Postoperative urinary retention    • Wegener's granulomatosis with renal involvement (CMS/HCC)    • Weight loss        Past Surgical History:    Procedure Laterality Date   • ABDOMINAL SURGERY  05/2018, 07/2018,09/2018    Colon resection, temp ileostomy, reversal   • CHOLECYSTECTOMY  06/2018   • COLON RESECTION N/A 5/1/2018    Procedure: COLON RESECTION LAPAROSCOPIC SIGMOID with splenic flexure takedown;  Surgeon: Bee Carreon MD;  Location: [facility];  Service: General   • COLONOSCOPY N/A 6/24/2016    Procedure: COLONOSCOPY INTO CECUM;  Surgeon: Bee Carreon MD;  Location: [facility];  Service:    • COLONOSCOPY N/A 6/21/2018    Procedure: COLONOSCOPY WITH DECOMPRESSION;  Surgeon: Bee Carreon MD;  Location: [facility];  Service: Gastroenterology   • EXPLORATORY LAPAROTOMY N/A 7/1/2018    Procedure: EXPLORATORY LAPAROTOMY WITH RESECTION OF SIGMOID COLON WITH DIVERTING ILEOSTOMY;  Surgeon: Bee Carreon MD;  Location: [facility];  Service: General   • ILEOSTOMY     • ILEOSTOMY CLOSURE N/A 9/18/2018    Procedure: ILEOSTOMY TAKEDOWN;  Surgeon: Bee Carreon MD;  Location: [facility];  Service: General   • LUNG BIOPSY Right 2014   • OOPHORECTOMY Right    • RENAL BIOPSY  2007   • SALPINGECTOMY Right    • SIGMOIDOSCOPY N/A 6/23/2018    Procedure: FLEXIBLE SIGMOIDOSCOPY WITH 12MM BALLOON DILATATION;  Surgeon: Bee Carreon MD;  Location: [facility];  Service: General   • SIGMOIDOSCOPY N/A 7/1/2018    Procedure: FLEXIBLE SIGMOIDOSCOPY WITH DILATATION with colonic balloons 8 TO 15MM;  Surgeon: Bee Carreon MD;  Location: [facility];  Service: General   • WISDOM TOOTH EXTRACTION         Scheduled Meds:    Continuous Infusions:No current facility-administered medications for this visit.       PRN Meds:.    Allergies   Allergen Reactions   • Biaxin [Clarithromycin] Nausea Only       Social History     Socioeconomic History   • Marital status:      Spouse name: Not on file   • Number of children: Not on file   • Years of education: Not on file   • Highest education level: Not on file    Occupational History   • Occupation: analysis   Tobacco Use   • Smoking status: Never Smoker   • Smokeless tobacco: Never Used   Substance and Sexual Activity   • Alcohol use: No   • Drug use: No   • Sexual activity: Yes     Partners: Male     Birth control/protection: Post-menopausal       Family History   Problem Relation Age of Onset   • Prostate cancer Father    • Irritable bowel syndrome Sister    • Malig Hyperthermia Neg Hx        Review of Systems   Constitutional: Negative for appetite change, chills, diaphoresis, fatigue, fever and unexpected weight change.   HENT: Negative for nosebleeds, postnasal drip, sore throat, trouble swallowing and voice change.    Respiratory: Negative for cough, choking, chest tightness, shortness of breath and wheezing.    Cardiovascular: Negative for chest pain, palpitations and leg swelling.   Gastrointestinal: Negative for abdominal distention, abdominal pain, anal bleeding, blood in stool, constipation, diarrhea, nausea, rectal pain and vomiting.   Endocrine: Negative for polydipsia, polyphagia and polyuria.   Musculoskeletal: Negative for gait problem.   Skin: Negative for rash and wound.   Allergic/Immunologic: Negative for food allergies.   Neurological: Negative for dizziness, speech difficulty and light-headedness.   Psychiatric/Behavioral: Negative for confusion, self-injury, sleep disturbance and suicidal ideas.       There were no vitals filed for this visit.    Physical Exam  Constitutional:       General: She is not in acute distress.     Appearance: She is not ill-appearing.   Pulmonary:      Effort: Pulmonary effort is normal. No respiratory distress.   Neurological:      Mental Status: She is oriented to person, place, and time.   Psychiatric:         Mood and Affect: Mood normal.         Thought Content: Thought content normal.         Judgment: Judgment normal.         No radiology results for the last 7 days     Assessment and plan      1.  Diverticulitis    2. History of colon resection    3. Belching    4. Dyspepsia    Sounds like she did suffer a possible diverticulitis flare this past July.  She is much better now.  Bowels are moving much better.  Recommend daily fiber supplementation.  Recommend a high-fiber diet.  She is due for a screening colonoscopy with Dr. Garibay.  Patient would like to discuss with Dr. Garibay when she would like to do the colonoscopy and if she would like to add an EGD to it.  Since patient has never had an EGD and continues to have some dyspepsia.  Patient to call the office with any issues.  Patient to follow-up with Dr. Garibay as planned.

## 2020-12-23 ENCOUNTER — OFFICE VISIT (OUTPATIENT)
Dept: GASTROENTEROLOGY | Facility: CLINIC | Age: 55
End: 2020-12-23

## 2020-12-23 ENCOUNTER — TELEPHONE (OUTPATIENT)
Dept: GASTROENTEROLOGY | Facility: CLINIC | Age: 55
End: 2020-12-23

## 2020-12-23 VITALS
WEIGHT: 127 LBS | HEIGHT: 67 IN | DIASTOLIC BLOOD PRESSURE: 75 MMHG | SYSTOLIC BLOOD PRESSURE: 118 MMHG | BODY MASS INDEX: 19.93 KG/M2

## 2020-12-23 DIAGNOSIS — Z87.19 HISTORY OF DIVERTICULITIS: ICD-10-CM

## 2020-12-23 DIAGNOSIS — R10.30 LOWER ABDOMINAL PAIN: ICD-10-CM

## 2020-12-23 DIAGNOSIS — R10.13 DYSPEPSIA: Primary | ICD-10-CM

## 2020-12-23 PROCEDURE — 99214 OFFICE O/P EST MOD 30 MIN: CPT | Performed by: INTERNAL MEDICINE

## 2020-12-23 RX ORDER — SODIUM CHLORIDE, SODIUM LACTATE, POTASSIUM CHLORIDE, CALCIUM CHLORIDE 600; 310; 30; 20 MG/100ML; MG/100ML; MG/100ML; MG/100ML
30 INJECTION, SOLUTION INTRAVENOUS CONTINUOUS
Status: CANCELLED | OUTPATIENT
Start: 2021-01-26

## 2020-12-23 NOTE — PROGRESS NOTES
Chief Complaint   Patient presents with   • Diverticulitis       Subjective     HPI    Sun Rodriguez is a 55 y.o. female with a past medical history noted below who presents for follow up.  She has been seen previously for epigastric pain, a separate left lower quadrant pain, history of diverticulitis, and history of sigmoid colectomy for complicated diverticulitis.    Episode in July where she had severe lower abdominal pain, felt like she had diverticulitis again.  Did not go to the ER.  Resolved after 3-4 days.  Has also had 2 distinct sets of lower abdominal pain, similar to when she had an intestinal obstruction before. Lasted hours at a time and then resolved as quickly as they had started. She did not feel they were related to bowel movements. A little nausea but no vomiting with episodes she has not had any imaging in over a year.    Recent labs with her nephrologist Dr. Corado and Cr stable at 2.6    Still with occasional dyspepsia episodes. She is on Pepcid and takes it a couple of times a week. Usually helps her symptoms    Taking Metamucil every few days. Keeps her bowels pretty regular.    Reports some fatigue. Otherwise eating and drinking pretty well    Today's visit was in the office.  Both the patient and I were wearing face masks and proper hand hygiene was performed before and after the physical exam.       Past Medical History:   Diagnosis Date   • Asthma    • Bronchiectasis (CMS/HCC)    • Chronic kidney disease    • Colitis    • Diverticulitis    • Diverticulosis    • Hypercholesteremia    • Hypothyroidism    • Joint pain    • PONV (postoperative nausea and vomiting)    • Postoperative urinary retention    • Wegener's granulomatosis with renal involvement (CMS/HCC)    • Weight loss          Current Outpatient Medications:   •  albuterol (PROVENTIL HFA;VENTOLIN HFA) 108 (90 Base) MCG/ACT inhaler, Inhale 2 puffs Every 4 (Four) Hours As Needed for Wheezing., Disp: , Rfl:   •   budesonide-formoterol (SYMBICORT) 160-4.5 MCG/ACT inhaler, Inhale 2 puffs 2 (Two) Times a Day., Disp: , Rfl:   •  calcitriol (ROCALTROL) 0.25 MCG capsule, TK 1 C PO 3 TIMES Q WK, Disp: , Rfl: 6  •  Cholecalciferol (VITAMIN D) 1000 units tablet, Take 1,000 Units by mouth Daily., Disp: , Rfl:   •  febuxostat (ULORIC) 40 MG tablet, Take 40 mg by mouth Daily., Disp: , Rfl:   •  fluticasone (FLONASE) 50 MCG/ACT nasal spray, 2 sprays into the nostril(s) as directed by provider Daily., Disp: , Rfl:   •  levothyroxine (SYNTHROID, LEVOTHROID) 100 MCG tablet, Take  by mouth Daily., Disp: , Rfl: 6  •  propranolol (INDERAL) 10 MG tablet, Take 10 mg by mouth Daily., Disp: , Rfl: 1  •  rosuvastatin (CRESTOR) 20 MG tablet, Take 20 mg by mouth Daily., Disp: , Rfl:   •  sodium bicarbonate 650 MG tablet, Take 650 mg by mouth 2 (Two) Times a Day., Disp: , Rfl:     Allergies   Allergen Reactions   • Biaxin [Clarithromycin] Nausea Only       Social History     Socioeconomic History   • Marital status:      Spouse name: Not on file   • Number of children: Not on file   • Years of education: Not on file   • Highest education level: Not on file   Occupational History   • Occupation: analysis   Tobacco Use   • Smoking status: Never Smoker   • Smokeless tobacco: Never Used   Substance and Sexual Activity   • Alcohol use: No   • Drug use: No   • Sexual activity: Yes     Partners: Male     Birth control/protection: Post-menopausal       Family History   Problem Relation Age of Onset   • Prostate cancer Father    • Irritable bowel syndrome Sister    • Malig Hyperthermia Neg Hx        Review of Systems   Constitutional: Positive for fatigue. Negative for activity change and appetite change.   HENT: Negative for sore throat and trouble swallowing.    Respiratory: Negative.    Cardiovascular: Negative.    Gastrointestinal: Positive for abdominal pain. Negative for abdominal distention and blood in stool.        Dyspepsia   Endocrine:  Negative for polydipsia, polyphagia and polyuria.   Genitourinary: Negative for difficulty urinating, dysuria and frequency.   Musculoskeletal: Negative for arthralgias, back pain and myalgias.   Skin: Negative.    Hematological: Negative for adenopathy. Does not bruise/bleed easily.   All other systems reviewed and are negative.      Objective     Vitals:    12/23/20 1238   BP: 118/75         12/23/20  1238   Weight: 57.6 kg (127 lb)     Body mass index is 19.89 kg/m².    Physical Exam  Vitals signs reviewed.   Constitutional:       General: She is not in acute distress.     Appearance: Normal appearance. She is well-developed. She is not diaphoretic.   HENT:      Head: Normocephalic and atraumatic.      Right Ear: External ear normal.      Left Ear: External ear normal.      Nose: Nose normal.   Eyes:      General: No scleral icterus.        Right eye: No discharge.         Left eye: No discharge.      Conjunctiva/sclera: Conjunctivae normal.   Neck:      Musculoskeletal: Normal range of motion and neck supple.      Thyroid: No thyromegaly.      Comments: No supraclavicular adenopathy  Cardiovascular:      Rate and Rhythm: Normal rate and regular rhythm.      Heart sounds: Normal heart sounds. No murmur. No gallop.       Comments: No lower extremity edema  Pulmonary:      Effort: Pulmonary effort is normal. No respiratory distress.      Breath sounds: Normal breath sounds. No wheezing.   Abdominal:      General: Bowel sounds are normal. There is no distension.      Palpations: Abdomen is soft. Abdomen is not rigid. There is no mass.      Tenderness: There is no abdominal tenderness. There is no guarding or rebound.      Hernia: No hernia is present.      Comments: Well-healed midline abdominal incision just below navel   Genitourinary:     Comments: Rectal exam deferred  Musculoskeletal: Normal range of motion.         General: No tenderness.      Comments: No atrophy of upper or lower extremities.  Normal digits  and nails of both hands.   Lymphadenopathy:      Cervical: No cervical adenopathy.   Skin:     General: Skin is warm and dry.      Findings: No erythema or rash.   Neurological:      Mental Status: She is alert and oriented to person, place, and time.      Motor: No atrophy.      Coordination: Coordination normal.   Psychiatric:         Behavior: Behavior normal.         Thought Content: Thought content normal.         Judgment: Judgment normal.         WBC   Date Value Ref Range Status   09/19/2018 10.30 4.50 - 10.70 10*3/mm3 Final     RBC   Date Value Ref Range Status   09/19/2018 3.38 (L) 3.90 - 5.20 10*6/mm3 Final     Hemoglobin   Date Value Ref Range Status   09/19/2018 9.6 (L) 11.9 - 15.5 g/dL Final     Hematocrit   Date Value Ref Range Status   09/19/2018 30.0 (L) 35.6 - 45.5 % Final     MCV   Date Value Ref Range Status   09/19/2018 88.8 80.5 - 98.2 fL Final     MCH   Date Value Ref Range Status   09/19/2018 28.4 26.9 - 32.0 pg Final     MCHC   Date Value Ref Range Status   09/19/2018 32.0 (L) 32.4 - 36.3 g/dL Final     RDW   Date Value Ref Range Status   09/19/2018 13.5 (H) 11.7 - 13.0 % Final     RDW-SD   Date Value Ref Range Status   09/19/2018 43.5 37.0 - 54.0 fl Final     MPV   Date Value Ref Range Status   09/19/2018 9.9 6.0 - 12.0 fL Final     Platelets   Date Value Ref Range Status   09/19/2018 183 140 - 500 10*3/mm3 Final     Neutrophil %   Date Value Ref Range Status   09/19/2018 77.5 (H) 42.7 - 76.0 % Final     Lymphocyte %   Date Value Ref Range Status   09/19/2018 10.9 (L) 19.6 - 45.3 % Final     Monocyte %   Date Value Ref Range Status   09/19/2018 11.0 5.0 - 12.0 % Final     Eosinophil %   Date Value Ref Range Status   09/19/2018 0.3 0.3 - 6.2 % Final     Basophil %   Date Value Ref Range Status   09/19/2018 0.3 0.0 - 1.5 % Final     Immature Grans %   Date Value Ref Range Status   09/19/2018 0.2 0.0 - 0.5 % Final     Neutrophils, Absolute   Date Value Ref Range Status   09/19/2018 7.99 1.90 -  8.10 10*3/mm3 Final     Lymphocytes, Absolute   Date Value Ref Range Status   09/19/2018 1.12 0.90 - 4.80 10*3/mm3 Final     Monocytes, Absolute   Date Value Ref Range Status   09/19/2018 1.13 0.20 - 1.20 10*3/mm3 Final     Eosinophils, Absolute   Date Value Ref Range Status   09/19/2018 0.03 0.00 - 0.70 10*3/mm3 Final     Basophils, Absolute   Date Value Ref Range Status   09/19/2018 0.03 0.00 - 0.20 10*3/mm3 Final     Immature Grans, Absolute   Date Value Ref Range Status   09/19/2018 0.02 0.00 - 0.03 10*3/mm3 Final     nRBC   Date Value Ref Range Status   07/06/2018 0.0 0.0 - 0.0 /100 WBC Final       Glucose   Date Value Ref Range Status   09/20/2018 84 65 - 99 mg/dL Final     Sodium   Date Value Ref Range Status   09/20/2018 144 136 - 145 mmol/L Final     Potassium   Date Value Ref Range Status   09/20/2018 4.5 3.5 - 5.2 mmol/L Final     CO2   Date Value Ref Range Status   09/20/2018 22.8 22.0 - 29.0 mmol/L Final     Chloride   Date Value Ref Range Status   09/20/2018 110 (H) 98 - 107 mmol/L Final     Anion Gap   Date Value Ref Range Status   09/20/2018 11.2 mmol/L Final     Creatinine   Date Value Ref Range Status   09/20/2018 2.24 (H) 0.57 - 1.00 mg/dL Final   06/15/2018 2.90 (H) 0.60 - 1.30 mg/dL Final     Comment:     Serial Number: [id number]:  [id number]     BUN   Date Value Ref Range Status   09/20/2018 28 (H) 6 - 20 mg/dL Final     BUN/Creatinine Ratio   Date Value Ref Range Status   09/20/2018 12.5 7.0 - 25.0 Final     Calcium   Date Value Ref Range Status   09/20/2018 9.8 8.6 - 10.5 mg/dL Final     eGFR Non  Amer   Date Value Ref Range Status   09/20/2018 23 (L) >60 mL/min/1.73 Final     Alkaline Phosphatase   Date Value Ref Range Status   07/11/2018 58 39 - 117 U/L Final     Total Protein   Date Value Ref Range Status   07/11/2018 5.8 (L) 6.0 - 8.5 g/dL Final     ALT (SGPT)   Date Value Ref Range Status   07/11/2018 10 1 - 33 U/L Final     AST (SGOT)   Date Value Ref Range Status   07/11/2018  30 1 - 32 U/L Final     Total Bilirubin   Date Value Ref Range Status   07/11/2018 <0.2 0.1 - 1.2 mg/dL Final     Albumin   Date Value Ref Range Status   09/20/2018 3.50 3.50 - 5.20 g/dL Final     Globulin   Date Value Ref Range Status   07/11/2018 2.8 gm/dL Final         Imaging Results (Last 7 Days)     ** No results found for the last 168 hours. **            No notes on file    Assessment/Plan    1. Lower abdominal pain: Recurrent episodes at sound fairly problematic. Unfortunately she has not had imaging at the time of these episodes to determine what is going on    2. Dyspepsia: Mild symptoms, only needing to take Pepcid a couple of times per week    3. History of diverticulitis: Status post colon resection    Plan  Continue fiber supplementation, advise changing to Benefiber to see if this is better tolerated  We will proceed with colonoscopy for further evaluation for pain, assessment following diverticular disease  Recommend calling/messaging the office should she have another episode of severe pain. We will proceed with noncontrasted CT scan to rule out diverticulitis, obstruction    Handwritten instruction note given on above    Diagnoses and all orders for this visit:    1. Dyspepsia (Primary)    2. Lower abdominal pain  -     Case Request; Standing  -     Follow Anesthesia Guidelines / Standing Orders; Future  -     Obtain Informed Consent; Future  -     Implement Anesthesia Orders Day of Procedure; Standing  -     Obtain Informed Consent; Standing  -     Verify bowel prep was successful; Standing  -     lactated ringers infusion  -     Case Request    3. History of diverticulitis  -     Case Request; Standing  -     Follow Anesthesia Guidelines / Standing Orders; Future  -     Obtain Informed Consent; Future  -     Implement Anesthesia Orders Day of Procedure; Standing  -     Obtain Informed Consent; Standing  -     Verify bowel prep was successful; Standing  -     lactated ringers infusion  -     Case  Request        I have discussed the above plan with the patient.  They verbalize understanding and are in agreement with the plan.  They have been advised to contact the office for any questions, concerns, or changes related to their health.    Dictated utilizing Dragon dictation

## 2021-01-15 ENCOUNTER — TRANSCRIBE ORDERS (OUTPATIENT)
Dept: SLEEP MEDICINE | Facility: HOSPITAL | Age: 56
End: 2021-01-15

## 2021-01-15 DIAGNOSIS — Z01.818 OTHER SPECIFIED PRE-OPERATIVE EXAMINATION: Primary | ICD-10-CM

## 2021-01-23 ENCOUNTER — LAB (OUTPATIENT)
Dept: LAB | Facility: HOSPITAL | Age: 56
End: 2021-01-23

## 2021-01-23 DIAGNOSIS — Z01.818 OTHER SPECIFIED PRE-OPERATIVE EXAMINATION: ICD-10-CM

## 2021-01-23 PROCEDURE — C9803 HOPD COVID-19 SPEC COLLECT: HCPCS

## 2021-01-23 PROCEDURE — U0004 COV-19 TEST NON-CDC HGH THRU: HCPCS

## 2021-01-25 LAB — SARS-COV-2 RNA RESP QL NAA+PROBE: NOT DETECTED

## 2021-01-25 PROCEDURE — S0260 H&P FOR SURGERY: HCPCS | Performed by: INTERNAL MEDICINE

## 2021-01-25 NOTE — H&P
[facility]  Pre Procedure History & Physical    Chief Complaint: History of diverticulitis, colon resection, recurrent lower abdominal pain      HPI: 55-year-old woman with past medical history as below here for colonoscopy for persistent left lower quadrant pain, history of diverticulitis requiring colon resection.  She otherwise feels stable.    Past Medical History:   Past Medical History:   Diagnosis Date   • Asthma    • Bronchiectasis (CMS/HCC)    • Chronic kidney disease    • Colitis    • Diverticulitis    • Diverticulosis    • Hypercholesteremia    • Hypothyroidism    • Joint pain    • PONV (postoperative nausea and vomiting)    • Postoperative urinary retention    • Wegener's granulomatosis with renal involvement (CMS/HCC)    • Weight loss        Family History:  Family History   Problem Relation Age of Onset   • Prostate cancer Father    • Irritable bowel syndrome Sister    • Malig Hyperthermia Neg Hx        Social History:   reports that she has never smoked. She has never used smokeless tobacco. She reports that she does not drink alcohol or use drugs.    Medications:   No medications prior to admission.       Allergies:  Biaxin [clarithromycin]    ROS:    Pertinent items are noted in HPI     Objective     not currently breastfeeding.    Physical Exam   Constitutional: Pt is oriented to person, place, and time and well-developed, well-nourished, and in no distress.   HENT:   Mouth/Throat: Oropharynx is clear and moist.   Neck: Normal range of motion. Neck supple.   Cardiovascular: Normal rate, regular rhythm and normal heart sounds.    Pulmonary/Chest: Effort normal and breath sounds normal. No respiratory distress. No  wheezes.   Abdominal: Soft. Bowel sounds are normal.   Skin: Skin is warm and dry.   Psychiatric: Mood, memory, affect and judgment normal.     Assessment/Plan     Diagnosis: History of diverticulitis, colon resection, recurrent lower abdominal  pain      Anticipated Surgical Procedure:    Colonoscopy    The risks, benefits, and alternatives of this procedure have been discussed with the patient or the responsible party- the patient understands and agrees to proceed.

## 2021-01-26 ENCOUNTER — ANESTHESIA EVENT (OUTPATIENT)
Dept: GASTROENTEROLOGY | Facility: HOSPITAL | Age: 56
End: 2021-01-26

## 2021-01-26 ENCOUNTER — ANESTHESIA (OUTPATIENT)
Dept: GASTROENTEROLOGY | Facility: HOSPITAL | Age: 56
End: 2021-01-26

## 2021-01-26 ENCOUNTER — HOSPITAL ENCOUNTER (OUTPATIENT)
Facility: HOSPITAL | Age: 56
Setting detail: HOSPITAL OUTPATIENT SURGERY
Discharge: HOME | End: 2021-01-26
Attending: INTERNAL MEDICINE | Admitting: INTERNAL MEDICINE

## 2021-01-26 VITALS
HEIGHT: 67 IN | SYSTOLIC BLOOD PRESSURE: 107 MMHG | WEIGHT: 124 LBS | DIASTOLIC BLOOD PRESSURE: 79 MMHG | HEART RATE: 56 BPM | OXYGEN SATURATION: 100 % | RESPIRATION RATE: 16 BRPM | TEMPERATURE: 98 F | BODY MASS INDEX: 19.46 KG/M2

## 2021-01-26 DIAGNOSIS — Z87.19 HISTORY OF DIVERTICULITIS: ICD-10-CM

## 2021-01-26 DIAGNOSIS — R10.30 LOWER ABDOMINAL PAIN: ICD-10-CM

## 2021-01-26 PROCEDURE — 25010000002 PROPOFOL 10 MG/ML EMULSION: Performed by: ANESTHESIOLOGY

## 2021-01-26 PROCEDURE — 88305 TISSUE EXAM BY PATHOLOGIST: CPT | Performed by: INTERNAL MEDICINE

## 2021-01-26 PROCEDURE — 45380 COLONOSCOPY AND BIOPSY: CPT | Performed by: INTERNAL MEDICINE

## 2021-01-26 RX ORDER — SODIUM CHLORIDE, SODIUM LACTATE, POTASSIUM CHLORIDE, CALCIUM CHLORIDE 600; 310; 30; 20 MG/100ML; MG/100ML; MG/100ML; MG/100ML
30 INJECTION, SOLUTION INTRAVENOUS CONTINUOUS
Status: DISCONTINUED | OUTPATIENT
Start: 2021-01-26 | End: 2021-01-26

## 2021-01-26 RX ORDER — PROPOFOL 10 MG/ML
VIAL (ML) INTRAVENOUS AS NEEDED
Status: DISCONTINUED | OUTPATIENT
Start: 2021-01-26 | End: 2021-01-26 | Stop reason: SURG

## 2021-01-26 RX ORDER — SODIUM CHLORIDE 9 MG/ML
1000 INJECTION, SOLUTION INTRAVENOUS CONTINUOUS
Status: DISCONTINUED | OUTPATIENT
Start: 2021-01-26 | End: 2021-01-26 | Stop reason: HOSPADM

## 2021-01-26 RX ORDER — PROPOFOL 10 MG/ML
VIAL (ML) INTRAVENOUS CONTINUOUS PRN
Status: DISCONTINUED | OUTPATIENT
Start: 2021-01-26 | End: 2021-01-26 | Stop reason: SURG

## 2021-01-26 RX ADMIN — PROPOFOL 160 MCG/KG/MIN: 10 INJECTION, EMULSION INTRAVENOUS at 10:59

## 2021-01-26 RX ADMIN — PROPOFOL 100 MG: 10 INJECTION, EMULSION INTRAVENOUS at 10:59

## 2021-01-26 RX ADMIN — SODIUM CHLORIDE 1000 ML: 9 INJECTION, SOLUTION INTRAVENOUS at 10:45

## 2021-01-26 NOTE — ANESTHESIA POSTPROCEDURE EVALUATION
"Patient: Sun Rodriguez    Procedure Summary     Date: 01/26/21 Room / Location: [facility] ENDOSCOPY 8 /  [facility]    Anesthesia Start: 1054 Anesthesia Stop: 1125    Procedure: COLONOSCOPY into cecum/terminal ileum with biopsy, polypectomy (N/A ) Diagnosis:       Lower abdominal pain      History of diverticulitis      (Lower abdominal pain [R10.30])      (History of diverticulitis [Z87.19])    Surgeon: Lupe Garibay MD Provider: Mathew Huitron MD    Anesthesia Type: MAC ASA Status: 2          Anesthesia Type: MAC    Vitals  Vitals Value Taken Time   /79 01/26/21 1143   Temp     Pulse 56 01/26/21 1143   Resp 16 01/26/21 1143   SpO2 100 % 01/26/21 1143           Post Anesthesia Care and Evaluation    Patient participation: complete - patient cannot participate  Anesthetic complications: No anesthetic complications    Cardiovascular status: acceptable  Respiratory status: acceptable    Comments: Patient record reviewed. Patient stable and discharged prior to being evaluated. No apparent anesthetic complications.  THIS CASE IS NOT MEDICALLY DIRECTED.  /79   Pulse 56   Temp 36.7 °C (98 °F) (Oral)   Resp 16   Ht 170.2 cm (67\")   Wt 56.2 kg (124 lb)   SpO2 100%   BMI 19.42 kg/m²       "

## 2021-01-26 NOTE — DISCHARGE INSTRUCTIONS

## 2021-01-26 NOTE — ANESTHESIA PREPROCEDURE EVALUATION
Anesthesia Evaluation     Patient summary reviewed and Nursing notes reviewed   history of anesthetic complications: PONV               Airway   Mallampati: II  TM distance: >3 FB  Neck ROM: full  no difficulty expected  Dental - normal exam     Pulmonary     breath sounds clear to auscultation  (+) asthma,  (-) shortness of breath, sleep apnea, decreased breath sounds, wheezes  Cardiovascular - normal exam  Exercise tolerance: good (4-7 METS)    Rhythm: regular  Rate: normal    (+) hypertension, hyperlipidemia,   (-) past MI, angina, CHF, orthopnea, PND, [place], PVD      Neuro/Psych  (+) dizziness/light headedness,     (-) seizures, neuromuscular disease, TIA, CVA, weakness, numbness  GI/Hepatic/Renal/Endo    (+)   renal disease,   (-) liver disease, diabetes    Musculoskeletal (-) negative ROS    Abdominal  - normal exam   Substance History - negative use  (-) alcohol use, drug use     OB/GYN negative ob/gyn ROS         Other   autoimmune disease (wegeners) ,                      Anesthesia Plan    ASA 2     MAC   (D/W pt. MAC and possible awareness intra op.  Pt understands MAC and GA are not the same and the possibility of GA being required for failed MAC)  intravenous induction     Anesthetic plan, all risks, benefits, and alternatives have been provided, discussed and informed consent has been obtained with: patient.

## 2021-01-27 LAB
LAB AP CASE REPORT: NORMAL
LAB AP CLINICAL INFORMATION: NORMAL
PATH REPORT.FINAL DX SPEC: NORMAL
PATH REPORT.GROSS SPEC: NORMAL

## 2021-01-28 NOTE — PROGRESS NOTES
Her colon polyps were a mix of benign and adenomatous polyps    Please place in 5-year colonoscopy recall    Recommend OTC IBgard as needed for abdominal pain.    6-month follow-up in the office.

## 2021-02-01 ENCOUNTER — TELEPHONE (OUTPATIENT)
Dept: GASTROENTEROLOGY | Facility: CLINIC | Age: 56
End: 2021-02-01

## 2021-02-01 NOTE — TELEPHONE ENCOUNTER
----- Message from Lupe Garibay MD sent at 1/28/2021  2:08 PM EST -----  Her colon polyps were a mix of benign and adenomatous polyps    Please place in 5-year colonoscopy recall    Recommend OTC IBgard as needed for abdominal pain.    6-month follow-up in the office.

## 2021-02-01 NOTE — TELEPHONE ENCOUNTER
Called pt and advised of Dr Garibay note. Verb understanding.     C/s placed in recall and hm for 01/26/2026.    F/u made for 08/03 at 1p with Dr Garibay.

## 2021-08-03 ENCOUNTER — OFFICE VISIT (OUTPATIENT)
Dept: GASTROENTEROLOGY | Facility: CLINIC | Age: 56
End: 2021-08-03

## 2021-08-03 VITALS
HEIGHT: 67 IN | WEIGHT: 127.9 LBS | TEMPERATURE: 97.5 F | BODY MASS INDEX: 20.07 KG/M2 | SYSTOLIC BLOOD PRESSURE: 110 MMHG | DIASTOLIC BLOOD PRESSURE: 65 MMHG

## 2021-08-03 DIAGNOSIS — R10.32 LLQ PAIN: Primary | Chronic | ICD-10-CM

## 2021-08-03 DIAGNOSIS — R14.2 BELCHING: ICD-10-CM

## 2021-08-03 DIAGNOSIS — R13.19 ESOPHAGEAL DYSPHAGIA: ICD-10-CM

## 2021-08-03 PROCEDURE — 99213 OFFICE O/P EST LOW 20 MIN: CPT | Performed by: INTERNAL MEDICINE

## 2021-08-03 RX ORDER — FAMOTIDINE 20 MG/1
20 TABLET, FILM COATED ORAL NIGHTLY
Qty: 30 TABLET | Refills: 0 | Status: SHIPPED | OUTPATIENT
Start: 2021-08-03 | End: 2021-09-08

## 2021-08-03 NOTE — PROGRESS NOTES
Chief Complaint   Patient presents with   • LLQ pain   • Diverticulitis       Subjective     HPI    Sun Rodriguez is a 56 y.o. female with a past medical history noted below who presents for follow-up.  She has been seen previously for lower abdominal pain, history of diverticulitis requiring sigmoid colectomy, and dyspepsia.    Colonoscopy January of this year notable for surgical anastomosis, 1 tubular adenoma and hyperplastic polyps.    Reports doing well.  Rare episodes of LLQ pain.  Eating a high fiber diet.  Bowels are moving pretty regularly.    Reports regular labs with her nephrologist, stable creatinine, she denies that she has been told of any other abnormalities in her labs.    Does complain of constant belching.  Whether she eats or not, large belches.  Not so embarrassing at home but very embarrassing when she goes out.    Rare episodes of dysphagia, feels issues with liquids and solids.  2-3 times per week, painful, eventually goes down.  She has never had any evaluation.  No change in her weight.  Not avoiding any particular foods.  She denies that she has any heartburn or reflux type symptoms    Today's visit was in the office.  Both the patient and I were wearing face masks and proper hand hygiene was performed before and after the physical exam.           Current Outpatient Medications:   •  albuterol (PROVENTIL HFA;VENTOLIN HFA) 108 (90 Base) MCG/ACT inhaler, Inhale 2 puffs Every 4 (Four) Hours As Needed for Wheezing., Disp: , Rfl:   •  budesonide-formoterol (SYMBICORT) 160-4.5 MCG/ACT inhaler, Inhale 2 puffs 2 (Two) Times a Day., Disp: , Rfl:   •  calcitriol (ROCALTROL) 0.25 MCG capsule, TK 1 C PO 3 TIMES Q WK, Disp: , Rfl: 6  •  Cholecalciferol (VITAMIN D) 1000 units tablet, Take 1,000 Units by mouth Daily., Disp: , Rfl:   •  febuxostat (ULORIC) 40 MG tablet, Take 40 mg by mouth Daily., Disp: , Rfl:   •  fluticasone (FLONASE) 50 MCG/ACT nasal spray, 2 sprays into the nostril(s) as directed by  provider Daily., Disp: , Rfl:   •  levothyroxine (SYNTHROID, LEVOTHROID) 100 MCG tablet, Take  by mouth Daily., Disp: , Rfl: 6  •  propranolol (INDERAL) 10 MG tablet, Take 10 mg by mouth Daily., Disp: , Rfl: 1  •  rosuvastatin (CRESTOR) 20 MG tablet, Take 20 mg by mouth Daily., Disp: , Rfl:   •  famotidine (Pepcid) 20 MG tablet, Take 1 tablet by mouth Every Night., Disp: 30 tablet, Rfl: 0      Objective     Vitals:    08/03/21 1248   BP: 110/65   Temp: 97.5 °F (36.4 °C)         08/03/21  1248   Weight: 58 kg (127 lb 14.4 oz)     Body mass index is 20.03 kg/m².    Physical Exam  Constitutional:       General: She is not in acute distress.  Pulmonary:      Effort: Pulmonary effort is normal.   Abdominal:      Palpations: Abdomen is soft.      Tenderness: There is no abdominal tenderness.   Neurological:      Mental Status: She is alert and oriented to person, place, and time.   Psychiatric:         Mood and Affect: Mood normal.         Behavior: Behavior normal.         Thought Content: Thought content normal.         Judgment: Judgment normal.             WBC   Date Value Ref Range Status   09/19/2018 10.30 4.50 - 10.70 10*3/mm3 Final     RBC   Date Value Ref Range Status   09/19/2018 3.38 (L) 3.90 - 5.20 10*6/mm3 Final     Hemoglobin   Date Value Ref Range Status   09/19/2018 9.6 (L) 11.9 - 15.5 g/dL Final     Hematocrit   Date Value Ref Range Status   09/19/2018 30.0 (L) 35.6 - 45.5 % Final     MCV   Date Value Ref Range Status   09/19/2018 88.8 80.5 - 98.2 fL Final     MCH   Date Value Ref Range Status   09/19/2018 28.4 26.9 - 32.0 pg Final     MCHC   Date Value Ref Range Status   09/19/2018 32.0 (L) 32.4 - 36.3 g/dL Final     RDW   Date Value Ref Range Status   09/19/2018 13.5 (H) 11.7 - 13.0 % Final     RDW-SD   Date Value Ref Range Status   09/19/2018 43.5 37.0 - 54.0 fl Final     MPV   Date Value Ref Range Status   09/19/2018 9.9 6.0 - 12.0 fL Final     Platelets   Date Value Ref Range Status   09/19/2018 183  140 - 500 10*3/mm3 Final     Neutrophil %   Date Value Ref Range Status   09/19/2018 77.5 (H) 42.7 - 76.0 % Final     Lymphocyte %   Date Value Ref Range Status   09/19/2018 10.9 (L) 19.6 - 45.3 % Final     Monocyte %   Date Value Ref Range Status   09/19/2018 11.0 5.0 - 12.0 % Final     Eosinophil %   Date Value Ref Range Status   09/19/2018 0.3 0.3 - 6.2 % Final     Basophil %   Date Value Ref Range Status   09/19/2018 0.3 0.0 - 1.5 % Final     Immature Grans %   Date Value Ref Range Status   09/19/2018 0.2 0.0 - 0.5 % Final     Neutrophils, Absolute   Date Value Ref Range Status   09/19/2018 7.99 1.90 - 8.10 10*3/mm3 Final     Lymphocytes, Absolute   Date Value Ref Range Status   09/19/2018 1.12 0.90 - 4.80 10*3/mm3 Final     Monocytes, Absolute   Date Value Ref Range Status   09/19/2018 1.13 0.20 - 1.20 10*3/mm3 Final     Eosinophils, Absolute   Date Value Ref Range Status   09/19/2018 0.03 0.00 - 0.70 10*3/mm3 Final     Basophils, Absolute   Date Value Ref Range Status   09/19/2018 0.03 0.00 - 0.20 10*3/mm3 Final     Immature Grans, Absolute   Date Value Ref Range Status   09/19/2018 0.02 0.00 - 0.03 10*3/mm3 Final     nRBC   Date Value Ref Range Status   07/06/2018 0.0 0.0 - 0.0 /100 WBC Final       Lab Results   Component Value Date    GLUCOSE 84 09/20/2018    BUN 28 (H) 09/20/2018    CREATININE 2.24 (H) 09/20/2018    EGFRIFNONA 23 (L) 09/20/2018    BCR 12.5 09/20/2018    CO2 22.8 09/20/2018    CALCIUM 9.8 09/20/2018    ALBUMIN 3.50 09/20/2018    AST 30 07/11/2018    ALT 10 07/11/2018         Imaging Results (Last 7 Days)     ** No results found for the last 168 hours. **          I personally reviewed data as detailed below:     Endoscopy procedures and pathology from: January 2021      No notes on file    Assessment/Plan    .  Left lower quadrant pain: Improving.  Suspect secondary to her anastomosis versus functional    2.  Belching: Reports consistent issue with this.    3.  Esophageal dysphagia:  Intermittent episodes, sounds more motility related    Plan  Esophagram to further evaluate the dysphagia  Trial of nightly Pepcid to see if this improves the belching and dysphagia symptoms  Encourage daily fiber supplementation of her choice    Diagnoses and all orders for this visit:    1. LLQ pain (Primary)    2. Belching  -     FL Esophagram Complete Double-Contrast; Future    3. Esophageal dysphagia  -     FL Esophagram Complete Double-Contrast; Future    Other orders  -     famotidine (Pepcid) 20 MG tablet; Take 1 tablet by mouth Every Night.  Dispense: 30 tablet; Refill: 0        I have discussed the above plan with the patient.  They verbalize understanding and are in agreement with the plan.  They have been advised to contact the office for any questions, concerns, or changes related to their health.    Dictated utilizing Dragon dictation

## 2021-08-06 ENCOUNTER — TELEPHONE (OUTPATIENT)
Dept: GASTROENTEROLOGY | Facility: CLINIC | Age: 56
End: 2021-08-06

## 2021-09-08 RX ORDER — FAMOTIDINE 20 MG/1
20 TABLET, FILM COATED ORAL NIGHTLY
Qty: 30 TABLET | Refills: 3 | Status: SHIPPED | OUTPATIENT
Start: 2021-09-08

## 2023-01-13 ENCOUNTER — TRANSCRIBE ORDERS (OUTPATIENT)
Dept: NUTRITION | Facility: HOSPITAL | Age: 58
End: 2023-01-13
Payer: COMMERCIAL

## 2023-01-13 DIAGNOSIS — N18.4 CKD (CHRONIC KIDNEY DISEASE), STAGE IV: Primary | ICD-10-CM

## 2023-01-13 DIAGNOSIS — N18.4 BENIGN HYPERTENSION WITH CKD (CHRONIC KIDNEY DISEASE) STAGE IV: ICD-10-CM

## 2023-01-13 DIAGNOSIS — I12.9 BENIGN HYPERTENSION WITH CKD (CHRONIC KIDNEY DISEASE) STAGE IV: ICD-10-CM

## 2023-09-22 ENCOUNTER — HOSPITAL ENCOUNTER (OUTPATIENT)
Dept: CT IMAGING | Facility: HOSPITAL | Age: 58
Discharge: HOME | End: 2023-09-22
Admitting: INTERNAL MEDICINE
Payer: COMMERCIAL

## 2023-09-22 DIAGNOSIS — J47.9 BRONCHIECTASIS WITHOUT COMPLICATION: ICD-10-CM

## 2023-09-22 PROCEDURE — 71250 CT THORAX DX C-: CPT

## 2023-09-30 ENCOUNTER — ANESTHESIA (OUTPATIENT)
Dept: GASTROENTEROLOGY | Facility: HOSPITAL | Age: 58
End: 2023-09-30
Payer: COMMERCIAL

## 2023-09-30 ENCOUNTER — ANESTHESIA EVENT (OUTPATIENT)
Dept: GASTROENTEROLOGY | Facility: HOSPITAL | Age: 58
End: 2023-09-30
Payer: COMMERCIAL

## 2023-09-30 ENCOUNTER — HOSPITAL ENCOUNTER (OUTPATIENT)
Facility: HOSPITAL | Age: 58
Setting detail: HOSPITAL OUTPATIENT SURGERY
Discharge: HOME | End: 2023-09-30
Attending: INTERNAL MEDICINE | Admitting: INTERNAL MEDICINE
Payer: COMMERCIAL

## 2023-09-30 VITALS
TEMPERATURE: 98.1 F | WEIGHT: 127.21 LBS | OXYGEN SATURATION: 95 % | DIASTOLIC BLOOD PRESSURE: 90 MMHG | RESPIRATION RATE: 26 BRPM | BODY MASS INDEX: 19.97 KG/M2 | SYSTOLIC BLOOD PRESSURE: 154 MMHG | HEIGHT: 67 IN | HEART RATE: 78 BPM

## 2023-09-30 DIAGNOSIS — R05.3 CHRONIC COUGH: ICD-10-CM

## 2023-09-30 LAB
APPEARANCE FLD: ABNORMAL
APPEARANCE FLD: ABNORMAL
B PARAPERT DNA SPEC QL NAA+PROBE: NOT DETECTED
B PERT DNA SPEC QL NAA+PROBE: NOT DETECTED
C PNEUM DNA NPH QL NAA+NON-PROBE: NOT DETECTED
COLOR FLD: ABNORMAL
COLOR FLD: COLORLESS
FLUAV SUBTYP SPEC NAA+PROBE: NOT DETECTED
FLUBV RNA ISLT QL NAA+PROBE: NOT DETECTED
HADV DNA SPEC NAA+PROBE: NOT DETECTED
HCOV 229E RNA SPEC QL NAA+PROBE: NOT DETECTED
HCOV HKU1 RNA SPEC QL NAA+PROBE: NOT DETECTED
HCOV NL63 RNA SPEC QL NAA+PROBE: NOT DETECTED
HCOV OC43 RNA SPEC QL NAA+PROBE: NOT DETECTED
HMPV RNA NPH QL NAA+NON-PROBE: NOT DETECTED
HPIV1 RNA ISLT QL NAA+PROBE: NOT DETECTED
HPIV2 RNA SPEC QL NAA+PROBE: NOT DETECTED
HPIV3 RNA NPH QL NAA+PROBE: NOT DETECTED
HPIV4 P GENE NPH QL NAA+PROBE: NOT DETECTED
LYMPHOCYTES NFR FLD MANUAL: 5 %
M PNEUMO IGG SER IA-ACNC: NOT DETECTED
METHOD: ABNORMAL
METHOD: ABNORMAL
MONOS+MACROS NFR FLD: 3 %
MONOS+MACROS NFR FLD: 7 %
NEUTROPHILS NFR FLD MANUAL: 88 %
NEUTROPHILS NFR FLD MANUAL: 97 %
NUC CELL # FLD: 1150 /MM3
NUC CELL # FLD: 920 /MM3
RBC # FLD AUTO: 130 /MM3
RBC # FLD AUTO: 1750 /MM3
RHINOVIRUS RNA SPEC NAA+PROBE: NOT DETECTED
RSV RNA NPH QL NAA+NON-PROBE: NOT DETECTED
SARS-COV-2 RNA NPH QL NAA+NON-PROBE: NOT DETECTED

## 2023-09-30 PROCEDURE — 87116 MYCOBACTERIA CULTURE: CPT | Performed by: INTERNAL MEDICINE

## 2023-09-30 PROCEDURE — 88305 TISSUE EXAM BY PATHOLOGIST: CPT | Performed by: INTERNAL MEDICINE

## 2023-09-30 PROCEDURE — 89051 BODY FLUID CELL COUNT: CPT | Performed by: INTERNAL MEDICINE

## 2023-09-30 PROCEDURE — 87071 CULTURE AEROBIC QUANT OTHER: CPT | Performed by: INTERNAL MEDICINE

## 2023-09-30 PROCEDURE — 87205 SMEAR GRAM STAIN: CPT | Performed by: INTERNAL MEDICINE

## 2023-09-30 PROCEDURE — 87185 SC STD ENZYME DETCJ PER NZM: CPT | Performed by: INTERNAL MEDICINE

## 2023-09-30 PROCEDURE — 0202U NFCT DS 22 TRGT SARS-COV-2: CPT | Performed by: INTERNAL MEDICINE

## 2023-09-30 PROCEDURE — 87102 FUNGUS ISOLATION CULTURE: CPT | Performed by: INTERNAL MEDICINE

## 2023-09-30 PROCEDURE — 25010000002 PROPOFOL 10 MG/ML EMULSION: Performed by: ANESTHESIOLOGY

## 2023-09-30 PROCEDURE — 88112 CYTOPATH CELL ENHANCE TECH: CPT | Performed by: INTERNAL MEDICINE

## 2023-09-30 PROCEDURE — 87206 SMEAR FLUORESCENT/ACID STAI: CPT | Performed by: INTERNAL MEDICINE

## 2023-09-30 RX ORDER — LIDOCAINE HYDROCHLORIDE 20 MG/ML
INJECTION, SOLUTION EPIDURAL; INFILTRATION; INTRACAUDAL; PERINEURAL AS NEEDED
Status: DISCONTINUED | OUTPATIENT
Start: 2023-09-30 | End: 2023-09-30 | Stop reason: HOSPADM

## 2023-09-30 RX ORDER — LIDOCAINE HYDROCHLORIDE 20 MG/ML
INJECTION, SOLUTION INFILTRATION; PERINEURAL AS NEEDED
Status: DISCONTINUED | OUTPATIENT
Start: 2023-09-30 | End: 2023-09-30 | Stop reason: SURG

## 2023-09-30 RX ORDER — SODIUM CHLORIDE, SODIUM LACTATE, POTASSIUM CHLORIDE, CALCIUM CHLORIDE 600; 310; 30; 20 MG/100ML; MG/100ML; MG/100ML; MG/100ML
INJECTION, SOLUTION INTRAVENOUS CONTINUOUS PRN
Status: DISCONTINUED | OUTPATIENT
Start: 2023-09-30 | End: 2023-09-30 | Stop reason: SURG

## 2023-09-30 RX ORDER — MONTELUKAST SODIUM 10 MG/1
10 TABLET ORAL NIGHTLY
COMMUNITY

## 2023-09-30 RX ORDER — PROPOFOL 10 MG/ML
VIAL (ML) INTRAVENOUS AS NEEDED
Status: DISCONTINUED | OUTPATIENT
Start: 2023-09-30 | End: 2023-09-30 | Stop reason: SURG

## 2023-09-30 RX ORDER — LIDOCAINE HYDROCHLORIDE 10 MG/ML
INJECTION, SOLUTION EPIDURAL; INFILTRATION; INTRACAUDAL; PERINEURAL AS NEEDED
Status: DISCONTINUED | OUTPATIENT
Start: 2023-09-30 | End: 2023-09-30 | Stop reason: HOSPADM

## 2023-09-30 RX ORDER — SODIUM CHLORIDE 9 MG/ML
30 INJECTION, SOLUTION INTRAVENOUS CONTINUOUS PRN
Status: DISCONTINUED | OUTPATIENT
Start: 2023-09-30 | End: 2023-09-30 | Stop reason: HOSPADM

## 2023-09-30 RX ADMIN — SODIUM CHLORIDE 30 ML/HR: 9 INJECTION, SOLUTION INTRAVENOUS at 07:40

## 2023-09-30 RX ADMIN — LIDOCAINE HYDROCHLORIDE 60 MG: 20 INJECTION, SOLUTION INFILTRATION; PERINEURAL at 08:18

## 2023-09-30 RX ADMIN — SODIUM CHLORIDE, POTASSIUM CHLORIDE, SODIUM LACTATE AND CALCIUM CHLORIDE: 600; 310; 30; 20 INJECTION, SOLUTION INTRAVENOUS at 08:18

## 2023-09-30 RX ADMIN — PROPOFOL 150 MG: 10 INJECTION, EMULSION INTRAVENOUS at 08:18

## 2023-09-30 NOTE — OP NOTE
Bronchoscopy Procedure Note    Procedure:  Bronchoscopy, Diagnostic    Pre-Operative Diagnosis:  Bronchiectasis    Post-Operative Diagnosis: Same    Indication:  Cough    Consent:  Informed consent obtained after explaining risks and benefits. Alternatives to procedure discussed. All questions answered and no objections to proceed.      Anesthesia: Monitored Anesthesia Care (MAC)    Procedure Details: The bronchocope was inserted into the main airway via the LMA. An anatomical survey was done of the main airways and the subsegmental bronchus to at least the first subsegmental level of all five lobes of both lungs.  The findings are reported below.      Findings:   All airways were visualized to at least the first subsegment level of all 5 lobes of both lungs.  Airways were of normal size and caliber.  No endobronchial lesions seen.  A bronchoalveolar lavage was performed using aliquots of normal saline instilled into the airways then aspirated back.    Very mild mucous plugs and secretions seen mainly in the right middle lobe and the left lower lobe.  Airways otherwise demonstrated only thin secretions.  BAL done separately in the right lung and in the left lung.  Sample sent for analysis separately.    Estimated Blood Loss:   Nil           Specimens:  Sent cloudy fluid for BAL separately from right lung and left lung                Complications:  None; patient tolerated the procedure well.           Disposition:  To recovery and then discharge per protocol.      Patient tolerated the procedure well.    Electronically signed by Grey Dia MD, 09/30/23, 8:30 AM EDT.

## 2023-09-30 NOTE — H&P
Sun Rodriguez  Appointment: 2023 12:00 PM  Location: [facility]  Patient #: [id number]  : 1965   / Language: English / Race: White  Female      History of Present Illness (Grey Dia MD; 2023 6:00 PM)  The patient is a 58 year old female who presents today for evaluation of dyspnea.  She continues to cough since her flu in Dec 2022. She has occasional phlegm. No fever. Some wheezing. Remains on Symbicort and Dupixent. Had HRCT and here to review. She uses her albuterol prn, helps the cough some. Denies change in SOA. No fever or chills.      Problem List/Past Medical (Corinne Godbey; 2023 12:06 PM)  AR (allergic rhinitis) (J30.9)    Abnormal heart sounds (R01.2)    Wegener's disease, pulmonary    Thyroid disease    Non-Contributory Problem List/Past Medical History    Problems Reconciled      Past Surgical History (Corinne Godbey; 2023 12:06 PM)  Gallbladder Surgery   [employer] 2022  Lung Biopsy   Right. Benign  Non-Contributory Past Surgical History      Allergies (Corinne Godbey; 2023 12:06 PM)  Biaxin *MACROLIDES*   Nausea.  Allergies Reconciled      Medication History (Corinne Godbey; 2023 12:06 PM)  Singulair  (10mg tablet, oral daily) Active.  Symbicort  (160-4.5MCG/ACT HFA Aerosol wit, 2 (two) Inhalation two times daily, Taken starting 10/09/2019) Active. (Rinse mouth after use.)  Proventil HFA  (108 (90 Base)MCG/ACT HFA Aerosol wit, 2 (two) Inhalation every four hours, as needed, Taken starting 10/21/2016) Active.  albuterol sulfate  (2.5 mg/3 mL (0.0 vial for nebuli, 3 (three) inhalation four times daily, Taken starting 2022) Active.  Sodium Chloride  (7% vial for nebuli, 4 Inhalation via nebulizer qid, use after albuterol nebs, Taken starting 2020) Active.  Dupixent Pen  (300mg/2 mL Pen Injector, 1 (one) subcutaneous Inject 300mg under skin every 2 weeks (Maintenance), Taken starting 2023) Active.  Levothroid  (75MCG tablet, 1  Oral daily) Active.  Allegra Allergy  (180MG tablet, 1 Oral prn) Active.  Crestor  (40MG tablet, 1 Oral daily) Active.  Calcitriol  (0.25MCG capsule, 1 Oral three times a week) Active.  sodium bicarbonate  (650mg tablet, 1 oral daily) Active.  febuxostat  (40mg tablet, 1/2 oral daily) Active.  Medications Reconciled     Social History (Corinne Godbey; 9/27/2023 12:06 PM)  Tobacco Use   [09/27/2023]: Former smoker. Quit 1995/// 1/2 ppd for 10 years. (cigs)  Caffeine use   Tea, Carbonated beverages.  Current work status   Full-time.   Alcohol use   [06/27/2023]: Occasional alcohol use. [employer] 11/11/2016  No drug use   [06/27/2023]: [employer] 11/11/2016  Marital status   .  Highest Education Level Attained   Some college. [facility] 11/11/2016  Exercise   3 x week. [employer] 06/26/2023  Non-Contributory Social History      Family History (Corinne Godbey; 9/27/2023 12:00 PM)  Hypertension   [employer] 01/16/2019  Hypertension   Father. [employer] 11/11/2016  Cancer   Father. [employer] 11/11/2016  Negative Family History of:   Lung disease  Prostate Cancer   Father.    Diagnostic Studies History (Corinne Godbey; 9/27/2023 12:00 PM)  Echocardiogram   11/18/13 at [facility]-EF 51%, RVSP 14mmHg, and trace MR.  Influenza   12/30/14 A&B are negative.  PFTs   10/28/15 FEV1 66%, FVC 73%, FEV1/FVC 72%  11/17/14 FEV1- 65% FEV1/FVC- 72%, post BD FEV1 71%(9% change).  12/6/2012 FEV1- 66 FEV1/FVC- 72%  10/28/15 FEV1-66 FEV1/FVC-88% post BD  Bronchoscopy, flexible, with bronchial alveolar lavage, fluoroscopic (91683)   Negative for malignant cells. No pathologic diagnosis. Detached purulent exudate. Tissue culture negative organism. No fungal elements are noted to date. BAL showed 2 + staph aureus sensitive to Tetracycline. Patient completed treatment with Doxycycline 100 mg PO BID.  CT Scan of Chest   10/2015-mild diffuse peribronchial thickening throughout both lungs with mild bronchiectasis showing no significant change  since the preceding CT dated 05/16/2014. There is a small focal reticulonodular opacity at the right lung base that is new.  11/1/13- increase in bronchiectasis in RML and Lingula; tiny LLL consolidation; air trapping.  CT Chest 10/22/2015      Health Maintenance History (Corinne Godbey; 9/27/2023 12:07 PM)  Prevnar 13   [08/18/2021]: Performed. 7/20 @   Flu Vaccine   Performed. 10/20 @   10/21   Has not had flu vaccine since 8/1/2023.  Pneumovax   [08/18/2021]: Performed. has had, year unknown  Intramuscular administration of second dose of Pfizer-BioNTech COVID-19 vaccine (40354)   [12/27/2022]: 1st, 2nd 02/2021  booster x 1  DME Company   [06/27/2023]: No O2 or CPAP  Covid   [09/27/2023]: COVID questions asked and all negative.  Non-Contributory Health Maintenance History      Travel History (Corinne Godbey; 9/27/2023 12:07 PM)  None   [06/27/2023]:  10/2017 [place]   [12/27/2022]: no travel 0  Non-Contributory Travel History   No travel 09/27/2023    Other Problems (Corinne Godbey; 9/27/2023 12:06 PM)  Asthma (J45.909)   Her alpha 1 genotype was MM. IgE not elevated. Has allergies to cat and mold  Post-viral cough syndrome (R05.8)    Bronchiectasis (J47.9)   Please note patient had CDiff after colon resection for diverticulitis in 2019.  Severe persistent asthma, unspecified whether complicated (J45.50)    PLANS    Pulmonary infiltrate (R91.8)   CT chest on 6/4/12 at [facility] was personally reviewed and revealed new GGO with reticulonodular opacity in RLL suspicious for new infection vs manifestation of WG. Her RML and lingulair bronchiectasis was unchanged. I scheduled the patient for bronchoscopy with BAL and biopsy under flouro and MAC anesthesia for 8/31/12 at 12:30pm at [facility]. I have discussed bronchoscopy with bronchoalveolar lavage and transbronchial biopsy with the patient/caregiver.  Bronchoscopy cultures revealed mucupurulent secretions and grew staph aureus MSSA. The atypical PNA screen was  negative, and fungal cultures to date have also been negative. She is now s/p treatment with Doxycycline for 10 days for MSSA. I will add a flutter valve in the view of RML inflammation seen on the scope. She was advised to use it for 15 mins BID. If f/u CT shows worsening infiltrates, we will need to consider biopsy.  Her CT shows worsening of reticulonodular infiltrates. I will do ANCA panel and TABITHA as well as ESR, and discuss with Dr. Liliya Davis after getting the results back. Most likely, she will need biopsy by VATS or treatment for Wegener's without biopsy with immunosuppressants. The sed rate results became available at the time of this note transcription, and was elevated at 30. TABITHA was negative and ANCA is still pending.    I discussed with the patient regarding needed for VATS biopsy to establish definitive diagnosis for the pulmonary infiltrates. I have discussed with Dr. Liliya Davis, patient's rheumatologist who is in agreement. Her ANCA and TABITHA were obtained last visit, and were both negative. I have cautioned the patient that she may require a repeat CT prior to lung biopsy given the time interval since last CT.  Acute bronchitis (J20.9)    Asthma exacerbation (J45.901)    PREVNAR    Irritation symptom of skin (R23.8)    Cough (R05.9)    Asthma, mild persistent (J45.30)      Vitals (Corinne Godbey; 9/27/2023 12:07 PM)  9/27/2023 12:07 PM  Weight: 128 lb   Height: 65.25 in  Neck: 13.5 in     Body Surface Area: 1.64 m²   Body Mass Index: 21.14 kg/m²    Temp.: 97.8° F    Pulse: 74 (Regular)    Resp.: 17 (Unlabored)    P.OX: 97% (Room air)  BP: 140/88(Sitting, Left Arm, Standard)              Physical Exam (Grey Dia MD; 9/27/2023 12:29 PM)  General  General Appearance - Not in acute distress.  Build & Nutrition - Well developed.  Gait - Ambulatory.  Oxygen Therapy - Breathing Room Air.    Head and Neck  Trachea - midline.  Thyroid  Gland Characteristics - normal size and consistency.    ENMT  Global  Assessment  Upon examination of the ears, nose, mouth and throat - external inspection reveals no scars, masses or lesions.  Mouth and Throat  Oral Cavity/Oropharynx - Teeth - Inspection reveals normal dentation. Tongue - normal, not enlarged. Oropharynx - Mallampati II  (NARROW PX) , no thrush present. Tonsils - Characteristics - Bilateral - no hypertrophy.    Chest and Lung Exam  Inspection  Chest Wall - Normal. Shape - Normal and Symmetric. Movements - Normal and Symmetrical. Accessory muscles - No use of accessory muscles in breathing.  Percussion  Quality and Intensity - Percussion of the chest reveals - Normal resonance. Diaphragm - Percussion Normal.  Palpation  Palpation of the chest reveals - Non-tender.  Auscultation  Breath sounds - Clear. Adventitious sounds - No Adventitious sounds.    Cardiovascular  Auscultation  Rhythm - Regular. Rate - heart rate is normal. Murmurs & Other Heart Sounds - Auscultation of the heart reveals - No Murmurs. Heart Sounds - S2 Physiologic splitting.    Abdomen  Palpation/Percussion  Palpation and Percussion of the abdomen reveal - soft and non-tender. Liver - Normal. Spleen - Normal.  Auscultation  - Bowel sounds normal.    Peripheral Vascular  Upper Extremity  Inspection - Bilateral - Pink nail beds - Bilateral, No Digital clubbing - Bilateral.  Lower Extremity  Palpation - Edema - Bilateral - No edema - Bilateral.    Neuropsychiatric  Orientation - oriented X3.  The patient's mood and affect are described as  - normal.  Judgment and Insight - insight is appropriate concerning matters relevant to self.    Lymphatic  General  Cervical Nodes - Grossly normal bilaterally. Supraclavicular Nodes - Grossly normal bilaterally.      Results (Grey Dia MD; 9/27/2023 6:02 PM)  Procedures    Name  Value  Date  CT CHEST HI RESOLUTION DIAGNOSTIC  Procedure Note: See Note        (Release to patient-&#62;Routine Release  Include inspiration/expiration?-&#62;Yes  Include prone and supine  images?-&#62;Yes  EXAM: CT CHEST HI RESOLUTION DIAGNOSTIC-    HISTORY: Follow-up bronchiectasis.    TECHNIQUE: Radiation dose reduction techniques were utilized, including  automated exposure control and exposure modulation based on body size. 1  mm images were obtained through the chest without the administration of  IV contrast. Expiratory imaging was obtained.    COMPARISON: CT chest 10/22/2015 and 5/16/2014      FINDINGS: Trachea and main bronchi are patent. Moderate right middle  lobe and lingular cylindrical bronchiectasis/bronchiolectasis with  multifocal subsegmental mucous plugging, similar to 2015 exam. Scattered  areas of tree-in-bud nodularity throughout the bilateral upper lobes and  right middle lobe. No reticulation or honeycombing. Biapical  pleuroparenchymal scarring. Right upper and middle lobe wedge  resections. Nondiagnostic expiratory imaging secondary to degree of  expiration. No pleural effusion.    Heart is normal in size. Small volume pericardial fluid. Mild coronary  artery calcifications. Nondilated main pulmonary artery and thoracic  aorta. No mediastinal/hilar adenopathy. Nondilated esophagus.        Moderate right middle lobe and lingular  bronchiectasis/bronchiolectasis with subsegmental mucus plugging is  relatively unchanged from 2015 exam. Bronchiolitis/small airways disease  scattered throughout the bilateral upper lobes and right middle lobe.  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        This report was finalized on 9/27/2023 3:48 PM by Dr. Luis Godinez M.D.    Signed by: Luis Godinez MD on 9/27/2023 3:48 PM    )    Performed: 9/27/2023 3:48 PM        Assessment & Plan (Grey Dia MD; 9/27/2023 6:02 PM)    Asthma (J45.909)  Impression: Moderate persistent.  Persistent severe cough since Dec 2022. Also see below.  Switch to Breztri. Continue Dupixent.    Current Plans  Started Breztri Aerosphere 160-9-4.8 mcg/actuation inhalation HFA  Aerosol with Adapter, 2 (two) inhalation two times daily, 1 Each, 30 days starting 09/27/2023, Ref. x5.  Screened for tobacco use (1000F)  Pt Education - How to Access Health Information Online using Patient Portal and 3rd Party Apps: discussed with patient and provided information.    Bronchiectasis (J47.9)  Impression: HRCT not much change from 2015 but some mucus plugging and bronchiectasis. Due to persistent cough, offered bronchoscopy. Atypical infections like LAQUITA not ruled out.  I have personally reviewed this patient's chest imaging on a CD or online PACS system. The radiologist written report was compared to the visual images by me.  I have discussed bronchoscopy with bronchoalveolar lavage and transbronchial biopsy with the patient/caregiver. Alternatives, potential benefits and risks were discussed. These include (but not limited to) respiratory failure requiring supplemental oxygen to intubation and mechanical ventilation; bleeding risk requiring surgical intervention (rarely); pneumothorax risk requiring possible placement of chest tube and therefore inpatient stay following procedure.  Patient/caregiver was asked to stop all anti-platelet and anticoagulant medications. They were asked to remain nil per orally from midnight of date of test and to bring a  to drive them back home after procedure.      Wegener's disease, pulmonary  Impression: There was no evidence of Wegener's on last VATS biopsy.  HRCT looks similar to 2015. Follows with Dr. Davis and no evidence of relapse.      PLANS    Current Plans  Follow-up with RONNY Rajput as indicated below  Follow up in 1 month or as needed

## 2023-09-30 NOTE — ANESTHESIA POSTPROCEDURE EVALUATION
"Patient: Sun Rodriguez    Procedure Summary       Date: 09/30/23 Room / Location: [facility] ENDOSCOPY 7 / [facility] ENDOSCOPY    Anesthesia Start: 0810 Anesthesia Stop: 0841    Procedure: BRONCHOSCOPY with bal bilateral (Bronchus) Diagnosis:     Surgeons: Grey Dia MD Provider: Sergo Barlow MD    Anesthesia Type: general ASA Status: 2            Anesthesia Type: general    Vitals  Vitals Value Taken Time   /90 09/30/23 0902   Temp     Pulse 78 09/30/23 0902   Resp 26 09/30/23 0902   SpO2 95 % 09/30/23 0902           Post Anesthesia Care and Evaluation    Pain management: adequate    Airway patency: patent  Anesthetic complications: No anesthetic complications    Cardiovascular status: acceptable  Respiratory status: acceptable  Hydration status: acceptable    Comments: /90 (BP Location: Left arm, Patient Position: Sitting)   Pulse 78   Temp 36.7 °C (98.1 °F) (Oral)   Resp 26   Ht 170.2 cm (67\")   Wt 57.7 kg (127 lb 3.3 oz)   LMP 10/31/2017 (Exact Date)   SpO2 95%   BMI 19.92 kg/m²       "

## 2023-09-30 NOTE — ANESTHESIA PREPROCEDURE EVALUATION
Anesthesia Evaluation     Patient summary reviewed and Nursing notes reviewed   history of anesthetic complications:  PONV               Airway   Mallampati: II  TM distance: >3 FB  Neck ROM: full  no difficulty expected  Dental - normal exam     Pulmonary     breath sounds clear to auscultation  (+) asthma,  (-) shortness of breath, sleep apnea, decreased breath sounds, wheezes  Cardiovascular - normal exam  Exercise tolerance: good (4-7 METS)    Rhythm: regular  Rate: normal    (+) hypertension, hyperlipidemia  (-) past MI, angina, CHF, orthopnea, PND, [place], PVD      Neuro/Psych  (+) dizziness/light headedness  (-) seizures, neuromuscular disease, TIA, CVA, weakness, numbness  GI/Hepatic/Renal/Endo    (+) renal disease  (-) liver disease, diabetes    Musculoskeletal (-) negative ROS    Abdominal  - normal exam   Substance History - negative use  (-) alcohol use, drug use     OB/GYN negative ob/gyn ROS         Other   autoimmune disease ([place]) ,                     Anesthesia Plan    ASA 2     general     intravenous induction     Anesthetic plan, risks, benefits, and alternatives have been provided, discussed and informed consent has been obtained with: patient.

## 2023-09-30 NOTE — ANESTHESIA PROCEDURE NOTES
Airway  Urgency: elective    Date/Time: 9/30/2023 8:16 AM  Airway not difficult    General Information and Staff    Patient location during procedure: OR  Anesthesiologist: Sergo Barlow MD    Indications and Patient Condition  Indications for airway management: airway protection    Preoxygenated: yes  Mask difficulty assessment: 1 - vent by mask    Final Airway Details  Final airway type: supraglottic airway      Successful airway: LMA  Size 4     Number of attempts at approach: 1

## 2023-09-30 NOTE — DISCHARGE INSTRUCTIONS
For the next 24 hours patient needs to be with a responsible adult.    For 24 hours DO NOT drive, operate machinery, appliances, drink alcohol, make important decisions or sign legal documents.    Start with a light or bland diet if you are feeling sick to your stomach otherwise advance to regular diet as tolerated.    Follow recommendations on procedure report if provided by your doctor.    Call Dr Dia for problems [phone]    Problems may include but not limited to: large amounts of bleeding, trouble breathing, repeated vomiting, severe unrelieved pain, fever or chills.

## 2023-10-01 LAB
NIGHT BLUE STAIN TISS: NORMAL
NIGHT BLUE STAIN TISS: NORMAL

## 2023-10-02 LAB
BACTERIA SPEC AEROBE CULT: ABNORMAL
GRAM STN SPEC: ABNORMAL

## 2023-10-03 LAB
CYTO UR: NORMAL
LAB AP CASE REPORT: NORMAL
PATH REPORT.FINAL DX SPEC: NORMAL
PATH REPORT.GROSS SPEC: NORMAL

## 2023-10-07 LAB
FUNGUS WND CULT: NORMAL
FUNGUS WND CULT: NORMAL
MYCOBACTERIUM SPEC CULT: NORMAL
MYCOBACTERIUM SPEC CULT: NORMAL
NIGHT BLUE STAIN TISS: NORMAL
NIGHT BLUE STAIN TISS: NORMAL

## 2023-10-28 LAB
FUNGUS WND CULT: NORMAL
MYCOBACTERIUM SPEC CULT: NORMAL
MYCOBACTERIUM SPEC CULT: NORMAL
NIGHT BLUE STAIN TISS: NORMAL
NIGHT BLUE STAIN TISS: NORMAL

## 2023-10-31 LAB — FUNGUS WND CULT: NORMAL

## 2023-11-04 LAB
MYCOBACTERIUM SPEC CULT: NORMAL
MYCOBACTERIUM SPEC CULT: NORMAL
NIGHT BLUE STAIN TISS: NORMAL
NIGHT BLUE STAIN TISS: NORMAL

## 2024-07-23 NOTE — PLAN OF CARE
Please call     Labs are back     Blood counts look good     Electrolytes are all in normal range      Cholesterol numbers are running high. His bad cholesterol LDL was 194 and total cholesterol was 279. Those two numbers are not influenced by fasting, and I know he was not fasting but would not expect fasting status to influence the total cholesterol or LDL . (Triglycerides can be affected by this, but his triglycerides were normal)      Whenever we see an LDL bad cholesterol over 190, the recommendation is to start a cholesterol medication called a statin to help lower risk of heart disease in the future. I would recommend he start a medication called crestor for cholesterol. Main side effects can be muscle aches and liver irritation . In conjunction with lifestyle changes (Mediterranean type diet, olive oil, lean proteins, veggies, and avoiding processed foods as much  as possible) . What are his thoughts about a cholesterol medication?     His liver numbers are elevated which can be a marker of something called fatty liver. Lifestyle changes as above can help this as well. I placed a repeat liver panel order for about 3 months, would recommend we repeat that liver panel in 3 months. If still elevated, would recommend a liver ultrasound just to look at the liver structure and make sure nothing else is going on      What question?   LVM patient is to call office back to go over results.   I sent letter to patient.         Problem: Patient Care Overview  Goal: Plan of Care Review  Outcome: Ongoing (interventions implemented as appropriate)   07/03/18 1645   Coping/Psychosocial   Plan of Care Reviewed With patient   OTHER   Outcome Summary [facility] consult for new ileostomy. Iniated ileostomy teaching. Patient has concerns about taking care of stoma and going back to work. Discussed pouching, home care, home health, supplies. Changed appliance and patient watched. Reassured her that once the bridge is out, it will be a little easier to manage and also that stoma is swollen and the size will go down over the next 6 weeks. She observed pouch change and asked appropriate questions. Booklet at bedside for her review. Will continue to follow.        Problem: Ileostomy (Adult)  Goal: Signs and Symptoms of Listed Potential Problems Will be Absent, Minimized or Managed (Ileostomy)  Outcome: Ongoing (interventions implemented as appropriate)   07/03/18 5610   Goal/Outcome Evaluation   Problems Assessed (Ileostomy) high-output stoma;stomal complications;situational response;postoperative nausea and vomiting   Problems Present (Ileostomy) situational response

## 2025-08-15 ENCOUNTER — TRANSCRIBE ORDERS (OUTPATIENT)
Dept: PET IMAGING | Facility: HOSPITAL | Age: 60
End: 2025-08-15
Payer: COMMERCIAL

## 2025-08-15 ENCOUNTER — HOSPITAL ENCOUNTER (OUTPATIENT)
Dept: PET IMAGING | Facility: HOSPITAL | Age: 60
Discharge: HOME | End: 2025-08-15
Admitting: FAMILY MEDICINE
Payer: COMMERCIAL

## 2025-08-15 DIAGNOSIS — Z78.0 POSTMENOPAUSAL STATUS (AGE-RELATED) (NATURAL): Primary | ICD-10-CM

## 2025-08-15 PROCEDURE — 77080 DXA BONE DENSITY AXIAL: CPT

## 2025-09-05 ENCOUNTER — OFFICE (OUTPATIENT)
Dept: URBAN - METROPOLITAN AREA CLINIC 76 | Facility: CLINIC | Age: 60
End: 2025-09-05
Payer: COMMERCIAL

## 2025-09-05 VITALS
HEIGHT: 67 IN | SYSTOLIC BLOOD PRESSURE: 123 MMHG | HEART RATE: 62 BPM | DIASTOLIC BLOOD PRESSURE: 77 MMHG | OXYGEN SATURATION: 97 % | WEIGHT: 133 LBS

## 2025-09-05 DIAGNOSIS — R13.10 DYSPHAGIA, UNSPECIFIED: ICD-10-CM

## 2025-09-05 DIAGNOSIS — K30 FUNCTIONAL DYSPEPSIA: ICD-10-CM

## 2025-09-05 DIAGNOSIS — R19.7 DIARRHEA, UNSPECIFIED: ICD-10-CM

## 2025-09-05 DIAGNOSIS — Z86.0100 PERSONAL HISTORY OF COLON POLYPS, UNSPECIFIED: ICD-10-CM

## 2025-09-05 PROCEDURE — 99204 OFFICE O/P NEW MOD 45 MIN: CPT | Performed by: INTERNAL MEDICINE

## 2025-09-05 RX ORDER — OMEPRAZOLE 20 MG/1
20 CAPSULE, DELAYED RELEASE ORAL
Qty: 90 | Refills: 3 | Status: ACTIVE
Start: 2025-09-05

## 2025-09-05 RX ORDER — CHOLESTYRAMINE 4 G/9G
8 POWDER, FOR SUSPENSION ORAL
Qty: 60 | Refills: 3 | Status: ACTIVE
Start: 2025-09-05

## (undated) DEVICE — APPL CHLORAPREP W/TINT 26ML ORNG

## (undated) DEVICE — ESOPHAGEAL/PYLORIC/COLONIC WIREGUIDED BALLOON DILATATION CATHETER: Brand: CRE WIREGUIDED

## (undated) DEVICE — ENCORE® LATEX ORTHO SIZE 6.5, STERILE LATEX POWDER-FREE SURGICAL GLOVE: Brand: ENCORE

## (undated) DEVICE — NDL HYPO PRECISIONGLIDE REG 25G 1 1/2

## (undated) DEVICE — SOL NACL 0.9PCT 1000ML

## (undated) DEVICE — SPNG GZ WOVN 4X4IN 12PLY 10/BX STRL

## (undated) DEVICE — VITAL SIGNS™ JACKSON-REES CIRCUITS: Brand: VITAL SIGNS™

## (undated) DEVICE — ENSEAL LAPAROSCOPIC TISSUE SEALER G2 ARTICULATING STRAIGHT JAW FOR USE WITH G2 GENERATOR 5MM DIAMETER 35CM SHAFT LENGTH: Brand: ENSEAL

## (undated) DEVICE — TUBING, SUCTION, 1/4" X 10', STRAIGHT: Brand: MEDLINE

## (undated) DEVICE — ELECTRD BLD EDGE/STD 1P 2.4X6.35MM STRL

## (undated) DEVICE — SUT SILK 3/0 TIES 18IN A184H

## (undated) DEVICE — SUT VIC 1 CTX 36IN J977H

## (undated) DEVICE — SENSR O2 OXIMAX FNGR A/ 18IN NONSTR

## (undated) DEVICE — SINGLE USE SUCTION VALVE MAJ-209: Brand: SINGLE USE SUCTION VALVE (STERILE)

## (undated) DEVICE — Device: Brand: DEFENDO AIR/WATER/SUCTION AND BIOPSY VALVE

## (undated) DEVICE — 3M™ IOBAN™ 2 ANTIMICROBIAL INCISE DRAPE 6650EZ: Brand: IOBAN™ 2

## (undated) DEVICE — PROXIMATE RH ROTATING HEAD SKIN STAPLERS (35 WIDE) CONTAINS 35 STAINLESS STEEL STAPLES: Brand: PROXIMATE

## (undated) DEVICE — SUT PROLN 0 CT2 MONO 30IN 8412H

## (undated) DEVICE — GOWN ,SIRUS,NONREINFORCED SMALL: Brand: MEDLINE

## (undated) DEVICE — SUT SILK 2/0 TIES 18IN A185H

## (undated) DEVICE — 3M™ STERI-STRIP™ REINFORCED ADHESIVE SKIN CLOSURES, R1547, 1/2 IN X 4 IN (12 MM X 100 MM), 6 STRIPS/ENVELOPE: Brand: 3M™ STERI-STRIP™

## (undated) DEVICE — MEDI-VAC YANKAUER SUCTION HANDLE W/BULBOUS TIP: Brand: CARDINAL HEALTH

## (undated) DEVICE — SPNG LAP 18X18IN LF STRL PK/5

## (undated) DEVICE — 3M™ STERI-STRIP™ COMPOUND BENZOIN TINCTURE 40 BAGS/CARTON 4 CARTONS/CASE C1544: Brand: 3M™ STERI-STRIP™

## (undated) DEVICE — LEGGINGS, PAIR, CLEAR, STERILE: Brand: MEDLINE

## (undated) DEVICE — Device

## (undated) DEVICE — LN SMPL CO2 SHTRM SD STREAM W/M LUER

## (undated) DEVICE — WOUND RETRACTOR AND PROTECTOR: Brand: ALEXIS WOUND PROTECTOR-RETRACTOR

## (undated) DEVICE — ADAPT CLN BIOGUARD AIR/H2O DISP

## (undated) DEVICE — TOTAL TRAY, 16FR 10ML SIL FOLEY, URN: Brand: MEDLINE

## (undated) DEVICE — ENDOPATH PNEUMONEEDLE INSUFFLATION NEEDLES WITH LUER LOCK CONNECTORS 120MM: Brand: ENDOPATH

## (undated) DEVICE — THE TORRENT IRRIGATION SCOPE CONNECTOR IS USED WITH THE TORRENT IRRIGATION TUBING TO PROVIDE IRRIGATION FLUIDS SUCH AS STERILE WATER DURING GASTROINTESTINAL ENDOSCOPIC PROCEDURES WHEN USED IN CONJUNCTION WITH AN IRRIGATION PUMP (OR ELECTROSURGICAL UNIT).: Brand: TORRENT

## (undated) DEVICE — SUT SILK 3/0 SH CR5 18IN C0135

## (undated) DEVICE — SINGLE USE BIOPSY VALVE MAJ-210: Brand: SINGLE USE BIOPSY VALVE (STERILE)

## (undated) DEVICE — DRSNG SURESITE WNDW 4X4.5

## (undated) DEVICE — DISPOSABLE GRASPER CARTRIDGE: Brand: DIRECT DRIVE REPOSABLE GRASPERS

## (undated) DEVICE — TOWEL,OR,DSP,ST,BLUE,STD,4/PK,20PK/CS: Brand: MEDLINE

## (undated) DEVICE — CANN NASL CO2 TRULINK W/O2 A/

## (undated) DEVICE — SUT PDS 1 XLH LP 99IN Z881G

## (undated) DEVICE — CANNULA,ADULT,SOFT-TOUCH,7'TUBE,UC: Brand: PENDING

## (undated) DEVICE — ELECTRD BLD EXT EDGE 1P COAT 6.5IN STRL

## (undated) DEVICE — DEV INFL CRE STERIFLATE 60CC DISP

## (undated) DEVICE — SUT SILK 2/0 FS BLK 18IN 685G

## (undated) DEVICE — ENSEAL TEMPERATURE CONTROLLED TISSUE SEALING TECHNOLOGY DISPOSABLE TISSUE SEALING DEVICE TAPTRONIC TRIGGER ACTIVATED POWER 5MM JAW STYLE: Brand: ENSEAL

## (undated) DEVICE — POOLE SUCTION HANDLE: Brand: CARDINAL HEALTH

## (undated) DEVICE — IRRIGATOR BULB ASEPTO 60CC STRL

## (undated) DEVICE — COLOSTOMY/ILEOSTOMY KIT, FLEXWEAR: Brand: NEW IMAGE

## (undated) DEVICE — PK PROC MAJ 40

## (undated) DEVICE — SUT SILK 2/0 SH CR8 18IN CR8 C012D

## (undated) DEVICE — ECHELON FLEX POWERED PLUS ARTICULATING ENDOSCOPIC LINEAR CUTTER , 60MM: Brand: ECHELON FLEX

## (undated) DEVICE — ENDOPATH XCEL UNIVERSAL TROCAR STABLILITY SLEEVES: Brand: ENDOPATH XCEL

## (undated) DEVICE — SUT SILK 4/0 TIES 18IN A183H

## (undated) DEVICE — SPNG DISECTOR KTNER XRAY COTN 1/4X9/16IN PK/5

## (undated) DEVICE — CANN O2 ETCO2 FITS ALL CONN CO2 SMPL A/ 7IN DISP LF

## (undated) DEVICE — STPLR SKIN VISISTAT WD 35CT

## (undated) DEVICE — COVER,MAYO STAND,STERILE: Brand: MEDLINE

## (undated) DEVICE — MSK AIRWY LARYNG LMA PILOT SZ4

## (undated) DEVICE — LAPAROSCOPIC SMOKE ELIMINATION DEVICE: Brand: PNEUVIEW XE

## (undated) DEVICE — GOWN,NON-REINFORCED,SIRUS,SET IN SLV,XXL: Brand: MEDLINE

## (undated) DEVICE — ENDOPATH XCEL BLADELESS TROCARS WITH STABILITY SLEEVES: Brand: ENDOPATH XCEL

## (undated) DEVICE — KT ORCA ORCAPOD DISP STRL

## (undated) DEVICE — SINGLE-USE BIOPSY FORCEPS: Brand: RADIAL JAW 4

## (undated) DEVICE — JACKSON-PRATT 100CC BULB RESERVOIR: Brand: CARDINAL HEALTH

## (undated) DEVICE — CULT AER/ANAEROB FASTIDIOUS BACT

## (undated) DEVICE — ENSEAL LAPAROSCOPIC TISSUE SEALER G2 ARTICULATING CURVED JAW FOR USE WITH G2 GENERATOR 5MM DIAMETER 45CM SHAFT LENGTH: Brand: ENSEAL

## (undated) DEVICE — TOWEL,OR,DSP,ST,NATURAL,DLX,4/PK,20PK/CS: Brand: MEDLINE

## (undated) DEVICE — COVER,LIGHT HANDLE,FLX,2/PK: Brand: MEDLINE INDUSTRIES, INC.

## (undated) DEVICE — DRSNG WND BORDR/ADHS NONADHR/GZ LF 4X14IN STRL

## (undated) DEVICE — SUT ETHLN 4/0 PS2 PLSTC 1667G

## (undated) DEVICE — LOU LAP SIGMOID COLON: Brand: MEDLINE INDUSTRIES, INC.

## (undated) DEVICE — VISUALIZATION SYSTEM: Brand: CLEARIFY

## (undated) DEVICE — ADAPT SWVL FIBROPTIC BRONCH

## (undated) DEVICE — ANTIBACTERIAL UNDYED BRAIDED (POLYGLACTIN 910), SYNTHETIC ABSORBABLE SUTURE: Brand: COATED VICRYL

## (undated) DEVICE — BANDAGE,GAUZE,BULKEE II,4.5"X4.1YD,STRL: Brand: MEDLINE

## (undated) DEVICE — SUT PROLN 0 CT1 30IN 8424H

## (undated) DEVICE — GOWN,SIRUS,NON REINFRCD,LARGE,SET IN SL: Brand: MEDLINE